# Patient Record
Sex: FEMALE | Employment: FULL TIME | URBAN - METROPOLITAN AREA
[De-identification: names, ages, dates, MRNs, and addresses within clinical notes are randomized per-mention and may not be internally consistent; named-entity substitution may affect disease eponyms.]

---

## 2017-12-04 ENCOUNTER — OFFICE VISIT (OUTPATIENT)
Dept: OBGYN CLINIC | Age: 27
End: 2017-12-04

## 2017-12-04 VITALS
DIASTOLIC BLOOD PRESSURE: 64 MMHG | BODY MASS INDEX: 31.42 KG/M2 | SYSTOLIC BLOOD PRESSURE: 108 MMHG | WEIGHT: 177.38 LBS

## 2017-12-04 DIAGNOSIS — Z34.80 PRENATAL CARE OF MULTIGRAVIDA, ANTEPARTUM: ICD-10-CM

## 2017-12-04 DIAGNOSIS — Z34.81 PRENATAL CARE, SUBSEQUENT PREGNANCY IN FIRST TRIMESTER: Primary | ICD-10-CM

## 2017-12-04 NOTE — PATIENT INSTRUCTIONS
Weeks 6 to 10 of Your Pregnancy: Care Instructions  Your Care Instructions    Congratulations on your pregnancy. This is an exciting and important time for you. During the first 6 to 10 weeks of your pregnancy, your body goes through many changes. Your baby grows very fast, even though you cannot feel it yet. You may start to notice that you feel different, both in your body and your emotions. Because each woman's pregnancy is unique, there is no right way to feel. You may feel the healthiest you have ever been, or you may feel tired or sick to your stomach (\"morning sickness\"). These early weeks are a time to make healthy choices and to eat the best foods for you and your baby. This care sheet will give you some ideas. This is also a good time to think about birth defects testing. These are tests done during pregnancy to look for possible problems with the baby. First trimester tests for birth defects can be done between 8 and 17 weeks of pregnancy, depending on the test. Talk with your doctor about what kinds of tests are available. Follow-up care is a key part of your treatment and safety. Be sure to make and go to all appointments, and call your doctor if you are having problems. It's also a good idea to know your test results and keep a list of the medicines you take. How can you care for yourself at home? Eat well  · Eat at least 3 meals and 2 healthy snacks every day. Eat fresh, whole foods, including:  ¨ 7 or more servings of bread, tortillas, cereal, rice, pasta, or oatmeal.  ¨ 3 or more servings of vegetables, especially leafy green vegetables. ¨ 2 or more servings of fruits. ¨ 3 or more servings of milk, yogurt, or cheese. ¨ 2 or more servings of meat, turkey, chicken, fish, eggs, or dried beans. · Drink plenty of fluids, especially water. Avoid sodas and other sweetened drinks. · Choose foods that have important vitamins for your baby, such as calcium, iron, and folate.   ¨ Dairy products, tofu, canned fish with bones, almonds, broccoli, dark leafy greens, corn tortillas, and fortified orange juice are good sources of calcium. ¨ Beef, poultry, liver, spinach, lentils, dried beans, fortified cereals, and dried fruits are rich in iron. ¨ Dark leafy greens, broccoli, asparagus, liver, fortified cereals, orange juice, peanuts, and almonds are good sources of folate. · Avoid foods that could harm your baby. ¨ Do not eat raw or undercooked meat, chicken, or fish (such as sushi or raw oysters). ¨ Do not eat raw eggs or foods that contain raw eggs, such as Caesar dressing. ¨ Do not eat soft cheeses and unpasteurized dairy foods, such as Brie, feta, or blue cheese. ¨ Do not eat fish that contains a lot of mercury, such as shark, swordfish, tilefish, or jerome mackerel. Do not eat more than 6 ounces of tuna each week. ¨ Do not eat raw sprouts, especially alfalfa sprouts. ¨ Cut down on caffeine, such as coffee, tea, and cola. Protect yourself and your baby  · Do not touch paula litter or cat feces. They can cause an infection that could harm your baby. · High body temperature can be harmful to your baby. So if you want to use a sauna or hot tub, be sure to talk to your doctor about how to use it safely. [employer] with morning sickness  · Sip small amounts of water, juices, or shakes. Try drinking between meals, not with meals. · Eat 5 or 6 small meals a day. Try dry toast or crackers when you first get up, and eat breakfast a little later. · Avoid spicy, greasy, and fatty foods. · When you feel sick, open your windows or go for a short walk to get fresh air. · Try nausea wristbands. These help some women. · Tell your doctor if you think your prenatal vitamins make you sick. Where can you learn more? Go to http://prasanth.info/. Enter G112 in the search box to learn more about \"Weeks 6 to 10 of Your Pregnancy: Care Instructions. \"  Current as of: March 16, 2017  Content Version: 11.4  © 5350-0063 Healthwise, Incorporated. Care instructions adapted under license by [employer] (which disclaims liability or warranty for this information). If you have questions about a medical condition or this instruction, always ask your healthcare professional. [address] any warranty or liability for your use of this information.

## 2017-12-04 NOTE — PROGRESS NOTES
Current pregnancy history:    Leopoldo Moose is a ,  28 y.o. female PATIENT REFUSED No LMP recorded. .  She presents for the evaluation of amenorrhea and a positive pregnancy test.    LMP history:  The date of her LMP is  certain. Her last menstrual period was normal.  A urine pregnancy test was positive      Based on her LMP, her EDC is 2018 and her EGA is 8 weeks,3 days. Her menstrual cycles are regular and occur approximately every 28 days  and range from 3 to 5 days. Ultrasound data:  She had an  ultrasound done by the ultrasound tech today which revealed a viable gonzalez pregnancy with a gestational age of 11 weeks and 2 days giving an [address] of 2018. Per US-   ULTRASOUND PERFORMED  A SINGLE VIABLE 8W2D IUP IS SEEN WITH NORMAL CARDIAC RHYTHM. GESTATIONAL AGE BASED ON TODAYS ULTRASOUND. A NORMAL YOLK SAC IS SEEN. RIGHT OVARY APPEARS WITHIN NORMAL LIMITS. LEFT OVARY APPEARS WITHIN NORMAL LIMITS. NO FREE FLUID IS SEEN IN THE CDS. Pregnancy symptoms:    Since her LMP she has experienced  urinary frequency, breast tenderness, and nausea. She has not been vomiting over the last few weeks. Associated signs and symptoms which she denies: dysuria, discharge, vaginal bleeding. Relevant past pregnancy history:   She has the following pregnancy history: Her last pregnancy was uncomplicated. She has no history of  delivery. Relevant past medical history:(relevant to this pregnancy): noncontributory. Pap/Occupational history:  Last pap smear: last year Results: Normal            Substance history: negative for alcohol, tobacco and street drugs. Positive for nothing. Exposure history: There is/are no indoor cat/s in the home. The patient was instructed to not change the cat litter. She admits close contact with children on a regular basis.    She has had chicken pox or the vaccine in the past.   Patient denies issues with domestic violence. Genetic Screening/Teratology Counseling: (Includes patient, baby's father, or anyone in either family with:)  3.  Patient's age >/= 28 at EDC?--no  2. Thalassemia ([facility] [place], [address], or  background): MCV<80?--no.     3.  Neural tube defect (meningomyelocele, spina bifida, anencephaly)?--no.   4.  Congenital heart defect?--no.  5.  Down syndrome?--no.   6.  Mateus-Sachs ([place], [facility])?--no.   7.  Canavan's Disease?--no.   8.  Familial Dysautonomia?--no.   9.  Sickle cell disease or trait ()? --no   The patient has not been tested for sickle trait  10. Hemophilia or other blood disorders?--no. 11.  Muscular dystrophy?--no. 12.  Cystic fibrosis?--no. 13.  Roland's Chorea?--no. 14.  Mental retardation/autism (if yes was person tested for Fragile X)?--no. 15.  Other inherited genetic or chromosomal disorder?--no. 12.  Maternal metabolic disorder (DM, PKU, etc)?--no. 17.  Patient or FOB with a child with a birth defect not listed above?--no.  17a. Patient or FOB with a birth defect themselves?--no. 18.  Recurrent pregnancy loss, or stillbirth?--no. 19.  Any medications since LMP other than prenatal vitamins (include vitamins, supplements, OTC meds, drugs, alcohol)?--no. 20.  Any other genetic/environmental exposure to discuss?--no. Infection History:  1. Lives with someone with TB or TB exposed?--no.   2.  Patient or partner has history of genital herpes?--no.  3.  Rash or viral illness since LMP?--no.    4.  History of STD (GC, CT, HPV, syphilis, HIV)? --no   5. Other: OTHER? No past medical history on file. No past surgical history on file. Social History     Occupational History    Not on file.      Social History Main Topics    Smoking status: Current Every Day Smoker     Packs/day: 0.50    Smokeless tobacco: Former User     Quit date: 1/4/2016    Alcohol use No    Drug use: No    Sexual activity: Yes     Partners: Male     Family History Problem Relation Age of Onset    Hypertension Father      OB History    Para Term  AB Living   1 1 1   1   SAB TAB Ectopic Molar Multiple Live Births       0 1      # Outcome Date GA Lbr John/2nd Weight Sex Delivery Anes PTL Lv   1 Term 16 40w1d 11:16 / 02:24 7 lb 13 oz (3.545 kg) F Vag-Vacuum EPIDURAL AN N DAPHNE        No Known Allergies  Prior to Admission medications    Medication Sig Start Date End Date Taking? Authorizing Provider   oxyCODONE-acetaminophen (PERCOCET) 5-325 mg per tablet Take 1 Tab by mouth every four (4) hours as needed for Pain. Max Daily Amount: 6 Tabs. 9/15/16   Richa Osman MD   ibuprofen (MOTRIN) 600 mg tablet Take 1 Tab by mouth every six (6) hours as needed for Pain. 9/15/16   Richa Osman MD   prenatal vit-calcium-iron-fa (PRENATAL PLUS, CALCIUM CARB,) 27 mg iron- 1 mg tab Take 1 Tab by mouth daily. Historical Provider   ferrous sulfate (IRON) 325 mg (65 mg iron) tablet Take  by mouth Daily (before breakfast). Historical Provider   ondansetron (ZOFRAN ODT) 4 mg disintegrating tablet Take 1 Tab by mouth every eight (8) hours as needed for Nausea.  13   Yong Sofia MD        Review of Systems: History obtained from the patient  Constitutional: negative for weight loss, fever, night sweats  HEENT: negative for hearing loss, earache, congestion, snoring, sore throat  CV: negative for chest pain, palpitations, edema  Resp: negative for cough, shortness of breath, wheezing  Breast: negative for breast lumps, nipple discharge, galactorrhea  GI: negative for change in bowel habits, abdominal pain, black or bloody stools  : negative for frequency, dysuria, hematuria, vaginal discharge  MSK: negative for back pain, joint pain, muscle pain  Skin: negative for itching, rash, hives  Neuro: negative for dizziness, headache, confusion, weakness  Psych: negative for anxiety, depression, change in mood  Heme/lymph: negative for bleeding, bruising, pallor    Objective: There were no vitals taken for this visit. Physical Exam:     Constitutional  · Appearance: well-nourished, well developed, alert, in no acute distress    HENT  · Head  · Face: appears normal  · Eyes: appear normal  · Ears: normal  · Mouth: normal  · Lips: no lesions      Chest  · Respiratory Effort: breathing unlabored     Cardiovascular  · Heart:  · Auscultation: regular rate and rhythm without murmur      Gastrointestinal  · Abdominal Examination: abdomen non-tender to palpation, normal bowel sounds, no masses present  · Liver and spleen: no hepatomegaly present, spleen not palpable  · Hernias: no hernias identified    Genitourinary  · deferred    Skin  · General Inspection: no rash, no lesions identified    Neurologic/Psychiatric  · Mental Status:  · Orientation: grossly oriented to person, place and time  · Mood and Affect: mood normal, affect appropriate    Assessment:   Intrauterine pregnancy with the following problems identified: see prob list.    Plan:     Offered CF testing, CVS, Nuchal Translucency, MSAFP, amnio, and discussed NIPT  Course of pregnancy discussed including visit schedule, routine U/S, glucola testing, etc.  Avoid alcoholic beverages and illicit/recreational drugs use  Take prenatal vitamins or folic acid daily. Hospital and practice style discussed with coverage system. Discussed nutrition, toxoplasmosis precautions, sexual activity, exercise, need for influenza vaccine, environmental and work hazards, travel advice, screen for domestic violence, need for seat belts. Discussed seafood, unpasteurized dairy products, deli meat, artificial sweeteners, and caffeine. Discussed current prescription drug use. Given medication list.  Discussed the use of over the counter medications and chemicals. Route of delivery discussed, including risks, benefits     Handouts given to pt.

## 2017-12-04 NOTE — MR AVS SNAPSHOT
Visit Information Date & Time Provider Department Dept. Phone Encounter #  
 21/9/3066 74:12 AM Leonardo Pastor MD [address] [facility] [phone] [id number] Upcoming Health Maintenance Date Due  
 PAP AKA CERVICAL CYTOLOGY 4/7/2010 Influenza Age 5 to Adult 8/1/2017 Allergies as of 12/4/2017  Review Complete On: 9/15/2016 By: Diane Evans RN No Known Allergies Current Immunizations  Never Reviewed No immunizations on file. Not reviewed this visit You Were Diagnosed With   
  
 Codes Comments Prenatal care, subsequent pregnancy in first trimester    -  Primary ICD-10-CM: Z34.81 ICD-9-CM: V22.1 Vitals BP Weight(growth percentile) Breastfeeding? BMI OB Status Smoking Status 108/64 (BP 1 Location: Right arm, BP Patient Position: Sitting) 177 lb 6 oz (80.5 kg) No 31.42 kg/m2 Pregnant Former Smoker BMI and BSA Data Body Mass Index Body Surface Area  
 31.42 kg/m 2 1.89 m 2 Your Updated Medication List  
  
   
This list is accurate as of: 12/4/17 11:28 AM.  Always use your most recent med list.  
  
  
  
  
 ibuprofen 600 mg tablet Commonly known as:  MOTRIN Take 1 Tab by mouth every six (6) hours as needed for Pain. Iron 325 mg (65 mg iron) tablet Generic drug:  ferrous sulfate Take  by mouth Daily (before breakfast). ondansetron 4 mg disintegrating tablet Commonly known as:  ZOFRAN ODT Take 1 Tab by mouth every eight (8) hours as needed for Nausea. oxyCODONE-acetaminophen 5-325 mg per tablet Commonly known as:  PERCOCET Take 1 Tab by mouth every four (4) hours as needed for Pain. Max Daily Amount: 6 Tabs. PRENATAL PLUS (CALCIUM CARB) 27 mg iron- 1 mg Tab Generic drug:  prenatal vit-calcium-iron-fa Take 1 Tab by mouth daily. Patient Instructions Weeks 6 to 10 of Your Pregnancy: Care Instructions Your Care Instructions Congratulations on your pregnancy. This is an exciting and important time for you. During the first 6 to 10 weeks of your pregnancy, your body goes through many changes. Your baby grows very fast, even though you cannot feel it yet. You may start to notice that you feel different, both in your body and your emotions. Because each woman's pregnancy is unique, there is no right way to feel. You may feel the healthiest you have ever been, or you may feel tired or sick to your stomach (\"morning sickness\"). These early weeks are a time to make healthy choices and to eat the best foods for you and your baby. This care sheet will give you some ideas. This is also a good time to think about birth defects testing. These are tests done during pregnancy to look for possible problems with the baby. First trimester tests for birth defects can be done between 8 and 17 weeks of pregnancy, depending on the test. Talk with your doctor about what kinds of tests are available. Follow-up care is a key part of your treatment and safety. Be sure to make and go to all appointments, and call your doctor if you are having problems. It's also a good idea to know your test results and keep a list of the medicines you take. How can you care for yourself at home? Eat well · Eat at least 3 meals and 2 healthy snacks every day. Eat fresh, whole foods, including: ¨ 7 or more servings of bread, tortillas, cereal, rice, pasta, or oatmeal. 
¨ 3 or more servings of vegetables, especially leafy green vegetables. ¨ 2 or more servings of fruits. ¨ 3 or more servings of milk, yogurt, or cheese. ¨ 2 or more servings of meat, turkey, chicken, fish, eggs, or dried beans. · Drink plenty of fluids, especially water. Avoid sodas and other sweetened drinks. · Choose foods that have important vitamins for your baby, such as calcium, iron, and folate.  
¨ Dairy products, tofu, canned fish with bones, almonds, broccoli, dark leafy greens, corn tortillas, and fortified orange juice are good sources of calcium. ¨ Beef, poultry, liver, spinach, lentils, dried beans, fortified cereals, and dried fruits are rich in iron. ¨ Dark leafy greens, broccoli, asparagus, liver, fortified cereals, orange juice, peanuts, and almonds are good sources of folate. · Avoid foods that could harm your baby. ¨ Do not eat raw or undercooked meat, chicken, or fish (such as sushi or raw oysters). ¨ Do not eat raw eggs or foods that contain raw eggs, such as Caesar dressing. ¨ Do not eat soft cheeses and unpasteurized dairy foods, such as Brie, feta, or blue cheese. ¨ Do not eat fish that contains a lot of mercury, such as shark, swordfish, tilefish, or jerome mackerel. Do not eat more than 6 ounces of tuna each week. ¨ Do not eat raw sprouts, especially alfalfa sprouts. ¨ Cut down on caffeine, such as coffee, tea, and cola. Protect yourself and your baby · Do not touch paula litter or cat feces. They can cause an infection that could harm your baby. · High body temperature can be harmful to your baby. So if you want to use a sauna or hot tub, be sure to talk to your doctor about how to use it safely. [place] with morning sickness · Sip small amounts of water, juices, or shakes. Try drinking between meals, not with meals. · Eat 5 or 6 small meals a day. Try dry toast or crackers when you first get up, and eat breakfast a little later. · Avoid spicy, greasy, and fatty foods. · When you feel sick, open your windows or go for a short walk to get fresh air. · Try nausea wristbands. These help some women. · Tell your doctor if you think your prenatal vitamins make you sick. Where can you learn more? Go to http://prasanth.info/. Enter G112 in the search box to learn more about \"Weeks 6 to 10 of Your Pregnancy: Care Instructions. \" Current as of: March 16, 2017 Content Version: 11.4 © 9520-9973 Healthwise, Incorporated. Care instructions adapted under license by [employer] (which disclaims liability or warranty for this information). If you have questions about a medical condition or this instruction, always ask your healthcare professional. [address] any warranty or liability for your use of this information. Introducing \[facility]\"" & HEALTH SERVICES! Hue Pollard introduces [facility] patient portal. Now you can access parts of your medical record, email your doctor's office, and request medication refills online. 1. In your internet browser, go to [email or web address] 2. Click on the First Time User? Click Here link in the Sign In box. You will see the New Member Sign Up page. 3. Enter your [facility] Access Code exactly as it appears below. You will not need to use this code after youve completed the sign-up process. If you do not sign up before the expiration date, you must request a new code. · [facility] Access Code: [email or web address] Expires: 3/4/2018 11:28 AM 
 
4. Enter the last four digits of your Social Security Number (xxxx) and Date of Birth (mm/dd/yyyy) as indicated and click Submit. You will be taken to the next sign-up page. 5. Create a [facility] ID. This will be your [facility] login ID and cannot be changed, so think of one that is secure and easy to remember. 6. Create a [facility] password. You can change your password at any time. 7. Enter your Password Reset Question and Answer. This can be used at a later time if you forget your password. 8. Enter your e-mail address. You will receive e-mail notification when new information is available in [address] 9. Click Sign Up. You can now view and download portions of your medical record. 10. Click the Download Summary menu link to download a portable copy of your medical information.  
 
If you have questions, please visit the Frequently Asked Questions section of the [employer]. Remember, [employer] is NOT to be used for urgent needs. For medical emergencies, dial 911. Now available from your iPhone and Android! Please provide this summary of care documentation to your next provider. Your primary care clinician is listed as Sharmaine Okeefe. If you have any questions after today's visit, please call [phone].

## 2017-12-26 ENCOUNTER — TELEPHONE (OUTPATIENT)
Dept: OBGYN CLINIC | Age: 27
End: 2017-12-26

## 2017-12-26 NOTE — TELEPHONE ENCOUNTER
I called patient and advised her not to touch sores and good handwashing until outbreak is gone. Appt next week if she wants titers done.

## 2017-12-26 NOTE — TELEPHONE ENCOUNTER
Patient calling stating that she and her family just moved in with her parents and her mother was dx with the shingles on her face. Patient states that she has had the chicken pox in the past.  Patient advised that she needs to practice good handwashing skills and she should be fine as long as she is immune to varicella. Patient requested to speak with Dr. Vasu castañeda to determine if she needs testing regarding her mother's dx of shingles. Patient transferred to Dr. Vasu castañeda.

## 2018-01-02 ENCOUNTER — ROUTINE PRENATAL (OUTPATIENT)
Dept: OBGYN CLINIC | Age: 28
End: 2018-01-02

## 2018-01-02 VITALS — WEIGHT: 178 LBS | DIASTOLIC BLOOD PRESSURE: 74 MMHG | BODY MASS INDEX: 31.53 KG/M2 | SYSTOLIC BLOOD PRESSURE: 110 MMHG

## 2018-01-02 DIAGNOSIS — Z34.80 PRENATAL CARE OF MULTIGRAVIDA, ANTEPARTUM: ICD-10-CM

## 2018-01-02 DIAGNOSIS — Z34.81 PRENATAL CARE, SUBSEQUENT PREGNANCY IN FIRST TRIMESTER: Primary | ICD-10-CM

## 2018-01-03 LAB
BACTERIA UR CULT: NORMAL
C TRACH RRNA CVX QL NAA+PROBE: NEGATIVE
CYTOLOGIST CVX/VAG CYTO: NORMAL
CYTOLOGY CVX/VAG DOC THIN PREP: NORMAL
DX ICD CODE: NORMAL
LABCORP, 190119: NORMAL
Lab: NORMAL
N GONORRHOEA RRNA CVX QL NAA+PROBE: NEGATIVE
OTHER STN SPEC: NORMAL
PATH REPORT.FINAL DX SPEC: NORMAL
STAT OF ADQ CVX/VAG CYTO-IMP: NORMAL

## 2018-01-04 LAB
ABO GROUP BLD: NORMAL
BLD GP AB SCN SERPL QL: NEGATIVE
ERYTHROCYTE [DISTWIDTH] IN BLOOD BY AUTOMATED COUNT: 13.6 % (ref 12.3–15.4)
HBV SURFACE AG SERPL QL IA: NEGATIVE
HCT VFR BLD AUTO: 36 % (ref 34–46.6)
HGB BLD-MCNC: 12.4 G/DL (ref 11.1–15.9)
HIV 1+2 AB+HIV1 P24 AG SERPL QL IA: NON REACTIVE
MCH RBC QN AUTO: 30.2 PG (ref 26.6–33)
MCHC RBC AUTO-ENTMCNC: 34.4 G/DL (ref 31.5–35.7)
MCV RBC AUTO: 88 FL (ref 79–97)
PLATELET # BLD AUTO: 247 X10E3/UL (ref 150–379)
RBC # BLD AUTO: 4.11 X10E6/UL (ref 3.77–5.28)
RH BLD: POSITIVE
RUBV IGG SERPL IA-ACNC: 10.2 INDEX
T PALLIDUM AB SER QL IA: NEGATIVE
WBC # BLD AUTO: 6.6 X10E3/UL (ref 3.4–10.8)

## 2018-01-22 ENCOUNTER — ROUTINE PRENATAL (OUTPATIENT)
Dept: OBGYN CLINIC | Age: 28
End: 2018-01-22

## 2018-01-22 VITALS
WEIGHT: 178.2 LBS | HEIGHT: 63 IN | BODY MASS INDEX: 31.57 KG/M2 | SYSTOLIC BLOOD PRESSURE: 102 MMHG | DIASTOLIC BLOOD PRESSURE: 68 MMHG

## 2018-01-22 DIAGNOSIS — Z3A.15 15 WEEKS GESTATION OF PREGNANCY: Primary | ICD-10-CM

## 2018-01-22 NOTE — PROGRESS NOTES
Here for routine prenatal visit  Reviewed midwifery services.   Declines Tetra today  FAS next visit

## 2018-01-22 NOTE — PROGRESS NOTES
Problem List  Date Reviewed: 1/2/2018          Codes Class Noted    Prenatal care of multigravida, antepartum ICD-10-CM: Z34.80  ICD-9-CM: V22.1  12/4/2017    Overview Addendum 8/9/0038  0:25 AM by Sherri Bains MD     Primary provider- Dr. Cristiane Hayward   VAVD-09/2016; 7lbs 13oz at 40/1wks; Dr Kingston Ramires;  [facility]; now 14m  EDC by LMP/ US  IOB labs:  done  Genetic Screening: declines panoroma/horizon; tetra__  Anatomy:  GTT:  Flu:__ TDAP:__  Rhogam:  GBS:  Circ:

## 2018-01-22 NOTE — PATIENT INSTRUCTIONS
Weeks 14 to 18 of Your Pregnancy: Care Instructions  Your Care Instructions    During this time, you may start to \"show,\" so that you look pregnant to people around you. You may also notice some changes in your skin, such as itchy spots on your palms or acne on your face. Your baby is now able to pass urine, and your baby's first stool (meconium) is starting to collect in his or her intestines. Hair is also beginning to grow on your baby's head. At your next visit, between weeks 18 and 20, your doctor may do an ultrasound test. The test allows your doctor to check for certain problems. Your doctor can also tell the sex of your baby. This is a good time to think about whether you want to know whether your baby is a boy or a girl. Talk to your doctor about getting a flu shot to help keep you healthy during your pregnancy. As your pregnancy moves along, it is common to worry or feel anxious. Your body is changing a lot. And you are thinking about giving birth, the health of your baby, and becoming a parent. You can learn to cope with any anxiety and stress you feel. Follow-up care is a key part of your treatment and safety. Be sure to make and go to all appointments, and call your doctor if you are having problems. It's also a good idea to know your test results and keep a list of the medicines you take. How can you care for yourself at home? ?Reduce stress  ? · Ask for help with cooking and housekeeping. ? · Figure out who or what causes your stress. Avoid these people or situations as much as possible. ? · Relax every day. Taking 10- to 15-minute breaks can make a big difference. Take a walk, listen to music, or take a warm bath. ? · Learn relaxation techniques at prenatal or yoga class. Or buy a relaxation tape. ? · List your fears about having a baby and becoming a parent. Share the list with someone you trust. Decide which worries are really small, and try to let them go. Exercise  ?  · If you did not exercise much before pregnancy, start slowly. Walking is best. Marzella  yourself, and do a little more every day. ? · Brisk walking, easy jogging, low-impact aerobics, water aerobics, and yoga are good choices. Some sports, such as scuba diving, horseback riding, downhill skiing, gymnastics, and water skiing, are not a good idea. ? · Try to do at least 2½ hours a week of moderate exercise, such as a fast walk. One way to do this is to be active 30 minutes a day, at least 5 days a week. It's fine to be active in blocks of 10 minutes or more throughout your day and week. ? · Wear loose clothing. And wear shoes and a bra that provide good support. ? · Warm up and cool down to start and finish your exercise. ? · If you want to use weights, be sure to use light weights. They reduce stress on your joints. ?Stay at the best weight for you  ? · Experts recommend that you gain about 1 pound a month during the first 3 months of your pregnancy. ? · Experts recommend that you gain about 1 pound a week during your last 6 months of pregnancy, for a total weight gain of 25 to 35 pounds. ? · If you are underweight, you will need to gain more weight (about 28 to 40 pounds). ? · If you are overweight, you may not need to gain as much weight (about 15 to 25 pounds). ? · If you are gaining weight too fast, use common sense. Exercise every day, and limit sweets, fast foods, and fats. Choose lean meats, fruits, and vegetables. ? · If you are having twins or more, your doctor may refer you to a dietitian. Where can you learn more? Go to http://jae-joni.info/. Enter N616 in the search box to learn more about \"Weeks 14 to 18 of Your Pregnancy: Care Instructions. \"  Current as of: March 16, 2017  Content Version: 11.4  © 4959-7863 [employer]. Care instructions adapted under license by easy2map (which disclaims liability or warranty for this information).  If you have questions about a medical condition or this instruction, always ask your healthcare professional. [place] [address] any warranty or liability for your use of this information.

## 2018-02-20 ENCOUNTER — ROUTINE PRENATAL (OUTPATIENT)
Dept: OBGYN CLINIC | Age: 28
End: 2018-02-20

## 2018-02-20 VITALS — WEIGHT: 179 LBS | BODY MASS INDEX: 31.71 KG/M2 | DIASTOLIC BLOOD PRESSURE: 70 MMHG | SYSTOLIC BLOOD PRESSURE: 112 MMHG

## 2018-02-20 DIAGNOSIS — Z34.80 PRENATAL CARE OF MULTIGRAVIDA, ANTEPARTUM: ICD-10-CM

## 2018-02-20 DIAGNOSIS — Z34.82 PRENATAL CARE, SUBSEQUENT PREGNANCY IN SECOND TRIMESTER: Primary | ICD-10-CM

## 2018-02-20 NOTE — PATIENT INSTRUCTIONS

## 2018-02-20 NOTE — MR AVS SNAPSHOT
[address] [place] [address] [facility] 
[phone] Patient: Chasity Chakraborty MRN: [id number] LWR:[birth year] Visit Information Date & Time Provider Department Dept. Phone Encounter #  
 6/59/5002  1:63 AM Sedrick Chiu MD [facility] [facility] [phone] [id number] Upcoming Health Maintenance Date Due Influenza Age 5 to Adult 8/1/2017 PAP AKA CERVICAL CYTOLOGY 1/2/2021 Allergies as of 2/20/2018  Review Complete On: 2/20/2018 By: Milla Elizabeth No Known Allergies Current Immunizations  Never Reviewed No immunizations on file. Not reviewed this visit You Were Diagnosed With   
  
 Codes Comments Prenatal care, subsequent pregnancy in second trimester    -  Primary ICD-10-CM: Z34.82 
ICD-9-CM: V22.1 Vitals BP Weight(growth percentile) BMI OB Status Smoking Status 112/70 179 lb (81.2 kg) 31.71 kg/m2 Pregnant Former Smoker BMI and BSA Data Body Mass Index Body Surface Area 31.71 kg/m 2 1.9 m 2 Your Updated Medication List  
  
   
This list is accurate as of: 2/20/18  8:40 AM.  Always use your most recent med list.  
  
  
  
  
 Iron 325 mg (65 mg iron) tablet Generic drug:  ferrous sulfate Take  by mouth Daily (before breakfast). PRENATAL PLUS (CALCIUM CARB) 27 mg iron- 1 mg Tab Generic drug:  prenatal vit-calcium-iron-fa Take 1 Tab by mouth daily. Patient Instructions Weeks 18 to 22 of Your Pregnancy: Care Instructions Your Care Instructions Your baby is continuing to develop quickly. At this stage, babies can now suck their thumbs,  firmly with their hands, and open and close their eyelids. Sometime between 18 and 22 weeks, you will start to feel your baby move. At first, these small fetal movements feel like fluttering or \"butterflies. \" Some women say that they feel like gas bubbles.  As the baby grows, these movements will become stronger. You may also notice that your baby kicks and hiccups. During this time, you may find that your nausea and fatigue are gone. Overall, you may feel better and have more energy than you did in your first trimester. But you may also have new discomforts now, such as sleep problems or leg cramps. This care sheet can help you ease these discomforts. Follow-up care is a key part of your treatment and safety. Be sure to make and go to all appointments, and call your doctor if you are having problems. It's also a good idea to know your test results and keep a list of the medicines you take. How can you care for yourself at home? Ease sleep problems · Avoid caffeine in drinks or chocolate late in the day. · Get some exercise every day. · Take a warm shower or bath before bed. · Have a light snack or glass of milk at bedtime. · Do relaxation exercises in bed to calm your mind and body. · Support your legs and back with extra pillows. Try a pillow between your legs if you sleep on your side. · Do not use sleeping pills or alcohol. They could harm your baby. Ease leg cramps · Do not massage your calf during the cramp. · Sit on a firm bed or chair. Straighten your leg, and bend your foot (flex your ankle) slowly upward, toward your knee. Bend your toes up and down. · Stand on a cool, flat surface. Stretch your toes upward, and take small steps walking on your heels. · Use a heating pad or hot water bottle to help with muscle ache. Prevent leg cramps · Be sure to get enough calcium. If you are worried that you are not getting enough, talk to your doctor. · Exercise every day, and stretch your legs before bed. · Take a warm bath before bed, and try leg warmers at night. Where can you learn more? Go to http://jae-joni.info/. Enter J119 in the search box to learn more about \"Weeks 18 to 22 of Your Pregnancy: Care Instructions. \" 
 Current as of: March 16, 2017 Content Version: 11.4 © 1600-1971 Healthwise, [employer]. Care instructions adapted under license by [employer] (which disclaims liability or warranty for this information). If you have questions about a medical condition or this instruction, always ask your healthcare professional. [address] any warranty or liability for your use of this information. Introducing [facility] & HEALTH SERVICES! [facility] introduces [employer] patient portal. Now you can access parts of your medical record, email your doctor's office, and request medication refills online. 1. In your internet browser, go to [email or web address]. [employer] 2. Click on the First Time User? Click Here link in the Sign In box. You will see the New Member Sign Up page. 3. Enter your [employer] Access Code exactly as it appears below. You will not need to use this code after youve completed the sign-up process. If you do not sign up before the expiration date, you must request a new code. · [employer] Access Code: [email or web address] Expires: 3/4/2018 11:28 AM 
 
4. Enter the last four digits of your Social Security Number (xxxx) and Date of Birth (mm/dd/yyyy) as indicated and click Submit. You will be taken to the next sign-up page. 5. Create a [employer] ID. This will be your [employer] login ID and cannot be changed, so think of one that is secure and easy to remember. 6. Create a [employer] password. You can change your password at any time. 7. Enter your Password Reset Question and Answer. This can be used at a later time if you forget your password. 8. Enter your e-mail address. You will receive e-mail notification when new information is available in [address] 9. Click Sign Up. You can now view and download portions of your medical record. 10. Click the Download Summary menu link to download a portable copy of your medical information. If you have questions, please visit the Frequently Asked Questions section of the [employer] website. Remember, [employer] is NOT to be used for urgent needs. For medical emergencies, dial 911. Now available from your iPhone and Android! Please provide this summary of care documentation to your next provider. Your primary care clinician is listed as Ras Esteban. If you have any questions after today's visit, please call [phone].

## 2018-03-19 ENCOUNTER — ROUTINE PRENATAL (OUTPATIENT)
Dept: OBGYN CLINIC | Age: 28
End: 2018-03-19

## 2018-03-19 VITALS — SYSTOLIC BLOOD PRESSURE: 122 MMHG | BODY MASS INDEX: 32.51 KG/M2 | DIASTOLIC BLOOD PRESSURE: 72 MMHG | WEIGHT: 183.5 LBS

## 2018-03-19 DIAGNOSIS — Z34.82 PRENATAL CARE, SUBSEQUENT PREGNANCY IN SECOND TRIMESTER: Primary | ICD-10-CM

## 2018-03-19 NOTE — MR AVS SNAPSHOT
[address] 57 
[phone] Patient: Francisco Gordon MRN: [id number] TYQ:4[birth year] Visit Information Date & Time Provider Department Dept. Phone Encounter #  
 6/03/4646 92:19 AM Jessy Haines MD [address] [facility] [phone] [id number] Your Appointments 4/17/2018  8:00 AM  
[facility] with [facility] AT [address] [place] [address] ([address] [place] [address]) Appt Note: u/s then ah w/ glucola(marginal cord insertion) [place] [address], [place] [address] [facility] [address]  
[address], [facility]5 [place] [id number]  
  
    
  
 4/17/2018  8:30 AM  
OB VISIT with Jessy Haines MD  
[address] [facility] ([address] [place] [address]) Appt Note: u/s then ah w/ glucola(marginal cord insertion) [place] [address], [place] 219 [facility] [address]  
[address], [address] [facility] 57 Upcoming Health Maintenance Date Due Influenza Age 5 to Adult 8/1/2017 PAP AKA CERVICAL CYTOLOGY 1/2/2021 Allergies as of 3/19/2018  Review Complete On: 3/19/2018 By: Taylor Linn No Known Allergies Current Immunizations  Never Reviewed No immunizations on file. Not reviewed this visit You Were Diagnosed With   
  
 Codes Comments Prenatal care, subsequent pregnancy in second trimester    -  Primary ICD-10-CM: Z34.82 
ICD-9-CM: V22.1 Vitals BP Weight(growth percentile) BMI OB Status Smoking Status 122/72 183 lb 8 oz (83.2 kg) 32.51 kg/m2 Pregnant Former Smoker BMI and BSA Data Body Mass Index Body Surface Area 32.51 kg/m 2 1.92 m 2 Your Updated Medication List  
  
   
This list is accurate as of 3/19/18 11:12 AM.  Always use your most recent med list.  
  
  
  
  
 PRENATAL PLUS (CALCIUM CARB) 27 mg iron- 1 mg Tab Generic drug:  prenatal vit-calcium-iron-fa Take 1 Tab by mouth daily. Patient Instructions Weeks 22 to 26 of Your Pregnancy: Care Instructions Your Care Instructions As you enter your 7th month of pregnancy at week 26, your baby's lungs are growing stronger and getting ready to breathe. You may notice that your baby responds to the sound of your or your partner's voice. You may also notice that your baby does less turning and twisting and more squirming or jerking. Jerking often means that your baby has the hiccups. Hiccups are perfectly normal and are only temporary. You may want to think about attending a childbirth preparation class. This is also a good time to start thinking about whether you want to have pain medicine during labor. Most pregnant women are tested for gestational diabetes between weeks 25 and 28. Gestational diabetes occurs when your blood sugar level gets too high when you're pregnant. The test is important, because you can have gestational diabetes and not know it. But the condition can cause problems for your baby. Follow-up care is a key part of your treatment and safety. Be sure to make and go to all appointments, and call your doctor if you are having problems. It's also a good idea to know your test results and keep a list of the medicines you take. How can you care for yourself at home? Ease discomfort from your baby's kicking · Change your position. Sometimes this will cause your baby to change position too. · Take a deep breath while you raise your arm over your head. Then breathe out while you drop your arm. Do Kegel exercises to prevent urine from leaking · You can do Kegel exercises while you stand or sit. ¨ Squeeze the same muscles you would use to stop your urine. Your belly and thighs should not move. ¨ Hold the squeeze for 3 seconds, and then relax for 3 seconds. ¨ Start with 3 seconds. Then add 1 second each week until you are able to squeeze for 10 seconds. ¨ Repeat the exercise 10 to 15 times for each session. Do three or more sessions each day. Ease or reduce swelling in your feet, ankles, hands, and fingers · If your fingers are puffy, take off your rings. · Do not eat high-salt foods, such as potato chips. · Prop up your feet on a stool or couch as much as possible. Sleep with pillows under your feet. · Do not stand for long periods of time or wear tight shoes. · Wear support stockings. Where can you learn more? Go to http://jae-joni.info/. Enter G264 in the search box to learn more about \"Weeks 22 to 26 of Your Pregnancy: Care Instructions. \" Current as of: March 16, 2017 Content Version: 11.4 © 8084-2663 [employer]. Care instructions adapted under license by Seeq (which disclaims liability or warranty for this information). If you have questions about a medical condition or this instruction, always ask your healthcare professional. [place] [address] any warranty or liability for your use of this information. Introducing \[facility]\"" & HEALTH SERVICES! [employer] introduces [employer] patient portal. Now you can access parts of your medical record, email your doctor's office, and request medication refills online. 1. In your internet browser, go to [email or web address] [email or web address]/IndaBox 2. Click on the First Time User? Click Here link in the Sign In box. You will see the New Member Sign Up page. 3. Enter your Add[employer] Access Code exactly as it appears below. You will not need to use this code after youve completed the sign-up process. If you do not sign up before the expiration date, you must request a new code. · Add[employer] Access Code: [email or web address] Expires: 6/17/2018 11:12 AM 
 
4. Enter the last four digits of your Social Security Number (xxxx) and Date of Birth (mm/dd/yyyy) as indicated and click Submit. You will be taken to the next sign-up page. 5. Create a BugHerd ID. This will be your Bug[employer] login ID and cannot be changed, so think of one that is secure and easy to remember. 6. Create a [employer] password. You can change your password at any time. 7. Enter your Password Reset Question and Answer. This can be used at a later time if you forget your password. 8. Enter your e-mail address. You will receive e-mail notification when new information is available in [address] 9. Click Sign Up. You can now view and download portions of your medical record. 10. Click the Download Summary menu link to download a portable copy of your medical information. If you have questions, please visit the Frequently Asked Questions section of the [employer] website. Remember, [employer] is NOT to be used for urgent needs. For medical emergencies, dial 911. Now available from your iPhone and Android! Please provide this summary of care documentation to your next provider. Your primary care clinician is listed as Esthela Garcia. If you have any questions after today's visit, please call [phone].

## 2018-03-19 NOTE — PATIENT INSTRUCTIONS
Weeks 22 to 26 of Your Pregnancy: Care Instructions  Your Care Instructions    As you enter your 7th month of pregnancy at week 26, your baby's lungs are growing stronger and getting ready to breathe. You may notice that your baby responds to the sound of your or your partner's voice. You may also notice that your baby does less turning and twisting and more squirming or jerking. Jerking often means that your baby has the hiccups. Hiccups are perfectly normal and are only temporary. You may want to think about attending a childbirth preparation class. This is also a good time to start thinking about whether you want to have pain medicine during labor. Most pregnant women are tested for gestational diabetes between weeks 25 and 28. Gestational diabetes occurs when your blood sugar level gets too high when you're pregnant. The test is important, because you can have gestational diabetes and not know it. But the condition can cause problems for your baby. Follow-up care is a key part of your treatment and safety. Be sure to make and go to all appointments, and call your doctor if you are having problems. It's also a good idea to know your test results and keep a list of the medicines you take. How can you care for yourself at home? Ease discomfort from your baby's kicking  · Change your position. Sometimes this will cause your baby to change position too. · Take a deep breath while you raise your arm over your head. Then breathe out while you drop your arm. Do Kegel exercises to prevent urine from leaking  · You can do Kegel exercises while you stand or sit. ¨ Squeeze the same muscles you would use to stop your urine. Your belly and thighs should not move. ¨ Hold the squeeze for 3 seconds, and then relax for 3 seconds. ¨ Start with 3 seconds. Then add 1 second each week until you are able to squeeze for 10 seconds. ¨ Repeat the exercise 10 to 15 times for each session.  Do three or more sessions each day.  Ease or reduce swelling in your feet, ankles, hands, and fingers  · If your fingers are puffy, take off your rings. · Do not eat high-salt foods, such as potato chips. · Prop up your feet on a stool or couch as much as possible. Sleep with pillows under your feet. · Do not stand for long periods of time or wear tight shoes. · Wear support stockings. Where can you learn more? Go to http://jae-joni.info/. Enter G264 in the search box to learn more about \"Weeks 22 to 26 of Your Pregnancy: Care Instructions. \"  Current as of: March 16, 2017  Content Version: 11.4  © 3702-3687 Healthwise, [employer]. Care instructions adapted under license by [employer] (which disclaims liability or warranty for this information). If you have questions about a medical condition or this instruction, always ask your healthcare professional. [place] [address] any warranty or liability for your use of this information.

## 2018-04-17 ENCOUNTER — ROUTINE PRENATAL (OUTPATIENT)
Dept: OBGYN CLINIC | Age: 28
End: 2018-04-17

## 2018-04-17 VITALS — WEIGHT: 187.5 LBS | SYSTOLIC BLOOD PRESSURE: 110 MMHG | BODY MASS INDEX: 33.21 KG/M2 | DIASTOLIC BLOOD PRESSURE: 78 MMHG

## 2018-04-17 DIAGNOSIS — Z34.80 PRENATAL CARE OF MULTIGRAVIDA, ANTEPARTUM: ICD-10-CM

## 2018-04-17 DIAGNOSIS — Z23 ENCOUNTER FOR IMMUNIZATION: ICD-10-CM

## 2018-04-17 DIAGNOSIS — Z34.82 PRENATAL CARE, SUBSEQUENT PREGNANCY IN SECOND TRIMESTER: Primary | ICD-10-CM

## 2018-04-17 LAB
GTT, 1 HR, GLUCOLA, EXTERNAL: 106
HCT, EXTERNAL: 34.9
HGB, EXTERNAL: 11.8
PLATELET CNT,   EXTERNAL: 252
T. PALLIDUM, EXTERNAL: NORMAL

## 2018-04-17 NOTE — PROGRESS NOTES
Tdap vaccine, Lot [id number], Exp 5/30/20, given IM in left deltoid without complication per MD order. Consent signed.

## 2018-04-17 NOTE — PROGRESS NOTES
Glucola and Tdap today. Per US-LIMITED OB SCAN  A SINGLE VERTEX 27W4D IUP IS SEEN. FETAL CARDIAC MOTION OBSERVED. LIMITED ANATOMY WAS VISUALIZED AND APPEARS WNL. APPROPRIATE GROWTH MEASURED. SIZE = DATES. BELLA AND PLACENTA APPEAR WITHIN NORMAL LIMITS. THE  UMBILICAL CORD HAS MARGINAL INSERTION.     Mom living with them; now in hospice; managing well

## 2018-04-17 NOTE — PATIENT INSTRUCTIONS
Weeks 26 to 30 of Your Pregnancy: Care Instructions  Your Care Instructions    You are now in your last trimester of pregnancy. Your baby is growing rapidly. And you'll probably feel your baby moving around more often. Your doctor may ask you to count your baby's kicks. Your back may ache as your body gets used to your baby's size and length. If you haven't already had the Tdap shot during this pregnancy, talk to your doctor about getting it. It will help protect your  against pertussis infection. During this time, it's important to take care of yourself and pay attention to what your body needs. If you feel sexual, explore ways to be close with your partner that match your comfort and desire. Use the tips provided in this care sheet to find ways to be sexual in your own way. Follow-up care is a key part of your treatment and safety. Be sure to make and go to all appointments, and call your doctor if you are having problems. It's also a good idea to know your test results and keep a list of the medicines you take. How can you care for yourself at home? Take it easy at work  · Take frequent breaks. If possible, stop working when you are tired, and rest during your lunch hour. · Take bathroom breaks every 2 hours. · Change positions often. If you sit for long periods, stand up and walk around. · When you stand for a long time, keep one foot on a low stool with your knee bent. After standing a lot, sit with your feet up. · Avoid fumes, chemicals, and tobacco smoke. Be sexual in your own way  · Having sex during pregnancy is okay, unless your doctor tells you not to. · You may be very interested in sex, or you may have no interest at all. · Your growing belly can make it hard to find a good position during intercourse. [place] and explore. · You may get cramps in your uterus when your partner touches your breasts.   · A back rub may relieve the backache or cramps that sometimes follow orgasm. Learn about  labor  · Watch for signs of  labor. You may be going into labor if:  ¨ You have menstrual-like cramps, with or without nausea. ¨ You have about 4 or more contractions in 20 minutes, or about 8 or more within 1 hour, even after you have had a glass of water and are resting. ¨ You have a low, dull backache that does not go away when you change your position. ¨ You have pain or pressure in your pelvis that comes and goes in a pattern. ¨ You have intestinal cramping or flu-like symptoms, with or without diarrhea. ¨ You notice an increase or change in your vaginal discharge. Discharge may be heavy, mucus-like, watery, or streaked with blood. ¨ Your water breaks. · If you think you have  labor:  ¨ Drink 2 or 3 glasses of water or juice. Not drinking enough fluids can cause contractions. ¨ Stop what you are doing, and empty your bladder. Then lie down on your left side for at least 1 hour. ¨ While lying on your side, find your breast bone. Put your fingers in the soft spot just below it. Move your fingers down toward your belly button to find the top of your uterus. Check to see if it is tight. ¨ Contractions can be weak or strong. Record your contractions for an hour. Time a contraction from the start of one contraction to the start of the next one. ¨ Single or several strong contractions without a pattern are called Miguel-Rodrigues contractions. They are practice contractions but not the start of labor. They often stop if you change what you are doing. ¨ Call your doctor if you have regular contractions. Where can you learn more? Go to http://jae-joni.info/. Enter M374 in the search box to learn more about \"Weeks 26 to 30 of Your Pregnancy: Care Instructions. \"  Current as of: 2017  Content Version: 11.4  © 5646-0069 [employer].  Care instructions adapted under license by [employer] (which disclaims liability or warranty for this information). If you have questions about a medical condition or this instruction, always ask your healthcare professional. [place] [address] any warranty or liability for your use of this information.

## 2018-04-18 LAB
BASOPHILS # BLD AUTO: 0 X10E3/UL (ref 0–0.2)
BASOPHILS NFR BLD AUTO: 0 %
EOSINOPHIL # BLD AUTO: 0.2 X10E3/UL (ref 0–0.4)
EOSINOPHIL NFR BLD AUTO: 2 %
ERYTHROCYTE [DISTWIDTH] IN BLOOD BY AUTOMATED COUNT: 14.2 % (ref 12.3–15.4)
GLUCOSE 1H P 50 G GLC PO SERPL-MCNC: 106 MG/DL (ref 65–139)
HCT VFR BLD AUTO: 34.9 % (ref 34–46.6)
HGB BLD-MCNC: 11.8 G/DL (ref 11.1–15.9)
IMM GRANULOCYTES # BLD: 0 X10E3/UL (ref 0–0.1)
IMM GRANULOCYTES NFR BLD: 0 %
LYMPHOCYTES # BLD AUTO: 1.3 X10E3/UL (ref 0.7–3.1)
LYMPHOCYTES NFR BLD AUTO: 15 %
MCH RBC QN AUTO: 30.2 PG (ref 26.6–33)
MCHC RBC AUTO-ENTMCNC: 33.8 G/DL (ref 31.5–35.7)
MCV RBC AUTO: 89 FL (ref 79–97)
MONOCYTES # BLD AUTO: 0.5 X10E3/UL (ref 0.1–0.9)
MONOCYTES NFR BLD AUTO: 5 %
NEUTROPHILS # BLD AUTO: 6.8 X10E3/UL (ref 1.4–7)
NEUTROPHILS NFR BLD AUTO: 78 %
PLATELET # BLD AUTO: 252 X10E3/UL (ref 150–379)
RBC # BLD AUTO: 3.91 X10E6/UL (ref 3.77–5.28)
RPR SER QL: NON REACTIVE
WBC # BLD AUTO: 8.9 X10E3/UL (ref 3.4–10.8)

## 2018-04-25 DIAGNOSIS — Z34.80 PRENATAL CARE OF MULTIGRAVIDA, ANTEPARTUM: ICD-10-CM

## 2018-05-14 ENCOUNTER — ROUTINE PRENATAL (OUTPATIENT)
Dept: OBGYN CLINIC | Age: 28
End: 2018-05-14

## 2018-05-14 VITALS — BODY MASS INDEX: 34.01 KG/M2 | SYSTOLIC BLOOD PRESSURE: 128 MMHG | DIASTOLIC BLOOD PRESSURE: 78 MMHG | WEIGHT: 192 LBS

## 2018-05-14 DIAGNOSIS — Z34.80 PRENATAL CARE OF MULTIGRAVIDA, ANTEPARTUM: ICD-10-CM

## 2018-05-14 NOTE — PROGRESS NOTES
Per US today-LIMITED OB SCAN  A SINGLE VERTEX 31W3D IUP IS SEEN. FETAL CARDIAC MOTION OBSERVED. LIMITED ANATOMY WAS VISUALIZED AND APPEARS WNL. APPROPRIATE GROWTH MEASURED. SIZE = DATES. BELLA AND PLACENTA APPEAR WITHIN NORMAL LIMITS. THE CORD INSERTION INTO PLACENTA APPEARS MARGINAL.     Mom was in hospice within their home; recently passed

## 2018-05-31 ENCOUNTER — ROUTINE PRENATAL (OUTPATIENT)
Dept: OBGYN CLINIC | Age: 28
End: 2018-05-31

## 2018-05-31 VITALS — BODY MASS INDEX: 34.22 KG/M2 | SYSTOLIC BLOOD PRESSURE: 116 MMHG | WEIGHT: 193.2 LBS | DIASTOLIC BLOOD PRESSURE: 68 MMHG

## 2018-05-31 DIAGNOSIS — Z34.83 PRENATAL CARE, SUBSEQUENT PREGNANCY IN THIRD TRIMESTER: Primary | ICD-10-CM

## 2018-05-31 DIAGNOSIS — N89.8 ITCHING IN THE VAGINAL AREA: ICD-10-CM

## 2018-05-31 NOTE — PROGRESS NOTES
Patient is having vaginal swelling, itching and white thick discharge; no recent antibiotics; candidiasis; recommend monistat

## 2018-06-04 ENCOUNTER — ROUTINE PRENATAL (OUTPATIENT)
Dept: OBGYN CLINIC | Age: 28
End: 2018-06-04

## 2018-06-04 VITALS — BODY MASS INDEX: 34.54 KG/M2 | DIASTOLIC BLOOD PRESSURE: 66 MMHG | SYSTOLIC BLOOD PRESSURE: 110 MMHG | WEIGHT: 195 LBS

## 2018-06-04 DIAGNOSIS — Z34.83 PRENATAL CARE, SUBSEQUENT PREGNANCY IN THIRD TRIMESTER: Primary | ICD-10-CM

## 2018-06-04 DIAGNOSIS — Z34.80 PRENATAL CARE OF MULTIGRAVIDA, ANTEPARTUM: ICD-10-CM

## 2018-06-04 NOTE — PATIENT INSTRUCTIONS

## 2018-06-04 NOTE — PROGRESS NOTES
Used Monistat-- feeling better! Doing well otherwise. Feeling lots of pressure. US next visit for marginal cord insert.  S>D

## 2018-06-15 ENCOUNTER — ROUTINE PRENATAL (OUTPATIENT)
Dept: OBGYN CLINIC | Age: 28
End: 2018-06-15

## 2018-06-15 VITALS — BODY MASS INDEX: 34.72 KG/M2 | DIASTOLIC BLOOD PRESSURE: 76 MMHG | WEIGHT: 196 LBS | SYSTOLIC BLOOD PRESSURE: 122 MMHG

## 2018-06-15 DIAGNOSIS — Z34.80 PRENATAL CARE OF MULTIGRAVIDA, ANTEPARTUM: ICD-10-CM

## 2018-06-15 DIAGNOSIS — Z34.83 PRENATAL CARE, SUBSEQUENT PREGNANCY IN THIRD TRIMESTER: Primary | ICD-10-CM

## 2018-06-15 LAB — GRBS, EXTERNAL: NEGATIVE

## 2018-06-15 NOTE — PATIENT INSTRUCTIONS

## 2018-06-15 NOTE — PROGRESS NOTES
Per US-LIMITED OB SCAN  A SINGLE VERTEX 36W0D IUP IS SEEN. FETAL CARDIAC MOTION OBSERVED. LIMITED ANATOMY WAS VISUALIZED AND APPEARS WNL. APPROPRIATE GROWTH MEASURED. SIZE = DATES. BELLA 24.9cm  PLACENTA APPEAR WITHIN NORMAL LIMITS. MARGINAL CORD INSERTION INTO PLACENTA. DOPPLERS APPEAR WNL. GBS today. Cramping and  pressure.  Fu US 2wk; will induce 7/6

## 2018-06-19 LAB — B-HEM STREP SPEC QL CULT: NEGATIVE

## 2018-06-20 DIAGNOSIS — Z34.80 PRENATAL CARE OF MULTIGRAVIDA, ANTEPARTUM: ICD-10-CM

## 2018-06-21 ENCOUNTER — ROUTINE PRENATAL (OUTPATIENT)
Dept: OBGYN CLINIC | Age: 28
End: 2018-06-21

## 2018-06-21 VITALS — SYSTOLIC BLOOD PRESSURE: 128 MMHG | DIASTOLIC BLOOD PRESSURE: 80 MMHG

## 2018-06-21 DIAGNOSIS — Z3A.36 36 WEEKS GESTATION OF PREGNANCY: Primary | ICD-10-CM

## 2018-06-21 NOTE — PROGRESS NOTES
Doing well. BELLA now 22! Will repeat BELLA next week.   Planning IOL with Dr. Ana Kemp in early July

## 2018-06-25 ENCOUNTER — ROUTINE PRENATAL (OUTPATIENT)
Dept: OBGYN CLINIC | Age: 28
End: 2018-06-25

## 2018-06-25 VITALS
DIASTOLIC BLOOD PRESSURE: 74 MMHG | SYSTOLIC BLOOD PRESSURE: 122 MMHG | HEIGHT: 63 IN | WEIGHT: 198.6 LBS | BODY MASS INDEX: 35.19 KG/M2

## 2018-06-25 DIAGNOSIS — Z34.83 PRENATAL CARE, SUBSEQUENT PREGNANCY IN THIRD TRIMESTER: Primary | ICD-10-CM

## 2018-06-25 NOTE — PATIENT INSTRUCTIONS
Week 37 of Your Pregnancy: Care Instructions  Your Care Instructions    You are near the end of your pregnancy-and you're probably pretty uncomfortable. It may be harder to walk around. Lying down probably isn't comfortable either. You may have trouble getting to sleep or staying asleep. Most women deliver their babies between 40 and 41 weeks. This is a good time to think about packing a bag for the hospital with items you'll need. Then you'll be ready when labor starts. Follow-up care is a key part of your treatment and safety. Be sure to make and go to all appointments, and call your doctor if you are having problems. It's also a good idea to know your test results and keep a list of the medicines you take. How can you care for yourself at home? Learn about breastfeeding  · Breastfeeding is best for your baby and good for you. · Breast milk has antibodies to help your baby fight infections. · Mothers who breastfeed often lose weight faster, because making milk burns calories. · Learning the best ways to hold your baby will make breastfeeding easier. · Let your partner bathe and diaper the baby to keep your partner from feeling left out. Snuggle together when you breastfeed. · You may want to learn how to use a breast pump and store your milk. · If you choose to bottle feed, make the feeding feel like breastfeeding so you can bond with your baby. Always hold your baby and the bottle. Do not prop bottles or let your baby fall asleep with a bottle. Learn about crying  · It is common for babies to cry for 1 to 3 hours a day. Some cry more, some cry less. · Babies don't cry to make you upset or because you are a bad parent. · Crying is how your baby communicates. Your baby may be hungry; have gas; need a diaper change; or feel cold, warm, tired, lonely, or tense. Sometimes babies cry for unknown reasons. · If you respond to your baby's needs, he or she will learn to trust you.   · Try to stay calm when your baby cries. Your baby may get more upset if he or she senses that you are upset. Know how to care for your   · Your baby's umbilical cord stump will drop off on its own, usually between 1 and 2 weeks. To care for your baby's umbilical cord area:  ¨ Clean the area at the bottom of the cord 2 or 3 times a day. ¨ Pay special attention to the area where the cord attaches to the skin. ¨ Keep the diaper folded below the cord. ¨ Use a damp washcloth or cotton ball to sponge bathe your baby until the stump has come off. · Your baby's first dark stool is called meconium. After the meconium is passed, your baby will develop his or her own bowel pattern. ¨ Some babies, especially  babies, have several bowel movements a day. Others have one or two a day, or one every 2 to 3 days. ¨  babies often have loose, yellow stools. Formula-fed babies have more formed stools. ¨ If your baby's stools look like little pellets, he or she is constipated. After 2 days of constipation, call your baby's doctor. · If your baby will be circumcised, you can care for him at home. ¨ Gently rinse his penis with warm water after every diaper change. Do not try to remove the film that forms on the penis. This film will go away on its own. Pat dry. ¨ Put petroleum ointment, such as Vaseline, on the area of the diaper that will touch your baby's penis. This will keep the diaper from sticking to your baby. ¨ Ask the doctor about giving your baby acetaminophen (Tylenol) for pain. Where can you learn more? Go to http://jae-joni.info/. Enter 68 21 97 in the search box to learn more about \"Week 37 of Your Pregnancy: Care Instructions. \"  Current as of: 2017  Content Version: 11.4  © 0968-5245 [employer]. Care instructions adapted under license by [employer] (which disclaims liability or warranty for this information).  If you have questions about a medical condition or this instruction, always ask your healthcare professional. [place] [address] any warranty or liability for your use of this information.

## 2018-07-02 ENCOUNTER — ROUTINE PRENATAL (OUTPATIENT)
Dept: OBGYN CLINIC | Age: 28
End: 2018-07-02

## 2018-07-02 VITALS
WEIGHT: 195.6 LBS | BODY MASS INDEX: 34.66 KG/M2 | SYSTOLIC BLOOD PRESSURE: 102 MMHG | DIASTOLIC BLOOD PRESSURE: 76 MMHG | HEIGHT: 63 IN

## 2018-07-02 DIAGNOSIS — Z34.83 PRENATAL CARE, SUBSEQUENT PREGNANCY IN THIRD TRIMESTER: Primary | ICD-10-CM

## 2018-07-05 ENCOUNTER — TELEPHONE (OUTPATIENT)
Dept: OBGYN CLINIC | Age: 28
End: 2018-07-05

## 2018-07-05 NOTE — TELEPHONE ENCOUNTER
Patient calling , 38w6d pregnant, C/O of having contractions that are 2-5 min apart for the past 20 min that vary in intensity. She is tracking the contractions but while on the phone, she states that she has not had one for almost 10 minutes now. She thinks these contractions are not consistent yet. She reports good fetal movement, denies bleeding, denies ROM. Patient states that she would like to track these contractions to the 5 min a part for a complete hour and contact our office back. Patient advised that she can do this since this is our labor protocol. Patient advised to contact me back if her contractions  and are consistent or if her water breaks. Patient is scheduled to be induced tomorrow 18. Patient verbalized understanding.

## 2018-07-06 ENCOUNTER — HOSPITAL ENCOUNTER (OUTPATIENT)
Age: 28
Setting detail: OBSERVATION
Discharge: HOME | End: 2018-07-06
Attending: OBSTETRICS & GYNECOLOGY | Admitting: OBSTETRICS & GYNECOLOGY
Payer: COMMERCIAL

## 2018-07-06 VITALS
SYSTOLIC BLOOD PRESSURE: 100 MMHG | TEMPERATURE: 97.7 F | BODY MASS INDEX: 34.55 KG/M2 | WEIGHT: 195 LBS | RESPIRATION RATE: 16 BRPM | HEIGHT: 63 IN | HEART RATE: 72 BPM | DIASTOLIC BLOOD PRESSURE: 57 MMHG

## 2018-07-06 PROBLEM — Z34.90 PREGNANCY: Status: ACTIVE | Noted: 2018-07-06

## 2018-07-06 LAB
ERYTHROCYTE [DISTWIDTH] IN BLOOD BY AUTOMATED COUNT: 13.7 % (ref 11.5–14.5)
HCT VFR BLD AUTO: 32.7 % (ref 35–47)
HGB BLD-MCNC: 10.7 G/DL (ref 11.5–16)
MCH RBC QN AUTO: 29.3 PG (ref 26–34)
MCHC RBC AUTO-ENTMCNC: 32.7 G/DL (ref 30–36.5)
MCV RBC AUTO: 89.6 FL (ref 80–99)
NRBC # BLD: 0 K/UL (ref 0–0.01)
NRBC BLD-RTO: 0 PER 100 WBC
PLATELET # BLD AUTO: 279 K/UL (ref 150–400)
PMV BLD AUTO: 10.9 FL (ref 8.9–12.9)
RBC # BLD AUTO: 3.65 M/UL (ref 3.8–5.2)
WBC # BLD AUTO: 6.9 K/UL (ref 3.6–11)

## 2018-07-06 PROCEDURE — 74011250636 HC RX REV CODE- 250/636: Performed by: OBSTETRICS & GYNECOLOGY

## 2018-07-06 PROCEDURE — 96366 THER/PROPH/DIAG IV INF ADDON: CPT

## 2018-07-06 PROCEDURE — 96360 HYDRATION IV INFUSION INIT: CPT

## 2018-07-06 PROCEDURE — 99285 EMERGENCY DEPT VISIT HI MDM: CPT

## 2018-07-06 PROCEDURE — 65270000029 HC RM PRIVATE

## 2018-07-06 PROCEDURE — 59020 FETAL CONTRACT STRESS TEST: CPT

## 2018-07-06 PROCEDURE — 85027 COMPLETE CBC AUTOMATED: CPT | Performed by: OBSTETRICS & GYNECOLOGY

## 2018-07-06 PROCEDURE — 96361 HYDRATE IV INFUSION ADD-ON: CPT

## 2018-07-06 PROCEDURE — 96365 THER/PROPH/DIAG IV INF INIT: CPT

## 2018-07-06 PROCEDURE — 36415 COLL VENOUS BLD VENIPUNCTURE: CPT | Performed by: OBSTETRICS & GYNECOLOGY

## 2018-07-06 PROCEDURE — 59025 FETAL NON-STRESS TEST: CPT

## 2018-07-06 PROCEDURE — 99218 HC RM OBSERVATION: CPT

## 2018-07-06 RX ORDER — NALOXONE HYDROCHLORIDE 0.4 MG/ML
0.4 INJECTION, SOLUTION INTRAMUSCULAR; INTRAVENOUS; SUBCUTANEOUS AS NEEDED
Status: DISCONTINUED | OUTPATIENT
Start: 2018-07-06 | End: 2018-07-06 | Stop reason: HOSPADM

## 2018-07-06 RX ORDER — LIDOCAINE HYDROCHLORIDE AND EPINEPHRINE 15; 5 MG/ML; UG/ML
4.5 INJECTION, SOLUTION EPIDURAL AS NEEDED
Status: DISCONTINUED | OUTPATIENT
Start: 2018-07-06 | End: 2018-07-06 | Stop reason: HOSPADM

## 2018-07-06 RX ORDER — TERBUTALINE SULFATE 1 MG/ML
0.25 INJECTION SUBCUTANEOUS AS NEEDED
Status: DISCONTINUED | OUTPATIENT
Start: 2018-07-06 | End: 2018-07-06 | Stop reason: HOSPADM

## 2018-07-06 RX ORDER — SODIUM CHLORIDE, SODIUM LACTATE, POTASSIUM CHLORIDE, CALCIUM CHLORIDE 600; 310; 30; 20 MG/100ML; MG/100ML; MG/100ML; MG/100ML
125 INJECTION, SOLUTION INTRAVENOUS CONTINUOUS
Status: DISCONTINUED | OUTPATIENT
Start: 2018-07-06 | End: 2018-07-06 | Stop reason: HOSPADM

## 2018-07-06 RX ORDER — FENTANYL/BUPIVACAINE/NS/PF 2-1250MCG
1-16 PREFILLED PUMP RESERVOIR EPIDURAL CONTINUOUS
Status: DISCONTINUED | OUTPATIENT
Start: 2018-07-06 | End: 2018-07-06 | Stop reason: HOSPADM

## 2018-07-06 RX ORDER — BUPIVACAINE HYDROCHLORIDE 5 MG/ML
30 INJECTION, SOLUTION EPIDURAL; INTRACAUDAL AS NEEDED
Status: DISCONTINUED | OUTPATIENT
Start: 2018-07-06 | End: 2018-07-06 | Stop reason: HOSPADM

## 2018-07-06 RX ORDER — FENTANYL CITRATE 50 UG/ML
100 INJECTION, SOLUTION INTRAMUSCULAR; INTRAVENOUS ONCE
Status: DISCONTINUED | OUTPATIENT
Start: 2018-07-06 | End: 2018-07-06 | Stop reason: HOSPADM

## 2018-07-06 RX ORDER — SODIUM CHLORIDE 0.9 % (FLUSH) 0.9 %
5-10 SYRINGE (ML) INJECTION AS NEEDED
Status: DISCONTINUED | OUTPATIENT
Start: 2018-07-06 | End: 2018-07-06 | Stop reason: HOSPADM

## 2018-07-06 RX ORDER — NALBUPHINE HYDROCHLORIDE 10 MG/ML
10 INJECTION, SOLUTION INTRAMUSCULAR; INTRAVENOUS; SUBCUTANEOUS
Status: DISCONTINUED | OUTPATIENT
Start: 2018-07-06 | End: 2018-07-06 | Stop reason: HOSPADM

## 2018-07-06 RX ORDER — OXYTOCIN IN 5 % DEXTROSE 30/500 ML
1-25 PLASTIC BAG, INJECTION (ML) INTRAVENOUS
Status: DISCONTINUED | OUTPATIENT
Start: 2018-07-06 | End: 2018-07-06 | Stop reason: HOSPADM

## 2018-07-06 RX ORDER — FENTANYL CITRATE 50 UG/ML
100 INJECTION, SOLUTION INTRAMUSCULAR; INTRAVENOUS
Status: DISCONTINUED | OUTPATIENT
Start: 2018-07-06 | End: 2018-07-06 | Stop reason: HOSPADM

## 2018-07-06 RX ORDER — EPHEDRINE SULFATE 50 MG/ML
10 INJECTION, SOLUTION INTRAVENOUS ONCE
Status: DISCONTINUED | OUTPATIENT
Start: 2018-07-06 | End: 2018-07-06 | Stop reason: HOSPADM

## 2018-07-06 RX ADMIN — SODIUM CHLORIDE, SODIUM LACTATE, POTASSIUM CHLORIDE, AND CALCIUM CHLORIDE 125 ML/HR: 600; 310; 30; 20 INJECTION, SOLUTION INTRAVENOUS at 06:45

## 2018-07-06 NOTE — IP AVS SNAPSHOT
[address] [place] [address] 
[phone] Patient: Natalia Larkin MRN: [id number] QQO:8[birth year] About your hospitalization You were admitted on:  July 6, 2018 You last received care in the:  [facility] 3 LABOR & DELIVERY You were discharged on:  July 6, 2018 Why you were hospitalized Your primary diagnosis was:  Not on File Your diagnoses also included:  Pregnancy Follow-up Information Follow up With Details Comments Contact Info Melquiades John MD On 7/11/2018 If symptoms worsen [address] [facility] [address] [facility] 
[phone] Discharge Orders None A check nikki indicates which time of day the medication should be taken. My Medications ASK your doctor about these medications Instructions Each Dose to Equal  
 Morning Noon Evening Bedtime PRENATAL PLUS (CALCIUM CARB) 27 mg iron- 1 mg Tab Generic drug:  prenatal vit-calcium-iron-fa Your last dose was: Your next dose is: Take 1 Tab by mouth daily. 1 Tab Discharge Instructions Labor Induction: Care Instructions Your Care Instructions If you pass your due date and your labor does not start on its own, your doctor may want to try to start (induce) labor. Your doctor may suggest doing this for other reasons. It may be a good idea to induce labor if you have another problem. For example, it may be done if you have high blood pressure. Or it may be a good idea if the placenta can no longer give enough support to the baby. There are several ways to induce labor. · Medicine may be used to make the cervix soft and help it thin. · Medicine may be used to cause the uterus to contract. · A balloon catheter may be used to help the cervix open.  
· Your doctor may sweep the membranes or break the amniotic sac to start or increase labor. This may be done if your cervix is soft and slightly open. You may need to have your baby delivered through a cut (incision) in your belly. This is called a  section, or a . It may be done if your doctor has used medicine but your labor is not progressing. After you have your baby, you should not have any side effects from the medicine used to start labor. Follow-up care is a key part of your treatment and safety. Be sure to make and go to all appointments, and call your doctor if you are having problems. It's also a good idea to know your test results and keep a list of the medicines you take. When should your labor be induced? · Your pregnancy has gone 1 to 2 weeks past your expected due date. · You have a problem that may harm your health or the health of your baby if you continue to be pregnant. This includes high blood pressure, preeclampsia, and diabetes. · Your water breaks, but labor does not start. When should you call for help? Watch closely for changes in your health, and be sure to contact your doctor if you have questions about inducing labor. Where can you learn more? Go to http://jae-joni.info/. Enter H493 in the search box to learn more about \"Labor Induction: Care Instructions. \" Current as of: 2017 Content Version: 11.4 © 6690-2205 Healthwise[employer]. Care instructions adapted under license by [employer] (which disclaims liability or warranty for this information). If you have questions about a medical condition or this instruction, always ask your healthcare professional. [place] [address] any warranty or liability for your use of this information. Introducing [facility]! [facility] introduces Biod[employer]ix patient portal. Now you can access parts of your medical record, email your doctor's office, and request medication refills online. 1. In your internet browser, go to [email or web address] FrontiÃ[employer]rest 2. Click on the First Time User? Click Here link in the Sign In box. You will see the New Member Sign Up page. 3. Enter your Aardvark Access Code exactly as it appears below. You will not need to use this code after youve completed the sign-up process. If you do not sign up before the expiration date, you must request a new code. · Aardvark Access Code: [email or web address] Expires: 9/23/2018  3:22 PM 
 
4. Enter the last four digits of your Social Security Number (xxxx) and Date of Birth (mm/dd/yyyy) as indicated and click Submit. You will be taken to the next sign-up page. 5. Create a YouEyet ID. This will be your Aardvark login ID and cannot be changed, so think of one that is secure and easy to remember. 6. Create a Aardvark password. You can change your password at any time. 7. Enter your Password Reset Question and Answer. This can be used at a later time if you forget your password. 8. Enter your e-mail address. You will receive e-mail notification when new information is available in [address] 9. Click Sign Up. You can now view and download portions of your medical record. 10. Click the Download Summary menu link to download a portable copy of your medical information. If you have questions, please visit the Frequently Asked Questions section of the Aardvark website. Remember, Aardvark is NOT to be used for urgent needs. For medical emergencies, dial 911. Now available from your iPhone and Android! Introducing Tim Mckeon As a [employer] patient, I wanted to make you aware of our electronic visit tool called Tim Mckeon. [employer] 24/7 allows you to connect within minutes with a medical provider 24 hours a day, seven days a week via a mobile device or tablet or logging into a secure website from your computer. You can access Tim Mckeon from anywhere in the [place]. A virtual visit might be right for you when you have a simple condition and feel like you just dont want to get out of bed, or cant get away from work for an appointment, when your regular [facility] provider is not available (evenings, weekends or holidays), or when youre out of town and need minor care. Electronic visits cost only $49 and if the [employer] provider determines a prescription is needed to treat your condition, one can be electronically transmitted to a nearby pharmacy*. Please take a moment to enroll today if you have not already done so. The enrollment process is free and takes just a few minutes. To enroll, please download the buuteeq polina to your tablet or phone, or visit [email or web address] to enroll on your computer. And, as an [address] [place] [address] patient with a Minggl account, the results of your visits will be scanned into your electronic medical record and your primary care provider will be able to view the scanned results. We urge you to continue to see your regular [facility] provider for your ongoing medical care. And while your primary care provider may not be the one available when you seek a Tim Smoothcindy virtual visit, the peace of mind you get from getting a real diagnosis real time can be priceless. For more information on Tim Mckeon, view our Frequently Asked Questions (FAQs) at [email or web address]. Sincerely, 
 
Kimberly Roger MD 
Chief Medical Officer Brandon Looney *:  certain medications cannot be prescribed via Tim Mckeon Providers Seen During Your Hospitalization Provider Specialty Primary office phone Lorraine Quijano MD Obstetrics & Gynecology [phone] Your Primary Care Physician (PCP) Primary Care Physician Office Phone Office Fax Chuck Marin [phone] You are allergic to the following No active allergies Recent Documentation Height Weight Breastfeeding? BMI OB Status Smoking Status 1.6 m 88.5 kg Yes 34.54 kg/m2 Pregnant Former Smoker Emergency Contacts Name Discharge Info Relation Home Work Mobile [address] CAREGIVER [3] Spouse [3] [phone] Patient Belongings The following personal items are in your possession at time of discharge: 
     Visual Aid: None             Clothing: At bedside, With patient Please provide this summary of care documentation to your next provider. Signatures-by signing, you are acknowledging that this After Visit Summary has been reviewed with you and you have received a copy. Patient Signature:  ____________________________________________________________ Date:  ____________________________________________________________  
  
Noelle Irvington Provider Signature:  ____________________________________________________________ Date:  ____________________________________________________________

## 2018-07-06 NOTE — PROGRESS NOTES
2018  6:10 AM Pt received for elec induction, 39 weeks, , Dr Elise Level, polyhydramnious and marginal cord insertion. 0630 Admission history and physical assessment completed. Pt states having \"leaking\" no fluid seen  0645 PIV and labs  0700 Dr Bartolo Gonzalez at bedside, SVE, nitrozene neg  0710 Pitocin int  1325 Dr. Bartolo Gonzalez in roomSVE, no change, ballotable head. Discussion with patient and  and decision made to stop induction and DC home and wait for labor. Pt verbalizes good understanding and is tearful but accepting of this plan. DC instructions with labor precautions and floow up appointment reviewed. PIV DCd. Pt dressed and walked out with family.

## 2018-07-06 NOTE — DISCHARGE INSTRUCTIONS
Labor Induction: Care Instructions  Your Care Instructions  If you pass your due date and your labor does not start on its own, your doctor may want to try to start (induce) labor. Your doctor may suggest doing this for other reasons. It may be a good idea to induce labor if you have another problem. For example, it may be done if you have high blood pressure. Or it may be a good idea if the placenta can no longer give enough support to the baby. There are several ways to induce labor. · Medicine may be used to make the cervix soft and help it thin. · Medicine may be used to cause the uterus to contract. · A balloon catheter may be used to help the cervix open. · Your doctor may sweep the membranes or break the amniotic sac to start or increase labor. This may be done if your cervix is soft and slightly open. You may need to have your baby delivered through a cut (incision) in your belly. This is called a  section, or a . It may be done if your doctor has used medicine but your labor is not progressing. After you have your baby, you should not have any side effects from the medicine used to start labor. Follow-up care is a key part of your treatment and safety. Be sure to make and go to all appointments, and call your doctor if you are having problems. It's also a good idea to know your test results and keep a list of the medicines you take. When should your labor be induced? · Your pregnancy has gone 1 to 2 weeks past your expected due date. · You have a problem that may harm your health or the health of your baby if you continue to be pregnant. This includes high blood pressure, preeclampsia, and diabetes. · Your water breaks, but labor does not start. When should you call for help? Watch closely for changes in your health, and be sure to contact your doctor if you have questions about inducing labor. Where can you learn more?   Go to http://jae-joni.info/. Enter I040 in the search box to learn more about \"Labor Induction: Care Instructions. \"  Current as of: March 16, 2017  Content Version: 11.4  © 7429-0748 Healthwise, [facility]. Care instructions adapted under license by [employer] (which disclaims liability or warranty for this information). If you have questions about a medical condition or this instruction, always ask your healthcare professional. [place] [address] any warranty or liability for your use of this information.

## 2018-07-06 NOTE — IP AVS SNAPSHOT
[address] 
[phone] Patient: Ana Lilia Lou MRN: [id number] JONELLE:[birth year] A check nikki indicates which time of day the medication should be taken. My Medications ASK your doctor about these medications Instructions Each Dose to Equal  
 Morning Noon Evening Bedtime PRENATAL PLUS (CALCIUM CARB) 27 mg iron- 1 mg Tab Generic drug:  prenatal vit-calcium-iron-fa Your last dose was: Your next dose is: Take 1 Tab by mouth daily. 1 Tab

## 2018-07-06 NOTE — H&P
History & Physical    Name: Tracy Pantoja MRN: [id number]  SSN: [id number]    YOB: 1990  Age: 33 y.o. Sex: female        Subjective:     Estimated Date of Delivery: 18  OB History      Para Term  AB Living    2 1 1 0 0 1    SAB TAB Ectopic Molar Multiple Live Births    0 0 0 0 0 1          Ms. Lorena Swanson is admitted with pregnancy at 39w0d for induction of labor. Favorable cervix Prenatal course was normal but her mom passed from metastatic cancer; her birthday is today. Please see prenatal records for details. Patient Active Problem List    Diagnosis    Pregnancy    Prenatal care of multigravida, antepartum     Primary provider- Dr. Ella Rivas     Consider indxn   Mom passed away 2018 metastatic - Sister and Sagrario Machado will be at birth for support  VAVD-2016; 7lbs 13oz at 40/1wks; Dr Ella Rivas; [place] Pearl; now 14m  EDC by LMP/ US  IOB labs:  done  Genetic Screening: declines panoroma/horizon; tetra declined  Anatomy:  BOY!!  Marginal cord insertion; fu US 28wk nl  GTT: 106 WNL  Flu: declined;  TDAP: done  GBS: Negative  Circ:          No specialty comments available. History reviewed. No pertinent past medical history. History reviewed. No pertinent surgical history. Social History     Occupational History    Not on file. Social History Main Topics    Smoking status: Former Smoker     Packs/day: 0.50     Quit date: 2016    Smokeless tobacco: Never Used    Alcohol use No    Drug use: No    Sexual activity: Yes     Partners: Male     Family History   Problem Relation Age of Onset    Cancer Mother     Hypertension Father        No Known Allergies  Prior to Admission medications    Medication Sig Start Date End Date Taking? Authorizing Provider   prenatal vit-calcium-iron-fa (PRENATAL PLUS, CALCIUM CARB,) 27 mg iron- 1 mg tab Take 1 Tab by mouth daily.    Yes Historical Provider        Review of Systems: A comprehensive review of systems was negative except for that written in the HPI. Objective:     Vitals:  Vitals:    07/06/18 0617 07/06/18 0626 07/06/18 1000 07/06/18 1200   BP:  117/61 100/58 100/57   Pulse:  75 72 72   Resp:  14  16   Temp:  98.1 °F (36.7 °C) 97.7 °F (36.5 °C)    Weight: 195 lb (88.5 kg)      Height: 5' 3\" (1.6 m)           Physical Exam:  Patient without distress. Membranes:  Intact  Fetal Heart Rate: Reactive  Accelerations: yes    Prenatal Labs:   Lab Results   Component Value Date/Time    Rubella, External Immune 06/21/2016    GrBStrep, External Negative 06/15/2018    HBsAg, External Negative 02/02/2016    Gonorrhea, External Negative 02/02/2016    Chlamydia, External Negative 02/02/2016        Assessment/Plan:     Plan: Admit for Reassuring fetal status. Group B Strep was negative.     Signed By:  Lorraine Quijano MD     July 6, 2018

## 2018-07-06 NOTE — PROGRESS NOTES
Patient now on pitocin for 5 hours; patient not feeling contractions despite position changes and titrating of pitocin    VSS  Tracing reactive  cx unchanged; 70/3/-3  BOWI    Discussed with couple current status. Due to elective induction status associated w unfortunate loss of her mom during pregnancy but now failure of body to go into labor, will discharge home w precautions and reconsider induction in 7-10 days if not delivered. Couple comfortable with plan.   appt in office next week

## 2018-07-07 ENCOUNTER — ANESTHESIA (OUTPATIENT)
Dept: LABOR AND DELIVERY | Age: 28
End: 2018-07-07
Payer: COMMERCIAL

## 2018-07-07 ENCOUNTER — HOSPITAL ENCOUNTER (INPATIENT)
Age: 28
LOS: 3 days | Discharge: HOME | End: 2018-07-10
Attending: OBSTETRICS & GYNECOLOGY | Admitting: OBSTETRICS & GYNECOLOGY
Payer: COMMERCIAL

## 2018-07-07 ENCOUNTER — ANESTHESIA EVENT (OUTPATIENT)
Dept: LABOR AND DELIVERY | Age: 28
End: 2018-07-07
Payer: COMMERCIAL

## 2018-07-07 LAB
ERYTHROCYTE [DISTWIDTH] IN BLOOD BY AUTOMATED COUNT: 13.6 % (ref 11.5–14.5)
HCT VFR BLD AUTO: 31.9 % (ref 35–47)
HGB BLD-MCNC: 10.6 G/DL (ref 11.5–16)
MCH RBC QN AUTO: 29.4 PG (ref 26–34)
MCHC RBC AUTO-ENTMCNC: 33.2 G/DL (ref 30–36.5)
MCV RBC AUTO: 88.6 FL (ref 80–99)
NRBC # BLD: 0 K/UL (ref 0–0.01)
NRBC BLD-RTO: 0 PER 100 WBC
PLATELET # BLD AUTO: 283 K/UL (ref 150–400)
PMV BLD AUTO: 11.3 FL (ref 8.9–12.9)
RBC # BLD AUTO: 3.6 M/UL (ref 3.8–5.2)
WBC # BLD AUTO: 7 K/UL (ref 3.6–11)

## 2018-07-07 PROCEDURE — 3E0E7GC INTRODUCTION OF OTHER THERAPEUTIC SUBSTANCE INTO PRODUCTS OF CONCEPTION, VIA NATURAL OR ARTIFICIAL OPENING: ICD-10-PCS | Performed by: ADVANCED PRACTICE MIDWIFE

## 2018-07-07 PROCEDURE — 74011000258 HC RX REV CODE- 258: Performed by: ADVANCED PRACTICE MIDWIFE

## 2018-07-07 PROCEDURE — 85027 COMPLETE CBC AUTOMATED: CPT | Performed by: ADVANCED PRACTICE MIDWIFE

## 2018-07-07 PROCEDURE — 74011000250 HC RX REV CODE- 250: Performed by: ANESTHESIOLOGY

## 2018-07-07 PROCEDURE — 75410000002 HC LABOR FEE PER 1 HR

## 2018-07-07 PROCEDURE — 74011250636 HC RX REV CODE- 250/636: Performed by: ANESTHESIOLOGY

## 2018-07-07 PROCEDURE — 76060000078 HC EPIDURAL ANESTHESIA

## 2018-07-07 PROCEDURE — 36415 COLL VENOUS BLD VENIPUNCTURE: CPT | Performed by: ADVANCED PRACTICE MIDWIFE

## 2018-07-07 PROCEDURE — 74011000250 HC RX REV CODE- 250

## 2018-07-07 PROCEDURE — 65270000029 HC RM PRIVATE

## 2018-07-07 PROCEDURE — 74011250636 HC RX REV CODE- 250/636: Performed by: ADVANCED PRACTICE MIDWIFE

## 2018-07-07 RX ORDER — NALOXONE HYDROCHLORIDE 0.4 MG/ML
0.4 INJECTION, SOLUTION INTRAMUSCULAR; INTRAVENOUS; SUBCUTANEOUS AS NEEDED
Status: DISCONTINUED | OUTPATIENT
Start: 2018-07-07 | End: 2018-07-08 | Stop reason: HOSPADM

## 2018-07-07 RX ORDER — TERBUTALINE SULFATE 1 MG/ML
0.25 INJECTION SUBCUTANEOUS AS NEEDED
Status: DISCONTINUED | OUTPATIENT
Start: 2018-07-07 | End: 2018-07-08 | Stop reason: HOSPADM

## 2018-07-07 RX ORDER — SODIUM CHLORIDE 0.9 % (FLUSH) 0.9 %
5-10 SYRINGE (ML) INJECTION EVERY 8 HOURS
Status: DISCONTINUED | OUTPATIENT
Start: 2018-07-07 | End: 2018-07-08 | Stop reason: HOSPADM

## 2018-07-07 RX ORDER — NALBUPHINE HYDROCHLORIDE 10 MG/ML
10 INJECTION, SOLUTION INTRAMUSCULAR; INTRAVENOUS; SUBCUTANEOUS
Status: DISCONTINUED | OUTPATIENT
Start: 2018-07-07 | End: 2018-07-08 | Stop reason: HOSPADM

## 2018-07-07 RX ORDER — BUPIVACAINE HYDROCHLORIDE 2.5 MG/ML
15 INJECTION, SOLUTION EPIDURAL; INFILTRATION; INTRACAUDAL ONCE
Status: DISPENSED | OUTPATIENT
Start: 2018-07-07 | End: 2018-07-08

## 2018-07-07 RX ORDER — NALBUPHINE HYDROCHLORIDE 10 MG/ML
2.5 INJECTION, SOLUTION INTRAMUSCULAR; INTRAVENOUS; SUBCUTANEOUS
Status: DISCONTINUED | OUTPATIENT
Start: 2018-07-07 | End: 2018-07-08 | Stop reason: HOSPADM

## 2018-07-07 RX ORDER — SODIUM CHLORIDE, SODIUM LACTATE, POTASSIUM CHLORIDE, CALCIUM CHLORIDE 600; 310; 30; 20 MG/100ML; MG/100ML; MG/100ML; MG/100ML
125 INJECTION, SOLUTION INTRAVENOUS CONTINUOUS
Status: DISCONTINUED | OUTPATIENT
Start: 2018-07-07 | End: 2018-07-08

## 2018-07-07 RX ORDER — BUPIVACAINE HYDROCHLORIDE 2.5 MG/ML
INJECTION, SOLUTION EPIDURAL; INFILTRATION; INTRACAUDAL AS NEEDED
Status: DISCONTINUED | OUTPATIENT
Start: 2018-07-07 | End: 2018-07-08 | Stop reason: HOSPADM

## 2018-07-07 RX ORDER — SODIUM CHLORIDE 0.9 % (FLUSH) 0.9 %
SYRINGE (ML) INJECTION
Status: DISPENSED
Start: 2018-07-07 | End: 2018-07-07

## 2018-07-07 RX ORDER — FENTANYL/BUPIVACAINE/NS/PF 2-1250MCG
1-16 PREFILLED PUMP RESERVOIR EPIDURAL CONTINUOUS
Status: DISCONTINUED | OUTPATIENT
Start: 2018-07-07 | End: 2018-07-08

## 2018-07-07 RX ORDER — EPHEDRINE SULFATE 50 MG/ML
10 INJECTION, SOLUTION INTRAVENOUS ONCE
Status: COMPLETED | OUTPATIENT
Start: 2018-07-07 | End: 2018-07-07

## 2018-07-07 RX ORDER — OXYTOCIN IN 5 % DEXTROSE 30/500 ML
1-25 PLASTIC BAG, INJECTION (ML) INTRAVENOUS
Status: DISCONTINUED | OUTPATIENT
Start: 2018-07-07 | End: 2018-07-08 | Stop reason: HOSPADM

## 2018-07-07 RX ORDER — NALBUPHINE HYDROCHLORIDE 10 MG/ML
2.5 INJECTION, SOLUTION INTRAMUSCULAR; INTRAVENOUS; SUBCUTANEOUS
Status: ACTIVE | OUTPATIENT
Start: 2018-07-07 | End: 2018-07-07

## 2018-07-07 RX ORDER — FENTANYL CITRATE 50 UG/ML
100 INJECTION, SOLUTION INTRAMUSCULAR; INTRAVENOUS ONCE
Status: DISPENSED | OUTPATIENT
Start: 2018-07-07 | End: 2018-07-08

## 2018-07-07 RX ORDER — FENTANYL CITRATE 50 UG/ML
100 INJECTION, SOLUTION INTRAMUSCULAR; INTRAVENOUS
Status: DISCONTINUED | OUTPATIENT
Start: 2018-07-07 | End: 2018-07-07 | Stop reason: SDUPTHER

## 2018-07-07 RX ORDER — FENTANYL CITRATE 50 UG/ML
100 INJECTION, SOLUTION INTRAMUSCULAR; INTRAVENOUS
Status: DISCONTINUED | OUTPATIENT
Start: 2018-07-07 | End: 2018-07-08 | Stop reason: HOSPADM

## 2018-07-07 RX ORDER — SODIUM CHLORIDE 0.9 % (FLUSH) 0.9 %
5-10 SYRINGE (ML) INJECTION AS NEEDED
Status: DISCONTINUED | OUTPATIENT
Start: 2018-07-07 | End: 2018-07-08 | Stop reason: HOSPADM

## 2018-07-07 RX ADMIN — BUPIVACAINE HYDROCHLORIDE 2 ML: 2.5 INJECTION, SOLUTION EPIDURAL; INFILTRATION; INTRACAUDAL at 18:03

## 2018-07-07 RX ADMIN — Medication 2 MILLI-UNITS/MIN: at 11:53

## 2018-07-07 RX ADMIN — SODIUM CHLORIDE, SODIUM LACTATE, POTASSIUM CHLORIDE, AND CALCIUM CHLORIDE 999 ML/HR: 600; 310; 30; 20 INJECTION, SOLUTION INTRAVENOUS at 17:41

## 2018-07-07 RX ADMIN — SODIUM CHLORIDE, SODIUM LACTATE, POTASSIUM CHLORIDE, AND CALCIUM CHLORIDE 999 ML/HR: 600; 310; 30; 20 INJECTION, SOLUTION INTRAVENOUS at 21:45

## 2018-07-07 RX ADMIN — BUPIVACAINE HYDROCHLORIDE 5 ML: 2.5 INJECTION, SOLUTION EPIDURAL; INFILTRATION; INTRACAUDAL at 18:14

## 2018-07-07 RX ADMIN — SODIUM CHLORIDE, SODIUM LACTATE, POTASSIUM CHLORIDE, AND CALCIUM CHLORIDE 125 ML/HR: 600; 310; 30; 20 INJECTION, SOLUTION INTRAVENOUS at 21:00

## 2018-07-07 RX ADMIN — AMPICILLIN SODIUM 2 G: 2 INJECTION, POWDER, FOR SOLUTION INTRAMUSCULAR; INTRAVENOUS at 23:48

## 2018-07-07 RX ADMIN — EPHEDRINE SULFATE 10 MG: 50 INJECTION INTRAVENOUS at 19:57

## 2018-07-07 RX ADMIN — Medication 10 ML/HR: at 18:09

## 2018-07-07 RX ADMIN — SODIUM CHLORIDE, SODIUM LACTATE, POTASSIUM CHLORIDE, AND CALCIUM CHLORIDE 125 ML/HR: 600; 310; 30; 20 INJECTION, SOLUTION INTRAVENOUS at 11:48

## 2018-07-07 RX ADMIN — BUPIVACAINE HYDROCHLORIDE 5 ML: 2.5 INJECTION, SOLUTION EPIDURAL; INFILTRATION; INTRACAUDAL at 18:05

## 2018-07-07 NOTE — IP AVS SNAPSHOT
[address] [facility] [address] 
[phone] Patient: Marianne Diaz MRN: [id number] YHC:8/0/5699 About your hospitalization You were admitted on:  July 7, 2018 You last received care in the:  [address] [facility] You were discharged on:  July 10, 2018 Why you were hospitalized Your primary diagnosis was:  Not on File Your diagnoses also included:  Pregnancy Follow-up Information Follow up With Details Comments Contact Info Zaid Pinedo MD   [address] [place] [address] [address] [facility] 
[phone] Your Scheduled Appointments Thursday July 12, 2018  1:00 PM EDT  
OB VISIT with RUTH Jones [facility] AT [address] [place] [address] ([address]) [address], [address] [facility]  
[phone] Discharge Orders None A check nikki indicates which time of day the medication should be taken. My Medications START taking these medications Instructions Each Dose to Equal  
 Morning Noon Evening Bedtime  
 ibuprofen 800 mg tablet Commonly known as:  MOTRIN Your last dose was: Your next dose is: Take 1 Tab by mouth every eight (8) hours. 800 mg  
    
   
   
   
  
 oxyCODONE-acetaminophen 5-325 mg per tablet Commonly known as:  PERCOCET Your last dose was: Your next dose is: Take 1 Tab by mouth every four (4) hours as needed. Max Daily Amount: 6 Tabs. 1 Tab ASK your doctor about these medications Instructions Each Dose to Equal  
 Morning Noon Evening Bedtime PRENATAL PLUS (CALCIUM CARB) 27 mg iron- 1 mg Tab Generic drug:  prenatal vit-calcium-iron-fa Your last dose was: Your next dose is: Take 1 Tab by mouth daily. 1 Tab Where to Get Your Medications Information on where to get these meds will be given to you by the nurse or doctor. ! Ask your nurse or doctor about these medications  
  ibuprofen 800 mg tablet  
 oxyCODONE-acetaminophen 5-325 mg per tablet Opioid Education Prescription Opioids: What You Need to Know: 
 
Prescription opioids can be used to help relieve moderate-to-severe pain and are often prescribed following a surgery or injury, or for certain health conditions. These medications can be an important part of treatment but also come with serious risks. Opioids are strong pain medicines. Examples include hydrocodone, oxycodone, fentanyl, and morphine. Heroin is an example of an illegal opioid. It is important to work with your health care provider to make sure you are getting the safest, most effective care. WHAT ARE THE RISKS AND SIDE EFFECTS OF OPIOID USE? Prescription opioids carry serious risks of addiction and overdose, especially with prolonged use. An opioid overdose, often marked by slow breathing, can cause sudden death. The use of prescription opioids can have a number of side effects as well, even when taken as directed. · Tolerance-meaning you might need to take more of a medication for the same pain relief · Physical dependence-meaning you have symptoms of withdrawal when the medication is stopped. Withdrawal symptoms can include nausea, sweating, chills, diarrhea, stomach cramps, and muscle aches. Withdrawal can last up to several weeks, depending on which drug you took and how long you took it. · Increased sensitivity to pain · Constipation · Nausea, vomiting, and dry mouth · Sleepiness and dizziness · Confusion · Depression · Low levels of testosterone that can result in lower sex drive, energy, and strength · Itching and sweating RISKS ARE GREATER WITH:      
· History of drug misuse, substance use disorder, or overdose · Mental health conditions (such as depression or anxiety) · Sleep apnea · Older age (72 years or older) · Pregnancy Avoid alcohol while taking prescription opioids. Also, unless specifically advised by your health care provider, medications to avoid include: · Benzodiazepines (such as Xanax or Valium) · Muscle relaxants (such as Soma or Flexeril) · Hypnotics (such as Ambien or Lunesta) · Other prescription opioids KNOW YOUR OPTIONS Talk to your health care provider about ways to manage your pain that don't involve prescription opioids. Some of these options may actually work better and have fewer risks and side effects. Options may include: 
· Pain relievers such as acetaminophen, ibuprofen, and naproxen · Some medications that are also used for depression or seizures · Physical therapy and exercise · Counseling to help patients learn how to cope better with triggers of pain and stress. · Application of heat or cold compress · Massage therapy · Relaxation techniques Be Informed Make sure you know the name of your medication, how much and how often to take it, and its potential risks & side effects. IF YOU ARE PRESCRIBED OPIOIDS FOR PAIN: 
· Never take opioids in greater amounts or more often than prescribed. Remember the goal is not to be pain-free but to manage your pain at a tolerable level. · Follow up with your primary care provider to: · Work together to create a plan on how to manage your pain. · Talk about ways to help manage your pain that don't involve prescription opioids. · Talk about any and all concerns and side effects. · Help prevent misuse and abuse. · Never sell or share prescription opioids · Help prevent misuse and abuse. · Store prescription opioids in a secure place and out of reach of others (this may include visitors, children, friends, and family).  
· Safely dispose of unused/unwanted prescription opioids: Find your community drug take-back program or your pharmacy mail-back program, or flush them down the toilet, following guidance from the Food and Drug Administration (www.fda.gov/Drugs/ResourcesForYou). · Visit www.cdc.gov/drugoverdose to learn about the risks of opioid abuse and overdose. · If you believe you may be struggling with addiction, tell your health care provider and ask for guidance or call [facility] at 3-850-984-YCTY. Discharge Instructions Vaginal Childbirth: Care Instructions Your Care Instructions Your body will slowly heal in the next few weeks. It is easy to get too tired and overwhelmed during the first weeks after your baby is born. Changes in your hormones can shift your mood without warning. You may find it hard to meet the extra demands on your energy and time. Take it easy on yourself. Follow-up care is a key part of your treatment and safety. Be sure to make and go to all appointments, and call your doctor if you are having problems. It's also a good idea to know your test results and keep a list of the medicines you take. How can you care for yourself at home? · Vaginal bleeding and cramps ¨ After delivery, you will have a bloody discharge from the vagina. This will turn pink within a week and then white or yellow after about 10 days. It may last for 2 to 4 weeks or longer, until the uterus has healed. Use pads instead of tampons until you stop bleeding. ¨ Do not worry if you pass some blood clots, as long as they are smaller than a golf ball. If you have a tear or stitches in your vaginal area, change the pad at least every 4 hours to prevent soreness and infection. ¨ You may have cramps for the first few days after childbirth. These are normal and occur as the uterus shrinks to normal size. Take an over-the-counter pain medicine, such as acetaminophen (Tylenol), ibuprofen (Advil, Motrin), or naproxen (Aleve), for cramps.  Read and follow all instructions on the label. Do not take aspirin, because it can cause more bleeding. ¨ Do not take two or more pain medicines at the same time unless the doctor told you to. Many pain medicines have acetaminophen, which is Tylenol. Too much acetaminophen (Tylenol) can be harmful. · Stitches ¨ If you have stitches, they will dissolve on their own and do not need to be removed. Follow your doctor's instructions for cleaning the stitched area. ¨ Put ice or a cold pack on your painful area for 10 to 20 minutes at a time, several times a day, for the first few days. Put a thin cloth between the ice and your skin. ¨ Sit in a few inches of warm water (sitz bath) 3 times a day and after bowel movements. The warm water helps with pain and itching. If you do not have a tub, a warm shower might help. · Breast fullness ¨ Your breasts may overfill (engorge) in the first few days after delivery. To help milk flow and to relieve pain, warm your breasts in the shower or by using warm, moist towels before nursing. ¨ If you are not nursing, do not put warmth on your breasts or touch your breasts. Wear a tight bra or sports bra and use ice until the fullness goes away. This usually takes 2 to 3 days. ¨ Put ice or a cold pack on your breast after nursing to reduce swelling and pain. Put a thin cloth between the ice and your skin. · Activity ¨ Eat a balanced diet. Do not try to lose weight by cutting calories. Keep taking your prenatal vitamins, or take a multivitamin. ¨ Get as much rest as you can. Try to take naps when your baby sleeps during the day. ¨ Get some exercise every day. But do not do any heavy exercise until your doctor says it is okay. ¨ Wait until you are healed (about 4 to 6 weeks) before you have sexual intercourse. Your doctor will tell you when it is okay to have sex. ¨ Talk to your doctor about birth control. You can get pregnant even before your period returns.  Also, you can get pregnant while you are breastfeeding. · Mental health ¨ It is normal to have some sadness, anxiety, sleeplessness, and mood swings after you go home. If you feel upset or hopeless for more than a few days or are having trouble doing the things you need to do, talk to your doctor. · Constipation and hemorrhoids ¨ Drink plenty of fluids, enough so that your urine is light yellow or clear like water. If you have kidney, heart, or liver disease and have to limit fluids, talk with your doctor before you increase the amount of fluids you drink. ¨ Eat plenty of fiber each day. Have a bran muffin or bran cereal for breakfast, and try eating a piece of fruit for a mid-afternoon snack. ¨ For painful, itchy hemorrhoids, put ice or a cold pack on the area several times a day for 10 minutes at a time. Follow this by putting a warm compress on the area for another 10 to 20 minutes or by sitting in a shallow, warm bath. When should you call for help? Call 911 anytime you think you may need emergency care. For example, call if: 
? · You passed out (lost consciousness). ?Call your doctor now or seek immediate medical care if: 
? · You have severe vaginal bleeding. ? · You are dizzy or lightheaded, or you feel like you may faint. ? · You have a fever. ? · You have new or more pain in your belly or pelvis. ? Watch closely for changes in your health, and be sure to contact your doctor if: 
? · Your vaginal bleeding seems to be getting heavier. ? · You have new or worse vaginal discharge. ? · You feel sad, anxious, or hopeless for more than a few days. ? · You do not get better as expected. Where can you learn more? Go to http://jae-joni.info/. Enter R351 in the search box to learn more about \"Vaginal Childbirth: Care Instructions. \" Current as of: March 16, 2017 Content Version: 11.4 © 7693-6449 Healthwise, Incorporated.  Care instructions adapted under license by [address] (which disclaims liability or warranty for this information). If you have questions about a medical condition or this instruction, always ask your healthcare professional. [address] any warranty or liability for your use of this information. Introducing [facility]! [employer] introduces Ailvxing net patient portal. Now you can access parts of your medical record, email your doctor's office, and request medication refills online. 1. In your internet browser, go to [email or web address] 2. Click on the First Time User? Click Here link in the Sign In box. You will see the New Member Sign Up page. 3. Enter your Ail[employer] net Access Code exactly as it appears below. You will not need to use this code after youve completed the sign-up process. If you do not sign up before the expiration date, you must request a new code. · Ail[employer] net Access Code: [email or web address] Expires: 9/23/2018  3:22 PM 
 
4. Enter the last four digits of your Social Security Number (xxxx) and Date of Birth (mm/dd/yyyy) as indicated and click Submit. You will be taken to the next sign-up page. 5. Create a Ailvxing net ID. This will be your Ailvxing net login ID and cannot be changed, so think of one that is secure and easy to remember. 6. Create a Ailvx[employer] net password. You can change your password at any time. 7. Enter your Password Reset Question and Answer. This can be used at a later time if you forget your password. 8. Enter your e-mail address. You will receive e-mail notification when new information is available in [address] 9. Click Sign Up. You can now view and download portions of your medical record. 10. Click the Download Summary menu link to download a portable copy of your medical information. If you have questions, please visit the Frequently Asked Questions section of the Ailvx[employer] net website.  Remember, Ail[employer] net is NOT to be used for urgent needs. For medical emergencies, dial 911. Now available from your iPhone and Android! Introducing Tim Mckeon As a [employer] patient, I wanted to make you aware of our electronic visit tool called Tim Mckeon. [employer] 24/7 allows you to connect within minutes with a medical provider 24 hours a day, seven days a week via a mobile device or tablet or logging into a secure website from your computer. You can access Tim Mckeon from anywhere in the [place]. A virtual visit might be right for you when you have a simple condition and feel like you just dont want to get out of bed, or cant get away from work for an appointment, when your regular [employer] provider is not available (evenings, weekends or holidays), or when youre out of town and need minor care. Electronic visits cost only $49 and if the [employer] 24/7 provider determines a prescription is needed to treat your condition, one can be electronically transmitted to a nearby pharmacy*. Please take a moment to enroll today if you have not already done so. The enrollment process is free and takes just a few minutes. To enroll, please download the [employer] 24/7 polina to your tablet or phone, or visit [email or web address] to enroll on your computer. And, as an [address] [place] [address] patient with a [email or web address] account, the results of your visits will be scanned into your electronic medical record and your primary care provider will be able to view the scanned results. We urge you to continue to see your regular [employer] provider for your ongoing medical care. And while your primary care provider may not be the one available when you seek a Tim Mckeon virtual visit, the peace of mind you get from getting a real diagnosis real time can be priceless. For more information on Tim Mckeon, view our Frequently Asked Questions (FAQs) at [email or web address].  
 
Sincerely, 
 
 Anders Melton MD 
Chief Medical Officer Brandon Looney *:  certain medications cannot be prescribed via Tim Mckeon Providers Seen During Your Hospitalization Provider Specialty Primary office phone Falguni Prieto MD Obstetrics & Gynecology [phone] Your Primary Care Physician (PCP) Primary Care Physician Office Phone Office Fax Alvaro Ortiz [phone] You are allergic to the following No active allergies Recent Documentation Height Weight Breastfeeding? BMI OB Status Smoking Status 1.6 m 88.5 kg Yes 34.54 kg/m2 Recent pregnancy Former Smoker Emergency Contacts Name Discharge Info Relation Home Work Mobile [address] [facility] CAREGIVER [3] Spouse [[phone] Patient Belongings The following personal items are in your possession at time of discharge: 
  Dental Appliances: None  Visual Aid: None      Home Medications: None   Jewelry: None  Clothing: At bedside, With patient    Other Valuables: None Please provide this summary of care documentation to your next provider. Signatures-by signing, you are acknowledging that this After Visit Summary has been reviewed with you and you have received a copy. Patient Signature:  ____________________________________________________________ Date:  ____________________________________________________________  
  
Escobar Reeves Provider Signature:  ____________________________________________________________ Date:  ____________________________________________________________

## 2018-07-07 NOTE — ANESTHESIA PREPROCEDURE EVALUATION
Anesthetic History No history of anesthetic complications Review of Systems / Medical History Patient summary reviewed, nursing notes reviewed and pertinent labs reviewed Pulmonary Within defined limits Neuro/Psych Within defined limits Cardiovascular Within defined limits GI/Hepatic/Renal 
Within defined limits Endo/Other Within defined limits Other Findings Physical Exam 
 
Airway Mallampati: II 
TM Distance: > 6 cm Neck ROM: normal range of motion Mouth opening: Normal 
 
 Cardiovascular Regular rate and rhythm,  S1 and S2 normal,  no murmur, click, rub, or gallop Dental 
No notable dental hx Pulmonary Breath sounds clear to auscultation Abdominal 
GI exam deferred Other Findings Anesthetic Plan ASA: 2 Anesthesia type: epidural 
 
 
 
 
Induction: Intravenous Anesthetic plan and risks discussed with: Patient

## 2018-07-07 NOTE — H&P
History & Physical 
 
Name: Marianne Diaz MRN: [id number]  SSN: [id number] YOB: 1990  Age: 33 y.o. Sex: female Subjective:  
 
Estimated Date of Delivery: 18 OB History  Para Term  AB Living 2 1 1 0 0 1 SAB TAB Ectopic Molar Multiple Live Births  
 0 0 0 0 0 1 Ms. Calixto Metz is admitted with pregnancy at 39w1d for active labor. Prenatal course was normal. Please see prenatal records for details. Patient Active Problem List  
 Diagnosis  Pregnancy  Prenatal care of multigravida, antepartum Primary provider- Dr. Elise Marsh  Consider indxn  Mom passed away 2018 metastatic - Sister and MIL will be at birth for support VAVD-2016; 7lbs 13oz at 40/1wks; Dr Elise Marsh; Tre Cho; now 14m EDC by LMP/ US 
IOB labs:  done Genetic Screening: declines panoroma/horizon; tetra declined Anatomy:  BOY!!  Marginal cord insertion; fu US 28wk nl GTT: 106 WNL Flu: declined;  TDAP: done GBS: Negative Circ: No specialty comments available. History reviewed. No pertinent past medical history. History reviewed. No pertinent surgical history. Social History Occupational History  Not on file. Social History Main Topics  Smoking status: Former Smoker Packs/day: 0.50 Quit date: 2016  Smokeless tobacco: Never Used  Alcohol use No  
 Drug use: No  
 Sexual activity: Yes  
  Partners: Male Family History Problem Relation Age of Onset  Cancer Mother  Hypertension Father No Known Allergies Prior to Admission medications Medication Sig Start Date End Date Taking? Authorizing Provider  
prenatal vit-calcium-iron-fa (PRENATAL PLUS, CALCIUM CARB,) 27 mg iron- 1 mg tab Take 1 Tab by mouth daily. Yes Historical Provider Review of Systems: A comprehensive review of systems was negative except for that written in the HPI. Objective:  
 
Vitals: 
Vitals:  
 18 2155 Weight: 195 lb (88.5 kg) Height: 5' 3\" (1.6 m) Physical Exam: 
SVE: deffered Membranes:  SROM clear Fetal Heart Rate: Cat 1 Prenatal Labs:  
Lab Results Component Value Date/Time  
 Rubella, External Immune 06/21/2016 GrBStrep, External Negative 06/15/2018 HBsAg, External Negative 02/02/2016 Gonorrhea, External Negative 02/02/2016 Chlamydia, External Negative 02/02/2016 Assessment/Plan:  
 
Plan: Admit for Reassuring fetal status. Group B Strep was negative. Admit labs, pt desires pitocin to facilitate labor, request epidural for pain management. Pt intermittent monitoring, may eat light breakfast,-  
 
Spoke to charge about pt history- pt to be moved to larger room to allow for additional supportive family to be present. Reviewed Pitocin R/B/A versus time for active labor to start- pt and  both in agreement for pitocin to start soon, verbalized understanding of R/B with PROM. Signed By:  Dulce Jiménez CNM July 7, 2018

## 2018-07-07 NOTE — PROGRESS NOTES
Pt back from walking, ate light meal, feels comfortable, random mild contractions FHR cat1 UC - occassional 
SVE- deffered Pit 2 A/P Continue current management pit per protocol Epidural when desires SVE after epidural  
 
[facility], CNM

## 2018-07-07 NOTE — PROGRESS NOTES
1130: Bedside and Verbal shift change report given to Panfilo Gomes RNC (oncoming nurse) by Ric Caceres RN (offgoing nurse). Report included the following information SBAR.  
0527: Ariana Nova CNM at bedside to see patient. 1749: Dr Kristen Pallas called for epidural. 
1800: Dr Kristen Pallas at bedside to place epidural. 
1935: Bedside and Verbal shift change report given to 2525 Johann Martinez (oncoming nurse) by Panfilo Gomes RNC (offgoing nurse). Report included the following information SBAR, Intake/Output, MAR and Accordion.

## 2018-07-07 NOTE — PROGRESS NOTES
2882 received pt of Dr. Maximiliano Johnston in with complaints of possible ROM, 0830 felt a fluid, clear, will place on monitor for evaluation 1015 amnisure positive 1021 updated Lila on pt status will be over to check patient [address] in room talking with pt viewing strip, going over plan of care, pt up to walk per orders will be back then to start pitocin

## 2018-07-07 NOTE — IP AVS SNAPSHOT
[address] 
[phone] Patient: Marianne Diaz MRN: [id number] IUK:[id number] A check nikki indicates which time of day the medication should be taken. My Medications START taking these medications Instructions Each Dose to Equal  
 Morning Noon Evening Bedtime  
 ibuprofen 800 mg tablet Commonly known as:  MOTRIN Your last dose was: Your next dose is: Take 1 Tab by mouth every eight (8) hours. 800 mg  
    
   
   
   
  
 oxyCODONE-acetaminophen 5-325 mg per tablet Commonly known as:  PERCOCET Your last dose was: Your next dose is: Take 1 Tab by mouth every four (4) hours as needed. Max Daily Amount: 6 Tabs. 1 Tab ASK your doctor about these medications Instructions Each Dose to Equal  
 Morning Noon Evening Bedtime PRENATAL PLUS (CALCIUM CARB) 27 mg iron- 1 mg Tab Generic drug:  prenatal vit-calcium-iron-fa Your last dose was: Your next dose is: Take 1 Tab by mouth daily. 1 Tab Where to Get Your Medications Information on where to get these meds will be given to you by the nurse or doctor. ! Ask your nurse or doctor about these medications  
  ibuprofen 800 mg tablet  
 oxyCODONE-acetaminophen 5-325 mg per tablet

## 2018-07-08 PROCEDURE — 74011250637 HC RX REV CODE- 250/637: Performed by: ADVANCED PRACTICE MIDWIFE

## 2018-07-08 PROCEDURE — 74011250636 HC RX REV CODE- 250/636: Performed by: ADVANCED PRACTICE MIDWIFE

## 2018-07-08 PROCEDURE — 75410000003 HC RECOV DEL/VAG/CSECN EA 0.5 HR

## 2018-07-08 PROCEDURE — 77030010848 HC CATH INTUTR PRSS KOLB -B

## 2018-07-08 PROCEDURE — 74011250636 HC RX REV CODE- 250/636: Performed by: ANESTHESIOLOGY

## 2018-07-08 PROCEDURE — 0KQM0ZZ REPAIR PERINEUM MUSCLE, OPEN APPROACH: ICD-10-PCS | Performed by: ADVANCED PRACTICE MIDWIFE

## 2018-07-08 PROCEDURE — 75410000000 HC DELIVERY VAGINAL/SINGLE

## 2018-07-08 PROCEDURE — 65410000002 HC RM PRIVATE OB

## 2018-07-08 PROCEDURE — 75410000002 HC LABOR FEE PER 1 HR

## 2018-07-08 PROCEDURE — 74011000258 HC RX REV CODE- 258: Performed by: ADVANCED PRACTICE MIDWIFE

## 2018-07-08 PROCEDURE — 77030005537 HC CATH URETH BARD -A

## 2018-07-08 PROCEDURE — 77030011943

## 2018-07-08 RX ORDER — IBUPROFEN 400 MG/1
800 TABLET ORAL EVERY 8 HOURS
Status: DISCONTINUED | OUTPATIENT
Start: 2018-07-08 | End: 2018-07-10 | Stop reason: HOSPADM

## 2018-07-08 RX ORDER — OXYTOCIN/RINGER'S LACTATE 20/1000 ML
125-1000 PLASTIC BAG, INJECTION (ML) INTRAVENOUS AS NEEDED
Status: DISCONTINUED | OUTPATIENT
Start: 2018-07-08 | End: 2018-07-10 | Stop reason: HOSPADM

## 2018-07-08 RX ORDER — MINERAL OIL
OIL (ML) ORAL
Status: DISCONTINUED
Start: 2018-07-08 | End: 2018-07-08

## 2018-07-08 RX ORDER — SIMETHICONE 80 MG
80 TABLET,CHEWABLE ORAL
Status: DISCONTINUED | OUTPATIENT
Start: 2018-07-08 | End: 2018-07-10 | Stop reason: HOSPADM

## 2018-07-08 RX ORDER — SODIUM CHLORIDE 0.9 % (FLUSH) 0.9 %
5-10 SYRINGE (ML) INJECTION EVERY 8 HOURS
Status: DISCONTINUED | OUTPATIENT
Start: 2018-07-08 | End: 2018-07-08

## 2018-07-08 RX ORDER — SODIUM CHLORIDE 0.9 % (FLUSH) 0.9 %
5-10 SYRINGE (ML) INJECTION AS NEEDED
Status: DISCONTINUED | OUTPATIENT
Start: 2018-07-08 | End: 2018-07-10 | Stop reason: HOSPADM

## 2018-07-08 RX ORDER — HYDROCORTISONE 1 %
CREAM (GRAM) TOPICAL AS NEEDED
Status: DISCONTINUED | OUTPATIENT
Start: 2018-07-08 | End: 2018-07-10 | Stop reason: HOSPADM

## 2018-07-08 RX ORDER — AMMONIA 15 % (W/V)
1 AMPUL (EA) INHALATION AS NEEDED
Status: DISCONTINUED | OUTPATIENT
Start: 2018-07-08 | End: 2018-07-10 | Stop reason: HOSPADM

## 2018-07-08 RX ORDER — OXYCODONE AND ACETAMINOPHEN 5; 325 MG/1; MG/1
1 TABLET ORAL
Status: DISCONTINUED | OUTPATIENT
Start: 2018-07-08 | End: 2018-07-10 | Stop reason: HOSPADM

## 2018-07-08 RX ORDER — HYDROCORTISONE ACETATE PRAMOXINE HCL 2.5; 1 G/100G; G/100G
CREAM TOPICAL AS NEEDED
Status: DISCONTINUED | OUTPATIENT
Start: 2018-07-08 | End: 2018-07-10 | Stop reason: HOSPADM

## 2018-07-08 RX ORDER — DOCUSATE SODIUM 100 MG/1
100 CAPSULE, LIQUID FILLED ORAL
Status: DISCONTINUED | OUTPATIENT
Start: 2018-07-08 | End: 2018-07-10 | Stop reason: HOSPADM

## 2018-07-08 RX ADMIN — IBUPROFEN 800 MG: 400 TABLET ORAL at 09:40

## 2018-07-08 RX ADMIN — OXYCODONE HYDROCHLORIDE AND ACETAMINOPHEN 1 TABLET: 5; 325 TABLET ORAL at 19:14

## 2018-07-08 RX ADMIN — SODIUM CHLORIDE, SODIUM LACTATE, POTASSIUM CHLORIDE, AND CALCIUM CHLORIDE 125 ML/HR: 600; 310; 30; 20 INJECTION, SOLUTION INTRAVENOUS at 01:15

## 2018-07-08 RX ADMIN — OXYCODONE HYDROCHLORIDE AND ACETAMINOPHEN 1 TABLET: 5; 325 TABLET ORAL at 23:50

## 2018-07-08 RX ADMIN — DOCUSATE SODIUM 100 MG: 100 CAPSULE, LIQUID FILLED ORAL at 14:10

## 2018-07-08 RX ADMIN — Medication 10 ML/HR: at 01:12

## 2018-07-08 RX ADMIN — AMPICILLIN SODIUM 1 G: 1 INJECTION, POWDER, FOR SOLUTION INTRAMUSCULAR; INTRAVENOUS at 03:53

## 2018-07-08 RX ADMIN — HYDROCORTISONE ACETATE PRAMOXINE HCL: 2.5; 1 CREAM TOPICAL at 14:10

## 2018-07-08 RX ADMIN — OXYCODONE HYDROCHLORIDE AND ACETAMINOPHEN 1 TABLET: 5; 325 TABLET ORAL at 14:10

## 2018-07-08 RX ADMIN — IBUPROFEN 800 MG: 400 TABLET ORAL at 17:36

## 2018-07-08 NOTE — ANESTHESIA PROCEDURE NOTES
Epidural Block Performed by: Regino Fry Authorized by: Regino Fry Pre-Procedure Indication: labor epidural

## 2018-07-08 NOTE — PROGRESS NOTES
Pt comfortable in bed, exam and FHR evaluated    FHR Cat 1  UC 3-5  Pit off  SVE 6-7    A/P:  Reactive tracing, fetal head remains -2 with MVU's 130-200 and spacing out  Restart pit low dose to achieve MVUs 180-220- titrate to FHR tolerance   Mk Brown, RUTH

## 2018-07-08 NOTE — PROGRESS NOTES
: Bedside and Verbal shift change report given to H. Midge Hatchet (oncoming nurse) by Lacy Varghese RN (offgoing nurse). Report included the following information SBAR, Procedure Summary, Intake/Output, MAR and Recent Results. : Pitocin halfed to 7mu/min due to [facility]. : Shantel Godoy called and made aware of [facility]. : Shantel Godoy at bedside assessing [facility]. : IUPC placed by CN for amnioinfusion. : Pitocin turned off.  
0: Pitocin restarted at 2 mu/min. 
0525: Pt called out complaining of pressure. 0530: SVE by Shantel Godoy - 10/100 and ready to push. 1:  of live male  by Hermes Cornelius, [address] Apgars 8/9.  
0747: TRANSFER - OUT REPORT: 
 
Verbal report given to YVETTE Faustin RN(name) on Ana Lilia Lou  being transferred to MIU(unit) for routine progression of care Report consisted of patients Situation, Background, Assessment and  
Recommendations(SBAR). Information from the following report(s) SBAR, Procedure Summary, Intake/Output, MAR and Recent Results was reviewed with the receiving nurse. Lines:  
Peripheral IV 18 Left Hand (Active) Site Assessment Clean, dry, & intact 2018  8:00 PM  
Phlebitis Assessment 0 2018  8:00 PM  
Infiltration Assessment 0 2018  8:00 PM  
Dressing Status Clean, dry, & intact 2018  8:00 PM  
Dressing Type Tape;Transparent 2018  8:00 PM  
Hub Color/Line Status Green; Infusing 2018  8:00 PM  
  
 
Opportunity for questions and clarification was provided. Patient transported with: 
 Registered Nurse

## 2018-07-08 NOTE — PROGRESS NOTES
Pt resting comfortably in bed S/P epidural, straight cath and exam    FHR - Cat 1  UC 2-4 moderate to palpation  Pit 14  SVE 5/90/-2    A/P- continue to increase pitocin for increased strength and fetal tolerance  Reviewed plan with pt and nurse both in agreement.   Recommend whelchers and spinning to nurse to assist with decent    Nilsa Mckeon CNM

## 2018-07-08 NOTE — ROUTINE PROCESS
Bedside shift change report given to TRAY Chu (oncoming nurse) by TRAY Flynn (offgoing nurse). Report included the following information SBAR.

## 2018-07-08 NOTE — ROUTINE PROCESS
0820 TRANSFER - IN REPORT:    Verbal report received from Angel Caballero [address] on Macario Whiting  being received from L&D for routine progression of care      Report consisted of patients Situation, Background, Assessment and   Recommendations(SBAR). Information from the following report(s) SBAR, Kardex, Intake/Output, MAR and Recent Results was reviewed with the receiving nurse. Opportunity for questions and clarification was provided. Assessment completed upon patients arrival to unit and care assumed. 0930 Patient up to void with nurse. Gait steady. Pericare completed. Patient returned to bed. Call bell placed within reach.

## 2018-07-08 NOTE — PROGRESS NOTES
Post-Partum Day Number 0 Progress Note    Leopoldo Moose     Assessment: Doing well, post partum day 0    Plan:  1. Continue routine postpartum and perineal care as well as maternal education. Information for the patient's :  Aime Moses [[id number]]   Information for the patient's :  Milena Rahman [[id number]]   Vaginal, Spontaneous Delivery   Patient doing well without significant complaint. Voiding without difficulty, normal lochia. Vitals:  Visit Vitals    BP 93/61 (BP 1 Location: Right arm, BP Patient Position: Sitting)    Pulse 86    Temp 97.8 °F (36.6 °C)    Resp 16    Ht 5' 3\" (1.6 m)    Wt 195 lb (88.5 kg)    SpO2 100%    Breastfeeding Yes    BMI 34.54 kg/m2     Temp (24hrs), Av.1 °F (36.7 °C), Min:97.4 °F (36.3 °C), Max:98.5 °F (36.9 °C)        Exam:   Patient without distress. Abdomen soft, fundus firm, nontender                Perineum with normal lochia noted. Lower extremities are negative for swelling, cords or tenderness.     Labs:     Lab Results   Component Value Date/Time    WBC 7.0 2018 11:33 AM    WBC 6.9 2018 06:47 AM    WBC 8.9 2018 09:13 AM    WBC 6.6 2018 08:33 AM    WBC 13.9 (H) 2016 04:20 AM    WBC 10.9 2013 11:15 AM    WBC 5.6 06/10/2009 11:48 AM    HGB 10.6 (L) 2018 11:33 AM    HGB 10.7 (L) 2018 06:47 AM    HGB 11.8 2018 09:13 AM    HGB 12.4 2018 08:33 AM    HGB 11.4 (L) 2016 04:20 AM    HGB 14.6 2013 11:15 AM    HGB 13.3 06/10/2009 11:48 AM    HCT 31.9 (L) 2018 11:33 AM    HCT 32.7 (L) 2018 06:47 AM    HCT 34.9 2018 09:13 AM    HCT 36.0 2018 08:33 AM    HCT 34.2 (L) 2016 04:20 AM    HCT 41.6 2013 11:15 AM    HCT 38.7 06/10/2009 11:48 AM    PLATELET 294 41/15/8441 11:33 AM    PLATELET 198  06:47 AM    PLATELET 212  09:13 AM    PLATELET 614  08:33 AM    PLATELET 268 09/13/2016 04:20 AM    PLATELET 081 84/75/2892 11:15 AM    PLATELET 117 02/35/6237 11:48 AM    Hgb, External 11.8 04/17/2018    Hct, External 34.9 04/17/2018    Platelet cnt., External 252 04/17/2018       No results found for this or any previous visit (from the past 24 hour(s)).

## 2018-07-08 NOTE — PROGRESS NOTES
Pt resting in room, contractions increasing in intensity however pt not very uncomfortable - will consider epidural in near future as contraction continue to intensify    FHR - Cat 1  UC 2-4 mild to moderate    A/P- continue current management  Conrad Lebron CNM

## 2018-07-08 NOTE — LACTATION NOTE
This note was copied from a baby's chart. Initial Lactation Consultation: Infant born this morning vaginally to a  mom at 44 2/7 weeks gestation. Mom has a 3year old that she nursed for 11 days. Per mom, infant latching deeply at feeds. She states that she started leaking colostrum 2 weeks ago. Infant not seen at breast at this time. She will call if she needs assistance. Mobile behaviors reviewed, Very sleepy in first 24 hours, mother must wake baby for feedings, offer hand expressed drops, baby usually will respond and feed vigorously 6-8 times in the first day, but is important to offer 8-12 times,  After baby wakes from deep sleep usually on the 2nd or 3rd day a new behavior pattern follows. Frequent feeding during this brief behavioral phase preceeding milk transition is called cluster feeding. Typical  behavior: baby becomes vigorous at the breast and wants to feed frequently- every 1-2 hours for several feedings. This is the normal process by which the baby demands his/her supply. This type of frequent feeding is the stimulation which causes lactogenesis II (milk coming in). Skin to skin and rooming in discussed with mom. The benefits include infants temperature regulation, decrease stress in mom and baby, increase in maternal milk production and allowing mom to see early feeding cues. Feeding Plan: Mother will keep baby skin to skin as often as possible, feed on demand, 8-12x/day , respond to feeding cues, obtain latch, listen for audible swallowing, be aware of signs of oxytocin release/ cramping,thirst,sleepiness while breastfeeding, offer both breasts,and will not limit feedings.

## 2018-07-08 NOTE — L&D DELIVERY NOTE
Delivery Note      Pt progressed to complete +2  and feeling pressure. Good maternal pushing efforts for  live male over second degree lac- repaired with 3-0 rapide. vtx delivered in VINH position. Infant placed on maternal abdomen with spontaneous respiratory effort. 3 vessel cord clamped x 2 by RUTH then cut by FOB after 2 mins. Spontaneous delivery of intact placenta.   ml     Provider:  Shaunna Eng CNM    Assistant: none    Pre-Delivery Diagnosis: Term pregnancy    Post-Delivery Diagnosis: Living  infant(s) or Male    Intrapartum Event: None    Procedure: Spontaneous vaginal delivery    Epidural: YES    Monitor:  Fetal Heart Tones - External and Uterine Contractions - External    Indications for instrumental delivery: none    Estimated Blood Loss:     Episiotomy: none    Laceration(s):  2nd degree    Laceration(s) repair: YES    Presentation: Cephalic    Fetal Description: gonzalez    Fetal Position: Left Occiput Anterior    Birth Weight: TBD    Birth Length: TBD    Apgar - One Minute: 8    Apgar - Five Minutes: 9    Umbilical Cord: 3 vessels present    Specimens: Cord Blood           Complications:  none           Cord Blood Results:   Information for the patient's :  Shalini Cook [[id number]]   No results found for: PCTABR, PCTDIG, BILI, [address]  Information for the patient's :  [facility] [[id number]]   No results found for: PCTABR, PCTDIG, BILI, ABORH    Prenatal Labs:     Lab Results   Component Value Date/Time    HBsAg, External Negative 2016    Rubella, External Immune 2016    Gonorrhea, External Negative 2016    Chlamydia, External Negative 2016    GrBStrep, External Negative 06/15/2018        Attending Attestation: I was present and scrubbed for the entire procedure    Signed By:  Shaunna Eng CNM     2018

## 2018-07-08 NOTE — PROGRESS NOTES
Called to room to assess fetal heart rate @ 2215- CNM to bedside immediately after    Visit Vitals    /59 (BP 1 Location: Left arm, BP Patient Position: Lying right side)    Pulse 68    Temp 98.5 °F (36.9 °C)    Resp 16    Ht 5' 3\" (1.6 m)    Wt 195 lb (88.5 kg)    SpO2 100%    Breastfeeding Yes    BMI 34.54 kg/m2     FHR Cat 2 with moderate variability- variables some late like others variables   2  Pit 7 - recently halved at 2144 by RN    A/P-   amnioinfusion 300ml bolus then 100 / hr  IUPC for strength  Reviewed strip, IUPC, with pt and  questions answered to pt satisfaction  Reviewed strip, plan with Seagl aware and in agreement with plan at this time  Pitocin off to observe tracing with amnioinfusion and position changes.   Conrad Lebron CNM

## 2018-07-08 NOTE — PROGRESS NOTES
Labor Progress Note    ASSESSMENT / PLAN:    Tomasa Xavierr 28 y.o.  at 39w1d     1. Alerted to status by Patti Loza CNM      Good variability. Repetitive variable decels, some late in timing. Pitocin off, amnioinfusion running    2. Introduced myself to patient and family. Current and past pregnancy history reviewed  2. Progressing   3.  Expectant Management with close observation      Active Problems:    Pregnancy (2018)          OBJECTIVE:    Visit Vitals    /59 (BP 1 Location: Left arm, BP Patient Position: Lying right side)    Pulse 68    Temp 98.5 °F (36.9 °C)    Resp 16    Ht 5' 3\" (1.6 m)    Wt 88.5 kg (195 lb)    SpO2 100%    Breastfeeding Yes    BMI 34.54 kg/m2        Fetal Monitoring: cat 2    SVE    5+ cm per CNM exam        Adenike Mccartney MD  2018  11:05 PM

## 2018-07-09 PROCEDURE — 74011250637 HC RX REV CODE- 250/637: Performed by: ADVANCED PRACTICE MIDWIFE

## 2018-07-09 PROCEDURE — 65410000002 HC RM PRIVATE OB

## 2018-07-09 RX ADMIN — OXYCODONE HYDROCHLORIDE AND ACETAMINOPHEN 1 TABLET: 5; 325 TABLET ORAL at 15:27

## 2018-07-09 RX ADMIN — OXYCODONE HYDROCHLORIDE AND ACETAMINOPHEN 1 TABLET: 5; 325 TABLET ORAL at 19:42

## 2018-07-09 RX ADMIN — OXYCODONE HYDROCHLORIDE AND ACETAMINOPHEN 1 TABLET: 5; 325 TABLET ORAL at 09:15

## 2018-07-09 RX ADMIN — IBUPROFEN 800 MG: 400 TABLET ORAL at 01:49

## 2018-07-09 RX ADMIN — OXYCODONE HYDROCHLORIDE AND ACETAMINOPHEN 1 TABLET: 5; 325 TABLET ORAL at 04:44

## 2018-07-09 RX ADMIN — IBUPROFEN 800 MG: 400 TABLET ORAL at 17:33

## 2018-07-09 RX ADMIN — IBUPROFEN 800 MG: 400 TABLET ORAL at 09:15

## 2018-07-09 NOTE — PROGRESS NOTES
Post-Partum Day Number 1 Progress Note    Aminah Malcolm     Assessment: Doing well, post partum day 1    Plan:  1. Continue routine postpartum and perineal care as well as maternal education. 2. The risks and benefits of the circumcision  procedure and anesthesia including: bleeding, infection, variability of cosmetic results were discussed at length with the mother. She is aware that future repeat procedures may be necessary. She gives informed consent to proceed as noted and her questions are answered. Information for the patient's :  Zoey Diop [[id number]]   Information for the patient's :  Mike Gutierrez [[id number]]   Vaginal, Spontaneous Delivery   Patient doing well without significant complaint. Voiding without difficulty, normal lochia. Vitals:  Visit Vitals    BP 99/64 (BP 1 Location: Right arm, BP Patient Position: At rest)    Pulse 72    Temp 97.7 °F (36.5 °C)    Resp 16    Ht 5' 3\" (1.6 m)    Wt 195 lb (88.5 kg)    SpO2 100%    Breastfeeding Yes    BMI 34.54 kg/m2     Temp (24hrs), Av.7 °F (36.5 °C), Min:97.3 °F (36.3 °C), Max:98 °F (36.7 °C)        Exam:   Patient without distress. Abdomen soft, fundus firm, nontender                Perineum with normal lochia noted. Lower extremities are negative for swelling, cords or tenderness.     Labs:     Lab Results   Component Value Date/Time    WBC 7.0 2018 11:33 AM    WBC 6.9 2018 06:47 AM    WBC 8.9 2018 09:13 AM    WBC 6.6 2018 08:33 AM    WBC 13.9 (H) 2016 04:20 AM    WBC 10.9 2013 11:15 AM    WBC 5.6 06/10/2009 11:48 AM    HGB 10.6 (L) 2018 11:33 AM    HGB 10.7 (L) 2018 06:47 AM    HGB 11.8 2018 09:13 AM    HGB 12.4 2018 08:33 AM    HGB 11.4 (L) 2016 04:20 AM    HGB 14.6 2013 11:15 AM    HGB 13.3 06/10/2009 11:48 AM    HCT 31.9 (L) 2018 11:33 AM    HCT 32.7 (L) 2018 06:47 AM HCT 34.9 04/17/2018 09:13 AM    HCT 36.0 01/02/2018 08:33 AM    HCT 34.2 (L) 09/13/2016 04:20 AM    HCT 41.6 12/21/2013 11:15 AM    HCT 38.7 06/10/2009 11:48 AM    PLATELET 944 68/37/0487 11:33 AM    PLATELET 424 28/03/2199 06:47 AM    PLATELET 306 62/13/8273 09:13 AM    PLATELET 829 21/82/7629 08:33 AM    PLATELET 686 86/59/4745 04:20 AM    PLATELET 959 94/78/3677 11:15 AM    PLATELET 016 54/09/7921 11:48 AM    Hgb, External 11.8 04/17/2018    Hct, External 34.9 04/17/2018    Platelet cnt., External 252 04/17/2018       No results found for this or any previous visit (from the past 24 hour(s)).

## 2018-07-09 NOTE — ROUTINE PROCESS
Bedside shift change report given to Patricia Bates RN (oncoming nurse) by DOROTHY Riggins RN (offgoing nurse). Report included the following information SBAR, Intake/Output, MAR and Recent Results. 7/10/18 @ 0100 - Rooming in interrupted due to Mother's request for 2 hours of sleep reason. Mother's concerns explored, solutions offered, education on the benefits of rooming in shared. Mother chooses to continue with plan of separation. Mother's request honored, baby taken to nursery.

## 2018-07-09 NOTE — LACTATION NOTE
This note was copied from a baby's chart. I was able to see one of infant's feeding. Mother was having a hard time getting him to latch when I walked in the room. She did not have baby supported well. I just helped her by showing her to bring infant up to the level of her breasts with a pillow for him to lay on and she sandwiched her areola and he latched right on. He was sucking rhythmically. Mother complains of initial soreness but only with initial latch. I assured her this was normal.  She is feeding infant on demand. She feels like breastfeeding is going well.

## 2018-07-09 NOTE — ROUTINE PROCESS
Bedside shift change report given to [address] [place] [address] (oncoming nurse) by SOHA Camacho RN (offgoing nurse). Report included the following information SBAR, Procedure Summary, Intake/Output, MAR and Recent Results.

## 2018-07-10 VITALS
DIASTOLIC BLOOD PRESSURE: 84 MMHG | HEIGHT: 63 IN | BODY MASS INDEX: 34.55 KG/M2 | SYSTOLIC BLOOD PRESSURE: 124 MMHG | TEMPERATURE: 97.6 F | RESPIRATION RATE: 16 BRPM | WEIGHT: 195 LBS | OXYGEN SATURATION: 100 % | HEART RATE: 81 BPM

## 2018-07-10 PROCEDURE — 74011250637 HC RX REV CODE- 250/637: Performed by: ADVANCED PRACTICE MIDWIFE

## 2018-07-10 RX ORDER — IBUPROFEN 800 MG/1
800 TABLET ORAL EVERY 8 HOURS
Qty: 90 TAB | Refills: 3 | Status: SHIPPED | OUTPATIENT
Start: 2018-07-10 | End: 2018-09-06

## 2018-07-10 RX ORDER — OXYCODONE AND ACETAMINOPHEN 5; 325 MG/1; MG/1
1 TABLET ORAL
Qty: 15 TAB | Refills: 0 | Status: SHIPPED | OUTPATIENT
Start: 2018-07-10 | End: 2018-09-06

## 2018-07-10 RX ADMIN — IBUPROFEN 800 MG: 400 TABLET ORAL at 08:42

## 2018-07-10 RX ADMIN — OXYCODONE HYDROCHLORIDE AND ACETAMINOPHEN 1 TABLET: 5; 325 TABLET ORAL at 01:22

## 2018-07-10 RX ADMIN — OXYCODONE HYDROCHLORIDE AND ACETAMINOPHEN 1 TABLET: 5; 325 TABLET ORAL at 04:52

## 2018-07-10 NOTE — DISCHARGE INSTRUCTIONS
Vaginal Childbirth: Care Instructions  Your Care Instructions  Your body will slowly heal in the next few weeks. It is easy to get too tired and overwhelmed during the first weeks after your baby is born. Changes in your hormones can shift your mood without warning. You may find it hard to meet the extra demands on your energy and time. Take it easy on yourself. Follow-up care is a key part of your treatment and safety. Be sure to make and go to all appointments, and call your doctor if you are having problems. It's also a good idea to know your test results and keep a list of the medicines you take. How can you care for yourself at home? · Vaginal bleeding and cramps  ¨ After delivery, you will have a bloody discharge from the vagina. This will turn pink within a week and then white or yellow after about 10 days. It may last for 2 to 4 weeks or longer, until the uterus has healed. Use pads instead of tampons until you stop bleeding. ¨ Do not worry if you pass some blood clots, as long as they are smaller than a golf ball. If you have a tear or stitches in your vaginal area, change the pad at least every 4 hours to prevent soreness and infection. ¨ You may have cramps for the first few days after childbirth. These are normal and occur as the uterus shrinks to normal size. Take an over-the-counter pain medicine, such as acetaminophen (Tylenol), ibuprofen (Advil, Motrin), or naproxen (Aleve), for cramps. Read and follow all instructions on the label. Do not take aspirin, because it can cause more bleeding. ¨ Do not take two or more pain medicines at the same time unless the doctor told you to. Many pain medicines have acetaminophen, which is Tylenol. Too much acetaminophen (Tylenol) can be harmful. · Stitches  ¨ If you have stitches, they will dissolve on their own and do not need to be removed. Follow your doctor's instructions for cleaning the stitched area.   ¨ Put ice or a cold pack on your painful area for 10 to 20 minutes at a time, several times a day, for the first few days. Put a thin cloth between the ice and your skin. ¨ Sit in a few inches of warm water (sitz bath) 3 times a day and after bowel movements. The warm water helps with pain and itching. If you do not have a tub, a warm shower might help. · Breast fullness  ¨ Your breasts may overfill (engorge) in the first few days after delivery. To help milk flow and to relieve pain, warm your breasts in the shower or by using warm, moist towels before nursing. ¨ If you are not nursing, do not put warmth on your breasts or touch your breasts. Wear a tight bra or sports bra and use ice until the fullness goes away. This usually takes 2 to 3 days. ¨ Put ice or a cold pack on your breast after nursing to reduce swelling and pain. Put a thin cloth between the ice and your skin. · Activity  ¨ Eat a balanced diet. Do not try to lose weight by cutting calories. Keep taking your prenatal vitamins, or take a multivitamin. ¨ Get as much rest as you can. Try to take naps when your baby sleeps during the day. ¨ Get some exercise every day. But do not do any heavy exercise until your doctor says it is okay. ¨ Wait until you are healed (about 4 to 6 weeks) before you have sexual intercourse. Your doctor will tell you when it is okay to have sex. ¨ Talk to your doctor about birth control. You can get pregnant even before your period returns. Also, you can get pregnant while you are breastfeeding. · Mental health  ¨ It is normal to have some sadness, anxiety, sleeplessness, and mood swings after you go home. If you feel upset or hopeless for more than a few days or are having trouble doing the things you need to do, talk to your doctor. · Constipation and hemorrhoids  ¨ Drink plenty of fluids, enough so that your urine is light yellow or clear like water.  If you have kidney, heart, or liver disease and have to limit fluids, talk with your doctor before you increase the amount of fluids you drink. ¨ Eat plenty of fiber each day. Have a bran muffin or bran cereal for breakfast, and try eating a piece of fruit for a mid-afternoon snack. ¨ For painful, itchy hemorrhoids, put ice or a cold pack on the area several times a day for 10 minutes at a time. Follow this by putting a warm compress on the area for another 10 to 20 minutes or by sitting in a shallow, warm bath. When should you call for help? Call 911 anytime you think you may need emergency care. For example, call if:  ? · You passed out (lost consciousness). ?Call your doctor now or seek immediate medical care if:  ? · You have severe vaginal bleeding. ? · You are dizzy or lightheaded, or you feel like you may faint. ? · You have a fever. ? · You have new or more pain in your belly or pelvis. ? Watch closely for changes in your health, and be sure to contact your doctor if:  ? · Your vaginal bleeding seems to be getting heavier. ? · You have new or worse vaginal discharge. ? · You feel sad, anxious, or hopeless for more than a few days. ? · You do not get better as expected. Where can you learn more? Go to http://jae-joni.info/. Enter P507 in the search box to learn more about \"Vaginal Childbirth: Care Instructions. \"  Current as of: March 16, 2017  Content Version: 11.4  © 1403-8277 [employer]. Care instructions adapted under license by [employer] (which disclaims liability or warranty for this information). If you have questions about a medical condition or this instruction, always ask your healthcare professional. [place] [address] any warranty or liability for your use of this information.

## 2018-07-10 NOTE — LACTATION NOTE
This note was copied from a baby's chart. Mom and baby scheduled for discharge today. I did not see the baby at the breast. Mom states the baby has been latching and nursing well. She said he has been nursing on both breasts and the baby nurses for up to 50 minutes at a time. Mom states baby was sleepy earlier this morning and she was concerned so she gave him some formula. We talked about breast stimulation and milk production and how giving formula could potentially affect her milk supply. We talked about ways to wake the baby to get him to nurse. We reviewed cluster feeding. Frequent feeding during this brief behavioral phase preceeding milk transition is called cluster feeding. Typical  behavior: baby becomes vigorous at the breast and wants to feed frequently- every 1-2 hours for several feedings. Emptying of the breast twice produces double in subsequent feedings. This is the normal process by which the baby demands his/her supply. This type of frequent feeding is the stimulation which causes lactogenesis II (milk coming in). We discussed engorgement. Breasts may become engorged when milk \"comes in\". How milk is made / normal phases of milk production, supply and demand discussed. Taught care of engorged breasts - frequent breastfeeding encouraged. Mom should put the baby to the breast and allow him to completely finish one breast before offering the second breast. She may pump a couple minutes after nursing for comfort. She can apply ice to the breasts for 10-15 minutes after nursing as needed. Pumping and returning to work/school discussed:  Start pumping for storage after first 2-3 weeks- about one hour after first AM feeding when supply is most abundant, once a day to start, timing of pumping at work/school, storage options and guidelines, and clean private pumping location (never in the bathroom). Breast feeding teaching completed and all questions answered.  Baby voiding and stooling adequately.

## 2018-07-10 NOTE — DISCHARGE SUMMARY
Obstetrical Discharge Summary     Name: Adam Elizalde MRN: [id number]  SSN: [id number]    YOB: 1990  Age: 33 y.o. Sex: female      Allergies: Review of patient's allergies indicates no known allergies. Admit Date: 2018    Discharge Date: 7/10/2018     Admitting Physician: Germain Romberg, MD     AttendingProvider:  Edgar Pate CNM      * Admission Diagnoses: Pregnancy    * Discharge Diagnoses:   Information for the patient's :  Blair Isidro [[id number]]   Delivery of a   male infant via  on 2018 at   by . Apgars were  and . Information for the patient's :  Rosalva Mcfadden [[id number]]   Delivery of a 8 lb 1.5 oz (3.67 kg) male infant via Vaginal, Spontaneous Delivery on 2018 at 5:49 AM  by . Apgars were 8 and 9.        Additional Diagnoses:   Hospital Problems as of 7/10/2018  Date Reviewed: 2018          Codes Class Noted - Resolved POA    Pregnancy ICD-10-CM: Z34.90  ICD-9-CM: V22.2  2018 - Present Unknown             Lab Results   Component Value Date/Time    Rubella, External Immune 2016    GrBStrep, External Negative 06/15/2018    ABO,Rh O Positive 2016      Immunization History   Administered Date(s) Administered    Tdap 2018       * Procedures:   * No surgery found *      [place]  Depression Scale  I have been able to laugh and see the funny side of things: As much as I always could  I have looked forward with enjoyment to things: As much as I ever did  I have blamed myself unnecessarily when things went wrong: Not very often  I have been anxious or worried for no good reason: No, not at all  I have felt scared or panicky for no very good reason: No, not at all  Things have been getting on top of me: No, I have been coping as well as ever  I have been so unhappy that I have had difficulty sleeping: No, not at all  I have felt sad or miserable: No, not at all  I have been so unhappy that I have been crying: No, never  The thought of harming myself has occurred to me: Never  Total Score: 1    * Discharge Condition: good    [facility] Hospital Course: Normal hospital course following the delivery. * Disposition: Home    Discharge Medications:   Current Discharge Medication List      START taking these medications    Details   ibuprofen (MOTRIN) 800 mg tablet Take 1 Tab by mouth every eight (8) hours. Qty: 90 Tab, Refills: 3      oxyCODONE-acetaminophen (PERCOCET) 5-325 mg per tablet Take 1 Tab by mouth every four (4) hours as needed. Max Daily Amount: 6 Tabs. Qty: 15 Tab, Refills: 0    Associated Diagnoses: Vaginal delivery             * Follow-up Care/Patient Instructions:   Activity: Activity as tolerated  Diet: Regular Diet  Wound Care: None needed    Follow-up Information     Follow up With Details Comments [address]  [facility] [address]  [phone]             Signed By:  Elda Pyle CNM     July 10, 2018

## 2018-07-10 NOTE — PROGRESS NOTES
Post-Partum Day Number 2 Progress Note    [facility]     Assessment: Doing well, post partum day 2    Plan:   1. Discharge home today  2. Follow up in office in 6 weeks with Vipin Barney CNM  3. Post partum activity advised, diet as tolerated  4. Discharge Medications: ibuprofen, percocet and medications prior to admission    Information for the patient's :  Darell Canavan [[id number]]   Information for the patient's :  Magali Alvarado [[id number]]   Vaginal, Spontaneous Delivery   Patient doing well without significant complaint. Voiding without difficulty, normal lochia. Vitals:  Visit Vitals    /68 (BP 1 Location: Right arm, BP Patient Position: Sitting)    Pulse 67    Temp 97.7 °F (36.5 °C)    Resp 16    Ht 5' 3\" (1.6 m)    Wt 195 lb (88.5 kg)    SpO2 100%    Breastfeeding Yes    BMI 34.54 kg/m2     Temp (24hrs), Av.7 °F (36.5 °C), Min:97.6 °F (36.4 °C), Max:97.8 °F (36.6 °C)      Exam:         Patient without distress. Abdomen soft, fundus firm, nontender                 Lower extremities are negative for swelling, cords or tenderness.     Labs:     Lab Results   Component Value Date/Time    WBC 7.0 2018 11:33 AM    WBC 6.9 2018 06:47 AM    WBC 8.9 2018 09:13 AM    WBC 6.6 2018 08:33 AM    WBC 13.9 (H) 2016 04:20 AM    WBC 10.9 2013 11:15 AM    WBC 5.6 06/10/2009 11:48 AM    HGB 10.6 (L) 2018 11:33 AM    HGB 10.7 (L) 2018 06:47 AM    HGB 11.8 2018 09:13 AM    HGB 12.4 2018 08:33 AM    HGB 11.4 (L) 2016 04:20 AM    HGB 14.6 2013 11:15 AM    HGB 13.3 06/10/2009 11:48 AM    HCT 31.9 (L) 2018 11:33 AM    HCT 32.7 (L) 2018 06:47 AM    HCT 34.9 2018 09:13 AM    HCT 36.0 2018 08:33 AM    HCT 34.2 (L) 2016 04:20 AM    HCT 41.6 2013 11:15 AM    HCT 38.7 06/10/2009 11:48 AM    PLATELET 983  11:33 AM    PLATELET 996 07/06/2018 06:47 AM    PLATELET 546 34/80/0484 09:13 AM    PLATELET 192 29/98/0332 08:33 AM    PLATELET 650 85/53/4078 04:20 AM    PLATELET 978 32/21/7120 11:15 AM    PLATELET 536 29/66/4505 11:48 AM    Hgb, External 11.8 04/17/2018    Hct, External 34.9 04/17/2018    Platelet cnt., External 252 04/17/2018       No results found for this or any previous visit (from the past 24 hour(s)).

## 2018-07-10 NOTE — ROUTINE PROCESS
OB SBAR received from [address] [place] [address]    8005: I have reviewed discharge instructions with the patient. The patient verbalized understanding. Prescriptions given for Motrin and Percocet.

## 2018-09-06 ENCOUNTER — OFFICE VISIT (OUTPATIENT)
Dept: OBGYN CLINIC | Age: 28
End: 2018-09-06

## 2018-09-06 VITALS
WEIGHT: 183.6 LBS | HEIGHT: 63 IN | BODY MASS INDEX: 32.53 KG/M2 | RESPIRATION RATE: 18 BRPM | DIASTOLIC BLOOD PRESSURE: 72 MMHG | SYSTOLIC BLOOD PRESSURE: 120 MMHG

## 2018-09-06 RX ORDER — DESOGESTREL AND ETHINYL ESTRADIOL 21-5 (28)
1 KIT ORAL DAILY
Qty: 3 PACKAGE | Refills: 4 | Status: SHIPPED | OUTPATIENT
Start: 2018-09-06

## 2018-09-06 NOTE — PROGRESS NOTES
Postpartum evaluation    Josiah Lindsay is a 33 y.o. female who presents for a postpartum exam.     She is now 8 weeks post normal spontaneous vaginal delivery. Her baby is doing well. She has had no menses since delivery. She has had the following significant problems since her delivery: none    The patient is bottle feeding without difficulty. The patient would like to use oral contraceptive for birth control. She is currently taking: no medications. She is due for her next AE in 01/2019 months.      Visit Vitals    /72    Resp 18    Ht 5' 3\" (1.6 m)    Wt 183 lb 9.6 oz (83.3 kg)    LMP 08/29/2018    BMI 32.52 kg/m2       PHYSICAL EXAMINATION    Constitutional  · Appearance: well-nourished, well developed, alert, in no acute distress    HENT  · Head and Face: appears normal    Gastrointestinal  · Abdominal Examination: abdomen non-tender to palpation, normal bowel sounds, no masses present  · Liver and spleen: no hepatomegaly present, spleen not palpable  · Hernias: no hernias identified    Genitourinary  · External Genitalia: normal appearance for age, no discharge present, no tenderness present, no inflammatory lesions present, no masses present, no atrophy present  · Vagina: normal vaginal vault without central or paravaginal defects, no discharge present, no inflammatory lesions present, no masses present  · Bladder: non-tender to palpation  · Urethra: appears normal  · Cervix: normal   · Uterus: normal size, shape and consistency  · Adnexa: no adnexal tenderness present, no adnexal masses present  · Perineum: perineum within normal limits, no evidence of trauma, no rashes or skin lesions present  · Anus: anus within normal limits, no hemorrhoids present  · Inguinal Lymph Nodes: no lymphadenopathy present    Skin  · General Inspection: no rash, no lesions identified    Neurologic/Psychiatric  · Mental Status:  · Orientation: grossly oriented to person, place and time  · Mood and Affect: mood normal, affect appropriate    Assessment:  Normal postpartum check    Plan:  RTO for AE.   Rx for contraception: Elsa Patel

## 2022-03-19 PROBLEM — Z34.80 PRENATAL CARE OF MULTIGRAVIDA, ANTEPARTUM: Status: ACTIVE | Noted: 2017-12-04

## 2022-03-20 PROBLEM — Z34.90 PREGNANCY: Status: ACTIVE | Noted: 2018-07-06

## 2024-09-16 ENCOUNTER — TELEPHONE (OUTPATIENT)
Facility: HOSPITAL | Age: 34
End: 2024-09-16

## 2024-09-16 DIAGNOSIS — C50.912 MALIGNANT NEOPLASM OF LEFT FEMALE BREAST, UNSPECIFIED ESTROGEN RECEPTOR STATUS, UNSPECIFIED SITE OF BREAST (HCC): Primary | ICD-10-CM

## 2024-09-19 ENCOUNTER — TELEPHONE (OUTPATIENT)
Facility: HOSPITAL | Age: 34
End: 2024-09-19

## 2024-09-20 ENCOUNTER — HOSPITAL ENCOUNTER (OUTPATIENT)
Facility: HOSPITAL | Age: 34
Discharge: HOME | End: 2024-09-23
Attending: SURGERY
Payer: COMMERCIAL

## 2024-09-20 ENCOUNTER — CLINICAL DOCUMENTATION (OUTPATIENT)
Facility: HOSPITAL | Age: 34
End: 2024-09-20

## 2024-09-20 DIAGNOSIS — C50.912 MALIGNANT NEOPLASM OF LEFT FEMALE BREAST, UNSPECIFIED ESTROGEN RECEPTOR STATUS, UNSPECIFIED SITE OF BREAST (HCC): ICD-10-CM

## 2024-09-20 PROCEDURE — A9585 GADOBUTROL INJECTION: HCPCS | Performed by: SURGERY

## 2024-09-20 PROCEDURE — 6360000004 HC RX CONTRAST MEDICATION: Performed by: SURGERY

## 2024-09-20 PROCEDURE — C8908 MRI W/O FOL W/CONT, BREAST,: HCPCS

## 2024-09-20 RX ORDER — GADOBUTROL 604.72 MG/ML
8 INJECTION INTRAVENOUS
Status: COMPLETED | OUTPATIENT
Start: 2024-09-20 | End: 2024-09-20

## 2024-09-20 RX ADMIN — GADOBUTROL 8 ML: 604.72 INJECTION INTRAVENOUS at 10:22

## 2024-09-20 NOTE — H&P (VIEW-ONLY)
and lateral left breast; there are 3  biopsy-proven malignancies and several additional malignant breast masses  present.  2. BI-RADS Assessment Category 6: Known biopsy proven malignancy- Appropriate  action should be taken. Newly diagnosed metastatic left axillary  lymphadenopathy. There are numerous malignant level 1 lymph nodes, in addition  to one malignant level 2 Mamta lymph node.  3. There are 2 abnormal left-sided internal mammary chain lymph nodes,  representing a suspicious finding.   4. Diffuse left breast skin thickening.     Other: Abnormal appearance of the bones throughout the visualized ribs and  sternum, concerning for bony metastatic disease.     RECOMMENDATIONS:  Appropriate action is recommended.  Staging studies are recommended, given the  findings above, including the diffusely abnormal appearance of the bones.       Review of Systems   All other systems reviewed and are negative.         Physical Exam  Vitals and nursing note reviewed.   Chest:   Breasts:     Right: No swelling, bleeding, inverted nipple, mass, nipple discharge, skin change or tenderness.      Left: Mass and skin change (peau d orange) present. No swelling, bleeding, inverted nipple, nipple discharge or tenderness.       Lymphadenopathy:      Upper Body:      Right upper body: No axillary adenopathy.      Left upper body: Axillary adenopathy present. No supraclavicular or pectoral adenopathy.            ASSESSMENT and PLAN   Diagnosis Orders   1. Breast cancer metastasized to axillary lymph node, left (HCC)  NM BONE SCAN WHOLE BODY    CT CHEST ABDOMEN PELVIS W CONTRAST Additional Contrast? None        35 yo female with left breast T2 N2 Mx IDC grade 3 ki 67 9% triple negative  Also with suspicious IM nodes   Mri shows concern for rib and sternal mets   - genetic testing kit given  - ct c/a/p, bone scan will have nurse navigator set up   - has appt with med onc Dr. Gillespie   - needs neoadjuvant chemo will need port

## 2024-09-23 ENCOUNTER — OFFICE VISIT (OUTPATIENT)
Age: 34
End: 2024-09-23
Payer: COMMERCIAL

## 2024-09-23 ENCOUNTER — TELEPHONE (OUTPATIENT)
Age: 34
End: 2024-09-23

## 2024-09-23 ENCOUNTER — TELEPHONE (OUTPATIENT)
Facility: HOSPITAL | Age: 34
End: 2024-09-23

## 2024-09-23 ENCOUNTER — CLINICAL DOCUMENTATION (OUTPATIENT)
Age: 34
End: 2024-09-23

## 2024-09-23 VITALS — WEIGHT: 188 LBS | BODY MASS INDEX: 32.1 KG/M2 | HEIGHT: 64 IN

## 2024-09-23 DIAGNOSIS — C77.3 BREAST CANCER METASTASIZED TO AXILLARY LYMPH NODE, LEFT (HCC): Primary | ICD-10-CM

## 2024-09-23 DIAGNOSIS — C50.912 BREAST CANCER METASTASIZED TO AXILLARY LYMPH NODE, LEFT (HCC): Primary | ICD-10-CM

## 2024-09-23 PROCEDURE — 99245 OFF/OP CONSLTJ NEW/EST HI 55: CPT | Performed by: SURGERY

## 2024-09-25 ENCOUNTER — TELEPHONE (OUTPATIENT)
Age: 34
End: 2024-09-25

## 2024-09-25 ENCOUNTER — HOSPITAL ENCOUNTER (OUTPATIENT)
Facility: HOSPITAL | Age: 34
Discharge: HOME | End: 2024-09-28
Payer: COMMERCIAL

## 2024-09-25 ENCOUNTER — HOSPITAL ENCOUNTER (OUTPATIENT)
Facility: HOSPITAL | Age: 34
Discharge: HOME | End: 2024-09-28
Attending: SURGERY
Payer: COMMERCIAL

## 2024-09-25 DIAGNOSIS — C77.3 BREAST CANCER METASTASIZED TO AXILLARY LYMPH NODE, LEFT (HCC): ICD-10-CM

## 2024-09-25 DIAGNOSIS — C50.912 BREAST CANCER METASTASIZED TO AXILLARY LYMPH NODE, LEFT (HCC): ICD-10-CM

## 2024-09-25 PROCEDURE — 71260 CT THORAX DX C+: CPT

## 2024-09-25 PROCEDURE — 78306 BONE IMAGING WHOLE BODY: CPT | Performed by: SURGERY

## 2024-09-25 PROCEDURE — 3430000000 HC RX DIAGNOSTIC RADIOPHARMACEUTICAL: Performed by: SURGERY

## 2024-09-25 PROCEDURE — A9503 TC99M MEDRONATE: HCPCS | Performed by: SURGERY

## 2024-09-25 PROCEDURE — 6360000004 HC RX CONTRAST MEDICATION: Performed by: RADIOLOGY

## 2024-09-25 RX ORDER — TC 99M MEDRONATE 20 MG/10ML
22 INJECTION, POWDER, LYOPHILIZED, FOR SOLUTION INTRAVENOUS
Status: COMPLETED | OUTPATIENT
Start: 2024-09-25 | End: 2024-09-25

## 2024-09-25 RX ORDER — IOPAMIDOL 755 MG/ML
100 INJECTION, SOLUTION INTRAVASCULAR
Status: COMPLETED | OUTPATIENT
Start: 2024-09-25 | End: 2024-09-25

## 2024-09-25 RX ADMIN — TC 99M MEDRONATE 22 MILLICURIE: 20 INJECTION, POWDER, LYOPHILIZED, FOR SOLUTION INTRAVENOUS at 10:00

## 2024-09-25 RX ADMIN — IOPAMIDOL 100 ML: 755 INJECTION, SOLUTION INTRAVENOUS at 10:35

## 2024-09-26 ENCOUNTER — TELEMEDICINE (OUTPATIENT)
Age: 34
End: 2024-09-26
Payer: COMMERCIAL

## 2024-09-26 ENCOUNTER — TELEPHONE (OUTPATIENT)
Age: 34
End: 2024-09-26

## 2024-09-26 DIAGNOSIS — C50.919 CARCINOMA OF BREAST METASTATIC TO BONE, UNSPECIFIED LATERALITY (HCC): ICD-10-CM

## 2024-09-26 DIAGNOSIS — N95.9 PREMENOPAUSAL PATIENT: ICD-10-CM

## 2024-09-26 DIAGNOSIS — E55.9 VITAMIN D DEFICIENCY: ICD-10-CM

## 2024-09-26 DIAGNOSIS — C50.912 BREAST CANCER METASTASIZED TO AXILLARY LYMPH NODE, LEFT (HCC): Primary | ICD-10-CM

## 2024-09-26 DIAGNOSIS — C79.51 CARCINOMA OF BREAST METASTATIC TO BONE, UNSPECIFIED LATERALITY (HCC): ICD-10-CM

## 2024-09-26 DIAGNOSIS — C77.3 BREAST CANCER METASTASIZED TO AXILLARY LYMPH NODE, LEFT (HCC): Primary | ICD-10-CM

## 2024-09-26 DIAGNOSIS — R45.89 ANXIETY ABOUT HEALTH: ICD-10-CM

## 2024-09-26 PROCEDURE — 99205 OFFICE O/P NEW HI 60 MIN: CPT | Performed by: INTERNAL MEDICINE

## 2024-09-26 RX ORDER — LORAZEPAM 0.5 MG/1
0.5 TABLET ORAL EVERY 8 HOURS PRN
Qty: 30 TABLET | Refills: 0 | Status: SHIPPED | OUTPATIENT
Start: 2024-09-26 | End: 2024-10-26

## 2024-09-26 NOTE — TELEPHONE ENCOUNTER
Port placement  Mac  [facility] or [facility]  Add on 10/8 [facility]

## 2024-09-27 ENCOUNTER — TELEPHONE (OUTPATIENT)
Age: 34
End: 2024-09-27

## 2024-09-27 ENCOUNTER — TELEPHONE (OUTPATIENT)
Facility: HOSPITAL | Age: 34
End: 2024-09-27

## 2024-09-27 ENCOUNTER — PREP FOR PROCEDURE (OUTPATIENT)
Age: 34
End: 2024-09-27

## 2024-09-27 ENCOUNTER — CLINICAL DOCUMENTATION (OUTPATIENT)
Age: 34
End: 2024-09-27

## 2024-09-27 DIAGNOSIS — C77.3 BREAST CANCER METASTASIZED TO AXILLARY LYMPH NODE, LEFT (HCC): Primary | ICD-10-CM

## 2024-09-27 DIAGNOSIS — C50.912 BREAST CANCER METASTASIZED TO AXILLARY LYMPH NODE, LEFT (HCC): Primary | ICD-10-CM

## 2024-09-30 ENCOUNTER — TELEPHONE (OUTPATIENT)
Age: 34
End: 2024-09-30

## 2024-09-30 NOTE — TELEPHONE ENCOUNTER
confirmed appt schedule with pt and pt also stated she sent msg to team regarding cold cap read msg of what kiesha sent pt stated would respond

## 2024-10-01 ENCOUNTER — NURSE ONLY (OUTPATIENT)
Age: 34
End: 2024-10-01

## 2024-10-01 DIAGNOSIS — C50.912 BREAST CANCER METASTASIZED TO AXILLARY LYMPH NODE, LEFT (HCC): Primary | ICD-10-CM

## 2024-10-01 DIAGNOSIS — C77.3 BREAST CANCER METASTASIZED TO AXILLARY LYMPH NODE, LEFT (HCC): Primary | ICD-10-CM

## 2024-10-01 RX ORDER — SODIUM CHLORIDE 9 MG/ML
5-250 INJECTION, SOLUTION INTRAVENOUS PRN
OUTPATIENT
Start: 2024-10-23

## 2024-10-01 RX ORDER — DIPHENHYDRAMINE HYDROCHLORIDE 50 MG/ML
50 INJECTION INTRAMUSCULAR; INTRAVENOUS
OUTPATIENT
Start: 2024-10-16

## 2024-10-01 RX ORDER — SODIUM CHLORIDE 0.9 % (FLUSH) 0.9 %
5-40 SYRINGE (ML) INJECTION PRN
OUTPATIENT
Start: 2024-10-23

## 2024-10-01 RX ORDER — ONDANSETRON 2 MG/ML
8 INJECTION INTRAMUSCULAR; INTRAVENOUS
OUTPATIENT
Start: 2024-10-09

## 2024-10-01 RX ORDER — DIPHENHYDRAMINE HYDROCHLORIDE 50 MG/ML
50 INJECTION INTRAMUSCULAR; INTRAVENOUS ONCE
OUTPATIENT
Start: 2024-10-09 | End: 2024-10-09

## 2024-10-01 RX ORDER — PALONOSETRON 0.05 MG/ML
0.25 INJECTION, SOLUTION INTRAVENOUS ONCE
OUTPATIENT
Start: 2024-10-09 | End: 2024-10-09

## 2024-10-01 RX ORDER — ACETAMINOPHEN 325 MG/1
650 TABLET ORAL
OUTPATIENT
Start: 2024-10-09

## 2024-10-01 RX ORDER — HEPARIN 100 UNIT/ML
500 SYRINGE INTRAVENOUS PRN
OUTPATIENT
Start: 2024-10-09

## 2024-10-01 RX ORDER — ALBUTEROL SULFATE 90 UG/1
4 INHALANT RESPIRATORY (INHALATION) PRN
OUTPATIENT
Start: 2024-10-23

## 2024-10-01 RX ORDER — DEXAMETHASONE SODIUM PHOSPHATE 10 MG/ML
10 INJECTION, SOLUTION INTRAMUSCULAR; INTRAVENOUS ONCE
OUTPATIENT
Start: 2024-10-23 | End: 2024-10-23

## 2024-10-01 RX ORDER — HEPARIN 100 UNIT/ML
500 SYRINGE INTRAVENOUS PRN
OUTPATIENT
Start: 2024-10-16

## 2024-10-01 RX ORDER — SODIUM CHLORIDE 0.9 % (FLUSH) 0.9 %
5-40 SYRINGE (ML) INJECTION PRN
OUTPATIENT
Start: 2024-10-16

## 2024-10-01 RX ORDER — EPINEPHRINE 1 MG/ML
0.3 INJECTION, SOLUTION, CONCENTRATE INTRAVENOUS PRN
OUTPATIENT
Start: 2024-10-23

## 2024-10-01 RX ORDER — SODIUM CHLORIDE 9 MG/ML
INJECTION, SOLUTION INTRAVENOUS CONTINUOUS
OUTPATIENT
Start: 2024-10-23

## 2024-10-01 RX ORDER — ACETAMINOPHEN 325 MG/1
650 TABLET ORAL
OUTPATIENT
Start: 2024-10-16

## 2024-10-01 RX ORDER — SODIUM CHLORIDE 9 MG/ML
5-250 INJECTION, SOLUTION INTRAVENOUS PRN
OUTPATIENT
Start: 2024-10-16

## 2024-10-01 RX ORDER — EPINEPHRINE 1 MG/ML
0.3 INJECTION, SOLUTION, CONCENTRATE INTRAVENOUS PRN
OUTPATIENT
Start: 2024-10-09

## 2024-10-01 RX ORDER — SODIUM CHLORIDE 9 MG/ML
5-250 INJECTION, SOLUTION INTRAVENOUS PRN
OUTPATIENT
Start: 2024-10-09

## 2024-10-01 RX ORDER — PROCHLORPERAZINE EDISYLATE 5 MG/ML
5 INJECTION INTRAMUSCULAR; INTRAVENOUS
OUTPATIENT
Start: 2024-10-09

## 2024-10-01 RX ORDER — MEPERIDINE HYDROCHLORIDE 25 MG/ML
12.5 INJECTION INTRAMUSCULAR; INTRAVENOUS; SUBCUTANEOUS PRN
OUTPATIENT
Start: 2024-10-09

## 2024-10-01 RX ORDER — DIPHENHYDRAMINE HYDROCHLORIDE 50 MG/ML
50 INJECTION INTRAMUSCULAR; INTRAVENOUS
OUTPATIENT
Start: 2024-10-09

## 2024-10-01 RX ORDER — DIPHENHYDRAMINE HYDROCHLORIDE 50 MG/ML
50 INJECTION INTRAMUSCULAR; INTRAVENOUS ONCE
OUTPATIENT
Start: 2024-10-16 | End: 2024-10-16

## 2024-10-01 RX ORDER — MEPERIDINE HYDROCHLORIDE 25 MG/ML
12.5 INJECTION INTRAMUSCULAR; INTRAVENOUS; SUBCUTANEOUS PRN
OUTPATIENT
Start: 2024-10-23

## 2024-10-01 RX ORDER — ALBUTEROL SULFATE 90 UG/1
4 INHALANT RESPIRATORY (INHALATION) PRN
OUTPATIENT
Start: 2024-10-16

## 2024-10-01 RX ORDER — SODIUM CHLORIDE 9 MG/ML
INJECTION, SOLUTION INTRAVENOUS CONTINUOUS
OUTPATIENT
Start: 2024-10-09

## 2024-10-01 RX ORDER — ALBUTEROL SULFATE 90 UG/1
4 INHALANT RESPIRATORY (INHALATION) PRN
OUTPATIENT
Start: 2024-10-09

## 2024-10-01 RX ORDER — ONDANSETRON 2 MG/ML
8 INJECTION INTRAMUSCULAR; INTRAVENOUS
OUTPATIENT
Start: 2024-10-23

## 2024-10-01 RX ORDER — ONDANSETRON 2 MG/ML
8 INJECTION INTRAMUSCULAR; INTRAVENOUS
OUTPATIENT
Start: 2024-10-16

## 2024-10-01 RX ORDER — HEPARIN 100 UNIT/ML
500 SYRINGE INTRAVENOUS PRN
OUTPATIENT
Start: 2024-10-23

## 2024-10-01 RX ORDER — DIPHENHYDRAMINE HYDROCHLORIDE 50 MG/ML
50 INJECTION INTRAMUSCULAR; INTRAVENOUS
OUTPATIENT
Start: 2024-10-23

## 2024-10-01 RX ORDER — DEXAMETHASONE SODIUM PHOSPHATE 10 MG/ML
10 INJECTION, SOLUTION INTRAMUSCULAR; INTRAVENOUS ONCE
OUTPATIENT
Start: 2024-10-16 | End: 2024-10-16

## 2024-10-01 RX ORDER — ACETAMINOPHEN 325 MG/1
650 TABLET ORAL
OUTPATIENT
Start: 2024-10-23

## 2024-10-01 RX ORDER — DIPHENHYDRAMINE HYDROCHLORIDE 50 MG/ML
50 INJECTION INTRAMUSCULAR; INTRAVENOUS ONCE
OUTPATIENT
Start: 2024-10-23 | End: 2024-10-23

## 2024-10-01 RX ORDER — MEPERIDINE HYDROCHLORIDE 25 MG/ML
12.5 INJECTION INTRAMUSCULAR; INTRAVENOUS; SUBCUTANEOUS PRN
OUTPATIENT
Start: 2024-10-16

## 2024-10-01 RX ORDER — SODIUM CHLORIDE 9 MG/ML
INJECTION, SOLUTION INTRAVENOUS CONTINUOUS
OUTPATIENT
Start: 2024-10-16

## 2024-10-01 RX ORDER — EPINEPHRINE 1 MG/ML
0.3 INJECTION, SOLUTION, CONCENTRATE INTRAVENOUS PRN
OUTPATIENT
Start: 2024-10-16

## 2024-10-01 RX ORDER — SODIUM CHLORIDE 0.9 % (FLUSH) 0.9 %
5-40 SYRINGE (ML) INJECTION PRN
OUTPATIENT
Start: 2024-10-09

## 2024-10-01 NOTE — PROGRESS NOTES
Opportunity to meet with patient at in office appt today, accompanied by supportive friend.  Patient has been prescribed treatment starting 10/9/24 and would like to use cold cap therapy for hair loss prevention.  Provided information regarding [employer] system and relayed that there is a local [employer] rep who was a cold cap user during her own treatment.      Reviewed with patient kit, how to use [employer] during treatment to prevent hair loss.  Understands that company cannot guarantee full hair loss prevention but that should be 50% or less and that when hair regrows it is expected to return sooner than fully unprotected hair follicles. Patient agreed to proceed with cap fitting and was determined to wear a treatment  medium cap with medium cover. Understands to watch training videos at www.[email or web address] as well as practicing with cap/kit upon arrival at home address.  Forms to Provider for signature and then faxed successfully to [employer].

## 2024-10-02 ENCOUNTER — TELEPHONE (OUTPATIENT)
Age: 34
End: 2024-10-02

## 2024-10-02 ENCOUNTER — CLINICAL DOCUMENTATION (OUTPATIENT)
Age: 34
End: 2024-10-02

## 2024-10-02 ENCOUNTER — HOSPITAL ENCOUNTER (OUTPATIENT)
Facility: HOSPITAL | Age: 34
Discharge: HOME | End: 2024-10-05
Payer: COMMERCIAL

## 2024-10-02 VITALS
RESPIRATION RATE: 12 BRPM | WEIGHT: 188 LBS | HEART RATE: 84 BPM | HEIGHT: 64 IN | DIASTOLIC BLOOD PRESSURE: 66 MMHG | BODY MASS INDEX: 32.1 KG/M2 | OXYGEN SATURATION: 96 % | SYSTOLIC BLOOD PRESSURE: 95 MMHG

## 2024-10-02 DIAGNOSIS — C79.51 CARCINOMA OF BREAST METASTATIC TO BONE, UNSPECIFIED LATERALITY (HCC): ICD-10-CM

## 2024-10-02 DIAGNOSIS — C77.3 BREAST CANCER METASTASIZED TO AXILLARY LYMPH NODE, LEFT (HCC): ICD-10-CM

## 2024-10-02 DIAGNOSIS — E55.9 VITAMIN D DEFICIENCY: ICD-10-CM

## 2024-10-02 DIAGNOSIS — C50.912 BREAST CANCER METASTASIZED TO AXILLARY LYMPH NODE, LEFT (HCC): ICD-10-CM

## 2024-10-02 DIAGNOSIS — N95.9 PREMENOPAUSAL PATIENT: ICD-10-CM

## 2024-10-02 DIAGNOSIS — C50.919 CARCINOMA OF BREAST METASTATIC TO BONE, UNSPECIFIED LATERALITY (HCC): ICD-10-CM

## 2024-10-02 LAB
BASOPHILS # BLD: 0 K/UL (ref 0–0.1)
BASOPHILS NFR BLD: 1 % (ref 0–1)
DIFFERENTIAL METHOD BLD: ABNORMAL
EOSINOPHIL # BLD: 0.1 K/UL (ref 0–0.4)
EOSINOPHIL NFR BLD: 3 % (ref 0–7)
ERYTHROCYTE [DISTWIDTH] IN BLOOD BY AUTOMATED COUNT: 12.6 % (ref 11.5–14.5)
HCG UR QL: NEGATIVE
HCT VFR BLD AUTO: 34.6 % (ref 35–47)
HGB BLD-MCNC: 11.7 G/DL (ref 11.5–16)
IMM GRANULOCYTES # BLD AUTO: 0.3 K/UL (ref 0–0.04)
IMM GRANULOCYTES NFR BLD AUTO: 6 % (ref 0–0.5)
LYMPHOCYTES # BLD: 1.3 K/UL (ref 0.8–3.5)
LYMPHOCYTES NFR BLD: 28 % (ref 12–49)
MCH RBC QN AUTO: 29.5 PG (ref 26–34)
MCHC RBC AUTO-ENTMCNC: 33.8 G/DL (ref 30–36.5)
MCV RBC AUTO: 87.2 FL (ref 80–99)
MONOCYTES # BLD: 0.3 K/UL (ref 0–1)
MONOCYTES NFR BLD: 7 % (ref 5–13)
NEUTS SEG # BLD: 2.6 K/UL (ref 1.8–8)
NEUTS SEG NFR BLD: 55 % (ref 32–75)
NRBC # BLD: 0 K/UL (ref 0–0.01)
NRBC BLD-RTO: 0 PER 100 WBC
PLATELET # BLD AUTO: 199 K/UL (ref 150–400)
PMV BLD AUTO: 9.8 FL (ref 8.9–12.9)
RBC # BLD AUTO: 3.97 M/UL (ref 3.8–5.2)
RBC MORPH BLD: ABNORMAL
WBC # BLD AUTO: 4.6 K/UL (ref 3.6–11)

## 2024-10-02 PROCEDURE — 88341 IMHCHEM/IMCYTCHM EA ADD ANTB: CPT

## 2024-10-02 PROCEDURE — 88305 TISSUE EXAM BY PATHOLOGIST: CPT

## 2024-10-02 PROCEDURE — 36415 COLL VENOUS BLD VENIPUNCTURE: CPT

## 2024-10-02 PROCEDURE — 88360 TUMOR IMMUNOHISTOCHEM/MANUAL: CPT

## 2024-10-02 PROCEDURE — 81025 URINE PREGNANCY TEST: CPT

## 2024-10-02 PROCEDURE — 88313 SPECIAL STAINS GROUP 2: CPT

## 2024-10-02 PROCEDURE — 2500000003 HC RX 250 WO HCPCS: Performed by: NURSE PRACTITIONER

## 2024-10-02 PROCEDURE — 85025 COMPLETE CBC W/AUTO DIFF WBC: CPT

## 2024-10-02 PROCEDURE — 88342 IMHCHEM/IMCYTCHM 1ST ANTB: CPT

## 2024-10-02 PROCEDURE — 88311 DECALCIFY TISSUE: CPT

## 2024-10-02 PROCEDURE — 99153 MOD SED SAME PHYS/QHP EA: CPT

## 2024-10-02 PROCEDURE — 6360000002 HC RX W HCPCS: Performed by: NURSE PRACTITIONER

## 2024-10-02 PROCEDURE — 2580000003 HC RX 258: Performed by: NURSE PRACTITIONER

## 2024-10-02 RX ORDER — FENTANYL CITRATE 50 UG/ML
INJECTION, SOLUTION INTRAMUSCULAR; INTRAVENOUS PRN
Status: COMPLETED | OUTPATIENT
Start: 2024-10-02 | End: 2024-10-02

## 2024-10-02 RX ORDER — SODIUM CHLORIDE 9 MG/ML
INJECTION, SOLUTION INTRAVENOUS CONTINUOUS PRN
Status: COMPLETED | OUTPATIENT
Start: 2024-10-02 | End: 2024-10-02

## 2024-10-02 RX ORDER — MIDAZOLAM HYDROCHLORIDE 5 MG/5ML
INJECTION, SOLUTION INTRAMUSCULAR; INTRAVENOUS PRN
Status: COMPLETED | OUTPATIENT
Start: 2024-10-02 | End: 2024-10-02

## 2024-10-02 RX ORDER — LIDOCAINE HYDROCHLORIDE 10 MG/ML
INJECTION, SOLUTION EPIDURAL; INFILTRATION; INTRACAUDAL; PERINEURAL PRN
Status: COMPLETED | OUTPATIENT
Start: 2024-10-02 | End: 2024-10-02

## 2024-10-02 RX ADMIN — MIDAZOLAM HYDROCHLORIDE 1 MG: 1 INJECTION, SOLUTION INTRAMUSCULAR; INTRAVENOUS at 13:05

## 2024-10-02 RX ADMIN — FENTANYL CITRATE 50 MCG: 50 INJECTION, SOLUTION INTRAMUSCULAR; INTRAVENOUS at 13:12

## 2024-10-02 RX ADMIN — LIDOCAINE HYDROCHLORIDE 10 ML: 10 INJECTION, SOLUTION EPIDURAL; INFILTRATION; INTRACAUDAL; PERINEURAL at 13:12

## 2024-10-02 RX ADMIN — FENTANYL CITRATE 50 MCG: 50 INJECTION, SOLUTION INTRAMUSCULAR; INTRAVENOUS at 13:08

## 2024-10-02 RX ADMIN — FENTANYL CITRATE 50 MCG: 50 INJECTION, SOLUTION INTRAMUSCULAR; INTRAVENOUS at 13:16

## 2024-10-02 RX ADMIN — MIDAZOLAM HYDROCHLORIDE 1 MG: 1 INJECTION, SOLUTION INTRAMUSCULAR; INTRAVENOUS at 13:19

## 2024-10-02 RX ADMIN — SODIUM CHLORIDE 100 ML/HR: 9 INJECTION, SOLUTION INTRAVENOUS at 12:59

## 2024-10-02 RX ADMIN — MIDAZOLAM HYDROCHLORIDE 1 MG: 1 INJECTION, SOLUTION INTRAMUSCULAR; INTRAVENOUS at 13:16

## 2024-10-02 RX ADMIN — MIDAZOLAM HYDROCHLORIDE 1 MG: 1 INJECTION, SOLUTION INTRAMUSCULAR; INTRAVENOUS at 13:12

## 2024-10-02 RX ADMIN — MIDAZOLAM HYDROCHLORIDE 1 MG: 1 INJECTION, SOLUTION INTRAMUSCULAR; INTRAVENOUS at 13:08

## 2024-10-02 RX ADMIN — FENTANYL CITRATE 50 MCG: 50 INJECTION, SOLUTION INTRAMUSCULAR; INTRAVENOUS at 13:05

## 2024-10-02 ASSESSMENT — PAIN - FUNCTIONAL ASSESSMENT: PAIN_FUNCTIONAL_ASSESSMENT: NONE - DENIES PAIN

## 2024-10-02 NOTE — H&P
INTERVENTIONAL RADIOLOGY  Preoperative History and Physical      Patient:  Alea Lindsay  :  1990  Age:  34 y.o.  MRN:  [id number]  Today's Date:  10/2/2024      CC / HPI   Alea Lindsay is a 34 y.o. female with a history of breast cancer who presents for iliac bone biopsy.    PAST MEDICAL HISTORY  No past medical history on file.    PAST SURGICAL HISTORY  Past Surgical History:   Procedure Laterality Date    US BREAST BIOPSY NEEDLE ADDITIONAL LEFT Left 2024    US BREAST BIOPSY NEEDLE ADDITIONAL LEFT    US LYMPH NODE BIOPSY  2024    RODDY US BIOPSY LYMPH NODE       SOCIAL HISTORY  Social History     Socioeconomic History    Marital status:      Spouse name: Not on file    Number of children: Not on file    Years of education: Not on file    Highest education level: Not on file   Occupational History    Not on file   Tobacco Use    Smoking status: Not on file    Smokeless tobacco: Not on file   Substance and Sexual Activity    Alcohol use: Not on file    Drug use: Not on file    Sexual activity: Not on file   Other Topics Concern    Not on file   Social History Narrative    Not on file     Social Determinants of Health     Financial Resource Strain: Not on file   Food Insecurity: Not on file   Transportation Needs: Not on file   Physical Activity: Not on file   Stress: Not on file   Social Connections: Not on file   Intimate Partner Violence: Not on file   Housing Stability: Not on file       FAMILY HISTORY  No family history on file.    CURRENT MEDICATIONS  Current Outpatient Medications   Medication Sig Dispense Refill    LORazepam (ATIVAN) 0.5 MG tablet Take 1 tablet by mouth every 8 hours as needed for Anxiety for up to 30 days. Max Daily Amount: 1.5 mg 30 tablet 0     No current facility-administered medications for this encounter.       ALLERGIES  No Known Allergies    DIAGNOSTIC STUDIES   IMAGING STUDIES  Relevant Imaging studies reviewed    LABS  Lab Results   Component Value

## 2024-10-02 NOTE — DISCHARGE INSTRUCTIONS
Bone Marrow Aspiration and Biopsy: What to Expect at Home  Your Recovery  The biopsy site may feel sore for several days. You may have a bruise on the site. It may help to take pain medicine and put ice packs on the site. You will probably be able to return to work and your usual activities the day after the procedure. Your doctor or nurse will call you with the results of your test.  This care sheet gives you a general idea about how long it will take for you to recover. But each person recovers at a different pace. Follow the steps below to get better as quickly as possible.  How can you care for yourself at home?  Activity    Rest when you feel tired. Getting enough sleep will help you recover.     Most people are able to return to their usual activities the day after the procedure.  Wait 24 hours to shower. Do not take a tub bath or go swimming for 10 days.   Medicines    Your doctor will tell you if and when you can restart your medicines. You will be given instructions about taking any new medicines.     If you stopped taking aspirin or some other blood thinner, your doctor will tell you when to start taking it again.     Be safe with medicines. Read and follow all instructions on the label.  If you are not taking a prescription pain medicine, ask your doctor if you can take an over-the-counter medicine such as acetaminophen (Tylenol). Read and follow all instructions on the label.  If the doctor gave you a prescription medicine for pain, take it as prescribed.  Store your prescription pain medicines where no one else can get to them. When you are done using them, dispose of them quickly and safely. Your local pharmacy or hospital may have a drop-off site.  Do not take two or more pain medicines at the same time unless the doctor told you to. Many pain medicines have acetaminophen, which is Tylenol. Too much acetaminophen (Tylenol) can be harmful.   Ice    Put ice or a cold pack on the biopsy site for 10

## 2024-10-02 NOTE — TELEPHONE ENCOUNTER
[facility]   [address] [place] [address]   W: [phone]  F: [phone]    Medical Nutrition Therapy  Nutrition Encounter:    Referral received from Dr. Gillespie. Patient with triple negative left breast cancer w/ bone mets.  Plan for carbo/taxol/pembro and AC/pembro pending bone biopsy. Called and spoke with patient, explained that RD is available to address nutrition throughout the spectrum of care.  We discussed general healthy eating, potential side effects that impact eating.  She wonders about sugar and cancer; discussed.  Will send information via [employer] and continue to be a resource for any nutrition related questions or concerns.         Signed By: CORNELIUS BRUNO RD

## 2024-10-02 NOTE — PROGRESS NOTES
1230: Pt arrives ambulatory to angio department accompanied by  for bone biopsy procedure. All assessments completed and consent was reviewed.  Education given was regarding procedure, moderate sedation, post-procedure care and  management/follow-up. Opportunity for questions was provided and all questions and concerns were addressed.     1415: Patient given copy of discharge instructions and verbalized understanding.  Patient wheeled to exit via wheelchair. Patient in NAD. No bleeding noted at puncture site.

## 2024-10-02 NOTE — PROGRESS NOTES
Patient Surgery Information Sheet      Patient Name:  Alea Lindsay  Surgery Date:  October 8, 2024    Type of Surgery:  PORT PLACEMENT     Estimated arrival time 9:00 AM    Arrival time will be confirmed the afternoon before your surgery.    Pre-procedure: Not Applicable    Pre-Operative Testing Department will call to schedule pre-op testing appointment if needed before surgery    Hospital:  [facility]  Address:  [address] [place], [place] [id number]  Check in location:  Through the main entrance of [facility] directly to the left at Patient Access    Pre-Operative Instructions:  Will be given at the pre-op appointment.    Special Instructions if needed:     NPO (nothing by mouth) or drinking after midnight the night before Surgery  Patient may shower the morning of, do not use any lotion, deodorant, powders, perfumes or makeup  Patient will need  the morning of surgery     Surgery Scheduler:  Sung Hoffman

## 2024-10-04 ENCOUNTER — TELEPHONE (OUTPATIENT)
Age: 34
End: 2024-10-04

## 2024-10-04 ENCOUNTER — HOSPITAL ENCOUNTER (OUTPATIENT)
Facility: HOSPITAL | Age: 34
Discharge: HOME | End: 2024-10-07

## 2024-10-04 VITALS
WEIGHT: 186.6 LBS | BODY MASS INDEX: 31.86 KG/M2 | SYSTOLIC BLOOD PRESSURE: 118 MMHG | OXYGEN SATURATION: 100 % | HEART RATE: 85 BPM | TEMPERATURE: 97.8 F | HEIGHT: 64 IN | DIASTOLIC BLOOD PRESSURE: 77 MMHG

## 2024-10-04 NOTE — TELEPHONE ENCOUNTER
case number:[id number]   phone number:[phone]     Called [employer] - no peer to peer needed as CT scans and bone scans were approved.

## 2024-10-04 NOTE — TELEPHONE ENCOUNTER
FU call to [facility] Pathology Dept, spoke with Chayo, [phone].  Patient and case number ID verified.  Per Chayo she supplied incorrect fax number to [facility] Path Dept and PDL-1 is not yet in process.  Understands that this was a STAT add on request.  Chayo supplied updated fax number direct to Pathologist of [phone] and stated request would be expedited.  Update to Provider/Research.      10/7/24 1338  FU call to [facility] Pathology Dept, [phone].  Spoke with Chayo, Patient fully ID verified.   Chayo is unable to confirm if fax requesting add on pathology was received or in process.  Escalated concern to supervisor.  Spoke with , Melody, at [phone].    Melody was able to confirm receipt of add on pathology request for PDL -1 testing.  States patient has already had PDL-1 testing completed, RN supplied office fax number, Melody states will fax to RN attn.  Update to Provider.

## 2024-10-04 NOTE — PERIOP NOTE
[address] [address]   MAIN OR                                  [phone]    MAIN PRE OP             [phone]                                                                                AMBULATORY PRE OP          [phone]  PRE-ADMISSION TESTING    [phone]     Surgery Date:  10/8/24       Is surgery arrival time given by surgeon?  NO    If “NO”, [facility] staff will call you between 4 and 7pm the day before your surgery with your arrival time. (If your surgery is on a Monday, we will call you the Friday before.)    Call [phone] after 7pm Monday-Friday if you did not receive this call.    INSTRUCTIONS BEFORE YOUR SURGERY   When You  Arrive Arrive at [facility] Patient Access on 1st floor the day of your surgery.   Medications to   TAKE   Morning of Surgery MEDICATIONS TO TAKE THE MORNING OF SURGERY WITH A SIP OF WATER: NONE    You may take these medications, IF NEEDED, the morning of surgery: NONE  Ask your surgeon/prescribing doctor for instructions on taking or stopping these medications prior to surgery: NONE   Medications to STOP  before surgery Non-Steroidal anti-inflammatory Drugs (NSAID's): for example, Diclofenac (Voltaren), Ibuprofen (Advil, Motrin), Naproxen (Aleve) 3 days  STOP all herbal supplements and vitamins(unless prescribed by your doctor), and fish oil for 7 days  Other: NONE  (Pain medications not listed above, including Tylenol may be taken up until 4 hours prior to arrival time)   Blood  Thinners If you take Aspirin, Plavix, Coumadin, or any blood-thinning or anti-blood clot medicine, talk to the doctor who prescribed the medications for pre-operative instructions.  If you take aspirin or aspirin containing products for pain, stop 7 days prior to surgery   Going Home - or Spending the Night OUTPATIENT SURGERY: You must have a responsible adult drive you home and stay with you 24 hours after surgery.

## 2024-10-07 ENCOUNTER — ANESTHESIA EVENT (OUTPATIENT)
Facility: HOSPITAL | Age: 34
End: 2024-10-07
Payer: COMMERCIAL

## 2024-10-07 DIAGNOSIS — C77.3 BREAST CANCER METASTASIZED TO AXILLARY LYMPH NODE, LEFT (HCC): Primary | ICD-10-CM

## 2024-10-07 DIAGNOSIS — C50.912 BREAST CANCER METASTASIZED TO AXILLARY LYMPH NODE, LEFT (HCC): Primary | ICD-10-CM

## 2024-10-07 RX ORDER — OXYCODONE HYDROCHLORIDE 5 MG/1
5 TABLET ORAL
Status: CANCELLED | OUTPATIENT
Start: 2024-10-07 | End: 2024-10-08

## 2024-10-07 RX ORDER — HYDROMORPHONE HYDROCHLORIDE 1 MG/ML
0.5 INJECTION, SOLUTION INTRAMUSCULAR; INTRAVENOUS; SUBCUTANEOUS EVERY 5 MIN PRN
Status: CANCELLED | OUTPATIENT
Start: 2024-10-07

## 2024-10-07 RX ORDER — HYDRALAZINE HYDROCHLORIDE 20 MG/ML
10 INJECTION INTRAMUSCULAR; INTRAVENOUS
Status: CANCELLED | OUTPATIENT
Start: 2024-10-07

## 2024-10-07 RX ORDER — ACETAMINOPHEN 500 MG
1000 TABLET ORAL ONCE
Status: CANCELLED | OUTPATIENT
Start: 2024-10-07 | End: 2024-10-07

## 2024-10-07 RX ORDER — SODIUM CHLORIDE, SODIUM LACTATE, POTASSIUM CHLORIDE, CALCIUM CHLORIDE 600; 310; 30; 20 MG/100ML; MG/100ML; MG/100ML; MG/100ML
INJECTION, SOLUTION INTRAVENOUS CONTINUOUS
Status: CANCELLED | OUTPATIENT
Start: 2024-10-07

## 2024-10-07 RX ORDER — LIDOCAINE HYDROCHLORIDE 10 MG/ML
1 INJECTION, SOLUTION INFILTRATION; PERINEURAL
Status: CANCELLED | OUTPATIENT
Start: 2024-10-07 | End: 2024-10-08

## 2024-10-07 RX ORDER — SODIUM CHLORIDE 0.9 % (FLUSH) 0.9 %
5-40 SYRINGE (ML) INJECTION EVERY 12 HOURS SCHEDULED
Status: CANCELLED | OUTPATIENT
Start: 2024-10-07

## 2024-10-07 RX ORDER — SODIUM CHLORIDE 9 MG/ML
INJECTION, SOLUTION INTRAVENOUS PRN
Status: CANCELLED | OUTPATIENT
Start: 2024-10-07

## 2024-10-07 RX ORDER — NALOXONE HYDROCHLORIDE 0.4 MG/ML
INJECTION, SOLUTION INTRAMUSCULAR; INTRAVENOUS; SUBCUTANEOUS PRN
Status: CANCELLED | OUTPATIENT
Start: 2024-10-07

## 2024-10-07 RX ORDER — MIDAZOLAM HYDROCHLORIDE 2 MG/2ML
2 INJECTION, SOLUTION INTRAMUSCULAR; INTRAVENOUS PRN
Status: CANCELLED | OUTPATIENT
Start: 2024-10-07

## 2024-10-07 RX ORDER — SODIUM CHLORIDE 0.9 % (FLUSH) 0.9 %
5-40 SYRINGE (ML) INJECTION PRN
Status: CANCELLED | OUTPATIENT
Start: 2024-10-07

## 2024-10-07 RX ORDER — LIDOCAINE/PRILOCAINE 2.5 %-2.5%
CREAM (GRAM) TOPICAL
Qty: 30 G | Refills: 1 | Status: SHIPPED | OUTPATIENT
Start: 2024-10-07

## 2024-10-07 RX ORDER — PROCHLORPERAZINE EDISYLATE 5 MG/ML
5 INJECTION INTRAMUSCULAR; INTRAVENOUS
Status: CANCELLED | OUTPATIENT
Start: 2024-10-07 | End: 2024-10-08

## 2024-10-07 RX ORDER — PROCHLORPERAZINE MALEATE 5 MG
5-10 TABLET ORAL EVERY 6 HOURS PRN
Qty: 30 TABLET | Refills: 1 | Status: SHIPPED | OUTPATIENT
Start: 2024-10-07

## 2024-10-07 RX ORDER — ONDANSETRON 4 MG/1
4-8 TABLET, ORALLY DISINTEGRATING ORAL EVERY 8 HOURS PRN
Qty: 60 TABLET | Refills: 1 | Status: SHIPPED | OUTPATIENT
Start: 2024-10-07

## 2024-10-07 RX ORDER — FENTANYL CITRATE 50 UG/ML
100 INJECTION, SOLUTION INTRAMUSCULAR; INTRAVENOUS
Status: CANCELLED | OUTPATIENT
Start: 2024-10-07 | End: 2024-10-08

## 2024-10-07 RX ORDER — FENTANYL CITRATE 50 UG/ML
25 INJECTION, SOLUTION INTRAMUSCULAR; INTRAVENOUS EVERY 5 MIN PRN
Status: CANCELLED | OUTPATIENT
Start: 2024-10-07

## 2024-10-07 RX ORDER — DEXAMETHASONE 4 MG/1
TABLET ORAL
Qty: 32 TABLET | Refills: 0 | Status: SHIPPED | OUTPATIENT
Start: 2024-10-07

## 2024-10-07 RX ORDER — ONDANSETRON 2 MG/ML
4 INJECTION INTRAMUSCULAR; INTRAVENOUS
Status: CANCELLED | OUTPATIENT
Start: 2024-10-07 | End: 2024-10-08

## 2024-10-08 ENCOUNTER — ANESTHESIA (OUTPATIENT)
Facility: HOSPITAL | Age: 34
End: 2024-10-08
Payer: COMMERCIAL

## 2024-10-08 ENCOUNTER — APPOINTMENT (OUTPATIENT)
Facility: HOSPITAL | Age: 34
End: 2024-10-08
Attending: SURGERY
Payer: COMMERCIAL

## 2024-10-08 ENCOUNTER — HOSPITAL ENCOUNTER (OUTPATIENT)
Facility: HOSPITAL | Age: 34
Setting detail: OUTPATIENT SURGERY
Discharge: HOME | End: 2024-10-08
Attending: SURGERY | Admitting: SURGERY
Payer: COMMERCIAL

## 2024-10-08 VITALS
TEMPERATURE: 98.4 F | SYSTOLIC BLOOD PRESSURE: 95 MMHG | OXYGEN SATURATION: 97 % | BODY MASS INDEX: 31.86 KG/M2 | RESPIRATION RATE: 16 BRPM | WEIGHT: 186.6 LBS | DIASTOLIC BLOOD PRESSURE: 67 MMHG | HEART RATE: 82 BPM | HEIGHT: 64 IN

## 2024-10-08 DIAGNOSIS — G89.18 PAIN FOLLOWING SURGERY OR PROCEDURE: Primary | ICD-10-CM

## 2024-10-08 PROCEDURE — 3700000000 HC ANESTHESIA ATTENDED CARE: Performed by: SURGERY

## 2024-10-08 PROCEDURE — 3600000002 HC SURGERY LEVEL 2 BASE: Performed by: SURGERY

## 2024-10-08 PROCEDURE — 7100000000 HC PACU RECOVERY - FIRST 15 MIN: Performed by: SURGERY

## 2024-10-08 PROCEDURE — C1788 PORT, INDWELLING, IMP: HCPCS | Performed by: SURGERY

## 2024-10-08 PROCEDURE — 7100000001 HC PACU RECOVERY - ADDTL 15 MIN: Performed by: SURGERY

## 2024-10-08 PROCEDURE — 3600000012 HC SURGERY LEVEL 2 ADDTL 15MIN: Performed by: SURGERY

## 2024-10-08 PROCEDURE — 2500000003 HC RX 250 WO HCPCS: Performed by: SURGERY

## 2024-10-08 PROCEDURE — 3700000001 HC ADD 15 MINUTES (ANESTHESIA): Performed by: SURGERY

## 2024-10-08 PROCEDURE — 6360000002 HC RX W HCPCS: Performed by: SURGERY

## 2024-10-08 PROCEDURE — 6360000002 HC RX W HCPCS: Performed by: NURSE ANESTHETIST, CERTIFIED REGISTERED

## 2024-10-08 PROCEDURE — 71045 X-RAY EXAM CHEST 1 VIEW: CPT

## 2024-10-08 PROCEDURE — 2580000003 HC RX 258: Performed by: NURSE ANESTHETIST, CERTIFIED REGISTERED

## 2024-10-08 PROCEDURE — 36561 INSERT TUNNELED CV CATH: CPT | Performed by: SURGERY

## 2024-10-08 PROCEDURE — 2709999900 HC NON-CHARGEABLE SUPPLY: Performed by: SURGERY

## 2024-10-08 DEVICE — POWERPORT ISP IMPLANTABLE PORT WITH ATTACHABLE 8F CHRONOFLEX OPEN-ENDED SINGLE-LUMEN VENOUS CATHETER INTERMEDIATE KIT (WITH SUTURE PLUGS)
Type: IMPLANTABLE DEVICE | Site: CHEST | Status: FUNCTIONAL
Brand: POWERPORT, CHRONOFLEX

## 2024-10-08 RX ORDER — MIDAZOLAM HYDROCHLORIDE 1 MG/ML
INJECTION INTRAMUSCULAR; INTRAVENOUS
Status: DISCONTINUED | OUTPATIENT
Start: 2024-10-08 | End: 2024-10-08 | Stop reason: SDUPTHER

## 2024-10-08 RX ORDER — ONDANSETRON 2 MG/ML
INJECTION INTRAMUSCULAR; INTRAVENOUS
Status: DISCONTINUED | OUTPATIENT
Start: 2024-10-08 | End: 2024-10-08 | Stop reason: SDUPTHER

## 2024-10-08 RX ORDER — OXYCODONE HYDROCHLORIDE 5 MG/1
5 TABLET ORAL EVERY 6 HOURS PRN
Qty: 28 TABLET | Refills: 0 | Status: SHIPPED | OUTPATIENT
Start: 2024-10-08 | End: 2024-10-15

## 2024-10-08 RX ORDER — ONDANSETRON 4 MG/1
4 TABLET, FILM COATED ORAL 3 TIMES DAILY PRN
Qty: 15 TABLET | Refills: 0 | Status: SHIPPED | OUTPATIENT
Start: 2024-10-08

## 2024-10-08 RX ORDER — CEFAZOLIN SODIUM 1 G/3ML
INJECTION, POWDER, FOR SOLUTION INTRAMUSCULAR; INTRAVENOUS
Status: DISCONTINUED | OUTPATIENT
Start: 2024-10-08 | End: 2024-10-08 | Stop reason: SDUPTHER

## 2024-10-08 RX ORDER — SODIUM CHLORIDE, SODIUM LACTATE, POTASSIUM CHLORIDE, CALCIUM CHLORIDE 600; 310; 30; 20 MG/100ML; MG/100ML; MG/100ML; MG/100ML
INJECTION, SOLUTION INTRAVENOUS
Status: DISCONTINUED | OUTPATIENT
Start: 2024-10-08 | End: 2024-10-08 | Stop reason: SDUPTHER

## 2024-10-08 RX ORDER — PHENYLEPHRINE HCL IN 0.9% NACL 0.4MG/10ML
SYRINGE (ML) INTRAVENOUS
Status: DISCONTINUED | OUTPATIENT
Start: 2024-10-08 | End: 2024-10-08 | Stop reason: SDUPTHER

## 2024-10-08 RX ORDER — DEXAMETHASONE SODIUM PHOSPHATE 4 MG/ML
INJECTION, SOLUTION INTRA-ARTICULAR; INTRALESIONAL; INTRAMUSCULAR; INTRAVENOUS; SOFT TISSUE
Status: DISCONTINUED | OUTPATIENT
Start: 2024-10-08 | End: 2024-10-08 | Stop reason: SDUPTHER

## 2024-10-08 RX ORDER — FENTANYL CITRATE 50 UG/ML
INJECTION, SOLUTION INTRAMUSCULAR; INTRAVENOUS
Status: DISCONTINUED | OUTPATIENT
Start: 2024-10-08 | End: 2024-10-08 | Stop reason: SDUPTHER

## 2024-10-08 RX ORDER — HEPARIN 100 UNIT/ML
SYRINGE INTRAVENOUS PRN
Status: DISCONTINUED | OUTPATIENT
Start: 2024-10-08 | End: 2024-10-08 | Stop reason: HOSPADM

## 2024-10-08 RX ADMIN — Medication 120 MCG: at 09:52

## 2024-10-08 RX ADMIN — MIDAZOLAM HYDROCHLORIDE 2 MG: 1 INJECTION, SOLUTION INTRAMUSCULAR; INTRAVENOUS at 09:35

## 2024-10-08 RX ADMIN — ONDANSETRON 4 MG: 2 INJECTION INTRAMUSCULAR; INTRAVENOUS at 09:58

## 2024-10-08 RX ADMIN — Medication 120 MCG: at 09:49

## 2024-10-08 RX ADMIN — DEXAMETHASONE SODIUM PHOSPHATE 4 MG: 4 INJECTION INTRA-ARTICULAR; INTRALESIONAL; INTRAMUSCULAR; INTRAVENOUS; SOFT TISSUE at 09:55

## 2024-10-08 RX ADMIN — Medication 100 MCG: at 09:55

## 2024-10-08 RX ADMIN — MIDAZOLAM HYDROCHLORIDE 3 MG: 1 INJECTION, SOLUTION INTRAMUSCULAR; INTRAVENOUS at 09:32

## 2024-10-08 RX ADMIN — Medication 100 MCG: at 09:58

## 2024-10-08 RX ADMIN — PROPOFOL 30 MG: 10 INJECTION, EMULSION INTRAVENOUS at 09:50

## 2024-10-08 RX ADMIN — PROPOFOL 30 MG: 10 INJECTION, EMULSION INTRAVENOUS at 09:46

## 2024-10-08 RX ADMIN — SODIUM CHLORIDE, SODIUM LACTATE, POTASSIUM CHLORIDE, AND CALCIUM CHLORIDE: 600; 310; 30; 20 INJECTION, SOLUTION INTRAVENOUS at 09:25

## 2024-10-08 RX ADMIN — FENTANYL CITRATE 50 MCG: 50 INJECTION INTRAMUSCULAR; INTRAVENOUS at 09:46

## 2024-10-08 RX ADMIN — PROPOFOL 100 MCG/KG/MIN: 10 INJECTION, EMULSION INTRAVENOUS at 09:42

## 2024-10-08 RX ADMIN — CEFAZOLIN 2 G: 1 INJECTION, POWDER, FOR SOLUTION INTRAMUSCULAR; INTRAVENOUS at 09:55

## 2024-10-08 RX ADMIN — Medication 100 MCG: at 10:00

## 2024-10-08 RX ADMIN — FENTANYL CITRATE 50 MCG: 50 INJECTION INTRAMUSCULAR; INTRAVENOUS at 09:32

## 2024-10-08 ASSESSMENT — PAIN - FUNCTIONAL ASSESSMENT: PAIN_FUNCTIONAL_ASSESSMENT: 0-10

## 2024-10-08 ASSESSMENT — PAIN SCALES - GENERAL
PAINLEVEL_OUTOF10: 0

## 2024-10-08 NOTE — ANESTHESIA PRE PROCEDURE
ASA 1       Induction: intravenous.      Anesthetic plan and risks discussed with patient.      Plan discussed with CRNA.    Attending anesthesiologist reviewed and agrees with Preprocedure content            Abdirahman Haider MD   10/8/2024

## 2024-10-08 NOTE — INTERVAL H&P NOTE
Update History & Physical    The patient's History and Physical of September 23, 2024 was reviewed with the patient and I examined the patient. The plan is port placement.  The surgical site was confirmed by the patient and me.     Plan: The risks, benefits, expected outcome, and alternative to the recommended procedure have been discussed with the patient. Patient understands and wants to proceed with the procedure.     Electronically signed by Juliana Navarro MD on 10/8/2024 at 9:02 AM

## 2024-10-08 NOTE — ANESTHESIA POSTPROCEDURE EVALUATION
Department of Anesthesiology  Postprocedure Note    Patient: Alea Lindsay  MRN: [id number]  YOB: 1990  Date of evaluation: 10/8/2024    Procedure Summary       Date: 10/08/24 Room / Location: [facility] ASU A2 / [facility] AMBULATORY OR    Anesthesia Start: 0932 Anesthesia Stop: 1030    Procedure: PORT PLACEMENT (Right: Chest) Diagnosis:       Malignant neoplasm of left breast (HCC)      (Malignant neoplasm of left breast (HCC) [C50.912])    Surgeons: Juliana Navarro MD Responsible Provider: Abdirahman Haider Jr., MD    Anesthesia Type: MAC ASA Status: 1            Anesthesia Type: No value filed.    Amee Phase I: Amee Score: 10    Amee Phase II:      Anesthesia Post Evaluation    Patient location during evaluation: PACU  Patient participation: complete - patient participated  Level of consciousness: awake  Pain score: 2  Airway patency: patent  Nausea & Vomiting: no nausea  Cardiovascular status: blood pressure returned to baseline and hemodynamically stable  Respiratory status: acceptable  Hydration status: stable  Multimodal analgesia pain management approach    No notable events documented.

## 2024-10-08 NOTE — OP NOTE
[address]  [address]                            OPERATIVE REPORT      PATIENT NAME: ITZ SANTOS         : 1990  MED REC NO: [id number]                       ROOM: [facility]  ACCOUNT NO: [id number]                       ADMIT DATE: 10/08/2024  PROVIDER: Juliana Navarro MD    DATE OF SERVICE:  10/08/2024    PREOPERATIVE DIAGNOSES:  Left breast cancer, needs IV access for chemo.    POSTOPERATIVE DIAGNOSES:  Left breast cancer, needs IV access for chemo.    PROCEDURES PERFORMED:  Right subclavian port placement.    SURGEON:  Juliana Navarro MD    ASSISTANT:  Hakan Fuentes.    ANESTHESIA:  MAC.    ESTIMATED BLOOD LOSS:  Minimal.    SPECIMENS REMOVED:  None.    INTRAOPERATIVE FINDINGS:  Port in good position.     COMPLICATIONS:  None.    IMPLANTS:  8-[employer] PowerPort.    INDICATIONS:  A 34-year-old female, who was recently diagnosed with stage IV breast cancer and needed a port for neoadjuvant chemo.    DESCRIPTION OF PROCEDURE:  The patient was seen in the preop holding area where surgical site was marked by the surgeon and informed consent was obtained.  She was taken to the operating room, laid in supine position where MAC anesthesia was induced.  Bilateral chest and neck prepped and draped in the usual fashion.  A time-out was performed.  Attention was turned to the right chest wall.  The patient was placed in Trendelenburg position.  10 mL of local anesthetic was injected just inferior to the right clavicle and an 18-gauge introducer needle was used to access the right subclavian vein.  The syringe was removed.  The wire was threaded through the needle.  The needle was removed and the wire was going towards the SVC and right atrium on fluoro.  Next, a #15 blade was used to make an incision at the wire.  Bovie cautery was used to create a port pocket.  The dilator sheath was threaded over the wire.  The inner cannula and wire were

## 2024-10-08 NOTE — BRIEF OP NOTE
Brief Postoperative Note      Patient: Alea Lindsay  YOB: 1990  MRN: [id number]    Date of Procedure: 10/8/2024    Pre-Op Diagnosis Codes:      * Malignant neoplasm of left breast (HCC) [C50.912]    Post-Op Diagnosis: Same       Procedure(s):  PORT PLACEMENT    Surgeon(s):  Juliana Navarro MD    Assistant:  Surgical Assistant: Hakan Fuentes    Anesthesia: Monitor Anesthesia Care    Estimated Blood Loss (mL): Minimal    Complications: None    Specimens:   * No specimens in log *    Implants:  Implant Name Type Inv. Item Serial No.  Lot No. LRB No. Used Action   PORT INFUS 8FR POLYUR ATTCH CHRONOFLEX CATH TI SIMBA FILL SUT - [id number]  PORT INFUS 8FR POLYUR ATTCH CHRONOFLEX CATH TI SIMBA FILL SUT  [employer] [id number] 1 Implanted         Drains: * No LDAs found *    Findings:  Infection Present At Time Of Surgery (PATOS) (choose all levels that have infection present):  No infection present  Other Findings: port in position    Electronically signed by Juliana Navarro MD on 10/8/2024 at 10:14 AM

## 2024-10-08 NOTE — DISCHARGE INSTRUCTIONS
Discharge Instructions from Dr. Navarro      You may shower, but no hot tubs, swimming pools, or baths until your incision is healed.  No heavy lifting with the affected extremity (nothing greater than 5 pounds), and limit its use for the next 4-5 days.  You may use an ice pack for comfort for the next couple of days, but do not place ice directly on the skin.  Rather, use a towel or clothing to serve as a barrier between skin and ice to prevent injury.  If I placed a drain, follow the drain instructions provided, especially as you keep a record of the drain output.  Follow medication instructions carefully.    You will have bruising and swelling  Watch for signs of infection as listed below.  Redness  Swelling  Drainage from the incision or from your nipple that appears infected  Fever over 101.5 degrees for consecutive readings, or over 99.5 if you are currently undergoing chemotherapy.  Call our office (number is below) for a follow-up appointment.  If you have any problems, our phone number is [phone]

## 2024-10-09 ENCOUNTER — HOSPITAL ENCOUNTER (OUTPATIENT)
Facility: HOSPITAL | Age: 34
Setting detail: INFUSION SERIES
Discharge: HOME | End: 2024-10-09
Payer: COMMERCIAL

## 2024-10-09 ENCOUNTER — RESEARCH ENCOUNTER (OUTPATIENT)
Facility: HOSPITAL | Age: 34
End: 2024-10-09

## 2024-10-09 ENCOUNTER — OFFICE VISIT (OUTPATIENT)
Age: 34
End: 2024-10-09

## 2024-10-09 VITALS
OXYGEN SATURATION: 98 % | DIASTOLIC BLOOD PRESSURE: 78 MMHG | BODY MASS INDEX: 31.76 KG/M2 | TEMPERATURE: 97.7 F | WEIGHT: 186 LBS | SYSTOLIC BLOOD PRESSURE: 111 MMHG | HEIGHT: 64 IN | RESPIRATION RATE: 16 BRPM | HEART RATE: 80 BPM

## 2024-10-09 VITALS
WEIGHT: 186 LBS | DIASTOLIC BLOOD PRESSURE: 78 MMHG | OXYGEN SATURATION: 98 % | HEART RATE: 80 BPM | BODY MASS INDEX: 31.93 KG/M2 | RESPIRATION RATE: 16 BRPM | SYSTOLIC BLOOD PRESSURE: 111 MMHG | TEMPERATURE: 97.7 F

## 2024-10-09 DIAGNOSIS — C50.919 CARCINOMA OF BREAST METASTATIC TO BONE, UNSPECIFIED LATERALITY (HCC): Primary | ICD-10-CM

## 2024-10-09 DIAGNOSIS — C50.912 BREAST CANCER METASTASIZED TO AXILLARY LYMPH NODE, LEFT (HCC): ICD-10-CM

## 2024-10-09 DIAGNOSIS — N95.9 PREMENOPAUSAL PATIENT: ICD-10-CM

## 2024-10-09 DIAGNOSIS — C77.3 BREAST CANCER METASTASIZED TO AXILLARY LYMPH NODE, LEFT (HCC): ICD-10-CM

## 2024-10-09 DIAGNOSIS — C77.3 BREAST CANCER METASTASIZED TO AXILLARY LYMPH NODE, LEFT (HCC): Primary | ICD-10-CM

## 2024-10-09 DIAGNOSIS — C79.51 CARCINOMA OF BREAST METASTATIC TO BONE, UNSPECIFIED LATERALITY (HCC): Primary | ICD-10-CM

## 2024-10-09 DIAGNOSIS — C50.912 BREAST CANCER METASTASIZED TO AXILLARY LYMPH NODE, LEFT (HCC): Primary | ICD-10-CM

## 2024-10-09 LAB
ALBUMIN SERPL-MCNC: 3.9 G/DL (ref 3.5–5)
ALBUMIN/GLOB SERPL: 1.1 (ref 1.1–2.2)
ALP SERPL-CCNC: 392 U/L (ref 45–117)
ALT SERPL-CCNC: 187 U/L (ref 12–78)
ANION GAP SERPL CALC-SCNC: 5 MMOL/L (ref 2–12)
AST SERPL-CCNC: 184 U/L (ref 15–37)
BASOPHILS # BLD: 0 K/UL (ref 0–0.1)
BASOPHILS NFR BLD: 0 % (ref 0–1)
BILIRUB SERPL-MCNC: 0.6 MG/DL (ref 0.2–1)
BUN SERPL-MCNC: 13 MG/DL (ref 6–20)
BUN/CREAT SERPL: 14 (ref 12–20)
CALCIUM SERPL-MCNC: 9.1 MG/DL (ref 8.5–10.1)
CHLORIDE SERPL-SCNC: 107 MMOL/L (ref 97–108)
CO2 SERPL-SCNC: 24 MMOL/L (ref 21–32)
CORTIS SERPL-MCNC: 1.6 UG/DL
CREAT SERPL-MCNC: 0.95 MG/DL (ref 0.55–1.02)
DIFFERENTIAL METHOD BLD: ABNORMAL
EOSINOPHIL # BLD: 0 K/UL (ref 0–0.4)
EOSINOPHIL NFR BLD: 0 % (ref 0–7)
ERYTHROCYTE [DISTWIDTH] IN BLOOD BY AUTOMATED COUNT: 12.8 % (ref 11.5–14.5)
GLOBULIN SER CALC-MCNC: 3.5 G/DL (ref 2–4)
GLUCOSE SERPL-MCNC: 115 MG/DL (ref 65–100)
HCT VFR BLD AUTO: 34.9 % (ref 35–47)
HGB BLD-MCNC: 11.6 G/DL (ref 11.5–16)
IMM GRANULOCYTES # BLD AUTO: 0 K/UL
IMM GRANULOCYTES NFR BLD AUTO: 0 %
LYMPHOCYTES # BLD: 1.1 K/UL (ref 0.8–3.5)
LYMPHOCYTES NFR BLD: 14 % (ref 12–49)
MCH RBC QN AUTO: 29.1 PG (ref 26–34)
MCHC RBC AUTO-ENTMCNC: 33.2 G/DL (ref 30–36.5)
MCV RBC AUTO: 87.7 FL (ref 80–99)
MONOCYTES # BLD: 0.3 K/UL (ref 0–1)
MONOCYTES NFR BLD: 4 % (ref 5–13)
NEUTS BAND NFR BLD MANUAL: 3 % (ref 0–6)
NEUTS SEG # BLD: 6.2 K/UL (ref 1.8–8)
NEUTS SEG NFR BLD: 79 % (ref 32–75)
NRBC # BLD: 0 K/UL (ref 0–0.01)
NRBC BLD-RTO: 0 PER 100 WBC
PLATELET # BLD AUTO: 270 K/UL (ref 150–400)
PMV BLD AUTO: 9.8 FL (ref 8.9–12.9)
POTASSIUM SERPL-SCNC: 4.2 MMOL/L (ref 3.5–5.1)
PROT SERPL-MCNC: 7.4 G/DL (ref 6.4–8.2)
RBC # BLD AUTO: 3.98 M/UL (ref 3.8–5.2)
RBC MORPH BLD: ABNORMAL
SODIUM SERPL-SCNC: 136 MMOL/L (ref 136–145)
TSH SERPL DL<=0.05 MIU/L-ACNC: 0.77 UIU/ML (ref 0.36–3.74)
WBC # BLD AUTO: 7.6 K/UL (ref 3.6–11)
WBC MORPH BLD: ABNORMAL

## 2024-10-09 PROCEDURE — 82533 TOTAL CORTISOL: CPT

## 2024-10-09 PROCEDURE — 85025 COMPLETE CBC W/AUTO DIFF WBC: CPT

## 2024-10-09 PROCEDURE — 84443 ASSAY THYROID STIM HORMONE: CPT

## 2024-10-09 PROCEDURE — 36415 COLL VENOUS BLD VENIPUNCTURE: CPT

## 2024-10-09 PROCEDURE — 80053 COMPREHEN METABOLIC PANEL: CPT

## 2024-10-09 ASSESSMENT — PAIN SCALES - GENERAL: PAINLEVEL_OUTOF10: 0

## 2024-10-09 NOTE — PROGRESS NOTES
Alea Lindsay is a 34 y.o. female    Chief Complaint   Patient presents with    Follow-up     new MBC       1. Have you been to the ER, urgent care clinic since your last visit?  Hospitalized since your last visit?No    2. Have you seen or consulted any other health care providers outside of the [facility] since your last visit?  Include any pap smears or colon screening. No    
testing/ pending    4) anxiety about new dx.   Ativan prn      5) Psychosocial. Mood good. Coping well.    on visit today. Pt has great support.   Works in a . Navigator/ SW support prn.   Interested in complementary services.  Discussed        Fu here at chemo  Call if questions    I appreciate the opportunity to participate in Ms. Alea Lindsay's care.    Signed By: Sepdieh Gillespie,

## 2024-10-09 NOTE — PROGRESS NOTES
Pt has been screened for all eligibility/ineligibility criteria and has been determined eligible for this protocol. Informed consent was reviewed by RN with patient prior to signing.  Patient was informed that participation is voluntary and she has the right to withdraw at anytime without prejudice.  The patient has been deemed of adequate mental and emotional capacity to give an informed consent per Dr. Gillespie. Possible side effects were discussed in detail, with patient being advised to inform RN or Dr. Gillespie immediately in the event of any side effects once drug is started or at any time should she have any questions.  All questions were answered adequately by RN or Dr. Gillespie.   Patient signed consent today for study [id number] : A Phase III, Open-label, Randomised Study of Datopotamab Deruxtecan (Dato-DXd) With or Without Durvalumab Compared with Investigator's Choice of Chemotherapy (Paclitaxel, Nab-paclitaxel or Gemcitabine + Carboplatin) in Combination With Pembrolizumab in Patients with PD-L1 Positive Locally Recurrent Inoperable or Metastatic Triple-negative Breast Cancer ([id number]).  The patient also signed the Genetic Research ICF.  The patient was accompanied by her .  A copy of ICF given to patient.  No additional questions at this time.      The participant is a WOCBP.  She agrees to be sexually active only with her vasectomized partner.  She assures the research staff that her partner received post-vasectomy confirmation of azoospermia.

## 2024-10-15 ENCOUNTER — HOSPITAL ENCOUNTER (OUTPATIENT)
Facility: HOSPITAL | Age: 34
Discharge: HOME | End: 2024-10-17
Payer: COMMERCIAL

## 2024-10-15 ENCOUNTER — HOSPITAL ENCOUNTER (OUTPATIENT)
Facility: HOSPITAL | Age: 34
Discharge: HOME | End: 2024-10-18
Payer: COMMERCIAL

## 2024-10-15 DIAGNOSIS — C50.912 BREAST CANCER METASTASIZED TO AXILLARY LYMPH NODE, LEFT (HCC): ICD-10-CM

## 2024-10-15 DIAGNOSIS — C77.3 BREAST CANCER METASTASIZED TO AXILLARY LYMPH NODE, LEFT (HCC): ICD-10-CM

## 2024-10-15 PROCEDURE — 93306 TTE W/DOPPLER COMPLETE: CPT

## 2024-10-15 PROCEDURE — 70553 MRI BRAIN STEM W/O & W/DYE: CPT

## 2024-10-15 PROCEDURE — 6360000004 HC RX CONTRAST MEDICATION: Performed by: NURSE PRACTITIONER

## 2024-10-15 PROCEDURE — A9579 GAD-BASE MR CONTRAST NOS,1ML: HCPCS | Performed by: NURSE PRACTITIONER

## 2024-10-15 RX ADMIN — GADOTERIDOL 18 ML: 279.3 INJECTION, SOLUTION INTRAVENOUS at 14:55

## 2024-10-16 ENCOUNTER — HOSPITAL ENCOUNTER (OUTPATIENT)
Facility: HOSPITAL | Age: 34
Setting detail: INFUSION SERIES
End: 2024-10-16

## 2024-10-16 LAB
ECHO AO ROOT DIAM: 1.8 CM
ECHO AV AREA PLAN: 1.4 CM2
ECHO EST RA PRESSURE: 5 MMHG
ECHO LA DIAMETER: 3.1 CM
ECHO LA TO AORTIC ROOT RATIO: 1.72
ECHO LA VOL A-L A4C: 39 ML (ref 22–52)
ECHO LA VOL MOD A4C: 38 ML (ref 22–52)
ECHO LV EDV A4C: 86 ML
ECHO LV EF PHYSICIAN: 55 %
ECHO LV EJECTION FRACTION A4C: 57 %
ECHO LV ESV A4C: 36 ML
ECHO LV FRACTIONAL SHORTENING: 29 % (ref 28–44)
ECHO LV INTERNAL DIMENSION DIASTOLIC: 4.5 CM (ref 3.9–5.3)
ECHO LV INTERNAL DIMENSION SYSTOLIC: 3.2 CM
ECHO LV IVSD: 0.9 CM (ref 0.6–0.9)
ECHO LV MASS 2D: 113.6 G (ref 67–162)
ECHO LV POSTERIOR WALL DIASTOLIC: 0.7 CM (ref 0.6–0.9)
ECHO LV RELATIVE WALL THICKNESS RATIO: 0.31
ECHO LVOT AREA: 2 CM2
ECHO LVOT DIAM: 1.6 CM
ECHO LVOT PEAK GRADIENT: 6 MMHG
ECHO LVOT PEAK VELOCITY: 1.3 M/S
ECHO MV A VELOCITY: 0.73 M/S
ECHO MV AREA PHT: 4.4 CM2
ECHO MV E DECELERATION TIME (DT): 160.1 MS
ECHO MV E VELOCITY: 0.49 M/S
ECHO MV E/A RATIO: 0.67
ECHO MV MAX VELOCITY: 1 M/S
ECHO MV MEAN GRADIENT: 1 MMHG
ECHO MV MEAN VELOCITY: 0.5 M/S
ECHO MV PEAK GRADIENT: 4 MMHG
ECHO MV PRESSURE HALF TIME (PHT): 50.2 MS
ECHO MV REGURGITANT PEAK GRADIENT: 58 MMHG
ECHO MV REGURGITANT PEAK VELOCITY: 3.8 M/S
ECHO MV VTI: 22.5 CM
ECHO PULMONARY ARTERY END DIASTOLIC PRESSURE: 6 MMHG
ECHO PULMONARY ARTERY END DIASTOLIC PRESSURE: 9 MMHG
ECHO PV MAX VELOCITY: 0.9 M/S
ECHO PV PEAK GRADIENT: 4 MMHG
ECHO PV REGURGITANT MAX VELOCITY: 1.2 M/S
ECHO RIGHT VENTRICULAR SYSTOLIC PRESSURE (RVSP): 35 MMHG
ECHO TV REGURGITANT MAX VELOCITY: 2.74 M/S
ECHO TV REGURGITANT PEAK GRADIENT: 31 MMHG

## 2024-10-17 ENCOUNTER — HOSPITAL ENCOUNTER (OUTPATIENT)
Facility: HOSPITAL | Age: 34
Discharge: HOME | End: 2024-10-20
Payer: COMMERCIAL

## 2024-10-17 ENCOUNTER — HOSPITAL ENCOUNTER (OUTPATIENT)
Facility: HOSPITAL | Age: 34
Setting detail: INFUSION SERIES
Discharge: HOME | End: 2024-10-17
Payer: COMMERCIAL

## 2024-10-17 DIAGNOSIS — C50.912 BREAST CANCER METASTASIZED TO AXILLARY LYMPH NODE, LEFT (HCC): ICD-10-CM

## 2024-10-17 DIAGNOSIS — C77.3 BREAST CANCER METASTASIZED TO AXILLARY LYMPH NODE, LEFT (HCC): ICD-10-CM

## 2024-10-17 LAB
ALBUMIN SERPL-MCNC: 3.7 G/DL (ref 3.5–5)
ALBUMIN/GLOB SERPL: 1.1 (ref 1.1–2.2)
ALP SERPL-CCNC: 452 U/L (ref 45–117)
ALT SERPL-CCNC: 175 U/L (ref 12–78)
AMYLASE SERPL-CCNC: 38 U/L (ref 25–115)
ANION GAP SERPL CALC-SCNC: 5 MMOL/L (ref 2–12)
AST SERPL-CCNC: 162 U/L (ref 15–37)
BASOPHILS # BLD: 0 K/UL (ref 0–0.1)
BASOPHILS NFR BLD: 0 % (ref 0–1)
BILIRUB SERPL-MCNC: 0.6 MG/DL (ref 0.2–1)
BUN SERPL-MCNC: 13 MG/DL (ref 6–20)
BUN/CREAT SERPL: 15 (ref 12–20)
CALCIUM SERPL-MCNC: 9.4 MG/DL (ref 8.5–10.1)
CHLORIDE SERPL-SCNC: 110 MMOL/L (ref 97–108)
CO2 SERPL-SCNC: 24 MMOL/L (ref 21–32)
CREAT SERPL-MCNC: 0.87 MG/DL (ref 0.55–1.02)
DIFFERENTIAL METHOD BLD: ABNORMAL
EKG ATRIAL RATE: 71 BPM
EKG DIAGNOSIS: NORMAL
EKG P AXIS: 49 DEGREES
EKG P-R INTERVAL: 140 MS
EKG Q-T INTERVAL: 368 MS
EKG QRS DURATION: 76 MS
EKG QTC CALCULATION (BAZETT): 399 MS
EKG R AXIS: 18 DEGREES
EKG T AXIS: 9 DEGREES
EKG VENTRICULAR RATE: 71 BPM
EOSINOPHIL # BLD: 0.1 K/UL (ref 0–0.4)
EOSINOPHIL NFR BLD: 3 % (ref 0–7)
ERYTHROCYTE [DISTWIDTH] IN BLOOD BY AUTOMATED COUNT: 13 % (ref 11.5–14.5)
GGT SERPL-CCNC: 235 U/L (ref 5–55)
GLOBULIN SER CALC-MCNC: 3.4 G/DL (ref 2–4)
GLUCOSE SERPL-MCNC: 108 MG/DL (ref 65–100)
HBV SURFACE AG SER QL: <0.1 INDEX
HBV SURFACE AG SER QL: <0.1 INDEX
HBV SURFACE AG SER QL: NEGATIVE
HBV SURFACE AG SER QL: NEGATIVE
HCG SERPL-ACNC: <1 MIU/ML (ref 0–6)
HCT VFR BLD AUTO: 35.2 % (ref 35–47)
HCV AB SER IA-ACNC: <0.02 INDEX
HCV AB SERPL QL IA: NONREACTIVE
HGB BLD-MCNC: 11.8 G/DL (ref 11.5–16)
HIV 1+2 AB+HIV1 P24 AG SERPL QL IA: NONREACTIVE
HIV 1/2 RESULT COMMENT: NORMAL
IMM GRANULOCYTES # BLD AUTO: 0 K/UL
IMM GRANULOCYTES NFR BLD AUTO: 0 %
LDH SERPL L TO P-CCNC: 299 U/L (ref 81–246)
LIPASE SERPL-CCNC: 32 U/L (ref 13–75)
LYMPHOCYTES # BLD: 1.2 K/UL (ref 0.8–3.5)
LYMPHOCYTES NFR BLD: 31 % (ref 12–49)
MAGNESIUM SERPL-MCNC: 2.3 MG/DL (ref 1.6–2.4)
MCH RBC QN AUTO: 29.3 PG (ref 26–34)
MCHC RBC AUTO-ENTMCNC: 33.5 G/DL (ref 30–36.5)
MCV RBC AUTO: 87.3 FL (ref 80–99)
METAMYELOCYTES NFR BLD MANUAL: 4 %
MONOCYTES # BLD: 0.3 K/UL (ref 0–1)
MONOCYTES NFR BLD: 8 % (ref 5–13)
NEUTS SEG # BLD: 2.1 K/UL (ref 1.8–8)
NEUTS SEG NFR BLD: 54 % (ref 32–75)
NRBC # BLD: 0.03 K/UL (ref 0–0.01)
NRBC BLD-RTO: 0.8 PER 100 WBC
PLATELET # BLD AUTO: 226 K/UL (ref 150–400)
PMV BLD AUTO: 9.2 FL (ref 8.9–12.9)
POTASSIUM SERPL-SCNC: 4.5 MMOL/L (ref 3.5–5.1)
PROT SERPL-MCNC: 7.1 G/DL (ref 6.4–8.2)
RBC # BLD AUTO: 4.03 M/UL (ref 3.8–5.2)
RBC MORPH BLD: ABNORMAL
SODIUM SERPL-SCNC: 139 MMOL/L (ref 136–145)
WBC # BLD AUTO: 3.8 K/UL (ref 3.6–11)

## 2024-10-17 PROCEDURE — 86803 HEPATITIS C AB TEST: CPT

## 2024-10-17 PROCEDURE — 83690 ASSAY OF LIPASE: CPT

## 2024-10-17 PROCEDURE — 80053 COMPREHEN METABOLIC PANEL: CPT

## 2024-10-17 PROCEDURE — 87389 HIV-1 AG W/HIV-1&-2 AB AG IA: CPT

## 2024-10-17 PROCEDURE — 83735 ASSAY OF MAGNESIUM: CPT

## 2024-10-17 PROCEDURE — 84443 ASSAY THYROID STIM HORMONE: CPT

## 2024-10-17 PROCEDURE — 85025 COMPLETE CBC W/AUTO DIFF WBC: CPT

## 2024-10-17 PROCEDURE — 36415 COLL VENOUS BLD VENIPUNCTURE: CPT

## 2024-10-17 PROCEDURE — 84702 CHORIONIC GONADOTROPIN TEST: CPT

## 2024-10-17 PROCEDURE — 82977 ASSAY OF GGT: CPT

## 2024-10-17 PROCEDURE — 93005 ELECTROCARDIOGRAM TRACING: CPT

## 2024-10-17 PROCEDURE — 82150 ASSAY OF AMYLASE: CPT

## 2024-10-17 PROCEDURE — 83615 LACTATE (LD) (LDH) ENZYME: CPT

## 2024-10-17 PROCEDURE — 87340 HEPATITIS B SURFACE AG IA: CPT

## 2024-10-17 NOTE — PROGRESS NOTES
Labs drawn peripherally from left AC and sent for processing. Pt tolerated procedure. Arm wrapped with 2x2 and coban. D/C ambulatory home in no distress.    Future Appointments   Date Time Provider Department Center   10/18/2024 10:00 AM PULMONARY LAB [facility]   10/22/2024 11:15 AM Indira Chau, APRN - NP [facility] AMB   3/5/2025 10:30 AM [facility] CHEMO CHAIR 2 [facility]

## 2024-10-18 ENCOUNTER — RESEARCH ENCOUNTER (OUTPATIENT)
Facility: HOSPITAL | Age: 34
End: 2024-10-18

## 2024-10-18 ENCOUNTER — HOSPITAL ENCOUNTER (OUTPATIENT)
Facility: HOSPITAL | Age: 34
Discharge: HOME | End: 2024-10-21
Payer: COMMERCIAL

## 2024-10-18 DIAGNOSIS — C50.912 BREAST CANCER METASTASIZED TO AXILLARY LYMPH NODE, LEFT (HCC): ICD-10-CM

## 2024-10-18 DIAGNOSIS — C77.3 BREAST CANCER METASTASIZED TO AXILLARY LYMPH NODE, LEFT (HCC): ICD-10-CM

## 2024-10-18 DIAGNOSIS — R79.89 ELEVATED LFTS: Primary | ICD-10-CM

## 2024-10-18 LAB — TSH SERPL DL<=0.05 MIU/L-ACNC: 1.21 UIU/ML (ref 0.45–4.5)

## 2024-10-18 PROCEDURE — 94726 PLETHYSMOGRAPHY LUNG VOLUMES: CPT

## 2024-10-18 PROCEDURE — 94010 BREATHING CAPACITY TEST: CPT

## 2024-10-18 PROCEDURE — 94729 DIFFUSING CAPACITY: CPT

## 2024-10-21 ENCOUNTER — TELEPHONE (OUTPATIENT)
Age: 34
End: 2024-10-21

## 2024-10-21 ENCOUNTER — RESEARCH ENCOUNTER (OUTPATIENT)
Facility: HOSPITAL | Age: 34
End: 2024-10-21

## 2024-10-21 ENCOUNTER — HOSPITAL ENCOUNTER (OUTPATIENT)
Facility: HOSPITAL | Age: 34
Setting detail: INFUSION SERIES
Discharge: HOME | End: 2024-10-21

## 2024-10-21 DIAGNOSIS — R79.89 ELEVATED LFTS: Primary | ICD-10-CM

## 2024-10-21 DIAGNOSIS — C79.51 CARCINOMA OF LEFT BREAST METASTATIC TO BONE (HCC): Primary | ICD-10-CM

## 2024-10-21 DIAGNOSIS — C50.912 CARCINOMA OF LEFT BREAST METASTATIC TO BONE (HCC): Primary | ICD-10-CM

## 2024-10-21 DIAGNOSIS — R79.89 ELEVATED LFTS: ICD-10-CM

## 2024-10-21 DIAGNOSIS — K76.9 LIVER LESION: Primary | ICD-10-CM

## 2024-10-21 DIAGNOSIS — C50.912 BREAST CANCER METASTASIZED TO AXILLARY LYMPH NODE, LEFT (HCC): ICD-10-CM

## 2024-10-21 DIAGNOSIS — C77.3 BREAST CANCER METASTASIZED TO AXILLARY LYMPH NODE, LEFT (HCC): ICD-10-CM

## 2024-10-21 LAB
ALBUMIN SERPL-MCNC: 3.9 G/DL (ref 3.5–5)
ALBUMIN SERPL-MCNC: 4 G/DL (ref 3.5–5)
ALBUMIN/GLOB SERPL: 1.1 (ref 1.1–2.2)
ALBUMIN/GLOB SERPL: 1.2 (ref 1.1–2.2)
ALP SERPL-CCNC: 450 U/L (ref 45–117)
ALP SERPL-CCNC: 461 U/L (ref 45–117)
ALT SERPL-CCNC: 186 U/L (ref 12–78)
ALT SERPL-CCNC: 187 U/L (ref 12–78)
AMYLASE SERPL-CCNC: 39 U/L (ref 25–115)
ANION GAP SERPL CALC-SCNC: 6 MMOL/L (ref 2–12)
APPEARANCE UR: CLEAR
APTT PPP: 26.3 SEC (ref 22.1–31)
AST SERPL-CCNC: 190 U/L (ref 15–37)
AST SERPL-CCNC: 192 U/L (ref 15–37)
BACTERIA URNS QL MICRO: NEGATIVE /HPF
BASOPHILS # BLD: 0 K/UL (ref 0–0.1)
BASOPHILS NFR BLD: 0 % (ref 0–1)
BILIRUB DIRECT SERPL-MCNC: 0.3 MG/DL (ref 0–0.2)
BILIRUB SERPL-MCNC: 0.7 MG/DL (ref 0.2–1)
BILIRUB SERPL-MCNC: 0.7 MG/DL (ref 0.2–1)
BILIRUB UR QL: NEGATIVE
BUN SERPL-MCNC: 9 MG/DL (ref 6–20)
BUN/CREAT SERPL: 11 (ref 12–20)
CALCIUM SERPL-MCNC: 9.1 MG/DL (ref 8.5–10.1)
CHLORIDE SERPL-SCNC: 106 MMOL/L (ref 97–108)
CO2 SERPL-SCNC: 25 MMOL/L (ref 21–32)
COLOR UR: NORMAL
CREAT SERPL-MCNC: 0.82 MG/DL (ref 0.55–1.02)
DIFFERENTIAL METHOD BLD: ABNORMAL
EOSINOPHIL # BLD: 0.1 K/UL (ref 0–0.4)
EOSINOPHIL NFR BLD: 3 % (ref 0–7)
EPITH CASTS URNS QL MICRO: NORMAL /LPF
ERYTHROCYTE [DISTWIDTH] IN BLOOD BY AUTOMATED COUNT: 13.1 % (ref 11.5–14.5)
GGT SERPL-CCNC: 226 U/L (ref 5–55)
GLOBULIN SER CALC-MCNC: 3.3 G/DL (ref 2–4)
GLOBULIN SER CALC-MCNC: 3.4 G/DL (ref 2–4)
GLUCOSE SERPL-MCNC: 112 MG/DL (ref 65–100)
GLUCOSE UR STRIP.AUTO-MCNC: NEGATIVE MG/DL
HCG SERPL QL: NEGATIVE
HCT VFR BLD AUTO: 35.3 % (ref 35–47)
HGB BLD-MCNC: 11.9 G/DL (ref 11.5–16)
HGB UR QL STRIP: NEGATIVE
IMM GRANULOCYTES # BLD AUTO: 0 K/UL
IMM GRANULOCYTES NFR BLD AUTO: 0 %
INR PPP: 0.9 (ref 0.9–1.1)
KETONES UR QL STRIP.AUTO: NEGATIVE MG/DL
LDH SERPL L TO P-CCNC: 329 U/L (ref 81–246)
LEUKOCYTE ESTERASE UR QL STRIP.AUTO: NEGATIVE
LIPASE SERPL-CCNC: 33 U/L (ref 13–75)
LYMPHOCYTES # BLD: 1.5 K/UL (ref 0.8–3.5)
LYMPHOCYTES NFR BLD: 31 % (ref 12–49)
MAGNESIUM SERPL-MCNC: 2.2 MG/DL (ref 1.6–2.4)
MCH RBC QN AUTO: 29.2 PG (ref 26–34)
MCHC RBC AUTO-ENTMCNC: 33.7 G/DL (ref 30–36.5)
MCV RBC AUTO: 86.5 FL (ref 80–99)
METAMYELOCYTES NFR BLD MANUAL: 3 %
MONOCYTES # BLD: 0.2 K/UL (ref 0–1)
MONOCYTES NFR BLD: 5 % (ref 5–13)
MYELOCYTES NFR BLD MANUAL: 2 %
NEUTS SEG # BLD: 2.7 K/UL (ref 1.8–8)
NEUTS SEG NFR BLD: 56 % (ref 32–75)
NITRITE UR QL STRIP.AUTO: NEGATIVE
NRBC # BLD: 0.03 K/UL (ref 0–0.01)
NRBC BLD-RTO: 0.6 PER 100 WBC
PH UR STRIP: 6 (ref 5–8)
PLATELET # BLD AUTO: 195 K/UL (ref 150–400)
PMV BLD AUTO: 9.6 FL (ref 8.9–12.9)
POTASSIUM SERPL-SCNC: 4.2 MMOL/L (ref 3.5–5.1)
PROT SERPL-MCNC: 7.3 G/DL (ref 6.4–8.2)
PROT SERPL-MCNC: 7.3 G/DL (ref 6.4–8.2)
PROT UR STRIP-MCNC: NEGATIVE MG/DL
PROTHROMBIN TIME: 9.9 SEC (ref 9–11.1)
RBC # BLD AUTO: 4.08 M/UL (ref 3.8–5.2)
RBC #/AREA URNS HPF: NORMAL /HPF (ref 0–5)
RBC MORPH BLD: ABNORMAL
SODIUM SERPL-SCNC: 137 MMOL/L (ref 136–145)
SP GR UR REFRACTOMETRY: 1.01 (ref 1–1.03)
THERAPEUTIC RANGE: NORMAL SECS (ref 58–77)
UROBILINOGEN UR QL STRIP.AUTO: 0.2 EU/DL (ref 0.2–1)
WBC # BLD AUTO: 4.9 K/UL (ref 3.6–11)
WBC MORPH BLD: ABNORMAL
WBC URNS QL MICRO: NORMAL /HPF (ref 0–4)

## 2024-10-21 PROCEDURE — 85610 PROTHROMBIN TIME: CPT

## 2024-10-21 PROCEDURE — 80076 HEPATIC FUNCTION PANEL: CPT

## 2024-10-21 PROCEDURE — 80053 COMPREHEN METABOLIC PANEL: CPT

## 2024-10-21 PROCEDURE — 83690 ASSAY OF LIPASE: CPT

## 2024-10-21 PROCEDURE — 84703 CHORIONIC GONADOTROPIN ASSAY: CPT

## 2024-10-21 PROCEDURE — 36415 COLL VENOUS BLD VENIPUNCTURE: CPT

## 2024-10-21 PROCEDURE — 82150 ASSAY OF AMYLASE: CPT

## 2024-10-21 PROCEDURE — 83615 LACTATE (LD) (LDH) ENZYME: CPT

## 2024-10-21 PROCEDURE — 85730 THROMBOPLASTIN TIME PARTIAL: CPT

## 2024-10-21 PROCEDURE — 83735 ASSAY OF MAGNESIUM: CPT

## 2024-10-21 PROCEDURE — 81002 URINALYSIS NONAUTO W/O SCOPE: CPT

## 2024-10-21 PROCEDURE — 85025 COMPLETE CBC W/AUTO DIFF WBC: CPT

## 2024-10-21 PROCEDURE — 82977 ASSAY OF GGT: CPT

## 2024-10-21 RX ORDER — ACETAMINOPHEN 325 MG/1
650 TABLET ORAL
OUTPATIENT
Start: 2024-10-31

## 2024-10-21 RX ORDER — MEPERIDINE HYDROCHLORIDE 25 MG/ML
12.5 INJECTION INTRAMUSCULAR; INTRAVENOUS; SUBCUTANEOUS PRN
Status: CANCELLED | OUTPATIENT
Start: 2024-10-24

## 2024-10-21 RX ORDER — DIPHENHYDRAMINE HYDROCHLORIDE 50 MG/ML
50 INJECTION INTRAMUSCULAR; INTRAVENOUS
Status: CANCELLED | OUTPATIENT
Start: 2024-10-24

## 2024-10-21 RX ORDER — SODIUM CHLORIDE 0.9 % (FLUSH) 0.9 %
5-40 SYRINGE (ML) INJECTION PRN
Status: CANCELLED | OUTPATIENT
Start: 2024-10-24

## 2024-10-21 RX ORDER — SODIUM CHLORIDE 9 MG/ML
INJECTION, SOLUTION INTRAVENOUS CONTINUOUS
OUTPATIENT
Start: 2024-10-24

## 2024-10-21 RX ORDER — PALONOSETRON 0.05 MG/ML
0.25 INJECTION, SOLUTION INTRAVENOUS ONCE
Status: CANCELLED | OUTPATIENT
Start: 2024-10-24 | End: 2024-10-24

## 2024-10-21 RX ORDER — ONDANSETRON 2 MG/ML
8 INJECTION INTRAMUSCULAR; INTRAVENOUS
OUTPATIENT
Start: 2024-10-24

## 2024-10-21 RX ORDER — DIPHENHYDRAMINE HYDROCHLORIDE 50 MG/ML
50 INJECTION INTRAMUSCULAR; INTRAVENOUS
OUTPATIENT
Start: 2024-10-31

## 2024-10-21 RX ORDER — ALBUTEROL SULFATE 90 UG/1
4 INHALANT RESPIRATORY (INHALATION) PRN
Status: CANCELLED | OUTPATIENT
Start: 2024-10-24

## 2024-10-21 RX ORDER — SODIUM CHLORIDE 9 MG/ML
5-250 INJECTION, SOLUTION INTRAVENOUS PRN
Status: CANCELLED | OUTPATIENT
Start: 2024-10-24

## 2024-10-21 RX ORDER — SODIUM CHLORIDE 9 MG/ML
5-250 INJECTION, SOLUTION INTRAVENOUS PRN
OUTPATIENT
Start: 2024-10-31

## 2024-10-21 RX ORDER — HEPARIN 100 UNIT/ML
500 SYRINGE INTRAVENOUS PRN
Status: CANCELLED | OUTPATIENT
Start: 2024-10-24

## 2024-10-21 RX ORDER — ACETAMINOPHEN 325 MG/1
650 TABLET ORAL
Status: CANCELLED | OUTPATIENT
Start: 2024-10-24

## 2024-10-21 RX ORDER — ACETAMINOPHEN 325 MG/1
650 TABLET ORAL
OUTPATIENT
Start: 2024-10-24

## 2024-10-21 RX ORDER — SODIUM CHLORIDE 0.9 % (FLUSH) 0.9 %
5-40 SYRINGE (ML) INJECTION PRN
OUTPATIENT
Start: 2024-10-31

## 2024-10-21 RX ORDER — ALBUTEROL SULFATE 90 UG/1
4 INHALANT RESPIRATORY (INHALATION) PRN
OUTPATIENT
Start: 2024-10-24

## 2024-10-21 RX ORDER — SODIUM CHLORIDE 9 MG/ML
INJECTION, SOLUTION INTRAVENOUS CONTINUOUS
Status: CANCELLED | OUTPATIENT
Start: 2024-10-24

## 2024-10-21 RX ORDER — ONDANSETRON 2 MG/ML
8 INJECTION INTRAMUSCULAR; INTRAVENOUS
OUTPATIENT
Start: 2024-10-31

## 2024-10-21 RX ORDER — EPINEPHRINE 1 MG/ML
0.3 INJECTION, SOLUTION, CONCENTRATE INTRAVENOUS PRN
OUTPATIENT
Start: 2024-10-31

## 2024-10-21 RX ORDER — DEXAMETHASONE SODIUM PHOSPHATE 10 MG/ML
10 INJECTION, SOLUTION INTRAMUSCULAR; INTRAVENOUS ONCE
Status: CANCELLED | OUTPATIENT
Start: 2024-10-24 | End: 2024-10-24

## 2024-10-21 RX ORDER — DIPHENHYDRAMINE HYDROCHLORIDE 50 MG/ML
50 INJECTION INTRAMUSCULAR; INTRAVENOUS
OUTPATIENT
Start: 2024-10-24

## 2024-10-21 RX ORDER — SODIUM CHLORIDE 9 MG/ML
INJECTION, SOLUTION INTRAVENOUS CONTINUOUS
OUTPATIENT
Start: 2024-10-31

## 2024-10-21 RX ORDER — MEPERIDINE HYDROCHLORIDE 25 MG/ML
12.5 INJECTION INTRAMUSCULAR; INTRAVENOUS; SUBCUTANEOUS PRN
OUTPATIENT
Start: 2024-10-31

## 2024-10-21 RX ORDER — ALBUTEROL SULFATE 90 UG/1
4 INHALANT RESPIRATORY (INHALATION) PRN
OUTPATIENT
Start: 2024-10-31

## 2024-10-21 RX ORDER — ONDANSETRON 2 MG/ML
8 INJECTION INTRAMUSCULAR; INTRAVENOUS
Status: CANCELLED | OUTPATIENT
Start: 2024-10-24

## 2024-10-21 RX ORDER — EPINEPHRINE 1 MG/ML
0.3 INJECTION, SOLUTION, CONCENTRATE INTRAVENOUS PRN
OUTPATIENT
Start: 2024-10-24

## 2024-10-21 RX ORDER — PALONOSETRON 0.05 MG/ML
0.25 INJECTION, SOLUTION INTRAVENOUS ONCE
OUTPATIENT
Start: 2024-10-31 | End: 2024-10-31

## 2024-10-21 RX ORDER — HEPARIN 100 UNIT/ML
500 SYRINGE INTRAVENOUS PRN
OUTPATIENT
Start: 2024-10-31

## 2024-10-21 RX ORDER — EPINEPHRINE 1 MG/ML
0.3 INJECTION, SOLUTION, CONCENTRATE INTRAVENOUS PRN
Status: CANCELLED | OUTPATIENT
Start: 2024-10-24

## 2024-10-21 NOTE — PROGRESS NOTES
Labs drawn peripherally from right forearm without difficulty. Labs sent for processing. Arm wrapped with 2x2 and coban. D/C ambulatory home in no distress. Pt is aware of appointment tomorrow.    Future Appointments   Date Time Provider Department Center   10/22/2024  8:00 AM [facility] CHEMO CHAIR 13 [facility]   10/22/2024  8:15 AM Sepideh Gillespie,  [facility]   10/24/2024 11:15 AM Indira Chau, APRN - NP [facility]   10/30/2024 10:00 AM [facility] CHEMO CHAIR 11 [facility]   11/6/2024 10:00 AM [facility] CHEMO CHAIR 11 [facility]   11/13/2024  8:00 AM [facility] CHEMO CHAIR 7 [facility]   11/20/2024 10:00 AM [facility] CHEMO CHAIR 11 [facility]   11/27/2024 10:00 AM [facility] CHEMO CHAIR 11 [facility]   12/4/2024  8:00 AM RAD[facility] CHEMO CHAIR 7 [facility]   12/11/2024 10:00 AM RAD[facility] CHEMO CHAIR 11 [facility]   12/18/2024 10:00 AM RAD[facility] CHEMO CHAIR 11 [facility]   12/26/2024  8:30 AM RAD[facility] CHEMO CHAIR 8 [facility]   1/2/2025 10:00 AM RAD[facility] CHEMO CHAIR 11 [facility]   1/8/2025 10:30 AM RAHDA CHEMO CHAIR 12 [facility]

## 2024-10-21 NOTE — TELEPHONE ENCOUNTER
Pts spouse called and lvm requesting cb as he has a couple of questions    Called pts spouse back and lvm requesting cb

## 2024-10-22 ENCOUNTER — TELEPHONE (OUTPATIENT)
Age: 34
End: 2024-10-22

## 2024-10-22 ENCOUNTER — HOSPITAL ENCOUNTER (OUTPATIENT)
Facility: HOSPITAL | Age: 34
Setting detail: INFUSION SERIES
End: 2024-10-22

## 2024-10-22 NOTE — TELEPHONE ENCOUNTER
pt spouse called wanting to follow up about a previous conversation you and his wife had. He is requesting you give him a call back [phone]      On 10/21/24

## 2024-10-22 NOTE — TELEPHONE ENCOUNTER
Pts spouse called and wanted to follow up on pts game plan for treatment. Pt was trying to get approved for clinical trial but that fell through, so where does pt stand with everything & what is the best plan to move ahead with?    Spouse was also inquiring about insurance approval process on treatment?      # [phone]

## 2024-10-22 NOTE — TELEPHONE ENCOUNTER
FU call to patient's spouse, Jose Ramon.  Patient ID verified X 2. [phone].  Spouse is concerned as insurance auth for treatment is still pending.  RN answered spouse's questions to best of ability regarding medication, approval process.  Encouraged to keep open mind and hope for approval/anticipate delay if not approved by AM of treatment.  Understands will be notified if not approved.    Spouse expressed concerns over the emotions of cancer, unenrollment from clinical trial, feels does not know what to expect. Reassured spouse that goal is to start treatment as soon as possible, with approval and would continue to strive for that goal.  Understanding verbalized.   Update to Provider/NN.

## 2024-10-23 ENCOUNTER — APPOINTMENT (OUTPATIENT)
Facility: HOSPITAL | Age: 34
End: 2024-10-23
Payer: COMMERCIAL

## 2024-10-23 NOTE — PROGRESS NOTES
I called the patient to notify her that she would not qualify for the [id number] clinical trial due to elevated liver function tests from screening labs performed on 10/17.  She reported that she has been taking tylenol for pain after port insertion, and denied recent ETOH use.  I presented the option to hold tylenol and hydrate over the weekend and retry on Monday to recheck liver function, with the understanding that her liver function tests might still not normalize.  The patient wishes to pursue this option.  [facility] lab appointment scheduled for Monday 10/21 @ 10:30 am - pt is aware.

## 2024-10-23 NOTE — PROGRESS NOTES
I called the patient to inform her that liver function tests were still elevated and that she will not qualify for the [id number] clinical trial.  She verbalized understanding.      After discussing with standard of care team, I called the patient to notify her that due to PD-L1 results and elevated liver function tests that resulted since her initial chemotherapy orders were placed, the standard of care team will need to adjust her chemotherapy regimen and plan.  I informed the patient that due to this change insurance pre-authorization will not be ready by tomorrow 10/22, and she will not be able to start treatment tomorrow.  I notified her that we have rescheduled her treatment start date to 10/24, with hope, but not guarantee that the STAT insurance authorization will be completed by that time.  The patient verbalized understanding.      Later in the afternoon I returned a call from the patient's  Katelin.  I answered his questions about the change in chemotherapy regimen, informing him that the initial regimen was entered as a placeholder while molecular testing and bone biopsy results were pending.  He verbalized understanding.  I informed him that Rosalba Guevara will be his point of contact moving forward and that he and Radames could contact her via MyChart messaging or the main clinic phone number.  I let him know that I would provide handoff of Radames's case to Rosalba tomorrow, and that the standard of care team would be in touch regarding the plan moving forward.

## 2024-10-24 ENCOUNTER — OFFICE VISIT (OUTPATIENT)
Age: 34
End: 2024-10-24
Payer: COMMERCIAL

## 2024-10-24 ENCOUNTER — CLINICAL DOCUMENTATION (OUTPATIENT)
Age: 34
End: 2024-10-24

## 2024-10-24 ENCOUNTER — HOSPITAL ENCOUNTER (OUTPATIENT)
Facility: HOSPITAL | Age: 34
Setting detail: INFUSION SERIES
Discharge: HOME | End: 2024-10-24
Payer: COMMERCIAL

## 2024-10-24 VITALS
BODY MASS INDEX: 31.76 KG/M2 | DIASTOLIC BLOOD PRESSURE: 80 MMHG | HEART RATE: 82 BPM | HEIGHT: 64 IN | SYSTOLIC BLOOD PRESSURE: 125 MMHG | RESPIRATION RATE: 18 BRPM | TEMPERATURE: 97.7 F | OXYGEN SATURATION: 100 % | WEIGHT: 186 LBS

## 2024-10-24 VITALS
DIASTOLIC BLOOD PRESSURE: 85 MMHG | OXYGEN SATURATION: 100 % | SYSTOLIC BLOOD PRESSURE: 115 MMHG | WEIGHT: 186 LBS | BODY MASS INDEX: 31.93 KG/M2 | TEMPERATURE: 97.7 F | HEART RATE: 92 BPM | RESPIRATION RATE: 18 BRPM

## 2024-10-24 DIAGNOSIS — C77.3 BREAST CANCER METASTASIZED TO AXILLARY LYMPH NODE, LEFT (HCC): ICD-10-CM

## 2024-10-24 DIAGNOSIS — C50.912 CARCINOMA OF LEFT BREAST METASTATIC TO BONE (HCC): Primary | ICD-10-CM

## 2024-10-24 DIAGNOSIS — C50.919 CARCINOMA OF BREAST METASTATIC TO BONE, UNSPECIFIED LATERALITY (HCC): Primary | ICD-10-CM

## 2024-10-24 DIAGNOSIS — N95.9 PREMENOPAUSAL PATIENT: ICD-10-CM

## 2024-10-24 DIAGNOSIS — C50.912 BREAST CANCER METASTASIZED TO AXILLARY LYMPH NODE, LEFT (HCC): ICD-10-CM

## 2024-10-24 DIAGNOSIS — C79.51 CARCINOMA OF BREAST METASTATIC TO BONE, UNSPECIFIED LATERALITY (HCC): Primary | ICD-10-CM

## 2024-10-24 DIAGNOSIS — C79.51 CARCINOMA OF LEFT BREAST METASTATIC TO BONE (HCC): Primary | ICD-10-CM

## 2024-10-24 LAB
ALBUMIN SERPL-MCNC: 3.6 G/DL (ref 3.5–5)
ALBUMIN/GLOB SERPL: 1 (ref 1.1–2.2)
ALP SERPL-CCNC: 431 U/L (ref 45–117)
ALT SERPL-CCNC: 141 U/L (ref 12–78)
ANION GAP SERPL CALC-SCNC: 7 MMOL/L (ref 2–12)
AST SERPL-CCNC: 122 U/L (ref 15–37)
BILIRUB SERPL-MCNC: 0.8 MG/DL (ref 0.2–1)
BUN SERPL-MCNC: 10 MG/DL (ref 6–20)
BUN/CREAT SERPL: 14 (ref 12–20)
CALCIUM SERPL-MCNC: 8.9 MG/DL (ref 8.5–10.1)
CHLORIDE SERPL-SCNC: 108 MMOL/L (ref 97–108)
CO2 SERPL-SCNC: 24 MMOL/L (ref 21–32)
CREAT SERPL-MCNC: 0.74 MG/DL (ref 0.55–1.02)
GLOBULIN SER CALC-MCNC: 3.5 G/DL (ref 2–4)
GLUCOSE SERPL-MCNC: 105 MG/DL (ref 65–100)
POTASSIUM SERPL-SCNC: 4.2 MMOL/L (ref 3.5–5.1)
PROT SERPL-MCNC: 7.1 G/DL (ref 6.4–8.2)
SODIUM SERPL-SCNC: 139 MMOL/L (ref 136–145)

## 2024-10-24 PROCEDURE — 36415 COLL VENOUS BLD VENIPUNCTURE: CPT

## 2024-10-24 PROCEDURE — 2580000003 HC RX 258: Performed by: INTERNAL MEDICINE

## 2024-10-24 PROCEDURE — 6360000002 HC RX W HCPCS: Performed by: INTERNAL MEDICINE

## 2024-10-24 PROCEDURE — 96372 THER/PROPH/DIAG INJ SC/IM: CPT

## 2024-10-24 PROCEDURE — 80053 COMPREHEN METABOLIC PANEL: CPT

## 2024-10-24 PROCEDURE — 96417 CHEMO IV INFUS EACH ADDL SEQ: CPT

## 2024-10-24 PROCEDURE — 99215 OFFICE O/P EST HI 40 MIN: CPT | Performed by: INTERNAL MEDICINE

## 2024-10-24 PROCEDURE — 96375 TX/PRO/DX INJ NEW DRUG ADDON: CPT

## 2024-10-24 PROCEDURE — 6360000002 HC RX W HCPCS: Performed by: NURSE PRACTITIONER

## 2024-10-24 PROCEDURE — 96413 CHEMO IV INFUSION 1 HR: CPT

## 2024-10-24 RX ORDER — SODIUM CHLORIDE 0.9 % (FLUSH) 0.9 %
5-40 SYRINGE (ML) INJECTION PRN
Status: DISCONTINUED | OUTPATIENT
Start: 2024-10-24 | End: 2024-10-25 | Stop reason: HOSPADM

## 2024-10-24 RX ORDER — PALONOSETRON 0.05 MG/ML
0.25 INJECTION, SOLUTION INTRAVENOUS ONCE
Status: COMPLETED | OUTPATIENT
Start: 2024-10-24 | End: 2024-10-24

## 2024-10-24 RX ORDER — DIPHENHYDRAMINE HYDROCHLORIDE 50 MG/ML
50 INJECTION INTRAMUSCULAR; INTRAVENOUS
OUTPATIENT
Start: 2024-12-01

## 2024-10-24 RX ORDER — EPINEPHRINE 1 MG/ML
0.3 INJECTION, SOLUTION INTRAMUSCULAR; SUBCUTANEOUS PRN
Status: DISCONTINUED | OUTPATIENT
Start: 2024-10-24 | End: 2024-10-25 | Stop reason: HOSPADM

## 2024-10-24 RX ORDER — MEPERIDINE HYDROCHLORIDE 25 MG/ML
12.5 INJECTION INTRAMUSCULAR; INTRAVENOUS; SUBCUTANEOUS PRN
Status: DISCONTINUED | OUTPATIENT
Start: 2024-10-24 | End: 2024-10-25 | Stop reason: HOSPADM

## 2024-10-24 RX ORDER — ALBUTEROL SULFATE 90 UG/1
4 INHALANT RESPIRATORY (INHALATION) PRN
OUTPATIENT
Start: 2024-12-01

## 2024-10-24 RX ORDER — DIPHENHYDRAMINE HYDROCHLORIDE 50 MG/ML
50 INJECTION INTRAMUSCULAR; INTRAVENOUS
Status: DISCONTINUED | OUTPATIENT
Start: 2024-10-24 | End: 2024-10-25 | Stop reason: HOSPADM

## 2024-10-24 RX ORDER — ACETAMINOPHEN 325 MG/1
650 TABLET ORAL
Status: DISCONTINUED | OUTPATIENT
Start: 2024-10-24 | End: 2024-10-25 | Stop reason: HOSPADM

## 2024-10-24 RX ORDER — ALBUTEROL SULFATE 90 UG/1
4 INHALANT RESPIRATORY (INHALATION) PRN
Status: DISCONTINUED | OUTPATIENT
Start: 2024-10-24 | End: 2024-10-25 | Stop reason: HOSPADM

## 2024-10-24 RX ORDER — EPINEPHRINE 1 MG/ML
0.3 INJECTION, SOLUTION INTRAMUSCULAR; SUBCUTANEOUS PRN
OUTPATIENT
Start: 2024-12-01

## 2024-10-24 RX ORDER — ONDANSETRON 2 MG/ML
8 INJECTION INTRAMUSCULAR; INTRAVENOUS
OUTPATIENT
Start: 2024-12-01

## 2024-10-24 RX ORDER — SODIUM CHLORIDE 9 MG/ML
INJECTION, SOLUTION INTRAVENOUS CONTINUOUS
Status: DISCONTINUED | OUTPATIENT
Start: 2024-10-24 | End: 2024-10-25 | Stop reason: HOSPADM

## 2024-10-24 RX ORDER — DEXAMETHASONE SODIUM PHOSPHATE 10 MG/ML
10 INJECTION, SOLUTION INTRAMUSCULAR; INTRAVENOUS ONCE
OUTPATIENT
Start: 2024-10-31 | End: 2024-10-31

## 2024-10-24 RX ORDER — ACETAMINOPHEN 325 MG/1
650 TABLET ORAL
OUTPATIENT
Start: 2024-12-01

## 2024-10-24 RX ORDER — ONDANSETRON 2 MG/ML
8 INJECTION INTRAMUSCULAR; INTRAVENOUS
Status: DISCONTINUED | OUTPATIENT
Start: 2024-10-24 | End: 2024-10-25 | Stop reason: HOSPADM

## 2024-10-24 RX ORDER — DEXAMETHASONE SODIUM PHOSPHATE 10 MG/ML
10 INJECTION, SOLUTION INTRAMUSCULAR; INTRAVENOUS ONCE
Status: COMPLETED | OUTPATIENT
Start: 2024-10-24 | End: 2024-10-24

## 2024-10-24 RX ORDER — SODIUM CHLORIDE 9 MG/ML
INJECTION, SOLUTION INTRAVENOUS CONTINUOUS
OUTPATIENT
Start: 2024-12-01

## 2024-10-24 RX ADMIN — DENOSUMAB 120 MG: 120 INJECTION SUBCUTANEOUS at 10:53

## 2024-10-24 RX ADMIN — GEMCITABINE HYDROCHLORIDE 1950 MG: 2 INJECTION, SOLUTION INTRAVENOUS at 12:00

## 2024-10-24 RX ADMIN — CARBOPLATIN 250 MG: 10 INJECTION, SOLUTION INTRAVENOUS at 11:23

## 2024-10-24 RX ADMIN — PALONOSETRON 0.25 MG: 0.05 INJECTION, SOLUTION INTRAVENOUS at 10:32

## 2024-10-24 RX ADMIN — DEXAMETHASONE SODIUM PHOSPHATE 10 MG: 10 INJECTION, SOLUTION INTRAMUSCULAR; INTRAVENOUS at 10:36

## 2024-10-24 RX ADMIN — SODIUM CHLORIDE, PRESERVATIVE FREE 20 ML: 5 INJECTION INTRAVENOUS at 12:30

## 2024-10-24 ASSESSMENT — PAIN SCALES - GENERAL: PAINLEVEL_OUTOF10: 0

## 2024-10-24 NOTE — PROGRESS NOTES
Pharmacy Note- Oncology Treatment Education    Alea Lindsay is a  34 y.o.female  diagnosed with breast cancer here today for Cycle 1, Day 1 oncology treatment counseling and consent. Ms. Lindsay is being treated with CARBOplatin, gemcitabine, pembrolizumab.   Provided education on CARBOplatin, gemcitabine, pembrolizumab and premedications - palonosetron and dexamethasone.    Provided Ms. Lindsay with handouts and reviewed home supportive care regimen.     Side effects of oncology treatment reviewed included s/s infection, anemia, appetite changes, thrombocytopenia, fatigue, hair loss/alopecia, bone pain, skin and nail changes, diarrhea/constipation and kidney/liver changes.    Ms. Lindsay was provided information regarding the risks of immune-mediated adverse reactions secondary to pembrolizumab that may require interruption/delay of treatment and possible use of corticosteroids.  These reactions include, but are not limited to: colitis (diarrhea or severe abdominal pain); hepatitis (jaundice, severe nausea, or vomiting, easy bruising, and/or bleeding); hypophysitis (persistent or unusual headache, extreme weakness, dizziness/fainting, and/or vision changes); dermatitis (skin rash, mouth sores, skin blisters, and/or skin peeling); ocular toxicity (uveitis, iritis, and/or episcleritis); neuropathy (motor or sensory); hyper/hypothyroidism.        Patient given ways to manage these side effects and when to contact office.     [facility] Handout of medications provided to patient. Ms. Lindsay verbalized understanding of the information presented and all of the patient's questions were answered.    Radha Gasca, PharmD, BCPS, BCOP    For Pharmacy Admin Tracking Only    Program: Medical Group  CPA in place:  Yes  Recommendation Provided To: Patient/Caregiver: 1 via In person    Intervention Accepted By: Patient/Caregiver: 1    Time Spent (min): 30

## 2024-10-24 NOTE — PROGRESS NOTES
[facility]   [address] [place] [address]   W: [phone]  F: [phone]    Medical Nutrition Therapy  Nutrition Encounter:    Brief visit with patient at start of treatment.  She wonders if there is anything she can do about LFTs, says she has been told they have been high in the past.  We discussed general healthy eating, choosing complex/whole grain/whole wheat carbohydrates, and protein at each meal.  Will send information via [employer] and continue to be a resource for any nutrition related questions or concerns.         Signed By: CORNELIUS BRUNO RD

## 2024-10-24 NOTE — PROCEDURES
[address]  [address]                         PULMONARY FUNCTION TEST      PATIENT NAME: ITZ SANTOS         : 1990  MED REC NO: [id number]                       ROOM:   ACCOUNT NO: [id number]                       ADMIT DATE: 10/18/2024  PROVIDER: Matthew Escobedo MD    DATE OF SERVICE:  10/18/2024    REQUESTING PHYSICIAN:  Not available.    DIAGNOSIS:  Shortness of breath.    ATS criteria for reproducibility and acceptability was met.    Spirometry: FEV1/FVC ratio is 81, which is normal.  FEV1 is 3.12 L which is 100% predicted, which is normal.  FVC is 3.84 L which is 102% predicted, which is also normal.    Lung volume:  Total lung capacity 4.74 L which is 96% predicted which is normal.  RV/TLC ratio is 19.    Diffusion capacity:  DLCO uncorrected for hemoglobin is 20.25 mL/mmHg per minute, which is 91% predicted, which is normal.    Flow volume loop is normal.    INTERPRETATION:  Normal spirometry.  Normal lung volumes and normal diffusion capacity.  Clinical correlation advised.        MD DIONI MARQUEZA/AQS  D:  10/23/2024 17:11:49  T:  10/24/2024 04:20:41  JOB #:  [id number]    CC:   Requesting Physician

## 2024-10-24 NOTE — PROGRESS NOTES
[facility] Chemotherapy Progress Note    Date: 2024    Name: Alea Lindsay    MRN: [id number]         : 1990       Pt admit to [facility] for C1 D1 Keytruda,Carbo,Gemzar ambulatory in stable condition. Assessment completed. No new concerns voiced. Port with positive blood return. Labs sent for processing on 10/21/24.                          No data to display                  Ms. Lindsay's vitals were reviewed.  Patient Vitals for the past 12 hrs:   Temp Pulse Resp BP SpO2   10/24/24 1230 -- 82 -- 125/80 --   10/24/24 0741 97.7 °F (36.5 °C) 92 18 115/85 100 %         Lab results were obtained and reviewed.  Recent Results (from the past 12 hour(s))   Comprehensive metabolic panel    Collection Time: 10/24/24  8:12 AM   Result Value Ref Range    Sodium 139 136 - 145 mmol/L    Potassium 4.2 3.5 - 5.1 mmol/L    Chloride 108 97 - 108 mmol/L    CO2 24 21 - 32 mmol/L    Anion Gap 7 2 - 12 mmol/L    Glucose 105 (H) 65 - 100 mg/dL    BUN 10 6 - 20 MG/DL    Creatinine 0.74 0.55 - 1.02 MG/DL    BUN/Creatinine Ratio 14 12 - 20      Est, Glom Filt Rate >90 >60 ml/min/1.73m2    Calcium 8.9 8.5 - 10.1 MG/DL    Total Bilirubin 0.8 0.2 - 1.0 MG/DL     (H) 12 - 78 U/L     (H) 15 - 37 U/L    Alk Phosphatase 431 (H) 45 - 117 U/L    Total Protein 7.1 6.4 - 8.2 g/dL    Albumin 3.6 3.5 - 5.0 g/dL    Globulin 3.5 2.0 - 4.0 g/dL    Albumin/Globulin Ratio 1.0 (L) 1.1 - 2.2         Pre-medications  were administered as ordered and chemotherapy was initiated.  Medications Administered         CARBOplatin (PARAPLATIN) 250 mg in sodium chloride 0.9 % 100 mL chemo IVPB Admin Date  10/24/2024 Action  New Bag Dose  250 mg Rate  270 mL/hr Route  IntraVENous Documented By  Teresa Pappas, RN        denosumab (XGEVA) SC injection 120 mg Admin Date  10/24/2024 Action  Given Dose  120 mg Rate   Route  SubCUTAneous Documented By  Teresa Pappas, RN        dexAMETHasone (PF) (DECADRON) injection 10 mg Admin

## 2024-10-24 NOTE — PROGRESS NOTES
Alea Lindsay is a 34 y.o. female    Chief Complaint   Patient presents with    Follow-up     new MBC       1. Have you been to the ER, urgent care clinic since your last visit?  Hospitalized since your last visit?No    2. Have you seen or consulted any other health care providers outside of the [facility] since your last visit?  Include any pap smears or colon screening. No    
performed by Juliana Navarro MD at [facility] AMBULATORY OR    US BREAST BIOPSY NEEDLE ADDITIONAL LEFT Left 2024    US BREAST BIOPSY NEEDLE ADDITIONAL LEFT    US LYMPH NODE BIOPSY  2024    RODDY US BIOPSY LYMPH NODE      Social History     Tobacco Use    Smoking status: Former     Current packs/day: 0.00     Average packs/day: 0.3 packs/day for 10.0 years (2.5 ttl pk-yrs)     Types: Cigarettes     Quit date: 2024     Years since quittin.1    Smokeless tobacco: Never    Tobacco comments:     QUIT SMOKING CIGARETTES    Substance Use Topics    Alcohol use: Yes     Alcohol/week: 2.0 standard drinks of alcohol     Types: 2 Glasses of wine per week      Family History   Problem Relation Age of Onset    Lung Cancer Mother     Cancer Mother         Lung Cancer -      Cancer Father         Blood Cancer -      Colon Cancer Paternal Grandfather     Anesth Problems Neg Hx      Current Outpatient Medications   Medication Sig    ondansetron (ZOFRAN) 4 MG tablet Take 1 tablet by mouth 3 times daily as needed for Nausea or Vomiting    lidocaine-prilocaine (EMLA) 2.5-2.5 % cream Apply thin layer to port a cath site 30-60 minutes prior to port access    ondansetron (ZOFRAN-ODT) 4 MG disintegrating tablet Take 1-2 tablets by mouth every 8 hours as needed for Nausea or Vomiting    prochlorperazine (COMPAZINE) 5 MG tablet Take 1-2 tablets by mouth every 6 hours as needed for Nausea    dexAMETHasone (DECADRON) 4 MG tablet Take 2 tabs (8 mg) by mouth on days 2 and 3 after chemotherapy    LORazepam (ATIVAN) 0.5 MG tablet Take 1 tablet by mouth every 8 hours as needed for Anxiety for up to 30 days. Max Daily Amount: 1.5 mg     No current facility-administered medications for this visit.      No Known Allergies     A complete review of systems was obtained, negative except as described above and on ROS pink sheet    Physical Exam:   /85 (Site: Left Upper Arm, Position: Sitting)   Pulse 92   Temp

## 2024-10-30 ENCOUNTER — APPOINTMENT (OUTPATIENT)
Facility: HOSPITAL | Age: 34
End: 2024-10-30
Payer: COMMERCIAL

## 2024-10-30 ENCOUNTER — HOSPITAL ENCOUNTER (OUTPATIENT)
Facility: HOSPITAL | Age: 34
Setting detail: INFUSION SERIES
Discharge: HOME | End: 2024-10-30
Payer: COMMERCIAL

## 2024-10-30 DIAGNOSIS — E83.51 HYPOCALCEMIA: Primary | ICD-10-CM

## 2024-10-30 DIAGNOSIS — C79.51 CARCINOMA OF LEFT BREAST METASTATIC TO BONE (HCC): ICD-10-CM

## 2024-10-30 DIAGNOSIS — C50.912 CARCINOMA OF LEFT BREAST METASTATIC TO BONE (HCC): ICD-10-CM

## 2024-10-30 LAB
ALBUMIN SERPL-MCNC: 3.4 G/DL (ref 3.5–5)
ALBUMIN/GLOB SERPL: 1 (ref 1.1–2.2)
ALP SERPL-CCNC: 336 U/L (ref 45–117)
ALT SERPL-CCNC: 181 U/L (ref 12–78)
ANION GAP SERPL CALC-SCNC: 6 MMOL/L (ref 2–12)
AST SERPL-CCNC: 166 U/L (ref 15–37)
BASOPHILS # BLD: 0 K/UL (ref 0–0.1)
BASOPHILS NFR BLD: 0 % (ref 0–1)
BILIRUB SERPL-MCNC: 0.7 MG/DL (ref 0.2–1)
BUN SERPL-MCNC: 9 MG/DL (ref 6–20)
BUN/CREAT SERPL: 12 (ref 12–20)
CALCIUM SERPL-MCNC: 7.4 MG/DL (ref 8.5–10.1)
CHLORIDE SERPL-SCNC: 105 MMOL/L (ref 97–108)
CO2 SERPL-SCNC: 24 MMOL/L (ref 21–32)
CREAT SERPL-MCNC: 0.77 MG/DL (ref 0.55–1.02)
DIFFERENTIAL METHOD BLD: ABNORMAL
EOSINOPHIL # BLD: 0 K/UL (ref 0–0.4)
EOSINOPHIL NFR BLD: 2 % (ref 0–7)
ERYTHROCYTE [DISTWIDTH] IN BLOOD BY AUTOMATED COUNT: 13.1 % (ref 11.5–14.5)
GLOBULIN SER CALC-MCNC: 3.3 G/DL (ref 2–4)
GLUCOSE SERPL-MCNC: 123 MG/DL (ref 65–100)
HCT VFR BLD AUTO: 30.7 % (ref 35–47)
HGB BLD-MCNC: 10.5 G/DL (ref 11.5–16)
IMM GRANULOCYTES # BLD AUTO: 0 K/UL
IMM GRANULOCYTES NFR BLD AUTO: 0 %
LYMPHOCYTES # BLD: 0.7 K/UL (ref 0.8–3.5)
LYMPHOCYTES NFR BLD: 49 % (ref 12–49)
MCH RBC QN AUTO: 29.3 PG (ref 26–34)
MCHC RBC AUTO-ENTMCNC: 34.2 G/DL (ref 30–36.5)
MCV RBC AUTO: 85.8 FL (ref 80–99)
MONOCYTES # BLD: 0 K/UL (ref 0–1)
MONOCYTES NFR BLD: 1 % (ref 5–13)
NEUTS BAND NFR BLD MANUAL: 2 % (ref 0–6)
NEUTS SEG # BLD: 0.7 K/UL (ref 1.8–8)
NEUTS SEG NFR BLD: 46 % (ref 32–75)
NRBC # BLD: 0 K/UL (ref 0–0.01)
NRBC BLD-RTO: 0 PER 100 WBC
PLATELET # BLD AUTO: 108 K/UL (ref 150–400)
PMV BLD AUTO: 9 FL (ref 8.9–12.9)
POTASSIUM SERPL-SCNC: 4.1 MMOL/L (ref 3.5–5.1)
PROT SERPL-MCNC: 6.7 G/DL (ref 6.4–8.2)
RBC # BLD AUTO: 3.58 M/UL (ref 3.8–5.2)
RBC MORPH BLD: ABNORMAL
SODIUM SERPL-SCNC: 135 MMOL/L (ref 136–145)
WBC # BLD AUTO: 1.4 K/UL (ref 3.6–11)
WBC MORPH BLD: ABNORMAL

## 2024-10-30 PROCEDURE — 80053 COMPREHEN METABOLIC PANEL: CPT

## 2024-10-30 PROCEDURE — 85025 COMPLETE CBC W/AUTO DIFF WBC: CPT

## 2024-10-30 PROCEDURE — 36415 COLL VENOUS BLD VENIPUNCTURE: CPT

## 2024-10-30 RX ORDER — CALCIUM GLUCONATE 20 MG/ML
2000 INJECTION, SOLUTION INTRAVENOUS ONCE
Status: CANCELLED
Start: 2024-10-31

## 2024-10-30 NOTE — PROGRESS NOTES
[facility] Progress Note    Date: 2024    Name: Alea Lindsay    MRN: [id number]         : 1990      Pt arrived ambulatory and in no distress, for lab visit.  Labs drawn via R hand, patient tolerated well.        Please check Epic for Results.     Departed [facility] ambulatory and in no distress.    Future Appointments   Date Time Provider Department Center   10/31/2024  8:30 AM [facility] CHEMO CHAIR 8 [facility]   2024 11:00 AM [facility] LAB CHAIR 1 [facility]   2024 11:30 AM Sepideh Gillespie DO [facility] AMB   2024  9:30 AM [facility] CHEMO CHAIR 10 [facility]   2024  9:45 AM Sepideh Gillespie DO [facility] AMB   2024  2:00 PM [facility] LAB CHAIR 1 [facility]   2024  8:00 AM [facility] CHEMO CHAIR 7 [facility]   2024 10:30 AM [facility] LAB CHAIR 1 [facility]   2024  8:00 AM [facility] CHEMO CHAIR 7 [facility]   2024  8:15 AM Sepideh Gillespie DO [facility] AMB   2024 10:00 AM [facility] LAB CHAIR 1 [facility]   2024  8:00 AM [facility] CHEMO CHAIR 7 [facility]   2024  1:30 PM [facility] LAB CHAIR 1 [facility]   2024  8:30 AM [facility] CHEMO CHAIR 8 [facility]   2024  9:00 AM Radha Christensen APRN - NP [facility] AM[facility]   2024 10:00 AM [facility] LAB CHAIR 1 [facility]   2025  8:00 AM [facility] CHEMO CHAIR 7 [facility]   1/15/2025 10:30 AM [facility] LAB CHAIR 1 [facility]   2025  9:30 AM [facility] CHEMO CHAIR 10 [facility]   2025  9:45 AM Radha Christensen APRN - NP [facility] AMB   2025 10:00 AM [facility] LAB CHAIR 1 [facility]   2025  8:30 AM [facility] CHEMO CHAIR 8 [facility]   2025 10:00 AM [facility]   2025  8:30 AM [facility] CHEMO CHAIR 8 [facility]   2025  8:45 AM Sepideh Gillespie, DO [facility]   2025  9:30 AM [facility]   2025  8:00 AM [facility] CHEMO CHAIR 7 [facility]       Cee Calles  2024

## 2024-10-31 ENCOUNTER — HOSPITAL ENCOUNTER (OUTPATIENT)
Facility: HOSPITAL | Age: 34
Setting detail: INFUSION SERIES
Discharge: HOME | End: 2024-10-31
Payer: COMMERCIAL

## 2024-10-31 ENCOUNTER — CLINICAL DOCUMENTATION (OUTPATIENT)
Age: 34
End: 2024-10-31

## 2024-10-31 VITALS
TEMPERATURE: 97.6 F | DIASTOLIC BLOOD PRESSURE: 76 MMHG | HEIGHT: 64 IN | HEART RATE: 74 BPM | WEIGHT: 185.6 LBS | SYSTOLIC BLOOD PRESSURE: 113 MMHG | RESPIRATION RATE: 18 BRPM | BODY MASS INDEX: 31.69 KG/M2

## 2024-10-31 DIAGNOSIS — C77.3 BREAST CANCER METASTASIZED TO AXILLARY LYMPH NODE, LEFT (HCC): ICD-10-CM

## 2024-10-31 DIAGNOSIS — E55.9 VITAMIN D DEFICIENCY: ICD-10-CM

## 2024-10-31 DIAGNOSIS — N95.9 PREMENOPAUSAL PATIENT: ICD-10-CM

## 2024-10-31 DIAGNOSIS — E83.51 HYPOCALCEMIA: ICD-10-CM

## 2024-10-31 DIAGNOSIS — C50.912 BREAST CANCER METASTASIZED TO AXILLARY LYMPH NODE, LEFT (HCC): ICD-10-CM

## 2024-10-31 DIAGNOSIS — C50.912 CARCINOMA OF LEFT BREAST METASTATIC TO BONE (HCC): Primary | ICD-10-CM

## 2024-10-31 DIAGNOSIS — C79.51 CARCINOMA OF LEFT BREAST METASTATIC TO BONE (HCC): Primary | ICD-10-CM

## 2024-10-31 LAB
BASOPHILS # BLD: 0 K/UL (ref 0–0.1)
BASOPHILS NFR BLD: 0 % (ref 0–1)
DIFFERENTIAL METHOD BLD: ABNORMAL
EOSINOPHIL # BLD: 0.1 K/UL (ref 0–0.4)
EOSINOPHIL NFR BLD: 3 % (ref 0–7)
ERYTHROCYTE [DISTWIDTH] IN BLOOD BY AUTOMATED COUNT: 13.1 % (ref 11.5–14.5)
HCT VFR BLD AUTO: 29.9 % (ref 35–47)
HGB BLD-MCNC: 10.2 G/DL (ref 11.5–16)
IMM GRANULOCYTES # BLD AUTO: 0 K/UL
IMM GRANULOCYTES NFR BLD AUTO: 0 %
LYMPHOCYTES # BLD: 1.1 K/UL (ref 0.8–3.5)
LYMPHOCYTES NFR BLD: 63 % (ref 12–49)
MCH RBC QN AUTO: 29.4 PG (ref 26–34)
MCHC RBC AUTO-ENTMCNC: 34.1 G/DL (ref 30–36.5)
MCV RBC AUTO: 86.2 FL (ref 80–99)
MONOCYTES # BLD: 0.1 K/UL (ref 0–1)
MONOCYTES NFR BLD: 8 % (ref 5–13)
NEUTS SEG # BLD: 0.5 K/UL (ref 1.8–8)
NEUTS SEG NFR BLD: 26 % (ref 32–75)
NRBC # BLD: 0.02 K/UL (ref 0–0.01)
NRBC BLD-RTO: 1.1 PER 100 WBC
PLATELET # BLD AUTO: 92 K/UL (ref 150–400)
PMV BLD AUTO: 9 FL (ref 8.9–12.9)
RBC # BLD AUTO: 3.47 M/UL (ref 3.8–5.2)
RBC MORPH BLD: ABNORMAL
WBC # BLD AUTO: 1.8 K/UL (ref 3.6–11)
WBC MORPH BLD: ABNORMAL

## 2024-10-31 PROCEDURE — 36415 COLL VENOUS BLD VENIPUNCTURE: CPT

## 2024-10-31 PROCEDURE — 96365 THER/PROPH/DIAG IV INF INIT: CPT

## 2024-10-31 PROCEDURE — 6360000002 HC RX W HCPCS: Performed by: INTERNAL MEDICINE

## 2024-10-31 PROCEDURE — 2580000003 HC RX 258: Performed by: INTERNAL MEDICINE

## 2024-10-31 PROCEDURE — 85025 COMPLETE CBC W/AUTO DIFF WBC: CPT

## 2024-10-31 RX ORDER — CALCIUM GLUCONATE 20 MG/ML
2000 INJECTION, SOLUTION INTRAVENOUS ONCE
Status: DISCONTINUED | OUTPATIENT
Start: 2024-10-31 | End: 2024-10-31

## 2024-10-31 RX ADMIN — CALCIUM GLUCONATE 1000 MG: 98 INJECTION, SOLUTION INTRAVENOUS at 11:14

## 2024-10-31 ASSESSMENT — PAIN SCALES - GENERAL: PAINLEVEL_OUTOF10: 0

## 2024-10-31 NOTE — PROGRESS NOTES
[facility]   [address] [place] [address]   W: [phone]  F: [phone]    Medical Nutrition Therapy  Nutrition Encounter:    Met with patient. She tolerated cycle 1 without any nausea/vomiting. Felt more tired on Monday.  She wonders about beet juice as her  read it could help w/ liver enzymes. Explained she could try, that more is not always better (suggested about 4oz if she tried) but encouraged trying whole beets as an option; suggested trying on salad w/ goat cheese and dressing to help mask the beets. She is not taking any supplements.  Calcium was low and was suggested to try oral calcium.  Provided examples.    Will continue to a resource for any nutrition related questions or concerns.        Signed By: CORNELIUS BRUNO RD

## 2024-10-31 NOTE — PROGRESS NOTES
[facility]                       Date: 2024    Name: Alea Lindsay    MRN: [id number]         : 1990      08:30  Pt admit to [facility] for Carbo/Gemzar (HELD) Calcium  ambulatory in stable condition. Assessment completed. No new concerns voiced.  Please review pending lab results in CC.      Ms. Lindsay's vitals were reviewed prior to and after treatment.   Patient Vitals for the past 12 hrs:   Temp Pulse Resp BP   10/31/24 1207 -- 74 -- 113/76   10/31/24 0913 97.6 °F (36.4 °C) 95 18 (!) 138/91         Lab results were obtained and reviewed.  Recent Results (from the past 12 hour(s))   CBC with Auto Differential    Collection Time: 10/31/24  9:27 AM   Result Value Ref Range    WBC 1.8 (L) 3.6 - 11.0 K/uL    RBC 3.47 (L) 3.80 - 5.20 M/uL    Hemoglobin 10.2 (L) 11.5 - 16.0 g/dL    Hematocrit 29.9 (L) 35.0 - 47.0 %    MCV 86.2 80.0 - 99.0 FL    MCH 29.4 26.0 - 34.0 PG    MCHC 34.1 30.0 - 36.5 g/dL    RDW 13.1 11.5 - 14.5 %    Platelets 92 (L) 150 - 400 K/uL    MPV 9.0 8.9 - 12.9 FL    Nucleated RBCs 1.1 (H) 0  WBC    nRBC 0.02 (H) 0.00 - 0.01 K/uL    Neutrophils % 26 (L) 32 - 75 %    Lymphocytes % 63 (H) 12 - 49 %    Monocytes % 8 5 - 13 %    Eosinophils % 3 0 - 7 %    Basophils % 0 0 - 1 %    Immature Granulocytes % 0 %    Neutrophils Absolute 0.5 (L) 1.8 - 8.0 K/UL    Lymphocytes Absolute 1.1 0.8 - 3.5 K/UL    Monocytes Absolute 0.1 0.0 - 1.0 K/UL    Eosinophils Absolute 0.1 0.0 - 0.4 K/UL    Basophils Absolute 0.0 0.0 - 0.1 K/UL    Immature Granulocytes Absolute 0.0 K/UL    Differential Type MANUAL      RBC Comment NORMOCYTIC, NORMOCHROMIC      WBC Comment ATYPICAL LYMPHOCYTES PRESENT         Medications Administered         calcium gluconate 1,000 mg in sodium chloride 0.9 % 100 mL IVPB Admin Date  10/31/2024 Action  New Bag Dose  1,000 mg Rate  120 mL/hr Route  IntraVENous Documented By  Yolie Subramanian, RN          CHEMO held today due to ANC. Calcium gluc still given.    Port

## 2024-11-06 ENCOUNTER — APPOINTMENT (OUTPATIENT)
Facility: HOSPITAL | Age: 34
End: 2024-11-06
Payer: COMMERCIAL

## 2024-11-06 ENCOUNTER — CLINICAL DOCUMENTATION (OUTPATIENT)
Age: 34
End: 2024-11-06

## 2024-11-06 NOTE — PROGRESS NOTES
Medical Acupuncture     Referral sent for acupuncture for bone pain from [facility] - pt with recently dx metastatic breast cancer. Started on chemotherapy.     Recent labs show that pt is neutropenic, and I cannot perform acupuncture at this time. Will be in communication w/ her Oncology office to alert me when can safely do acupuncture treatment.

## 2024-11-11 PROBLEM — R45.89 ANXIETY ABOUT HEALTH: Status: ACTIVE | Noted: 2024-11-11

## 2024-11-11 PROBLEM — Z80.9 FAMILY HISTORY OF CANCER: Status: ACTIVE | Noted: 2024-11-11

## 2024-11-11 PROBLEM — C79.51 MALIGNANT NEOPLASM OF BREAST METASTATIC TO BONE (HCC): Status: ACTIVE | Noted: 2024-11-11

## 2024-11-11 PROBLEM — C50.919 MALIGNANT NEOPLASM OF BREAST METASTATIC TO BONE (HCC): Status: ACTIVE | Noted: 2024-11-11

## 2024-11-11 RX ORDER — ALBUTEROL SULFATE 90 UG/1
4 INHALANT RESPIRATORY (INHALATION) PRN
Status: CANCELLED | OUTPATIENT
Start: 2024-11-21

## 2024-11-11 RX ORDER — MEPERIDINE HYDROCHLORIDE 25 MG/ML
12.5 INJECTION INTRAMUSCULAR; INTRAVENOUS; SUBCUTANEOUS PRN
Status: CANCELLED | OUTPATIENT
Start: 2024-11-21

## 2024-11-11 RX ORDER — DEXAMETHASONE SODIUM PHOSPHATE 10 MG/ML
10 INJECTION, SOLUTION INTRAMUSCULAR; INTRAVENOUS ONCE
Status: CANCELLED | OUTPATIENT
Start: 2024-11-21 | End: 2024-11-21

## 2024-11-11 RX ORDER — SODIUM CHLORIDE 9 MG/ML
5-250 INJECTION, SOLUTION INTRAVENOUS PRN
Status: CANCELLED | OUTPATIENT
Start: 2024-11-21

## 2024-11-11 RX ORDER — ACETAMINOPHEN 325 MG/1
650 TABLET ORAL
Status: CANCELLED | OUTPATIENT
Start: 2024-11-21

## 2024-11-11 RX ORDER — HYDROCORTISONE SODIUM SUCCINATE 100 MG/2ML
100 INJECTION INTRAMUSCULAR; INTRAVENOUS
Status: CANCELLED | OUTPATIENT
Start: 2024-11-21

## 2024-11-11 RX ORDER — ONDANSETRON 2 MG/ML
8 INJECTION INTRAMUSCULAR; INTRAVENOUS
Status: CANCELLED | OUTPATIENT
Start: 2024-11-21

## 2024-11-11 RX ORDER — PALONOSETRON 0.05 MG/ML
0.25 INJECTION, SOLUTION INTRAVENOUS ONCE
Status: CANCELLED | OUTPATIENT
Start: 2024-11-21 | End: 2024-11-21

## 2024-11-11 RX ORDER — SODIUM CHLORIDE 9 MG/ML
INJECTION, SOLUTION INTRAVENOUS CONTINUOUS
Status: CANCELLED | OUTPATIENT
Start: 2024-11-21

## 2024-11-11 RX ORDER — EPINEPHRINE 1 MG/ML
0.3 INJECTION, SOLUTION INTRAMUSCULAR; SUBCUTANEOUS PRN
Status: CANCELLED | OUTPATIENT
Start: 2024-11-21

## 2024-11-11 RX ORDER — HEPARIN 100 UNIT/ML
500 SYRINGE INTRAVENOUS PRN
Status: CANCELLED | OUTPATIENT
Start: 2024-11-21

## 2024-11-11 RX ORDER — DIPHENHYDRAMINE HYDROCHLORIDE 50 MG/ML
50 INJECTION INTRAMUSCULAR; INTRAVENOUS
Status: CANCELLED | OUTPATIENT
Start: 2024-11-21

## 2024-11-11 RX ORDER — SODIUM CHLORIDE 0.9 % (FLUSH) 0.9 %
5-40 SYRINGE (ML) INJECTION PRN
Status: CANCELLED | OUTPATIENT
Start: 2024-11-21

## 2024-11-11 NOTE — PROGRESS NOTES
[facility] [place] at [facility]  [address], [address] [place] [address]  W: [phone]  F: [phone]    Reason for Visit:   Alea Lindsay is a 34 y.o. female seen today in office for follow up of MBC.    Treatment History:   Initially presented to [facility] with LEFT breast pain  Mammo 9/6/24 with 2.8 cm spiculated mass with two other suspicious masses on LEFT  09/09/24 -  (A) LEFT Breast 4:00 biopsy: PATH - IDC, grade 3, ER negative, OH negative, HER2 negative, Ki 67 >95%, with tumor necrosis.  Lymphovascular invasion identified  (B) LEFT Breast 3:00 biopsy: PATH - IDC, grade 3, ER negative, OH negative, HER2 negative, Ki 67 >95%,  with tumor necrosis  (C) LEFT Breast 1:00 biopsy: PATH - IDC grade 3, ER negative, OH negative, HER2 negative, Ki 67 >95%, with tumor necrosis, Lymphovascular invasion identified  (D) Lymph Node LEFT Axilla - Metastatic carcinoma  Breast MRI 9/20/24: Right Breast: There is no suspicious mass or non-mass enhancement within the right breast. There is no skin thickening or nipple retraction. There is no suspicious axillary or internal mammary chain lymphadenopathy. Left Breast: There are multiple malignant masses in the central and lateral left breast. The largest of these is located posteriorly at 4:00, is solid and cystic with areas of central necrosis, and measures up to 3.1 x 2.1 x 2 cm. Anterior and superior to this, are 5 additional smaller masses, all within close proximity, and the largest of these has a biopsy clip associated with it. The largest mass measures 1.3 cm. There is very little intervening normal breast tissue  the dominant mass at 4:00 from these multiple additional masses within the central left breast. Within the anterior third laterally at 3:00, there is an additional malignant mass containing a biopsy clip, and this mass measures up to 1 cm. Posterior to this mass by approximately 2 cm is an additional malignant mass

## 2024-11-12 ENCOUNTER — APPOINTMENT (OUTPATIENT)
Facility: HOSPITAL | Age: 34
End: 2024-11-12
Payer: COMMERCIAL

## 2024-11-13 ENCOUNTER — OFFICE VISIT (OUTPATIENT)
Age: 34
End: 2024-11-13

## 2024-11-13 ENCOUNTER — HOSPITAL ENCOUNTER (OUTPATIENT)
Facility: HOSPITAL | Age: 34
Setting detail: INFUSION SERIES
Discharge: HOME | End: 2024-11-13
Payer: COMMERCIAL

## 2024-11-13 ENCOUNTER — APPOINTMENT (OUTPATIENT)
Facility: HOSPITAL | Age: 34
End: 2024-11-13
Payer: COMMERCIAL

## 2024-11-13 VITALS
TEMPERATURE: 98 F | BODY MASS INDEX: 32.27 KG/M2 | OXYGEN SATURATION: 99 % | DIASTOLIC BLOOD PRESSURE: 76 MMHG | SYSTOLIC BLOOD PRESSURE: 109 MMHG | HEART RATE: 104 BPM | WEIGHT: 188 LBS | RESPIRATION RATE: 16 BRPM

## 2024-11-13 DIAGNOSIS — C50.912 CARCINOMA OF LEFT BREAST METASTATIC TO BONE (HCC): ICD-10-CM

## 2024-11-13 DIAGNOSIS — Z51.12 ENCOUNTER FOR ANTINEOPLASTIC IMMUNOTHERAPY: ICD-10-CM

## 2024-11-13 DIAGNOSIS — Z09 CHEMOTHERAPY FOLLOW-UP EXAMINATION: ICD-10-CM

## 2024-11-13 DIAGNOSIS — T45.1X5A CHEMOTHERAPY-INDUCED FATIGUE: ICD-10-CM

## 2024-11-13 DIAGNOSIS — Z80.9 FAMILY HISTORY OF CANCER: ICD-10-CM

## 2024-11-13 DIAGNOSIS — N95.9 PREMENOPAUSAL PATIENT: ICD-10-CM

## 2024-11-13 DIAGNOSIS — R53.83 CHEMOTHERAPY-INDUCED FATIGUE: ICD-10-CM

## 2024-11-13 DIAGNOSIS — R45.89 ANXIETY ABOUT HEALTH: ICD-10-CM

## 2024-11-13 DIAGNOSIS — Z95.828 PORT-A-CATH IN PLACE: ICD-10-CM

## 2024-11-13 DIAGNOSIS — D70.1 CHEMOTHERAPY INDUCED NEUTROPENIA (HCC): Primary | ICD-10-CM

## 2024-11-13 DIAGNOSIS — T45.1X5A CHEMOTHERAPY INDUCED NEUTROPENIA (HCC): Primary | ICD-10-CM

## 2024-11-13 DIAGNOSIS — C50.919 MALIGNANT NEOPLASM OF BREAST METASTATIC TO BONE (HCC): Primary | ICD-10-CM

## 2024-11-13 DIAGNOSIS — C79.51 MALIGNANT NEOPLASM OF BREAST METASTATIC TO BONE (HCC): Primary | ICD-10-CM

## 2024-11-13 DIAGNOSIS — E83.51 HYPOCALCEMIA: ICD-10-CM

## 2024-11-13 DIAGNOSIS — C79.51 CARCINOMA OF LEFT BREAST METASTATIC TO BONE (HCC): ICD-10-CM

## 2024-11-13 LAB
ALBUMIN SERPL-MCNC: 4.1 G/DL (ref 3.5–5)
ALBUMIN/GLOB SERPL: 1.2 (ref 1.1–2.2)
ALP SERPL-CCNC: 549 U/L (ref 45–117)
ALT SERPL-CCNC: 80 U/L (ref 12–78)
ANION GAP SERPL CALC-SCNC: 7 MMOL/L (ref 2–12)
AST SERPL-CCNC: 66 U/L (ref 15–37)
BASOPHILS # BLD: 0 K/UL (ref 0–0.1)
BASOPHILS NFR BLD: 1 % (ref 0–1)
BILIRUB SERPL-MCNC: 0.9 MG/DL (ref 0.2–1)
BUN SERPL-MCNC: 8 MG/DL (ref 6–20)
BUN/CREAT SERPL: 12 (ref 12–20)
CALCIUM SERPL-MCNC: 8.3 MG/DL (ref 8.5–10.1)
CHLORIDE SERPL-SCNC: 109 MMOL/L (ref 97–108)
CO2 SERPL-SCNC: 22 MMOL/L (ref 21–32)
CREAT SERPL-MCNC: 0.66 MG/DL (ref 0.55–1.02)
DIFFERENTIAL METHOD BLD: ABNORMAL
EOSINOPHIL # BLD: 0.1 K/UL (ref 0–0.4)
EOSINOPHIL NFR BLD: 2 % (ref 0–7)
ERYTHROCYTE [DISTWIDTH] IN BLOOD BY AUTOMATED COUNT: 15.9 % (ref 11.5–14.5)
GLOBULIN SER CALC-MCNC: 3.4 G/DL (ref 2–4)
GLUCOSE SERPL-MCNC: 105 MG/DL (ref 65–100)
HCT VFR BLD AUTO: 30.5 % (ref 35–47)
HGB BLD-MCNC: 10.2 G/DL (ref 11.5–16)
IMM GRANULOCYTES # BLD AUTO: 0 K/UL
IMM GRANULOCYTES NFR BLD AUTO: 0 %
LYMPHOCYTES # BLD: 1.1 K/UL (ref 0.8–3.5)
LYMPHOCYTES NFR BLD: 44 % (ref 12–49)
MCH RBC QN AUTO: 29.9 PG (ref 26–34)
MCHC RBC AUTO-ENTMCNC: 33.4 G/DL (ref 30–36.5)
MCV RBC AUTO: 89.4 FL (ref 80–99)
METAMYELOCYTES NFR BLD MANUAL: 1 %
MONOCYTES # BLD: 0.4 K/UL (ref 0–1)
MONOCYTES NFR BLD: 14 % (ref 5–13)
MYELOCYTES NFR BLD MANUAL: 1 %
NEUTS SEG # BLD: 1 K/UL (ref 1.8–8)
NEUTS SEG NFR BLD: 37 % (ref 32–75)
NRBC # BLD: 0.17 K/UL (ref 0–0.01)
NRBC BLD-RTO: 6.5 PER 100 WBC
PLATELET # BLD AUTO: 241 K/UL (ref 150–400)
PMV BLD AUTO: 9.3 FL (ref 8.9–12.9)
POTASSIUM SERPL-SCNC: 4.1 MMOL/L (ref 3.5–5.1)
PROT SERPL-MCNC: 7.5 G/DL (ref 6.4–8.2)
RBC # BLD AUTO: 3.41 M/UL (ref 3.8–5.2)
RBC MORPH BLD: ABNORMAL
SODIUM SERPL-SCNC: 138 MMOL/L (ref 136–145)
WBC # BLD AUTO: 2.6 K/UL (ref 3.6–11)
WBC MORPH BLD: ABNORMAL

## 2024-11-13 PROCEDURE — 80053 COMPREHEN METABOLIC PANEL: CPT

## 2024-11-13 PROCEDURE — 85025 COMPLETE CBC W/AUTO DIFF WBC: CPT

## 2024-11-13 PROCEDURE — 36415 COLL VENOUS BLD VENIPUNCTURE: CPT

## 2024-11-13 RX ORDER — EPINEPHRINE 1 MG/ML
0.3 INJECTION, SOLUTION, CONCENTRATE INTRAVENOUS PRN
OUTPATIENT
Start: 2024-11-18

## 2024-11-13 RX ORDER — ACETAMINOPHEN 325 MG/1
650 TABLET ORAL
OUTPATIENT
Start: 2024-11-18

## 2024-11-13 RX ORDER — LIDOCAINE/PRILOCAINE 2.5 %-2.5%
CREAM (GRAM) TOPICAL
Qty: 30 G | Refills: 1 | Status: SHIPPED | OUTPATIENT
Start: 2024-11-13

## 2024-11-13 RX ORDER — ONDANSETRON 2 MG/ML
8 INJECTION INTRAMUSCULAR; INTRAVENOUS
OUTPATIENT
Start: 2024-11-18

## 2024-11-13 RX ORDER — ALBUTEROL SULFATE 90 UG/1
4 INHALANT RESPIRATORY (INHALATION) PRN
OUTPATIENT
Start: 2024-11-18

## 2024-11-13 RX ORDER — DIPHENHYDRAMINE HYDROCHLORIDE 50 MG/ML
50 INJECTION INTRAMUSCULAR; INTRAVENOUS
OUTPATIENT
Start: 2024-11-18

## 2024-11-13 RX ORDER — SODIUM CHLORIDE 9 MG/ML
INJECTION, SOLUTION INTRAVENOUS CONTINUOUS
OUTPATIENT
Start: 2024-11-18

## 2024-11-13 NOTE — PROGRESS NOTES
Alea Lindsay is a 34 y.o. female    Chief Complaint   Patient presents with    Follow-up     MBC       1. Have you been to the ER, urgent care clinic since your last visit?  Hospitalized since your last visit?No    2. Have you seen or consulted any other health care providers outside of the [facility] since your last visit?  Include any pap smears or colon screening. No

## 2024-11-13 NOTE — PROGRESS NOTES
[facility] Progress Note    Date: 2024    Name: Alea Lindsay    MRN: [id number]         : 1990      Pt arrived ambulatory and in no distress, for lab visit.  Labs drawn via L forearm, patient tolerated well.        Please check Epic for Results.     Departed [facility] ambulatory and in no distress.    Future Appointments   Date Time Provider Department Center   2024  9:30 AM [facility] CHEMO CHAIR 10 [facility]   2024  2:00 PM [facility] LAB CHAIR 1 [facility]   2024  8:00 AM [facility] CHEMO CHAIR 7 [facility]   2024 10:30 AM [facility] LAB CHAIR 1 [facility]   2024  8:00 AM [facility] CHEMO CHAIR 7 [facility]   2024  8:15 AM Sepideh Gillespie DO [facility] AM[facility]   2024 10:00 AM [facility] LAB CHAIR 1 [facility]   2024  8:00 AM [facility] CHEMO CHAIR 7 [facility]   2024  1:30 PM [facility] LAB CHAIR 1 [facility]   2024  8:30 AM [facility] CHEMO CHAIR 8 [facility]   2024  9:00 AM Radha Christensen APRN - NP [facility] AM[facility]   2024 10:00 AM [facility] LAB CHAIR 1 [facility]   2025  8:00 AM [facility] CHEMO CHAIR 7 [facility]   1/15/2025 10:30 AM [facility] LAB CHAIR 1 [facility]   2025  9:30 AM [facility] CHEMO CHAIR 10 [facility]   2025  9:45 AM Radha Christensen APRN - NP [facility] AM[facility]   2025 10:00 AM [facility] LAB CHAIR 1 [facility]   2025  8:30 AM [facility] CHEMO CHAIR 8 [facility]   2025 10:00 AM [facility] LAB CHAIR 1 [facility]   2025  8:30 AM [facility] CHEMO CHAIR 8 [facility]   2025  8:45 AM Sepideh Gillespie DO [facility] BS AMB   2025  9:30 AM RAD[facility] LAB CHAIR 1 [facility]   2025  8:00 AM RAD[facility] CHEMO CHAIR 7 [facility]       Cee Calles  2024

## 2024-11-14 ENCOUNTER — TELEPHONE (OUTPATIENT)
Facility: HOSPITAL | Age: 34
End: 2024-11-14

## 2024-11-14 ENCOUNTER — APPOINTMENT (OUTPATIENT)
Facility: HOSPITAL | Age: 34
End: 2024-11-14
Payer: COMMERCIAL

## 2024-11-14 ENCOUNTER — HOSPITAL ENCOUNTER (OUTPATIENT)
Facility: HOSPITAL | Age: 34
Setting detail: INFUSION SERIES
Discharge: HOME | End: 2024-11-14
Payer: COMMERCIAL

## 2024-11-14 VITALS
HEIGHT: 64 IN | HEART RATE: 82 BPM | SYSTOLIC BLOOD PRESSURE: 113 MMHG | OXYGEN SATURATION: 99 % | DIASTOLIC BLOOD PRESSURE: 70 MMHG | WEIGHT: 188 LBS | RESPIRATION RATE: 17 BRPM | TEMPERATURE: 98 F | BODY MASS INDEX: 32.1 KG/M2

## 2024-11-14 DIAGNOSIS — C50.912 CARCINOMA OF LEFT BREAST METASTATIC TO BONE (HCC): ICD-10-CM

## 2024-11-14 DIAGNOSIS — E83.51 HYPOCALCEMIA: Primary | ICD-10-CM

## 2024-11-14 DIAGNOSIS — C79.51 CARCINOMA OF LEFT BREAST METASTATIC TO BONE (HCC): ICD-10-CM

## 2024-11-14 PROCEDURE — 96375 TX/PRO/DX INJ NEW DRUG ADDON: CPT

## 2024-11-14 PROCEDURE — 96413 CHEMO IV INFUSION 1 HR: CPT

## 2024-11-14 PROCEDURE — 6360000002 HC RX W HCPCS: Performed by: INTERNAL MEDICINE

## 2024-11-14 PROCEDURE — 96417 CHEMO IV INFUS EACH ADDL SEQ: CPT

## 2024-11-14 PROCEDURE — 2580000003 HC RX 258: Performed by: INTERNAL MEDICINE

## 2024-11-14 RX ORDER — ACETAMINOPHEN 325 MG/1
650 TABLET ORAL
Status: DISCONTINUED | OUTPATIENT
Start: 2024-11-14 | End: 2024-11-15 | Stop reason: HOSPADM

## 2024-11-14 RX ORDER — DEXAMETHASONE SODIUM PHOSPHATE 10 MG/ML
10 INJECTION, SOLUTION INTRAMUSCULAR; INTRAVENOUS ONCE
Status: COMPLETED | OUTPATIENT
Start: 2024-11-14 | End: 2024-11-14

## 2024-11-14 RX ORDER — ALBUTEROL SULFATE 90 UG/1
4 INHALANT RESPIRATORY (INHALATION) PRN
Status: DISCONTINUED | OUTPATIENT
Start: 2024-11-14 | End: 2024-11-15 | Stop reason: HOSPADM

## 2024-11-14 RX ORDER — MEPERIDINE HYDROCHLORIDE 25 MG/ML
12.5 INJECTION INTRAMUSCULAR; INTRAVENOUS; SUBCUTANEOUS PRN
Status: DISCONTINUED | OUTPATIENT
Start: 2024-11-14 | End: 2024-11-15 | Stop reason: HOSPADM

## 2024-11-14 RX ORDER — SODIUM CHLORIDE 9 MG/ML
5-250 INJECTION, SOLUTION INTRAVENOUS PRN
Status: DISCONTINUED | OUTPATIENT
Start: 2024-11-14 | End: 2024-11-15 | Stop reason: HOSPADM

## 2024-11-14 RX ORDER — EPINEPHRINE 1 MG/ML
0.3 INJECTION, SOLUTION INTRAMUSCULAR; SUBCUTANEOUS PRN
Status: DISCONTINUED | OUTPATIENT
Start: 2024-11-14 | End: 2024-11-15 | Stop reason: HOSPADM

## 2024-11-14 RX ORDER — PALONOSETRON 0.05 MG/ML
0.25 INJECTION, SOLUTION INTRAVENOUS ONCE
Status: COMPLETED | OUTPATIENT
Start: 2024-11-14 | End: 2024-11-14

## 2024-11-14 RX ORDER — SODIUM CHLORIDE 9 MG/ML
INJECTION, SOLUTION INTRAVENOUS CONTINUOUS
Status: DISCONTINUED | OUTPATIENT
Start: 2024-11-14 | End: 2024-11-15 | Stop reason: HOSPADM

## 2024-11-14 RX ORDER — SODIUM CHLORIDE 0.9 % (FLUSH) 0.9 %
5-40 SYRINGE (ML) INJECTION PRN
Status: DISCONTINUED | OUTPATIENT
Start: 2024-11-14 | End: 2024-11-15 | Stop reason: HOSPADM

## 2024-11-14 RX ORDER — HEPARIN 100 UNIT/ML
500 SYRINGE INTRAVENOUS PRN
Status: DISCONTINUED | OUTPATIENT
Start: 2024-11-14 | End: 2024-11-15 | Stop reason: HOSPADM

## 2024-11-14 RX ORDER — ONDANSETRON 2 MG/ML
8 INJECTION INTRAMUSCULAR; INTRAVENOUS
Status: DISCONTINUED | OUTPATIENT
Start: 2024-11-14 | End: 2024-11-15 | Stop reason: HOSPADM

## 2024-11-14 RX ORDER — DIPHENHYDRAMINE HYDROCHLORIDE 50 MG/ML
50 INJECTION INTRAMUSCULAR; INTRAVENOUS
Status: DISCONTINUED | OUTPATIENT
Start: 2024-11-14 | End: 2024-11-15 | Stop reason: HOSPADM

## 2024-11-14 RX ORDER — HYDROCORTISONE SODIUM SUCCINATE 100 MG/2ML
100 INJECTION INTRAMUSCULAR; INTRAVENOUS
Status: DISCONTINUED | OUTPATIENT
Start: 2024-11-14 | End: 2024-11-15 | Stop reason: HOSPADM

## 2024-11-14 RX ADMIN — SODIUM CHLORIDE 200 MG: 9 INJECTION, SOLUTION INTRAVENOUS at 11:26

## 2024-11-14 RX ADMIN — GEMCITABINE HYDROCHLORIDE 1560 MG: 1 INJECTION, SOLUTION INTRAVENOUS at 13:29

## 2024-11-14 RX ADMIN — CARBOPLATIN 215 MG: 10 INJECTION, SOLUTION INTRAVENOUS at 12:56

## 2024-11-14 RX ADMIN — SODIUM CHLORIDE, PRESERVATIVE FREE 20 ML: 5 INJECTION INTRAVENOUS at 13:57

## 2024-11-14 RX ADMIN — PALONOSETRON HYDROCHLORIDE 0.25 MG: 0.25 INJECTION INTRAVENOUS at 11:53

## 2024-11-14 RX ADMIN — DEXAMETHASONE SODIUM PHOSPHATE 10 MG: 10 INJECTION, SOLUTION INTRAMUSCULAR; INTRAVENOUS at 11:56

## 2024-11-14 NOTE — TELEPHONE ENCOUNTER
[facility]  Breast Navigator Encounter    Name:    Alea Lindsay  Age:    34 y.o.  Diagnosis: TNBC     Interdisciplinary Team:  Med-Onc:  Jose Miguel    Surg-Onc:      Rad-Onc:      Plastics:      :      Nurse Navigator:  Chata BIGGS-RN      Encounter type:  []Patient Initiated  []Navigator Follow-up []Pre-op  []Post-op  []Check-in Prior to First Treatment []Treatment Modality Change  [x]Initial Navigator Encounter []Other:       Narrative:     Spoke with Radames and introduced self and role. Followed up with their most recent doctors appointments to see if they had any questions about that and if they felt comfortable with all the information given. I emailed the patient the same information. I let them know I am here to support them and to help in any way possible and I will follow them for the duration of their care. Thank you for allowing me to be a part of Radames care. We discussed adding palliative medicine on at some point for symptom management. She would like to get through the holidays first and some of the other things she is doing before she sets an appointment up but is very open to that. I told her I would be reaching out here and there and checking in but if she needed anything from me to please let me know.                  Referrals/Handouts:            Chata BIGGS, RN.  Breast Cancer Navigator   [facility]  [address] [place] [address]  [address]  Office: [phone]  Cell: [phone]  F: [phone]  [email or web address]  Good Help to Those in Need®

## 2024-11-14 NOTE — PROGRESS NOTES
\[facility]\"" Chemotherapy Progress Note    Date: 2024    Name: Alea Lindsay    MRN: [id number]         : 1990      0930 Pt admit to \[facility]\"" for C2 Keytruda, Carbo, Gemzar ambulatory in stable condition. Assessment completed. No new concerns voiced. Port with positive blood return. Labs sent for processing. ( Labs done 24)                         No data to display                  Ms. Lindsay's vitals were reviewed.  Patient Vitals for the past 12 hrs:   Temp Pulse Resp BP SpO2   24 1400 -- 82 -- 113/70 --   24 0930 98 °F (36.7 °C) 93 17 125/81 99 %         Lab results were obtained and reviewed.  No results found for this or any previous visit (from the past 12 hour(s)).    Pre-medications  were administered as ordered and chemotherapy was initiated.  Medications Administered         CARBOplatin (PARAPLATIN) 215 mg in sodium chloride 0.9 % 100 mL chemo IVPB Admin Date  2024 Action  New Bag Dose  215 mg Rate  263 mL/hr Route  IntraVENous Documented By  Teresa Pappas RN        dexAMETHasone (PF) (DECADRON) injection 10 mg Admin Date  2024 Action  Given Dose  10 mg Rate   Route  IntraVENous Documented By  Teresa Pappas RN        gemcitabine HCl (GEMZAR) 1,560 mg in sodium chloride 0.9 % 100 mL chemo IVPB Admin Date  2024 Action  New Bag Dose  1,560 mg Rate  302.1 mL/hr Route  IntraVENous Documented By  Teresa Pappas RN        palonosetron (ALOXI) injection 0.25 mg Admin Date  2024 Action  Given Dose  0.25 mg Rate   Route  IntraVENous Documented By  Teresa Pappas RN        pembrolizumab (KEYTRUDA) 200 mg in sodium chloride 0.9 % 100 mL IVPB Admin Date  2024 Action  New Bag Dose  200 mg Rate  236 mL/hr Route  IntraVENous Documented By  Teresa Pappas RN        sodium chloride flush 0.9 % injection 5-40 mL Admin Date  2024 Action  Given Dose  20 mL Rate   Route  IntraVENous Documented By  Teresa Pappas RN            Two nurses

## 2024-11-18 ENCOUNTER — HOSPITAL ENCOUNTER (OUTPATIENT)
Facility: HOSPITAL | Age: 34
Setting detail: INFUSION SERIES
Discharge: HOME | End: 2024-11-18
Payer: COMMERCIAL

## 2024-11-18 VITALS
DIASTOLIC BLOOD PRESSURE: 72 MMHG | HEART RATE: 106 BPM | TEMPERATURE: 97.8 F | RESPIRATION RATE: 18 BRPM | SYSTOLIC BLOOD PRESSURE: 113 MMHG

## 2024-11-18 DIAGNOSIS — D70.1 CHEMOTHERAPY INDUCED NEUTROPENIA (HCC): Primary | ICD-10-CM

## 2024-11-18 DIAGNOSIS — T45.1X5A CHEMOTHERAPY INDUCED NEUTROPENIA (HCC): Primary | ICD-10-CM

## 2024-11-18 DIAGNOSIS — C79.51 MALIGNANT NEOPLASM OF BREAST METASTATIC TO BONE (HCC): ICD-10-CM

## 2024-11-18 DIAGNOSIS — C50.919 MALIGNANT NEOPLASM OF BREAST METASTATIC TO BONE (HCC): ICD-10-CM

## 2024-11-18 PROCEDURE — 96372 THER/PROPH/DIAG INJ SC/IM: CPT

## 2024-11-18 PROCEDURE — 6360000002 HC RX W HCPCS: Performed by: NURSE PRACTITIONER

## 2024-11-18 RX ORDER — ALBUTEROL SULFATE 90 UG/1
4 INHALANT RESPIRATORY (INHALATION) PRN
Status: CANCELLED | OUTPATIENT
Start: 2024-11-19

## 2024-11-18 RX ORDER — ACETAMINOPHEN 325 MG/1
650 TABLET ORAL
Status: CANCELLED | OUTPATIENT
Start: 2024-11-19

## 2024-11-18 RX ORDER — HYDROCORTISONE SODIUM SUCCINATE 100 MG/2ML
100 INJECTION INTRAMUSCULAR; INTRAVENOUS
Status: CANCELLED | OUTPATIENT
Start: 2024-11-19

## 2024-11-18 RX ORDER — SODIUM CHLORIDE 9 MG/ML
INJECTION, SOLUTION INTRAVENOUS CONTINUOUS
Status: CANCELLED | OUTPATIENT
Start: 2024-11-19

## 2024-11-18 RX ORDER — DIPHENHYDRAMINE HYDROCHLORIDE 50 MG/ML
50 INJECTION INTRAMUSCULAR; INTRAVENOUS
Status: CANCELLED | OUTPATIENT
Start: 2024-11-19

## 2024-11-18 RX ORDER — ONDANSETRON 2 MG/ML
8 INJECTION INTRAMUSCULAR; INTRAVENOUS
Status: CANCELLED | OUTPATIENT
Start: 2024-11-19

## 2024-11-18 RX ORDER — EPINEPHRINE 1 MG/ML
0.3 INJECTION, SOLUTION INTRAMUSCULAR; SUBCUTANEOUS PRN
Status: CANCELLED | OUTPATIENT
Start: 2024-11-19

## 2024-11-18 RX ADMIN — FILGRASTIM-SNDZ 480 MCG: 480 INJECTION, SOLUTION INTRAVENOUS; SUBCUTANEOUS at 14:30

## 2024-11-18 ASSESSMENT — PAIN SCALES - GENERAL: PAINLEVEL_OUTOF10: 0

## 2024-11-18 NOTE — PROGRESS NOTES
[facility] Short [facility]                       Date: 2024    Name: Alea Lindsay    MRN: [id number]         : 1990      Pt admit to [facility] for Zarxio ambulatory in stable condition. Assessment completed and documented in flowsheets.     Ms. Lindsay's vitals were reviewed:  Patient Vitals for the past 12 hrs:   Temp Pulse Resp BP   24 1426 97.8 °F (36.6 °C) (!) 106 18 113/72         Medications given: given SC in LEFT arm  Medications Administered         filgrastim-sndz (ZARXIO) injection 480 mcg Admin Date  2024 Action  Given Dose  480 mcg Route  SubCUTAneous Documented By  Sofia Rodriguez RN            Medication education reinforced with patient and they verbalize understanding.    Ms. Lindsay tolerated the injection and [facility]. Patient is aware if future appointments.    Future Appointments   Date Time Provider Department Center   2024  3:30 PM [facility] FASTTRACK 1 [facility]   2024  2:00 PM [facility] LAB CHAIR 1 [facility]   2024  8:00 AM [facility] CHEMO CHAIR 7 [facility]   2024 10:30 AM [facility] LAB CHAIR 1 [facility]   2024 10:45 AM Sepideh Gillespie DO [facility]   2024  8:00 AM [facility] CHEMO CHAIR 7 [facility]   2024 10:00 AM [facility] LAB CHAIR 1 [facility]   2024  8:00 AM [facility] CHEMO CHAIR 7 [facility]   2024  8:15 AM Sepideh Gillespie DO [facility] AMB   2024  1:30 PM [facility] LAB CHAIR 1 [facility]   2024  8:30 AM [facility] CHEMO CHAIR 8 [facility]   2024 10:00 AM [facility] LAB CHAIR 1 [facility]   2025  8:00 AM [facility] CHEMO CHAIR 7 [facility]   1/15/2025 10:30 AM [facility] LAB CHAIR 1 [facility]   2025  9:30 AM [facility] CHEMO CHAIR 10 [facility]   2025  9:45 AM Radha Christensen APRN - NP [facility] AM[facility]   2025 10:00 AM [facility]   2025  8:30 AM [facility]   2025 10:00 AM [facility]   2025  8:30 AM [facility]

## 2024-11-19 ENCOUNTER — HOSPITAL ENCOUNTER (OUTPATIENT)
Facility: HOSPITAL | Age: 34
Setting detail: INFUSION SERIES
Discharge: HOME | End: 2024-11-19
Payer: COMMERCIAL

## 2024-11-19 VITALS
OXYGEN SATURATION: 99 % | DIASTOLIC BLOOD PRESSURE: 72 MMHG | RESPIRATION RATE: 18 BRPM | TEMPERATURE: 98.4 F | SYSTOLIC BLOOD PRESSURE: 117 MMHG | HEART RATE: 104 BPM

## 2024-11-19 DIAGNOSIS — D70.1 CHEMOTHERAPY INDUCED NEUTROPENIA (HCC): Primary | ICD-10-CM

## 2024-11-19 DIAGNOSIS — C50.919 MALIGNANT NEOPLASM OF BREAST METASTATIC TO BONE (HCC): ICD-10-CM

## 2024-11-19 DIAGNOSIS — T45.1X5A CHEMOTHERAPY INDUCED NEUTROPENIA (HCC): Primary | ICD-10-CM

## 2024-11-19 DIAGNOSIS — C79.51 MALIGNANT NEOPLASM OF BREAST METASTATIC TO BONE (HCC): ICD-10-CM

## 2024-11-19 PROCEDURE — 6360000002 HC RX W HCPCS: Performed by: NURSE PRACTITIONER

## 2024-11-19 PROCEDURE — 96372 THER/PROPH/DIAG INJ SC/IM: CPT

## 2024-11-19 RX ORDER — ACETAMINOPHEN 325 MG/1
650 TABLET ORAL
OUTPATIENT
Start: 2024-11-20

## 2024-11-19 RX ORDER — DIPHENHYDRAMINE HYDROCHLORIDE 50 MG/ML
50 INJECTION INTRAMUSCULAR; INTRAVENOUS
OUTPATIENT
Start: 2024-11-20

## 2024-11-19 RX ORDER — ONDANSETRON 2 MG/ML
8 INJECTION INTRAMUSCULAR; INTRAVENOUS
OUTPATIENT
Start: 2024-11-20

## 2024-11-19 RX ORDER — SODIUM CHLORIDE 9 MG/ML
INJECTION, SOLUTION INTRAVENOUS CONTINUOUS
OUTPATIENT
Start: 2024-11-20

## 2024-11-19 RX ORDER — HYDROCORTISONE SODIUM SUCCINATE 100 MG/2ML
100 INJECTION INTRAMUSCULAR; INTRAVENOUS
OUTPATIENT
Start: 2024-11-20

## 2024-11-19 RX ORDER — EPINEPHRINE 1 MG/ML
0.3 INJECTION, SOLUTION INTRAMUSCULAR; SUBCUTANEOUS PRN
OUTPATIENT
Start: 2024-11-20

## 2024-11-19 RX ORDER — ALBUTEROL SULFATE 90 UG/1
4 INHALANT RESPIRATORY (INHALATION) PRN
OUTPATIENT
Start: 2024-11-20

## 2024-11-19 RX ADMIN — FILGRASTIM-SNDZ 480 MCG: 480 INJECTION, SOLUTION INTRAVENOUS; SUBCUTANEOUS at 15:38

## 2024-11-19 NOTE — PROGRESS NOTES
[facility] Progress Note    Date: 2024    Name: Alea Lindsay    MRN: [id number]         : 1990    Ms. Lindsay arrived ambulatory and in no distress for Zarxio dose 2.      Assessment was completed and documented in flowsheets. No acute concerns at this time.    Ms. Lindsay's vital signs for this visit.  Vitals:    24 1536   BP: 117/72   Pulse: (!) 104   Resp: 18   Temp: 98.4 °F (36.9 °C)   SpO2: 99%        Medications given:   Medications Administered         filgrastim-sndz (ZARXIO) injection 480 mcg Admin Date  2024 Action  Given Dose  480 mcg Route  SubCUTAneous Documented By  Silvia Redding RN            Given in the R arm.     Ms. Lindsay tolerated the injection, and had no complaints.    [facility]. [facility] condition and is aware of future appointments.     Future Appointments   Date Time Provider Department Center   2024  2:00 PM [facility] LAB CHAIR 1 [facility]   2024  8:00 AM [facility] CHEMO CHAIR 7 [facility]   2024 10:30 AM [facility] LAB CHAIR 1 [facility]   2024 10:45 AM Sepideh Gillespie DO [facility]   2024  8:00 AM [facility] CHEMO CHAIR 7 [facility]   2024 10:00 AM [facility] LAB CHAIR 1 [facility]   2024  8:00 AM [facility] CHEMO CHAIR 7 [facility]   2024  8:15 AM Sepideh Gillespie DO [facility] AM[facility]   2024  1:30 PM [facility] LAB CHAIR 1 [facility]   2024  8:30 AM [facility] CHEMO CHAIR 8 [facility]   2024 10:00 AM [facility] LAB CHAIR 1 [facility]   2025  8:00 AM [facility] CHEMO CHAIR 7 [facility]   1/15/2025 10:30 AM [facility] LAB CHAIR 1 [facility]   2025  9:30 AM [facility] CHEMO CHAIR 10 [facility]   2025  9:45 AM Radha Christensen BRETT - NP [facility] BS AMB   2025 10:00 AM [facility]   2025  8:30 AM [facility]   2025 10:00 AM [facility]   2025  8:30 AM [facility]   2025  8:45 AM Sepideh Gillespie DO [facility] BS AMB

## 2024-11-20 ENCOUNTER — APPOINTMENT (OUTPATIENT)
Facility: HOSPITAL | Age: 34
End: 2024-11-20
Payer: COMMERCIAL

## 2024-11-20 ENCOUNTER — HOSPITAL ENCOUNTER (OUTPATIENT)
Facility: HOSPITAL | Age: 34
Setting detail: INFUSION SERIES
Discharge: HOME | End: 2024-11-20
Payer: COMMERCIAL

## 2024-11-20 VITALS
OXYGEN SATURATION: 99 % | RESPIRATION RATE: 16 BRPM | SYSTOLIC BLOOD PRESSURE: 119 MMHG | DIASTOLIC BLOOD PRESSURE: 80 MMHG | TEMPERATURE: 98 F | HEART RATE: 92 BPM

## 2024-11-20 DIAGNOSIS — C79.51 MALIGNANT NEOPLASM OF BREAST METASTATIC TO BONE (HCC): ICD-10-CM

## 2024-11-20 DIAGNOSIS — D70.1 CHEMOTHERAPY INDUCED NEUTROPENIA (HCC): Primary | ICD-10-CM

## 2024-11-20 DIAGNOSIS — T45.1X5A CHEMOTHERAPY INDUCED NEUTROPENIA (HCC): Primary | ICD-10-CM

## 2024-11-20 DIAGNOSIS — C50.919 MALIGNANT NEOPLASM OF BREAST METASTATIC TO BONE (HCC): ICD-10-CM

## 2024-11-20 PROCEDURE — 6360000002 HC RX W HCPCS: Performed by: NURSE PRACTITIONER

## 2024-11-20 PROCEDURE — 96372 THER/PROPH/DIAG INJ SC/IM: CPT

## 2024-11-20 RX ORDER — HYDROCORTISONE SODIUM SUCCINATE 100 MG/2ML
100 INJECTION INTRAMUSCULAR; INTRAVENOUS
OUTPATIENT
Start: 2024-11-20

## 2024-11-20 RX ORDER — ALBUTEROL SULFATE 90 UG/1
4 INHALANT RESPIRATORY (INHALATION) PRN
OUTPATIENT
Start: 2024-11-20

## 2024-11-20 RX ORDER — DIPHENHYDRAMINE HYDROCHLORIDE 50 MG/ML
50 INJECTION INTRAMUSCULAR; INTRAVENOUS
OUTPATIENT
Start: 2024-11-20

## 2024-11-20 RX ORDER — ACETAMINOPHEN 325 MG/1
650 TABLET ORAL
OUTPATIENT
Start: 2024-11-20

## 2024-11-20 RX ORDER — SODIUM CHLORIDE 9 MG/ML
INJECTION, SOLUTION INTRAVENOUS CONTINUOUS
OUTPATIENT
Start: 2024-11-20

## 2024-11-20 RX ORDER — ONDANSETRON 2 MG/ML
8 INJECTION INTRAMUSCULAR; INTRAVENOUS
OUTPATIENT
Start: 2024-11-20

## 2024-11-20 RX ORDER — EPINEPHRINE 1 MG/ML
0.3 INJECTION, SOLUTION INTRAMUSCULAR; SUBCUTANEOUS PRN
OUTPATIENT
Start: 2024-11-20

## 2024-11-20 RX ADMIN — FILGRASTIM-SNDZ 480 MCG: 480 INJECTION, SOLUTION INTRAVENOUS; SUBCUTANEOUS at 14:22

## 2024-11-20 ASSESSMENT — PAIN SCALES - GENERAL: PAINLEVEL_OUTOF10: 0

## 2024-11-20 NOTE — PROGRESS NOTES
[facility] Progress Note    Date: November 20, 2024       Pt [facility] Fil[facility]im [facility] in stable condition.  Assessment completed.     Patient Vitals for the past 12 hrs:   Temp Pulse Resp BP SpO2   11/20/24 1415 98 °F (36.7 °C) 92 16 119/80 99 %         Medications Administered         filgrastim-sndz (ZARXIO) injection 480 mcg Admin Date  11/20/2024 Action  Given Dose  480 mcg Route  SubCUTAneous Documented By  Chula Jackson RN           Injection given in left arm.    Ms. Lindsay tolerated the injection, and had no complaints.      [facility]. [facility]. Patient is aware of next scheduled [facility] appointment.         Future Appointments   Date Time Provider Department Center   11/21/2024  8:00 AM [facility] CHEMO CHAIR 7 [facility]   12/4/2024 10:30 AM [facility] LAB CHAIR 1 [facility]   12/4/2024 10:45 AM Sepideh Gillespie DO [facility] AM[facility]   12/5/2024  8:00 AM [facility] CHEMO CHAIR 7 [facility]   12/11/2024 10:00 AM [facility] LAB CHAIR 1 [facility]   12/12/2024  8:00 AM [facility] CHEMO CHAIR 7 [facility]   12/12/2024  8:15 AM Sepideh Gillespie DO [facility] BS AMB   12/23/2024  1:30 PM [facility] LAB CHAIR 1 [facility]   12/26/2024  8:30 AM [facility] CHEMO CHAIR 8 [facility]   12/30/2024 10:00 AM [facility] LAB CHAIR 1 [facility]   1/2/2025  8:00 AM [facility] CHEMO CHAIR 7 [facility]   1/15/2025 10:30 AM [facility] LAB CHAIR 1 [facility]   1/16/2025  9:30 AM [facility] CHEMO CHAIR 10 [facility]   1/16/2025  9:45 AM Radha Christensen APRN - NP [facility] AMB   1/22/2025 10:00 AM [facility] LAB CHAIR 1 [facility]   1/23/2025  8:30 AM [facility] CHEMO CHAIR 8 [facility]   2/5/2025 10:00 AM [facility] LAB CHAIR 1 [facility]   2/6/2025  8:30 AM [facility]   2/6/2025  8:45 AM Sepideh Gillespie DO [facility] BS [facility]   2/12/2025  9:30 AM [facility]   2/13/2025  8:00 AM RAD[facility] CHEMO CHAIR 7 [facility]         Chula Jackson, RN, RN  November 20, 2024

## 2024-11-21 ENCOUNTER — HOSPITAL ENCOUNTER (OUTPATIENT)
Facility: HOSPITAL | Age: 34
Setting detail: INFUSION SERIES
Discharge: HOME | End: 2024-11-21
Payer: COMMERCIAL

## 2024-11-21 VITALS
BODY MASS INDEX: 32.61 KG/M2 | SYSTOLIC BLOOD PRESSURE: 111 MMHG | HEART RATE: 89 BPM | DIASTOLIC BLOOD PRESSURE: 73 MMHG | TEMPERATURE: 98.4 F | RESPIRATION RATE: 16 BRPM | OXYGEN SATURATION: 100 % | HEIGHT: 64 IN | WEIGHT: 191 LBS

## 2024-11-21 DIAGNOSIS — E83.51 HYPOCALCEMIA: Primary | ICD-10-CM

## 2024-11-21 DIAGNOSIS — C50.912 CARCINOMA OF LEFT BREAST METASTATIC TO BONE (HCC): ICD-10-CM

## 2024-11-21 DIAGNOSIS — C79.51 CARCINOMA OF LEFT BREAST METASTATIC TO BONE (HCC): ICD-10-CM

## 2024-11-21 LAB
ALBUMIN SERPL-MCNC: 3.4 G/DL (ref 3.5–5)
ALBUMIN/GLOB SERPL: 1.1 (ref 1.1–2.2)
ALP SERPL-CCNC: 477 U/L (ref 45–117)
ALT SERPL-CCNC: 102 U/L (ref 12–78)
ANION GAP SERPL CALC-SCNC: 9 MMOL/L (ref 2–12)
AST SERPL-CCNC: 65 U/L (ref 15–37)
BASOPHILS # BLD: 0 K/UL (ref 0–0.1)
BASOPHILS NFR BLD: 0 % (ref 0–1)
BILIRUB SERPL-MCNC: 0.5 MG/DL (ref 0.2–1)
BUN SERPL-MCNC: 8 MG/DL (ref 6–20)
BUN/CREAT SERPL: 13 (ref 12–20)
CALCIUM SERPL-MCNC: 7.9 MG/DL (ref 8.5–10.1)
CHLORIDE SERPL-SCNC: 107 MMOL/L (ref 97–108)
CO2 SERPL-SCNC: 23 MMOL/L (ref 21–32)
CREAT SERPL-MCNC: 0.62 MG/DL (ref 0.55–1.02)
DIFFERENTIAL METHOD BLD: ABNORMAL
EOSINOPHIL # BLD: 0 K/UL (ref 0–0.4)
EOSINOPHIL NFR BLD: 0 % (ref 0–7)
ERYTHROCYTE [DISTWIDTH] IN BLOOD BY AUTOMATED COUNT: 15.1 % (ref 11.5–14.5)
GLOBULIN SER CALC-MCNC: 3.1 G/DL (ref 2–4)
GLUCOSE SERPL-MCNC: 131 MG/DL (ref 65–100)
HCT VFR BLD AUTO: 24.4 % (ref 35–47)
HGB BLD-MCNC: 8.3 G/DL (ref 11.5–16)
IMM GRANULOCYTES # BLD AUTO: 0 K/UL
IMM GRANULOCYTES NFR BLD AUTO: 0 %
LYMPHOCYTES # BLD: 1.7 K/UL (ref 0.8–3.5)
LYMPHOCYTES NFR BLD: 18 % (ref 12–49)
MCH RBC QN AUTO: 30.2 PG (ref 26–34)
MCHC RBC AUTO-ENTMCNC: 34 G/DL (ref 30–36.5)
MCV RBC AUTO: 88.7 FL (ref 80–99)
METAMYELOCYTES NFR BLD MANUAL: 2 %
MONOCYTES # BLD: 0.6 K/UL (ref 0–1)
MONOCYTES NFR BLD: 6 % (ref 5–13)
MYELOCYTES NFR BLD MANUAL: 1 %
NEUTS SEG # BLD: 7.1 K/UL (ref 1.8–8)
NEUTS SEG NFR BLD: 73 % (ref 32–75)
NRBC # BLD: 0.08 K/UL (ref 0–0.01)
NRBC BLD-RTO: 0.8 PER 100 WBC
PLATELET # BLD AUTO: 104 K/UL (ref 150–400)
PMV BLD AUTO: 9.4 FL (ref 8.9–12.9)
POTASSIUM SERPL-SCNC: 4.2 MMOL/L (ref 3.5–5.1)
PROT SERPL-MCNC: 6.5 G/DL (ref 6.4–8.2)
RBC # BLD AUTO: 2.75 M/UL (ref 3.8–5.2)
RBC MORPH BLD: ABNORMAL
RBC MORPH BLD: ABNORMAL
SODIUM SERPL-SCNC: 139 MMOL/L (ref 136–145)
WBC # BLD AUTO: 9.7 K/UL (ref 3.6–11)

## 2024-11-21 PROCEDURE — 85025 COMPLETE CBC W/AUTO DIFF WBC: CPT

## 2024-11-21 PROCEDURE — 96417 CHEMO IV INFUS EACH ADDL SEQ: CPT

## 2024-11-21 PROCEDURE — 96413 CHEMO IV INFUSION 1 HR: CPT

## 2024-11-21 PROCEDURE — 36415 COLL VENOUS BLD VENIPUNCTURE: CPT

## 2024-11-21 PROCEDURE — 6360000002 HC RX W HCPCS: Performed by: INTERNAL MEDICINE

## 2024-11-21 PROCEDURE — 80053 COMPREHEN METABOLIC PANEL: CPT

## 2024-11-21 PROCEDURE — 2580000003 HC RX 258: Performed by: INTERNAL MEDICINE

## 2024-11-21 PROCEDURE — 96375 TX/PRO/DX INJ NEW DRUG ADDON: CPT

## 2024-11-21 RX ORDER — DEXAMETHASONE SODIUM PHOSPHATE 10 MG/ML
10 INJECTION, SOLUTION INTRAMUSCULAR; INTRAVENOUS ONCE
Status: COMPLETED | OUTPATIENT
Start: 2024-11-21 | End: 2024-11-21

## 2024-11-21 RX ORDER — SODIUM CHLORIDE 9 MG/ML
5-250 INJECTION, SOLUTION INTRAVENOUS PRN
Status: DISCONTINUED | OUTPATIENT
Start: 2024-11-21 | End: 2024-11-22 | Stop reason: HOSPADM

## 2024-11-21 RX ORDER — SODIUM CHLORIDE 0.9 % (FLUSH) 0.9 %
5-40 SYRINGE (ML) INJECTION PRN
Status: DISCONTINUED | OUTPATIENT
Start: 2024-11-21 | End: 2024-11-22 | Stop reason: HOSPADM

## 2024-11-21 RX ORDER — HEPARIN 100 UNIT/ML
500 SYRINGE INTRAVENOUS PRN
Status: DISCONTINUED | OUTPATIENT
Start: 2024-11-21 | End: 2024-11-22 | Stop reason: HOSPADM

## 2024-11-21 RX ORDER — PALONOSETRON 0.05 MG/ML
0.25 INJECTION, SOLUTION INTRAVENOUS ONCE
Status: COMPLETED | OUTPATIENT
Start: 2024-11-21 | End: 2024-11-21

## 2024-11-21 RX ADMIN — DEXAMETHASONE SODIUM PHOSPHATE 10 MG: 10 INJECTION INTRAMUSCULAR; INTRAVENOUS at 10:49

## 2024-11-21 RX ADMIN — PALONOSETRON HYDROCHLORIDE 0.25 MG: 0.25 INJECTION INTRAVENOUS at 10:46

## 2024-11-21 RX ADMIN — GEMCITABINE HYDROCHLORIDE 1560 MG: 1 INJECTION, SOLUTION INTRAVENOUS at 12:16

## 2024-11-21 RX ADMIN — SODIUM CHLORIDE, PRESERVATIVE FREE 20 ML: 5 INJECTION INTRAVENOUS at 12:48

## 2024-11-21 RX ADMIN — CARBOPLATIN 215 MG: 10 INJECTION, SOLUTION INTRAVENOUS at 11:38

## 2024-11-21 NOTE — PROGRESS NOTES
A Spiritual Care Partner Volunteer visited Alea MARIJA Lindsay at [facility] in [facility] on 11/21/2024     Documented by: Chaplain Maddi Watkins

## 2024-11-21 NOTE — PROGRESS NOTES
[facility] Chemotherapy Progress Note    Date: 2024    Name: Alea Lindsay    MRN: [id number]         : 1990      0800 Pt admit to [facility] for C2 D8 Carbo/Gemzar ambulatory in stable condition. Assessment completed. No new concerns voiced. Port with positive blood return. Labs sent for processing.                          No data to display                  Ms. Lindsay's vitals were reviewed.  Patient Vitals for the past 12 hrs:   Temp Pulse Resp BP SpO2   24 1245 -- 89 -- 111/73 --   24 0800 98.4 °F (36.9 °C) 98 16 (!) 148/75 100 %         Lab results were obtained and reviewed.  Recent Results (from the past 12 hour(s))   Comprehensive metabolic panel    Collection Time: 24  8:21 AM   Result Value Ref Range    Sodium 139 136 - 145 mmol/L    Potassium 4.2 3.5 - 5.1 mmol/L    Chloride 107 97 - 108 mmol/L    CO2 23 21 - 32 mmol/L    Anion Gap 9 2 - 12 mmol/L    Glucose 131 (H) 65 - 100 mg/dL    BUN 8 6 - 20 MG/DL    Creatinine 0.62 0.55 - 1.02 MG/DL    BUN/Creatinine Ratio 13 12 - 20      Est, Glom Filt Rate >90 >60 ml/min/1.73m2    Calcium 7.9 (L) 8.5 - 10.1 MG/DL    Total Bilirubin 0.5 0.2 - 1.0 MG/DL     (H) 12 - 78 U/L    AST 65 (H) 15 - 37 U/L    Alk Phosphatase 477 (H) 45 - 117 U/L    Total Protein 6.5 6.4 - 8.2 g/dL    Albumin 3.4 (L) 3.5 - 5.0 g/dL    Globulin 3.1 2.0 - 4.0 g/dL    Albumin/Globulin Ratio 1.1 1.1 - 2.2     CBC With Auto Differential    Collection Time: 24  8:21 AM   Result Value Ref Range    WBC 9.7 3.6 - 11.0 K/uL    RBC 2.75 (L) 3.80 - 5.20 M/uL    Hemoglobin 8.3 (L) 11.5 - 16.0 g/dL    Hematocrit 24.4 (L) 35.0 - 47.0 %    MCV 88.7 80.0 - 99.0 FL    MCH 30.2 26.0 - 34.0 PG    MCHC 34.0 30.0 - 36.5 g/dL    RDW 15.1 (H) 11.5 - 14.5 %    Platelets 104 (L) 150 - 400 K/uL    MPV 9.4 8.9 - 12.9 FL    Nucleated RBCs 0.8 (H) 0  WBC    nRBC 0.08 (H) 0.00 - 0.01 K/uL    Neutrophils % 73 32 - 75 %    Lymphocytes % 18 12 - 49 %    Monocytes % 6 5 -  Flushed, and de-accessed per protocol. D/c home ambulatory in no distress. Pt aware of next appointment scheduled for 12/4/24.    Future Appointments   Date Time Provider Department Center   12/4/2024 10:30 AM [facility] LAB CHAIR 1 [facility]   12/4/2024 10:45 AM Sepideh Gillespie DO [facility]   12/5/2024  8:00 AM [facility] CHEMO CHAIR 7 [facility]   12/11/2024 10:00 AM [facility] LAB CHAIR 1 [facility]   12/12/2024  8:00 AM [facility] CHEMO CHAIR 7 [facility]   12/12/2024  8:15 AM Sepideh Gillespie DO [facility] BS AMB   12/23/2024  1:30 PM [facility] LAB CHAIR 1 [facility]   12/26/2024  8:30 AM [facility] CHEMO CHAIR 8 [facility]   12/30/2024 10:00 AM [facility] LAB CHAIR 1 [facility]   1/2/2025  8:00 AM [facility] CHEMO CHAIR 7 [facility]   1/15/2025 10:30 AM [facility] LAB CHAIR 1 [facility]   1/16/2025  9:30 AM [facility] CHEMO CHAIR 10 [facility]   1/16/2025  9:45 AM Radha Christensen APRN - NP [facility] BS AMB   1/22/2025 10:00 AM [facility] LAB CHAIR 1 [facility]   1/23/2025  8:30 AM [facility] CHEMO CHAIR 8 [facility]   2/5/2025 10:00 AM [facility] LAB CHAIR 1 [facility]   2/6/2025  8:30 AM [facility] CHEMO CHAIR 8 [facility]   2/6/2025  8:45 AM Sepideh Gillespie DO [facility] BS AMB   2/12/2025  9:30 AM [facility] LAB CHAIR 1 [facility]   2/13/2025  8:00 AM [facility] CHEMO CHAIR 7 [facility]         SEVERINO REYES RN  November 21, 2024

## 2024-11-26 RX ORDER — HEPARIN 100 UNIT/ML
500 SYRINGE INTRAVENOUS PRN
Status: CANCELLED | OUTPATIENT
Start: 2024-12-05

## 2024-11-26 RX ORDER — ONDANSETRON 2 MG/ML
8 INJECTION INTRAMUSCULAR; INTRAVENOUS
Status: CANCELLED | OUTPATIENT
Start: 2024-12-05

## 2024-11-26 RX ORDER — SODIUM CHLORIDE 9 MG/ML
5-250 INJECTION, SOLUTION INTRAVENOUS PRN
Status: CANCELLED | OUTPATIENT
Start: 2024-12-05

## 2024-11-26 RX ORDER — EPINEPHRINE 1 MG/ML
0.3 INJECTION, SOLUTION INTRAMUSCULAR; SUBCUTANEOUS PRN
Status: CANCELLED | OUTPATIENT
Start: 2024-12-12

## 2024-11-26 RX ORDER — HYDROCORTISONE SODIUM SUCCINATE 100 MG/2ML
100 INJECTION INTRAMUSCULAR; INTRAVENOUS
Status: CANCELLED | OUTPATIENT
Start: 2024-12-05

## 2024-11-26 RX ORDER — DEXAMETHASONE SODIUM PHOSPHATE 10 MG/ML
10 INJECTION, SOLUTION INTRAMUSCULAR; INTRAVENOUS ONCE
Status: CANCELLED | OUTPATIENT
Start: 2024-12-05 | End: 2024-12-05

## 2024-11-26 RX ORDER — ONDANSETRON 2 MG/ML
8 INJECTION INTRAMUSCULAR; INTRAVENOUS
Status: CANCELLED | OUTPATIENT
Start: 2024-12-12

## 2024-11-26 RX ORDER — SODIUM CHLORIDE 0.9 % (FLUSH) 0.9 %
5-40 SYRINGE (ML) INJECTION PRN
Status: CANCELLED | OUTPATIENT
Start: 2024-12-12

## 2024-11-26 RX ORDER — HEPARIN 100 UNIT/ML
500 SYRINGE INTRAVENOUS PRN
Status: CANCELLED | OUTPATIENT
Start: 2024-12-12

## 2024-11-26 RX ORDER — DEXAMETHASONE SODIUM PHOSPHATE 10 MG/ML
10 INJECTION, SOLUTION INTRAMUSCULAR; INTRAVENOUS ONCE
Status: CANCELLED | OUTPATIENT
Start: 2024-12-12 | End: 2024-12-12

## 2024-11-26 RX ORDER — SODIUM CHLORIDE 9 MG/ML
5-250 INJECTION, SOLUTION INTRAVENOUS PRN
Status: CANCELLED | OUTPATIENT
Start: 2024-12-12

## 2024-11-26 RX ORDER — ALBUTEROL SULFATE 90 UG/1
4 INHALANT RESPIRATORY (INHALATION) PRN
Status: CANCELLED | OUTPATIENT
Start: 2024-12-05

## 2024-11-26 RX ORDER — EPINEPHRINE 1 MG/ML
0.3 INJECTION, SOLUTION INTRAMUSCULAR; SUBCUTANEOUS PRN
Status: CANCELLED | OUTPATIENT
Start: 2024-12-05

## 2024-11-26 RX ORDER — SODIUM CHLORIDE 9 MG/ML
INJECTION, SOLUTION INTRAVENOUS CONTINUOUS
Status: CANCELLED | OUTPATIENT
Start: 2024-12-12

## 2024-11-26 RX ORDER — MEPERIDINE HYDROCHLORIDE 25 MG/ML
12.5 INJECTION INTRAMUSCULAR; INTRAVENOUS; SUBCUTANEOUS PRN
Status: CANCELLED | OUTPATIENT
Start: 2024-12-12

## 2024-11-26 RX ORDER — ACETAMINOPHEN 325 MG/1
650 TABLET ORAL
Status: CANCELLED | OUTPATIENT
Start: 2024-12-12

## 2024-11-26 RX ORDER — DIPHENHYDRAMINE HYDROCHLORIDE 50 MG/ML
50 INJECTION INTRAMUSCULAR; INTRAVENOUS
Status: CANCELLED | OUTPATIENT
Start: 2024-12-05

## 2024-11-26 RX ORDER — HYDROCORTISONE SODIUM SUCCINATE 100 MG/2ML
100 INJECTION INTRAMUSCULAR; INTRAVENOUS
Status: CANCELLED | OUTPATIENT
Start: 2024-12-12

## 2024-11-26 RX ORDER — PALONOSETRON 0.05 MG/ML
0.25 INJECTION, SOLUTION INTRAVENOUS ONCE
Status: CANCELLED | OUTPATIENT
Start: 2024-12-05 | End: 2024-12-05

## 2024-11-26 RX ORDER — SODIUM CHLORIDE 0.9 % (FLUSH) 0.9 %
5-40 SYRINGE (ML) INJECTION PRN
Status: CANCELLED | OUTPATIENT
Start: 2024-12-05

## 2024-11-26 RX ORDER — SODIUM CHLORIDE 9 MG/ML
INJECTION, SOLUTION INTRAVENOUS CONTINUOUS
Status: CANCELLED | OUTPATIENT
Start: 2024-12-05

## 2024-11-26 RX ORDER — PALONOSETRON 0.05 MG/ML
0.25 INJECTION, SOLUTION INTRAVENOUS ONCE
Status: CANCELLED | OUTPATIENT
Start: 2024-12-12 | End: 2024-12-12

## 2024-11-26 RX ORDER — DIPHENHYDRAMINE HYDROCHLORIDE 50 MG/ML
50 INJECTION INTRAMUSCULAR; INTRAVENOUS
Status: CANCELLED | OUTPATIENT
Start: 2024-12-12

## 2024-11-26 RX ORDER — ALBUTEROL SULFATE 90 UG/1
4 INHALANT RESPIRATORY (INHALATION) PRN
Status: CANCELLED | OUTPATIENT
Start: 2024-12-12

## 2024-11-26 RX ORDER — ACETAMINOPHEN 325 MG/1
650 TABLET ORAL
Status: CANCELLED | OUTPATIENT
Start: 2024-12-05

## 2024-11-26 RX ORDER — MEPERIDINE HYDROCHLORIDE 25 MG/ML
12.5 INJECTION INTRAMUSCULAR; INTRAVENOUS; SUBCUTANEOUS PRN
Status: CANCELLED | OUTPATIENT
Start: 2024-12-05

## 2024-11-27 ENCOUNTER — APPOINTMENT (OUTPATIENT)
Facility: HOSPITAL | Age: 34
End: 2024-11-27
Payer: COMMERCIAL

## 2024-12-03 ENCOUNTER — APPOINTMENT (OUTPATIENT)
Facility: HOSPITAL | Age: 34
End: 2024-12-03
Payer: COMMERCIAL

## 2024-12-03 NOTE — PROGRESS NOTES
[facility] [place] at [facility]  [address], [address] [place] [address]  W: [phone]  F: [phone]    Reason for Visit:   Alea Lindsay is a 34 y.o. female seen today in office for follow up of MBC.    Treatment History:   Initially presented to [facility] with LEFT breast pain  Mammo 9/6/24 with 2.8 cm spiculated mass with two other suspicious masses on LEFT  09/09/24 -  (A) LEFT Breast 4:00 biopsy: PATH - IDC, grade 3, ER negative, WA negative, HER2 negative, Ki 67 >95%, with tumor necrosis.  Lymphovascular invasion identified  (B) LEFT Breast 3:00 biopsy: PATH - IDC, grade 3, ER negative, WA negative, HER2 negative, Ki 67 >95%,  with tumor necrosis  (C) LEFT Breast 1:00 biopsy: PATH - IDC grade 3, ER negative, WA negative, HER2 negative, Ki 67 >95%, with tumor necrosis, Lymphovascular invasion identified  (D) Lymph Node LEFT Axilla - Metastatic carcinoma  Breast MRI 9/20/24: Right Breast: There is no suspicious mass or non-mass enhancement within the right breast. There is no skin thickening or nipple retraction. There is no suspicious axillary or internal mammary chain lymphadenopathy. Left Breast: There are multiple malignant masses in the central and lateral left breast. The largest of these is located posteriorly at 4:00, is solid and cystic with areas of central necrosis, and measures up to 3.1 x 2.1 x 2 cm. Anterior and superior to this, are 5 additional smaller masses, all within close proximity, and the largest of these has a biopsy clip associated with it. The largest mass measures 1.3 cm. There is very little intervening normal breast tissue  the dominant mass at 4:00 from these multiple additional masses within the central left breast. Within the anterior third laterally at 3:00, there is an additional malignant mass containing a biopsy clip, and this mass measures up to 1 cm. Posterior to this mass by approximately 2 cm is an additional malignant mass

## 2024-12-04 ENCOUNTER — APPOINTMENT (OUTPATIENT)
Facility: HOSPITAL | Age: 34
End: 2024-12-04
Payer: COMMERCIAL

## 2024-12-04 ENCOUNTER — HOSPITAL ENCOUNTER (OUTPATIENT)
Facility: HOSPITAL | Age: 34
Setting detail: INFUSION SERIES
Discharge: HOME | End: 2024-12-04
Payer: COMMERCIAL

## 2024-12-04 ENCOUNTER — OFFICE VISIT (OUTPATIENT)
Age: 34
End: 2024-12-04
Payer: COMMERCIAL

## 2024-12-04 VITALS
DIASTOLIC BLOOD PRESSURE: 70 MMHG | BODY MASS INDEX: 33.13 KG/M2 | OXYGEN SATURATION: 99 % | SYSTOLIC BLOOD PRESSURE: 104 MMHG | WEIGHT: 193 LBS | HEART RATE: 109 BPM | TEMPERATURE: 98.4 F | RESPIRATION RATE: 18 BRPM

## 2024-12-04 DIAGNOSIS — C79.51 MALIGNANT NEOPLASM OF BREAST METASTATIC TO BONE (HCC): Primary | ICD-10-CM

## 2024-12-04 DIAGNOSIS — R53.83 CHEMOTHERAPY-INDUCED FATIGUE: ICD-10-CM

## 2024-12-04 DIAGNOSIS — C50.919 MALIGNANT NEOPLASM OF BREAST METASTATIC TO BONE (HCC): Primary | ICD-10-CM

## 2024-12-04 DIAGNOSIS — E83.51 HYPOCALCEMIA: Primary | ICD-10-CM

## 2024-12-04 DIAGNOSIS — T45.1X5A CHEMOTHERAPY INDUCED NEUTROPENIA (HCC): ICD-10-CM

## 2024-12-04 DIAGNOSIS — D70.1 CHEMOTHERAPY INDUCED NEUTROPENIA (HCC): ICD-10-CM

## 2024-12-04 DIAGNOSIS — C50.912 CARCINOMA OF LEFT BREAST METASTATIC TO BONE (HCC): ICD-10-CM

## 2024-12-04 DIAGNOSIS — C79.51 CARCINOMA OF LEFT BREAST METASTATIC TO BONE (HCC): ICD-10-CM

## 2024-12-04 DIAGNOSIS — R79.89 ELEVATED LFTS: ICD-10-CM

## 2024-12-04 DIAGNOSIS — T45.1X5A CHEMOTHERAPY-INDUCED FATIGUE: ICD-10-CM

## 2024-12-04 LAB
ALBUMIN SERPL-MCNC: 4 G/DL (ref 3.5–5)
ALBUMIN/GLOB SERPL: 1.3 (ref 1.1–2.2)
ALP SERPL-CCNC: 376 U/L (ref 45–117)
ALT SERPL-CCNC: 82 U/L (ref 12–78)
ANION GAP SERPL CALC-SCNC: 6 MMOL/L (ref 2–12)
AST SERPL-CCNC: 62 U/L (ref 15–37)
BASOPHILS # BLD: 0 K/UL (ref 0–0.1)
BASOPHILS NFR BLD: 0 % (ref 0–1)
BILIRUB SERPL-MCNC: 0.8 MG/DL (ref 0.2–1)
BUN SERPL-MCNC: 5 MG/DL (ref 6–20)
BUN/CREAT SERPL: 8 (ref 12–20)
CALCIUM SERPL-MCNC: 7.9 MG/DL (ref 8.5–10.1)
CHLORIDE SERPL-SCNC: 109 MMOL/L (ref 97–108)
CO2 SERPL-SCNC: 22 MMOL/L (ref 21–32)
CREAT SERPL-MCNC: 0.61 MG/DL (ref 0.55–1.02)
DIFFERENTIAL METHOD BLD: ABNORMAL
EOSINOPHIL # BLD: 0.1 K/UL (ref 0–0.4)
EOSINOPHIL NFR BLD: 3 % (ref 0–7)
ERYTHROCYTE [DISTWIDTH] IN BLOOD BY AUTOMATED COUNT: 18.8 % (ref 11.5–14.5)
GLOBULIN SER CALC-MCNC: 3.1 G/DL (ref 2–4)
GLUCOSE SERPL-MCNC: 104 MG/DL (ref 65–100)
HCT VFR BLD AUTO: 23.7 % (ref 35–47)
HGB BLD-MCNC: 7.5 G/DL (ref 11.5–16)
IMM GRANULOCYTES # BLD AUTO: 0 K/UL
IMM GRANULOCYTES NFR BLD AUTO: 0 %
LYMPHOCYTES # BLD: 1.4 K/UL (ref 0.8–3.5)
LYMPHOCYTES NFR BLD: 44 % (ref 12–49)
MAGNESIUM SERPL-MCNC: 2.3 MG/DL (ref 1.6–2.4)
MCH RBC QN AUTO: 30.4 PG (ref 26–34)
MCHC RBC AUTO-ENTMCNC: 31.6 G/DL (ref 30–36.5)
MCV RBC AUTO: 96 FL (ref 80–99)
MONOCYTES # BLD: 0.2 K/UL (ref 0–1)
MONOCYTES NFR BLD: 7 % (ref 5–13)
NEUTS SEG # BLD: 1.4 K/UL (ref 1.8–8)
NEUTS SEG NFR BLD: 46 % (ref 32–75)
NRBC # BLD: 0.24 K/UL (ref 0–0.01)
NRBC BLD-RTO: 7.8 PER 100 WBC
PHOSPHATE SERPL-MCNC: 1.7 MG/DL (ref 2.6–4.7)
PLATELET # BLD AUTO: 159 K/UL (ref 150–400)
PMV BLD AUTO: 10.6 FL (ref 8.9–12.9)
POTASSIUM SERPL-SCNC: 4.3 MMOL/L (ref 3.5–5.1)
PROT SERPL-MCNC: 7.1 G/DL (ref 6.4–8.2)
RBC # BLD AUTO: 2.47 M/UL (ref 3.8–5.2)
RBC MORPH BLD: ABNORMAL
SODIUM SERPL-SCNC: 137 MMOL/L (ref 136–145)
TSH SERPL DL<=0.05 MIU/L-ACNC: 1.99 UIU/ML (ref 0.36–3.74)
WBC # BLD AUTO: 3.1 K/UL (ref 3.6–11)
WBC MORPH BLD: ABNORMAL

## 2024-12-04 PROCEDURE — 85025 COMPLETE CBC W/AUTO DIFF WBC: CPT

## 2024-12-04 PROCEDURE — 99215 OFFICE O/P EST HI 40 MIN: CPT | Performed by: INTERNAL MEDICINE

## 2024-12-04 PROCEDURE — 83735 ASSAY OF MAGNESIUM: CPT

## 2024-12-04 PROCEDURE — 84443 ASSAY THYROID STIM HORMONE: CPT

## 2024-12-04 PROCEDURE — 84100 ASSAY OF PHOSPHORUS: CPT

## 2024-12-04 PROCEDURE — 80053 COMPREHEN METABOLIC PANEL: CPT

## 2024-12-04 PROCEDURE — 36415 COLL VENOUS BLD VENIPUNCTURE: CPT

## 2024-12-04 NOTE — PROGRESS NOTES
Alea Lindsay is a 34 y.o. female    Chief Complaint   Patient presents with    Follow-up     MBC       1. Have you been to the ER, urgent care clinic since your last visit?  Hospitalized since your last visit?No    2. Have you seen or consulted any other health care providers outside of the [facility] since your last visit?  Include any pap smears or colon screening. No

## 2024-12-04 NOTE — PROGRESS NOTES
[facility] Progress Note    Date: 2024    Name: Alea Lindsay    MRN: [id number]         : 1990      Pt arrived ambulatory and in no distress, for lab visit.  Labs drawn via L forearm, patient tolerated well.        Please check Epic for Results.     Departed [facility] ambulatory and in no distress.    Future Appointments   Date Time Provider Department Center   2024  8:00 AM [facility] CHEMO CHAIR 7 [facility]   2024 10:00 AM [facility] LAB CHAIR 1 [facility]   2024  8:00 AM [facility] CHEMO CHAIR 7 [facility]   2024  8:15 AM Sepideh Gillespie,  [facility] AMB   2024  1:30 PM [facility] LAB CHAIR 1 [facility]   2024  8:30 AM [facility] CHEMO CHAIR 8 [facility]   2024 10:00 AM [facility] LAB CHAIR 1 [facility]   2025  8:00 AM [facility] CHEMO CHAIR 7 [facility]   1/15/2025 10:30 AM [facility] LAB CHAIR 1 [facility]   2025  9:30 AM [facility] CHEMO CHAIR 10 [facility]   2025  9:45 AM Radha Christensen APRN - NP [facility] AMB   2025 10:00 AM [facility] LAB CHAIR 1 [facility]   2025  8:30 AM [facility] CHEMO CHAIR 8 [facility]   2025 10:00 AM [facility] LAB CHAIR 1 [facility]   2025  8:30 AM [facility] CHEMO CHAIR 8 [facility]   2025  8:45 AM Sepideh Gillespie DO [facility] BS AMB   2025  9:30 AM [facility] LAB CHAIR 1 [facility]   2025  8:00 AM [facility] CHEMO CHAIR 7 [facility]       VeraEmma Marli  2024

## 2024-12-05 ENCOUNTER — HOSPITAL ENCOUNTER (OUTPATIENT)
Facility: HOSPITAL | Age: 34
Setting detail: INFUSION SERIES
Discharge: HOME | End: 2024-12-05
Payer: COMMERCIAL

## 2024-12-05 VITALS
HEIGHT: 64 IN | TEMPERATURE: 97.9 F | OXYGEN SATURATION: 100 % | HEART RATE: 98 BPM | RESPIRATION RATE: 18 BRPM | BODY MASS INDEX: 33.12 KG/M2 | WEIGHT: 194 LBS | SYSTOLIC BLOOD PRESSURE: 118 MMHG | DIASTOLIC BLOOD PRESSURE: 74 MMHG

## 2024-12-05 DIAGNOSIS — C79.51 CARCINOMA OF LEFT BREAST METASTATIC TO BONE (HCC): Primary | ICD-10-CM

## 2024-12-05 DIAGNOSIS — C50.912 CARCINOMA OF LEFT BREAST METASTATIC TO BONE (HCC): Primary | ICD-10-CM

## 2024-12-05 DIAGNOSIS — E83.51 HYPOCALCEMIA: ICD-10-CM

## 2024-12-05 PROCEDURE — 96375 TX/PRO/DX INJ NEW DRUG ADDON: CPT

## 2024-12-05 PROCEDURE — 6360000002 HC RX W HCPCS: Performed by: INTERNAL MEDICINE

## 2024-12-05 PROCEDURE — 96413 CHEMO IV INFUSION 1 HR: CPT

## 2024-12-05 PROCEDURE — 6370000000 HC RX 637 (ALT 250 FOR IP): Performed by: NURSE PRACTITIONER

## 2024-12-05 PROCEDURE — 96417 CHEMO IV INFUS EACH ADDL SEQ: CPT

## 2024-12-05 PROCEDURE — 2580000003 HC RX 258: Performed by: INTERNAL MEDICINE

## 2024-12-05 RX ORDER — SODIUM CHLORIDE 0.9 % (FLUSH) 0.9 %
5-40 SYRINGE (ML) INJECTION PRN
Status: DISCONTINUED | OUTPATIENT
Start: 2024-12-05 | End: 2024-12-06 | Stop reason: HOSPADM

## 2024-12-05 RX ORDER — DEXAMETHASONE SODIUM PHOSPHATE 10 MG/ML
10 INJECTION, SOLUTION INTRAMUSCULAR; INTRAVENOUS ONCE
Status: COMPLETED | OUTPATIENT
Start: 2024-12-05 | End: 2024-12-05

## 2024-12-05 RX ORDER — ONDANSETRON 2 MG/ML
8 INJECTION INTRAMUSCULAR; INTRAVENOUS
OUTPATIENT
Start: 2025-01-15

## 2024-12-05 RX ORDER — HEPARIN 100 UNIT/ML
500 SYRINGE INTRAVENOUS PRN
Status: DISCONTINUED | OUTPATIENT
Start: 2024-12-05 | End: 2024-12-06 | Stop reason: HOSPADM

## 2024-12-05 RX ORDER — ALBUTEROL SULFATE 90 UG/1
4 INHALANT RESPIRATORY (INHALATION) PRN
OUTPATIENT
Start: 2025-01-15

## 2024-12-05 RX ORDER — HYDROCORTISONE SODIUM SUCCINATE 100 MG/2ML
100 INJECTION INTRAMUSCULAR; INTRAVENOUS
OUTPATIENT
Start: 2025-01-15

## 2024-12-05 RX ORDER — ACETAMINOPHEN 325 MG/1
650 TABLET ORAL
OUTPATIENT
Start: 2025-01-15

## 2024-12-05 RX ORDER — DIPHENHYDRAMINE HYDROCHLORIDE 50 MG/ML
50 INJECTION INTRAMUSCULAR; INTRAVENOUS
OUTPATIENT
Start: 2025-01-15

## 2024-12-05 RX ORDER — PALONOSETRON 0.05 MG/ML
0.25 INJECTION, SOLUTION INTRAVENOUS ONCE
Status: COMPLETED | OUTPATIENT
Start: 2024-12-05 | End: 2024-12-05

## 2024-12-05 RX ORDER — EPINEPHRINE 1 MG/ML
0.3 INJECTION, SOLUTION INTRAMUSCULAR; SUBCUTANEOUS PRN
OUTPATIENT
Start: 2025-01-15

## 2024-12-05 RX ORDER — SODIUM CHLORIDE 9 MG/ML
INJECTION, SOLUTION INTRAVENOUS CONTINUOUS
OUTPATIENT
Start: 2025-01-15

## 2024-12-05 RX ORDER — SODIUM CHLORIDE 9 MG/ML
5-250 INJECTION, SOLUTION INTRAVENOUS PRN
Status: DISCONTINUED | OUTPATIENT
Start: 2024-12-05 | End: 2024-12-06 | Stop reason: HOSPADM

## 2024-12-05 RX ADMIN — PALONOSETRON 0.25 MG: 0.05 INJECTION, SOLUTION INTRAVENOUS at 09:58

## 2024-12-05 RX ADMIN — CARBOPLATIN 220 MG: 10 INJECTION, SOLUTION INTRAVENOUS at 10:30

## 2024-12-05 RX ADMIN — SODIUM CHLORIDE 200 MG: 9 INJECTION, SOLUTION INTRAVENOUS at 09:11

## 2024-12-05 RX ADMIN — GEMCITABINE HYDROCHLORIDE 1560 MG: 1 INJECTION, SOLUTION INTRAVENOUS at 11:05

## 2024-12-05 RX ADMIN — DEXAMETHASONE SODIUM PHOSPHATE 10 MG: 10 INJECTION, SOLUTION INTRAMUSCULAR; INTRAVENOUS at 10:00

## 2024-12-05 RX ADMIN — POTASSIUM & SODIUM PHOSPHATES POWDER PACK 280-160-250 MG 500 MG: 280-160-250 PACK at 08:27

## 2024-12-05 ASSESSMENT — PAIN SCALES - GENERAL: PAINLEVEL_OUTOF10: 0

## 2024-12-05 NOTE — PROGRESS NOTES
\[facility] [place] [facility]\"" Progress Note    08:00 Ms. Lindsay Arrived ambulatory and in no distress for KEYTRUDA/CARBO/GEMZAR C3D1 regimen.  Assessment was completed, no acute issues at this time, no new complaints voiced.  Port accessed without difficulty, labs drawn and processed.    Ms. Lindsay's vitals were reviewed.  Patient Vitals for the past 12 hrs:   Temp Pulse Resp BP SpO2   12/05/24 1134 -- 98 -- 118/74 --   12/05/24 0804 97.9 °F (36.6 °C) 96 18 (!) 140/87 100 %         Lab results were obtained and reviewed.  No results found for this or any previous visit (from the past 12 hour(s)).    Pre-medications  were administered as ordered and chemotherapy was initiated.  Medications Administered         CARBOplatin (PARAPLATIN) 220 mg in sodium chloride 0.9 % 100 mL chemo IVPB Admin Date  12/05/2024 Action  New Bag Dose  220 mg Rate  264 mL/hr Route  IntraVENous Documented By  Yolie Subramanian RN        dexAMETHasone (PF) (DECADRON) injection 10 mg Admin Date  12/05/2024 Action  Given Dose  10 mg Rate   Route  IntraVENous Documented By  Yolie Subramanian RN        gemcitabine HCl (GEMZAR) 1,560 mg in sodium chloride 0.9 % 100 mL chemo IVPB Admin Date  12/05/2024 Action  New Bag Dose  1,560 mg Rate  302.1 mL/hr Route  IntraVENous Documented By  Yolie Subramanian RN        palonosetron (ALOXI) injection 0.25 mg Admin Date  12/05/2024 Action  Given Dose  0.25 mg Rate   Route  IntraVENous Documented By  Yolie Subramanian RN        pembrolizumab (KEYTRUDA) 200 mg in sodium chloride 0.9 % 100 mL IVPB Admin Date  12/05/2024 Action  New Bag Dose  200 mg Rate  236 mL/hr Route  IntraVENous Documented By  Yolie Subramanian RN        potassium & sodium phosphates (PHOS-NAK) 280-160-250 MG packet 500 mg Admin Date  12/05/2024 Action  Given Dose  500 mg Rate   Route  Oral Documented By  Yolie Subramanian RN            Two nurses verified prior to administering: Drug name, Drug dose, Infusion volume or drug volume when prepared in a syringe, Rate of

## 2024-12-11 ENCOUNTER — APPOINTMENT (OUTPATIENT)
Facility: HOSPITAL | Age: 34
End: 2024-12-11
Payer: COMMERCIAL

## 2024-12-11 ENCOUNTER — HOSPITAL ENCOUNTER (OUTPATIENT)
Facility: HOSPITAL | Age: 34
Setting detail: INFUSION SERIES
Discharge: HOME | End: 2024-12-11
Payer: COMMERCIAL

## 2024-12-11 VITALS — BODY MASS INDEX: 33.06 KG/M2 | WEIGHT: 192.6 LBS

## 2024-12-11 DIAGNOSIS — C50.912 CARCINOMA OF LEFT BREAST METASTATIC TO BONE (HCC): ICD-10-CM

## 2024-12-11 DIAGNOSIS — C79.51 CARCINOMA OF LEFT BREAST METASTATIC TO BONE (HCC): ICD-10-CM

## 2024-12-11 DIAGNOSIS — E83.51 HYPOCALCEMIA: ICD-10-CM

## 2024-12-11 LAB
ALBUMIN SERPL-MCNC: 3.9 G/DL (ref 3.5–5)
ALBUMIN/GLOB SERPL: 1.1 (ref 1.1–2.2)
ALP SERPL-CCNC: 374 U/L (ref 45–117)
ALT SERPL-CCNC: 161 U/L (ref 12–78)
ANION GAP SERPL CALC-SCNC: 8 MMOL/L (ref 2–12)
AST SERPL-CCNC: 83 U/L (ref 15–37)
BASOPHILS # BLD: 0 K/UL (ref 0–0.1)
BASOPHILS NFR BLD: 1 % (ref 0–1)
BILIRUB SERPL-MCNC: 0.8 MG/DL (ref 0.2–1)
BUN SERPL-MCNC: 10 MG/DL (ref 6–20)
BUN/CREAT SERPL: 14 (ref 12–20)
CALCIUM SERPL-MCNC: 8.9 MG/DL (ref 8.5–10.1)
CHLORIDE SERPL-SCNC: 105 MMOL/L (ref 97–108)
CO2 SERPL-SCNC: 26 MMOL/L (ref 21–32)
CREAT SERPL-MCNC: 0.71 MG/DL (ref 0.55–1.02)
DIFFERENTIAL METHOD BLD: ABNORMAL
EOSINOPHIL # BLD: 0 K/UL (ref 0–0.4)
EOSINOPHIL NFR BLD: 0 % (ref 0–7)
ERYTHROCYTE [DISTWIDTH] IN BLOOD BY AUTOMATED COUNT: 17 % (ref 11.5–14.5)
GLOBULIN SER CALC-MCNC: 3.4 G/DL (ref 2–4)
GLUCOSE SERPL-MCNC: 110 MG/DL (ref 65–100)
HCT VFR BLD AUTO: 24 % (ref 35–47)
HGB BLD-MCNC: 7.7 G/DL (ref 11.5–16)
IMM GRANULOCYTES # BLD AUTO: 0 K/UL
IMM GRANULOCYTES NFR BLD AUTO: 0 %
LYMPHOCYTES # BLD: 1.2 K/UL (ref 0.8–3.5)
LYMPHOCYTES NFR BLD: 43 % (ref 12–49)
MCH RBC QN AUTO: 30.6 PG (ref 26–34)
MCHC RBC AUTO-ENTMCNC: 32.1 G/DL (ref 30–36.5)
MCV RBC AUTO: 95.2 FL (ref 80–99)
METAMYELOCYTES NFR BLD MANUAL: 1 %
MONOCYTES # BLD: 0.1 K/UL (ref 0–1)
MONOCYTES NFR BLD: 3 % (ref 5–13)
MYELOCYTES NFR BLD MANUAL: 3 %
NEUTS BAND NFR BLD MANUAL: 1 % (ref 0–6)
NEUTS SEG # BLD: 1.3 K/UL (ref 1.8–8)
NEUTS SEG NFR BLD: 48 % (ref 32–75)
NRBC # BLD: 0.21 K/UL (ref 0–0.01)
NRBC BLD-RTO: 7.8 PER 100 WBC
PLATELET # BLD AUTO: 241 K/UL (ref 150–400)
PMV BLD AUTO: 9.8 FL (ref 8.9–12.9)
POTASSIUM SERPL-SCNC: 4.2 MMOL/L (ref 3.5–5.1)
PROT SERPL-MCNC: 7.3 G/DL (ref 6.4–8.2)
RBC # BLD AUTO: 2.52 M/UL (ref 3.8–5.2)
RBC MORPH BLD: ABNORMAL
SODIUM SERPL-SCNC: 139 MMOL/L (ref 136–145)
WBC # BLD AUTO: 2.7 K/UL (ref 3.6–11)

## 2024-12-11 PROCEDURE — 80053 COMPREHEN METABOLIC PANEL: CPT

## 2024-12-11 PROCEDURE — 36415 COLL VENOUS BLD VENIPUNCTURE: CPT

## 2024-12-11 PROCEDURE — 85025 COMPLETE CBC W/AUTO DIFF WBC: CPT

## 2024-12-11 NOTE — PROGRESS NOTES
[facility] [place] Progress [facility]    Date: 2024    Name: Alea Lindsay    MRN: [id number]         : 1990      1000:  Pt arrived ambulatory and in no distress, for lab visit.  Labs drawn via Left forearm, patient tolerated well.            Departed [facility] [place] ambulatory and in no distress.    Future Appointments   Date Time Provider Department Center   2024  8:00 AM [facility] CHEMO CHAIR [facility]   2024  1:30 PM [facility] LAB CHAIR 1 [facility]   2024  8:30 AM [facility] CHEMO CHAIR 8 [facility]   2024 10:00 AM [facility] LAB CHAIR [facility]   2025  8:00 AM [facility] CHEMO CHAIR [facility]   1/15/2025 10:30 AM [facility] LAB CHAIR [facility]   1/15/2025 10:45 AM [facility] Christensen APRN - NP [facility]   2025  9:30 AM [facility] CHEMO CHAIR 10 [facility]   2025 10:00 AM [facility] LAB CHAIR [facility]   2025  8:30 AM [facility] CHEMO CHAIR [facility]   2025 10:00 AM [facility] LAB CHAIR [facility]   2025  8:30 AM [facility]MO CHA[facility]   2025  9:30 AM [facility] CHA[facility]   2025  8:00 AM [facility] CHEMO CHAIR [facility]   2025  9:00 AM [facility] LAB CHAIR 1 [facility]   2025  8:00 AM [facility] CHEMO CHAIR [facility]   3/5/2025  9:00 AM [facility] LAB CHAIR 1 [facility]   3/6/2025  8:00 AM [facility] CHEMO CHAIR 7 [facility]   3/19/2025  9:30 AM [facility] LAB CHAIR 1 [facility]   3/20/2025  8:00 AM [facility] CHEMO CHAIR 7 [facility]   3/26/2025  9:30 AM [facility] LAB CHAIR 1 [facility]   3/27/2025  8:00 AM [facility] CHEMO CHAIR 7 [facility]       Chayo Monotya RN  2024

## 2024-12-12 ENCOUNTER — HOSPITAL ENCOUNTER (OUTPATIENT)
Facility: HOSPITAL | Age: 34
Setting detail: INFUSION SERIES
Discharge: HOME | End: 2024-12-12
Payer: COMMERCIAL

## 2024-12-12 VITALS
SYSTOLIC BLOOD PRESSURE: 129 MMHG | WEIGHT: 192.6 LBS | RESPIRATION RATE: 18 BRPM | TEMPERATURE: 97.9 F | DIASTOLIC BLOOD PRESSURE: 66 MMHG | HEIGHT: 64 IN | BODY MASS INDEX: 32.88 KG/M2 | HEART RATE: 89 BPM

## 2024-12-12 DIAGNOSIS — E83.51 HYPOCALCEMIA: ICD-10-CM

## 2024-12-12 DIAGNOSIS — C79.51 CARCINOMA OF LEFT BREAST METASTATIC TO BONE (HCC): Primary | ICD-10-CM

## 2024-12-12 DIAGNOSIS — C50.912 CARCINOMA OF LEFT BREAST METASTATIC TO BONE (HCC): Primary | ICD-10-CM

## 2024-12-12 PROCEDURE — 96372 THER/PROPH/DIAG INJ SC/IM: CPT

## 2024-12-12 PROCEDURE — 6360000002 HC RX W HCPCS: Performed by: NURSE PRACTITIONER

## 2024-12-12 PROCEDURE — 6360000002 HC RX W HCPCS: Performed by: INTERNAL MEDICINE

## 2024-12-12 PROCEDURE — 96375 TX/PRO/DX INJ NEW DRUG ADDON: CPT

## 2024-12-12 PROCEDURE — 96377 APPLICATON ON-BODY INJECTOR: CPT

## 2024-12-12 PROCEDURE — 96413 CHEMO IV INFUSION 1 HR: CPT

## 2024-12-12 PROCEDURE — 96417 CHEMO IV INFUS EACH ADDL SEQ: CPT

## 2024-12-12 PROCEDURE — 2580000003 HC RX 258: Performed by: INTERNAL MEDICINE

## 2024-12-12 RX ORDER — DIPHENHYDRAMINE HYDROCHLORIDE 50 MG/ML
50 INJECTION INTRAMUSCULAR; INTRAVENOUS
OUTPATIENT
Start: 2025-01-15

## 2024-12-12 RX ORDER — SODIUM CHLORIDE 0.9 % (FLUSH) 0.9 %
5-40 SYRINGE (ML) INJECTION PRN
Status: DISCONTINUED | OUTPATIENT
Start: 2024-12-12 | End: 2024-12-13 | Stop reason: HOSPADM

## 2024-12-12 RX ORDER — ONDANSETRON 2 MG/ML
8 INJECTION INTRAMUSCULAR; INTRAVENOUS
OUTPATIENT
Start: 2025-01-15

## 2024-12-12 RX ORDER — ACETAMINOPHEN 325 MG/1
650 TABLET ORAL
OUTPATIENT
Start: 2025-01-15

## 2024-12-12 RX ORDER — HYDROCORTISONE SODIUM SUCCINATE 100 MG/2ML
100 INJECTION INTRAMUSCULAR; INTRAVENOUS
OUTPATIENT
Start: 2025-01-15

## 2024-12-12 RX ORDER — SODIUM CHLORIDE 9 MG/ML
INJECTION, SOLUTION INTRAVENOUS CONTINUOUS
OUTPATIENT
Start: 2025-01-15

## 2024-12-12 RX ORDER — EPINEPHRINE 1 MG/ML
0.3 INJECTION, SOLUTION INTRAMUSCULAR; SUBCUTANEOUS PRN
OUTPATIENT
Start: 2025-01-15

## 2024-12-12 RX ORDER — SODIUM CHLORIDE 9 MG/ML
5-250 INJECTION, SOLUTION INTRAVENOUS PRN
Status: DISCONTINUED | OUTPATIENT
Start: 2024-12-12 | End: 2024-12-13 | Stop reason: HOSPADM

## 2024-12-12 RX ORDER — DEXAMETHASONE SODIUM PHOSPHATE 10 MG/ML
10 INJECTION, SOLUTION INTRAMUSCULAR; INTRAVENOUS ONCE
Status: COMPLETED | OUTPATIENT
Start: 2024-12-12 | End: 2024-12-12

## 2024-12-12 RX ORDER — ALBUTEROL SULFATE 90 UG/1
4 INHALANT RESPIRATORY (INHALATION) PRN
OUTPATIENT
Start: 2025-01-15

## 2024-12-12 RX ORDER — HEPARIN 100 UNIT/ML
500 SYRINGE INTRAVENOUS PRN
Status: DISCONTINUED | OUTPATIENT
Start: 2024-12-12 | End: 2024-12-13 | Stop reason: HOSPADM

## 2024-12-12 RX ORDER — PALONOSETRON 0.05 MG/ML
0.25 INJECTION, SOLUTION INTRAVENOUS ONCE
Status: COMPLETED | OUTPATIENT
Start: 2024-12-12 | End: 2024-12-12

## 2024-12-12 RX ADMIN — PALONOSETRON 0.25 MG: 0.05 INJECTION, SOLUTION INTRAVENOUS at 08:44

## 2024-12-12 RX ADMIN — SODIUM CHLORIDE, PRESERVATIVE FREE 20 ML: 5 INJECTION INTRAVENOUS at 10:40

## 2024-12-12 RX ADMIN — PEGFILGRASTIM 6 MG: KIT SUBCUTANEOUS at 10:42

## 2024-12-12 RX ADMIN — CARBOPLATIN 215 MG: 10 INJECTION, SOLUTION INTRAVENOUS at 09:34

## 2024-12-12 RX ADMIN — DEXAMETHASONE SODIUM PHOSPHATE 10 MG: 10 INJECTION INTRAMUSCULAR; INTRAVENOUS at 08:47

## 2024-12-12 RX ADMIN — DENOSUMAB 120 MG: 120 INJECTION SUBCUTANEOUS at 10:45

## 2024-12-12 RX ADMIN — GEMCITABINE HYDROCHLORIDE 1560 MG: 1 INJECTION, SOLUTION INTRAVENOUS at 10:09

## 2024-12-12 NOTE — PROGRESS NOTES
[facility] Chemotherapy Progress Note    Date: 2024    Name: Alea Lindsay    MRN: [id number]         : 1990      0800 Pt admit to [facility] for C3 D8 Carbo/Gemzar ambulatory in stable condition. Assessment completed. No new concerns voiced. Port with positive blood return. Labs sent for processing(on 24).                          No data to display                  Ms. Lindsay's vitals were reviewed.  Patient Vitals for the past 12 hrs:   Temp Pulse Resp BP   24 1045 -- 89 -- 129/66   24 0815 97.9 °F (36.6 °C) 87 18 116/79         Lab results were obtained and reviewed.  No results found for this or any previous visit (from the past 12 hour(s)).    Pre-medications  were administered as ordered and chemotherapy was initiated.  Medications Administered         CARBOplatin (PARAPLATIN) 215 mg in sodium chloride 0.9 % 100 mL chemo IVPB Admin Date  2024 Action  New Bag Dose  215 mg Rate  263 mL/hr Route  IntraVENous Documented By  Teresa Pappas RN        denosumab (XGEVA) SC injection 120 mg Admin Date  2024 Action  Given Dose  120 mg Rate   Route  SubCUTAneous Documented By  Teresa Pappas RN        dexAMETHasone (PF) (DECADRON) injection 10 mg Admin Date  2024 Action  Given Dose  10 mg Rate   Route  IntraVENous Documented By  Teresa Pappas RN        gemcitabine HCl (GEMZAR) 1,560 mg in sodium chloride 0.9 % 100 mL chemo IVPB Admin Date  2024 Action  New Bag Dose  1,560 mg Rate  302.1 mL/hr Route  IntraVENous Documented By  Teresa Pappas RN        palonosetron (ALOXI) injection 0.25 mg Admin Date  2024 Action  Given Dose  0.25 mg Rate   Route  IntraVENous Documented By  Teresa Pappas RN        pegfilgrastim (NEULASTA) on-body injector 6 mg Admin Date  2024 Action  Given Dose  6 mg Rate   Route  SubCUTAneous Documented By  Teresa Pappas RN        sodium chloride flush 0.9 % injection 5-40 mL Admin Date  2024 Action  Given  Dose  20 mL Rate   Route  IntraVENous Documented By  Severino Pappas, ZEHRA        Xgeva given sc on the right arm, OBI placed on the left arm    Two nurses verified prior to administering:Drug name, Drug doseInfusion volume or drug volume when prepared in a syringe, Rate of administration, Route of administration, Expiration dates and/or times, Appearance and physical integrity of the drugs, Rate set on infusion pump, when used, Sequencing of drug administration.      1053 Pt tolerated treatment well. Port maintained positive blood return throughout treatment. Flushed, and de-accessed per protocol. D/c home ambulatory in no distress. Pt aware of next appointment scheduled for 12/26/24.    Future Appointments   Date Time Provider Department Center   12/23/2024  1:30 PM [facility] LAB CHAIR 1 [facility]   12/26/2024  8:30 AM [facility] CHEMO CHAIR 8 [facility]   12/30/2024 10:00 AM [facility] LAB CHAIR 1 [facility]   1/2/2025  8:00 AM [facility] CHEMO CHAIR 7 [facility]   1/15/2025 10:30 AM [facility] LAB CHAIR 1 [facility]   1/15/2025 10:45 AM [facility] Christensen APRN - NP [facility]   1/16/2025  9:30 AM [facility] CHEMO CHAIR 10 [facility]   1/22/2025 10:00 AM [facility] LAB CHAIR 1 [facility]   1/23/2025  8:30 AM [facility] CHEMO CHAIR 8 [facility]   2/5/2025 10:00 AM [facility] LAB CHAIR 1 [facility]   2/6/2025  8:30 AM [facility] CHEMO CHAIR 8 [facility]   2/12/2025  9:30 AM [facility] LAB CHAIR 1 [facility]   2/13/2025  8:00 AM [facility] CHEMO CHAIR 7 [facility]   2/26/2025  9:00 AM [facility] LAB CHAIR 1 [facility]   2/27/2025  8:00 AM [facility] CHEMO CHAIR 7 [facility]   3/5/2025  9:00 AM [facility] LAB CHAIR 1 [facility]   3/6/2025  8:00 AM [facility] CHEMO CHAIR 7 [facility]   3/19/2025  9:30 AM [facility] LAB CHAIR 1 [facility]   3/20/2025  8:00 AM [facility] CHEMO CHAIR 7 [facility]   3/26/2025  9:30 AM [facility] LAB CHAIR 1 [facility]   3/27/2025  8:00 AM [facility] CHEMO CHAIR 7 [facility]         SEVERINO PAPPAS RN  December 12, 2024

## 2024-12-18 ENCOUNTER — APPOINTMENT (OUTPATIENT)
Facility: HOSPITAL | Age: 34
End: 2024-12-18
Payer: COMMERCIAL

## 2024-12-24 ENCOUNTER — APPOINTMENT (OUTPATIENT)
Facility: HOSPITAL | Age: 34
End: 2024-12-24
Payer: COMMERCIAL

## 2024-12-26 ENCOUNTER — APPOINTMENT (OUTPATIENT)
Facility: HOSPITAL | Age: 34
End: 2024-12-26
Payer: COMMERCIAL

## 2024-12-26 ENCOUNTER — HOSPITAL ENCOUNTER (OUTPATIENT)
Facility: HOSPITAL | Age: 34
Setting detail: INFUSION SERIES
Discharge: HOME | End: 2024-12-26
Payer: COMMERCIAL

## 2024-12-26 VITALS
TEMPERATURE: 97 F | HEIGHT: 64 IN | DIASTOLIC BLOOD PRESSURE: 86 MMHG | WEIGHT: 194.6 LBS | SYSTOLIC BLOOD PRESSURE: 127 MMHG | RESPIRATION RATE: 16 BRPM | BODY MASS INDEX: 33.22 KG/M2 | OXYGEN SATURATION: 98 % | HEART RATE: 100 BPM

## 2024-12-26 DIAGNOSIS — C79.51 CARCINOMA OF LEFT BREAST METASTATIC TO BONE (HCC): ICD-10-CM

## 2024-12-26 DIAGNOSIS — C77.3 BREAST CANCER METASTASIZED TO AXILLARY LYMPH NODE, LEFT (HCC): ICD-10-CM

## 2024-12-26 DIAGNOSIS — C77.3 BREAST CANCER METASTASIZED TO AXILLARY LYMPH NODE, LEFT (HCC): Primary | ICD-10-CM

## 2024-12-26 DIAGNOSIS — C50.912 CARCINOMA OF LEFT BREAST METASTATIC TO BONE (HCC): ICD-10-CM

## 2024-12-26 DIAGNOSIS — T45.1X5A ANTINEOPLASTIC CHEMOTHERAPY INDUCED ANEMIA: Primary | ICD-10-CM

## 2024-12-26 DIAGNOSIS — C50.912 BREAST CANCER METASTASIZED TO AXILLARY LYMPH NODE, LEFT (HCC): ICD-10-CM

## 2024-12-26 DIAGNOSIS — D64.81 ANTINEOPLASTIC CHEMOTHERAPY INDUCED ANEMIA: ICD-10-CM

## 2024-12-26 DIAGNOSIS — E83.51 HYPOCALCEMIA: ICD-10-CM

## 2024-12-26 DIAGNOSIS — D64.81 ANTINEOPLASTIC CHEMOTHERAPY INDUCED ANEMIA: Primary | ICD-10-CM

## 2024-12-26 DIAGNOSIS — T45.1X5A ANTINEOPLASTIC CHEMOTHERAPY INDUCED ANEMIA: ICD-10-CM

## 2024-12-26 DIAGNOSIS — C50.912 BREAST CANCER METASTASIZED TO AXILLARY LYMPH NODE, LEFT (HCC): Primary | ICD-10-CM

## 2024-12-26 LAB
ALBUMIN SERPL-MCNC: 3.6 G/DL (ref 3.5–5)
ALBUMIN/GLOB SERPL: 1.2 (ref 1.1–2.2)
ALP SERPL-CCNC: 253 U/L (ref 45–117)
ALT SERPL-CCNC: 59 U/L (ref 12–78)
ANION GAP SERPL CALC-SCNC: 8 MMOL/L (ref 2–12)
AST SERPL-CCNC: 63 U/L (ref 15–37)
BASOPHILS # BLD: 0 K/UL (ref 0–0.1)
BASOPHILS NFR BLD: 1 % (ref 0–1)
BILIRUB SERPL-MCNC: 1.4 MG/DL (ref 0.2–1)
BUN SERPL-MCNC: 7 MG/DL (ref 6–20)
BUN/CREAT SERPL: 10 (ref 12–20)
CALCIUM SERPL-MCNC: 7.1 MG/DL (ref 8.5–10.1)
CHLORIDE SERPL-SCNC: 104 MMOL/L (ref 97–108)
CO2 SERPL-SCNC: 21 MMOL/L (ref 21–32)
CREAT SERPL-MCNC: 0.71 MG/DL (ref 0.55–1.02)
DIFFERENTIAL METHOD BLD: ABNORMAL
EOSINOPHIL # BLD: 0 K/UL (ref 0–0.4)
EOSINOPHIL NFR BLD: 1 % (ref 0–7)
ERYTHROCYTE [DISTWIDTH] IN BLOOD BY AUTOMATED COUNT: 20.4 % (ref 11.5–14.5)
GLOBULIN SER CALC-MCNC: 2.9 G/DL (ref 2–4)
GLUCOSE SERPL-MCNC: 139 MG/DL (ref 65–100)
HCT VFR BLD AUTO: 17.6 % (ref 35–47)
HGB BLD-MCNC: 5.5 G/DL (ref 11.5–16)
HISTORY CHECK: NORMAL
IMM GRANULOCYTES # BLD AUTO: 0 K/UL
IMM GRANULOCYTES NFR BLD AUTO: 0 %
LYMPHOCYTES # BLD: 0.8 K/UL (ref 0.8–3.5)
LYMPHOCYTES NFR BLD: 19 % (ref 12–49)
MCH RBC QN AUTO: 30.9 PG (ref 26–34)
MCHC RBC AUTO-ENTMCNC: 31.3 G/DL (ref 30–36.5)
MCV RBC AUTO: 98.9 FL (ref 80–99)
METAMYELOCYTES NFR BLD MANUAL: 2 %
MONOCYTES # BLD: 0.3 K/UL (ref 0–1)
MONOCYTES NFR BLD: 7 % (ref 5–13)
MYELOCYTES NFR BLD MANUAL: 3 %
NEUTS BAND NFR BLD MANUAL: 3 % (ref 0–6)
NEUTS SEG # BLD: 2.9 K/UL (ref 1.8–8)
NEUTS SEG NFR BLD: 64 % (ref 32–75)
NRBC # BLD: 0.27 K/UL (ref 0–0.01)
NRBC BLD-RTO: 6.1 PER 100 WBC
PLATELET # BLD AUTO: 52 K/UL (ref 150–400)
PMV BLD AUTO: 11.4 FL (ref 8.9–12.9)
POTASSIUM SERPL-SCNC: 4 MMOL/L (ref 3.5–5.1)
PROT SERPL-MCNC: 6.5 G/DL (ref 6.4–8.2)
RBC # BLD AUTO: 1.78 M/UL (ref 3.8–5.2)
RBC MORPH BLD: ABNORMAL
SODIUM SERPL-SCNC: 133 MMOL/L (ref 136–145)
WBC # BLD AUTO: 4.4 K/UL (ref 3.6–11)
WBC MORPH BLD: ABNORMAL

## 2024-12-26 PROCEDURE — 86850 RBC ANTIBODY SCREEN: CPT

## 2024-12-26 PROCEDURE — 36415 COLL VENOUS BLD VENIPUNCTURE: CPT

## 2024-12-26 PROCEDURE — 36430 TRANSFUSION BLD/BLD COMPNT: CPT

## 2024-12-26 PROCEDURE — 85025 COMPLETE CBC W/AUTO DIFF WBC: CPT

## 2024-12-26 PROCEDURE — P9016 RBC LEUKOCYTES REDUCED: HCPCS

## 2024-12-26 PROCEDURE — 86920 COMPATIBILITY TEST SPIN: CPT

## 2024-12-26 PROCEDURE — 86901 BLOOD TYPING SEROLOGIC RH(D): CPT

## 2024-12-26 PROCEDURE — 80053 COMPREHEN METABOLIC PANEL: CPT

## 2024-12-26 PROCEDURE — 86900 BLOOD TYPING SEROLOGIC ABO: CPT

## 2024-12-26 PROCEDURE — 2580000003 HC RX 258

## 2024-12-26 RX ORDER — SODIUM CHLORIDE 9 MG/ML
20 INJECTION, SOLUTION INTRAVENOUS CONTINUOUS
Status: CANCELLED | OUTPATIENT
Start: 2024-12-26

## 2024-12-26 RX ORDER — ONDANSETRON 2 MG/ML
8 INJECTION INTRAMUSCULAR; INTRAVENOUS
Status: CANCELLED | OUTPATIENT
Start: 2024-12-26

## 2024-12-26 RX ORDER — ACETAMINOPHEN 325 MG/1
650 TABLET ORAL
Status: CANCELLED | OUTPATIENT
Start: 2024-12-26

## 2024-12-26 RX ORDER — EPINEPHRINE 1 MG/ML
0.3 INJECTION, SOLUTION INTRAMUSCULAR; SUBCUTANEOUS PRN
Status: CANCELLED | OUTPATIENT
Start: 2024-12-26

## 2024-12-26 RX ORDER — ACETAMINOPHEN 325 MG/1
650 TABLET ORAL
OUTPATIENT
Start: 2024-12-26

## 2024-12-26 RX ORDER — HYDROCORTISONE SODIUM SUCCINATE 100 MG/2ML
100 INJECTION INTRAMUSCULAR; INTRAVENOUS
OUTPATIENT
Start: 2024-12-26

## 2024-12-26 RX ORDER — EPINEPHRINE 1 MG/ML
0.3 INJECTION, SOLUTION, CONCENTRATE INTRAVENOUS PRN
OUTPATIENT
Start: 2024-12-26

## 2024-12-26 RX ORDER — DIPHENHYDRAMINE HYDROCHLORIDE 50 MG/ML
50 INJECTION INTRAMUSCULAR; INTRAVENOUS
Status: CANCELLED | OUTPATIENT
Start: 2024-12-26

## 2024-12-26 RX ORDER — SODIUM CHLORIDE 9 MG/ML
25 INJECTION, SOLUTION INTRAVENOUS PRN
Status: DISCONTINUED | OUTPATIENT
Start: 2024-12-26 | End: 2024-12-27 | Stop reason: HOSPADM

## 2024-12-26 RX ORDER — HYDROCORTISONE SODIUM SUCCINATE 100 MG/2ML
100 INJECTION INTRAMUSCULAR; INTRAVENOUS
Status: CANCELLED | OUTPATIENT
Start: 2024-12-26

## 2024-12-26 RX ORDER — SODIUM CHLORIDE 9 MG/ML
20 INJECTION, SOLUTION INTRAVENOUS CONTINUOUS
Status: DISCONTINUED | OUTPATIENT
Start: 2024-12-26 | End: 2024-12-27 | Stop reason: HOSPADM

## 2024-12-26 RX ORDER — DIPHENHYDRAMINE HYDROCHLORIDE 50 MG/ML
50 INJECTION INTRAMUSCULAR; INTRAVENOUS
OUTPATIENT
Start: 2024-12-26

## 2024-12-26 RX ORDER — ALBUTEROL SULFATE 90 UG/1
4 INHALANT RESPIRATORY (INHALATION) PRN
OUTPATIENT
Start: 2024-12-26

## 2024-12-26 RX ORDER — SODIUM CHLORIDE 9 MG/ML
25 INJECTION, SOLUTION INTRAVENOUS PRN
Status: CANCELLED | OUTPATIENT
Start: 2024-12-26

## 2024-12-26 RX ORDER — SODIUM CHLORIDE 0.9 % (FLUSH) 0.9 %
5-40 SYRINGE (ML) INJECTION PRN
Status: CANCELLED | OUTPATIENT
Start: 2024-12-26

## 2024-12-26 RX ORDER — SODIUM CHLORIDE 0.9 % (FLUSH) 0.9 %
5-40 SYRINGE (ML) INJECTION PRN
Status: DISCONTINUED | OUTPATIENT
Start: 2024-12-26 | End: 2024-12-27 | Stop reason: HOSPADM

## 2024-12-26 RX ORDER — ONDANSETRON 2 MG/ML
8 INJECTION INTRAMUSCULAR; INTRAVENOUS
OUTPATIENT
Start: 2024-12-26

## 2024-12-26 RX ORDER — SODIUM CHLORIDE 9 MG/ML
INJECTION, SOLUTION INTRAVENOUS CONTINUOUS
OUTPATIENT
Start: 2024-12-26

## 2024-12-26 RX ORDER — SODIUM CHLORIDE 9 MG/ML
INJECTION, SOLUTION INTRAVENOUS CONTINUOUS
Status: CANCELLED | OUTPATIENT
Start: 2024-12-26

## 2024-12-26 RX ORDER — ALBUTEROL SULFATE 90 UG/1
4 INHALANT RESPIRATORY (INHALATION) PRN
Status: CANCELLED | OUTPATIENT
Start: 2024-12-26

## 2024-12-26 RX ADMIN — SODIUM CHLORIDE 20 ML/HR: 9 INJECTION, SOLUTION INTRAVENOUS at 12:43

## 2024-12-26 ASSESSMENT — PAIN SCALES - GENERAL: PAINLEVEL_OUTOF10: 0

## 2024-12-26 NOTE — PROGRESS NOTES
[facility] - Chemotherapy Progress Note    0848 Pt admit to [facility] for Keytruda/Gemzar/Carboplatin C4D1 (held) ambulatory in stable condition accompanied by family. Assessment completed by access RN.  Port accessed by access RN with positive blood return. Labs drawn per order and sent.  Line flushed, clamped, Curos Cap applied to end clave.    Treatment held; Hgb, 5.5; 2 Units PRBC's to be administered; discussed w/ pt, verbalized understanding; Port flushed w/ + blood return    Please see Blood Flowsheet for documentation     /73   Pulse (!) 101   Temp 96.8 °F (36 °C)   Resp 16   Ht 1.626 m (5' 4\")   Wt 88.3 kg (194 lb 9.6 oz)   SpO2 98%   BMI 33.40 kg/m²     Medications Administered         0.9 % sodium chloride infusion Admin Date  12/26/2024 Action  New Bag Dose  20 mL/hr Rate  20 mL/hr Route  IntraVENous Documented By  Lisa Ramirez, RN            1820 Pt tolerated treatment well. Port maintained positive blood return throughout treatment, flushed with positive blood return at conclusion and de-accessed per protocol. Pt declined to remain in [facility] x1 hour for observation; discharge education given, all questions answered and concerns addressed. D/c home ambulatory in no distress. Pt aware of next [facility] appointment scheduled for 12/30/2024.    Recent Results (from the past 12 hour(s))   CBC With Auto Differential    Collection Time: 12/26/24  8:35 AM   Result Value Ref Range    WBC 4.4 3.6 - 11.0 K/uL    RBC 1.78 (L) 3.80 - 5.20 M/uL    Hemoglobin 5.5 (LL) 11.5 - 16.0 g/dL    Hematocrit 17.6 (LL) 35.0 - 47.0 %    MCV 98.9 80.0 - 99.0 FL    MCH 30.9 26.0 - 34.0 PG    MCHC 31.3 30.0 - 36.5 g/dL    RDW 20.4 (H) 11.5 - 14.5 %    Platelets 52 (L) 150 - 400 K/uL    MPV 11.4 8.9 - 12.9 FL    Nucleated RBCs 6.1 (H) 0  WBC    nRBC 0.27 (H) 0.00 - 0.01 K/uL    Neutrophils % 64 32 - 75 %    Band Neutrophils 3 0 - 6 %    Lymphocytes % 19 12 - 49 %    Monocytes % 7 5 - 13 %    Eosinophils

## 2024-12-27 LAB
ABO + RH BLD: NORMAL
BLD PROD TYP BPU: NORMAL
BLD PROD TYP BPU: NORMAL
BLOOD BANK BLOOD PRODUCT EXPIRATION DATE: NORMAL
BLOOD BANK BLOOD PRODUCT EXPIRATION DATE: NORMAL
BLOOD BANK DISPENSE STATUS: NORMAL
BLOOD BANK DISPENSE STATUS: NORMAL
BLOOD BANK ISBT PRODUCT BLOOD TYPE: 5100
BLOOD BANK ISBT PRODUCT BLOOD TYPE: 5100
BLOOD BANK PRODUCT CODE: NORMAL
BLOOD BANK PRODUCT CODE: NORMAL
BLOOD BANK UNIT TYPE AND RH: NORMAL
BLOOD BANK UNIT TYPE AND RH: NORMAL
BLOOD GROUP ANTIBODIES SERPL: NORMAL
BPU ID: NORMAL
BPU ID: NORMAL
CROSSMATCH RESULT: NORMAL
CROSSMATCH RESULT: NORMAL
SPECIMEN EXP DATE BLD: NORMAL
UNIT DIVISION: 0
UNIT DIVISION: 0
UNIT ISSUE DATE/TIME: NORMAL
UNIT ISSUE DATE/TIME: NORMAL

## 2024-12-30 ENCOUNTER — HOSPITAL ENCOUNTER (OUTPATIENT)
Facility: HOSPITAL | Age: 34
Setting detail: INFUSION SERIES
Discharge: HOME | End: 2024-12-30
Payer: COMMERCIAL

## 2024-12-30 DIAGNOSIS — C50.912 BREAST CANCER METASTASIZED TO AXILLARY LYMPH NODE, LEFT (HCC): ICD-10-CM

## 2024-12-30 DIAGNOSIS — E83.51 HYPOCALCEMIA: ICD-10-CM

## 2024-12-30 DIAGNOSIS — C79.51 CARCINOMA OF LEFT BREAST METASTATIC TO BONE (HCC): ICD-10-CM

## 2024-12-30 DIAGNOSIS — D64.81 ANTINEOPLASTIC CHEMOTHERAPY INDUCED ANEMIA: ICD-10-CM

## 2024-12-30 DIAGNOSIS — C50.912 CARCINOMA OF LEFT BREAST METASTATIC TO BONE (HCC): ICD-10-CM

## 2024-12-30 DIAGNOSIS — T45.1X5A ANTINEOPLASTIC CHEMOTHERAPY INDUCED ANEMIA: ICD-10-CM

## 2024-12-30 DIAGNOSIS — C77.3 BREAST CANCER METASTASIZED TO AXILLARY LYMPH NODE, LEFT (HCC): ICD-10-CM

## 2024-12-30 LAB
ABO + RH BLD: NORMAL
ALBUMIN SERPL-MCNC: 3.5 G/DL (ref 3.5–5)
ALBUMIN/GLOB SERPL: 1.1 (ref 1.1–2.2)
ALP SERPL-CCNC: 240 U/L (ref 45–117)
ALT SERPL-CCNC: 58 U/L (ref 12–78)
ANION GAP SERPL CALC-SCNC: 6 MMOL/L (ref 2–12)
AST SERPL-CCNC: 54 U/L (ref 15–37)
BASOPHILS # BLD: 0.1 K/UL (ref 0–0.1)
BASOPHILS NFR BLD: 3 % (ref 0–1)
BILIRUB SERPL-MCNC: 0.9 MG/DL (ref 0.2–1)
BLOOD GROUP ANTIBODIES SERPL: NORMAL
BUN SERPL-MCNC: 5 MG/DL (ref 6–20)
BUN/CREAT SERPL: 10 (ref 12–20)
CALCIUM SERPL-MCNC: 7.8 MG/DL (ref 8.5–10.1)
CHLORIDE SERPL-SCNC: 110 MMOL/L (ref 97–108)
CO2 SERPL-SCNC: 22 MMOL/L (ref 21–32)
CREAT SERPL-MCNC: 0.5 MG/DL (ref 0.55–1.02)
DIFFERENTIAL METHOD BLD: ABNORMAL
EOSINOPHIL # BLD: 0 K/UL (ref 0–0.4)
EOSINOPHIL NFR BLD: 1 % (ref 0–7)
ERYTHROCYTE [DISTWIDTH] IN BLOOD BY AUTOMATED COUNT: 17.8 % (ref 11.5–14.5)
GLOBULIN SER CALC-MCNC: 3.3 G/DL (ref 2–4)
GLUCOSE SERPL-MCNC: 121 MG/DL (ref 65–100)
HCT VFR BLD AUTO: 30.2 % (ref 35–47)
HGB BLD-MCNC: 9.3 G/DL (ref 11.5–16)
IMM GRANULOCYTES # BLD AUTO: 0 K/UL
IMM GRANULOCYTES NFR BLD AUTO: 0 %
LYMPHOCYTES # BLD: 1.3 K/UL (ref 0.8–3.5)
LYMPHOCYTES NFR BLD: 27 % (ref 12–49)
MCH RBC QN AUTO: 30.2 PG (ref 26–34)
MCHC RBC AUTO-ENTMCNC: 30.8 G/DL (ref 30–36.5)
MCV RBC AUTO: 98.1 FL (ref 80–99)
METAMYELOCYTES NFR BLD MANUAL: 1 %
MONOCYTES # BLD: 0.3 K/UL (ref 0–1)
MONOCYTES NFR BLD: 7 % (ref 5–13)
MYELOCYTES NFR BLD MANUAL: 1 %
NEUTS BAND NFR BLD MANUAL: 4 % (ref 0–6)
NEUTS SEG # BLD: 2.9 K/UL (ref 1.8–8)
NEUTS SEG NFR BLD: 56 % (ref 32–75)
NRBC # BLD: 0.34 K/UL (ref 0–0.01)
NRBC BLD-RTO: 7.1 PER 100 WBC
PLATELET # BLD AUTO: 84 K/UL (ref 150–400)
PMV BLD AUTO: 11 FL (ref 8.9–12.9)
POTASSIUM SERPL-SCNC: 4.4 MMOL/L (ref 3.5–5.1)
PROT SERPL-MCNC: 6.8 G/DL (ref 6.4–8.2)
RBC # BLD AUTO: 3.08 M/UL (ref 3.8–5.2)
RBC MORPH BLD: ABNORMAL
SODIUM SERPL-SCNC: 138 MMOL/L (ref 136–145)
SPECIMEN EXP DATE BLD: NORMAL
WBC # BLD AUTO: 4.8 K/UL (ref 3.6–11)
WBC MORPH BLD: ABNORMAL

## 2024-12-30 PROCEDURE — 86850 RBC ANTIBODY SCREEN: CPT

## 2024-12-30 PROCEDURE — 86901 BLOOD TYPING SEROLOGIC RH(D): CPT

## 2024-12-30 PROCEDURE — 86900 BLOOD TYPING SEROLOGIC ABO: CPT

## 2024-12-30 PROCEDURE — 85025 COMPLETE CBC W/AUTO DIFF WBC: CPT

## 2024-12-30 PROCEDURE — 36415 COLL VENOUS BLD VENIPUNCTURE: CPT

## 2024-12-30 PROCEDURE — 80053 COMPREHEN METABOLIC PANEL: CPT

## 2024-12-30 NOTE — PROGRESS NOTES
[facility] - Chemotherapy Progress Note    0924 Pt admit to [facility] for pre-treatment labs/possible transfusion ambulatory in stable condition. Assessment completed. No new concerns voiced. Labs drawn peripherally and sent for processing. D/c home ambulatory in no distress. Pt aware of next [facility] appointment scheduled for 01/02/2025.    Please see labs in Results tab

## 2025-01-01 PROBLEM — T45.1X5A ANEMIA DUE TO CHEMOTHERAPY: Status: ACTIVE | Noted: 2025-01-01

## 2025-01-01 PROBLEM — D64.81 ANEMIA DUE TO CHEMOTHERAPY: Status: ACTIVE | Noted: 2025-01-01

## 2025-01-02 ENCOUNTER — APPOINTMENT (OUTPATIENT)
Facility: HOSPITAL | Age: 35
End: 2025-01-02
Payer: COMMERCIAL

## 2025-01-02 ENCOUNTER — HOSPITAL ENCOUNTER (OUTPATIENT)
Facility: HOSPITAL | Age: 35
Setting detail: INFUSION SERIES
Discharge: HOME | End: 2025-01-02
Payer: COMMERCIAL

## 2025-01-02 ENCOUNTER — OFFICE VISIT (OUTPATIENT)
Age: 35
End: 2025-01-02

## 2025-01-02 VITALS
DIASTOLIC BLOOD PRESSURE: 83 MMHG | SYSTOLIC BLOOD PRESSURE: 130 MMHG | WEIGHT: 196 LBS | BODY MASS INDEX: 33.64 KG/M2 | TEMPERATURE: 98.4 F | HEART RATE: 92 BPM | RESPIRATION RATE: 18 BRPM | OXYGEN SATURATION: 100 %

## 2025-01-02 VITALS
WEIGHT: 196.6 LBS | SYSTOLIC BLOOD PRESSURE: 137 MMHG | HEIGHT: 64 IN | BODY MASS INDEX: 33.57 KG/M2 | DIASTOLIC BLOOD PRESSURE: 88 MMHG | TEMPERATURE: 97.7 F | OXYGEN SATURATION: 100 % | RESPIRATION RATE: 16 BRPM | HEART RATE: 78 BPM

## 2025-01-02 DIAGNOSIS — E55.9 VITAMIN D DEFICIENCY: ICD-10-CM

## 2025-01-02 DIAGNOSIS — Z95.828 PORT-A-CATH IN PLACE: ICD-10-CM

## 2025-01-02 DIAGNOSIS — R53.83 CHEMOTHERAPY-INDUCED FATIGUE: ICD-10-CM

## 2025-01-02 DIAGNOSIS — D69.59 CHEMOTHERAPY-INDUCED THROMBOCYTOPENIA: ICD-10-CM

## 2025-01-02 DIAGNOSIS — E83.51 HYPOCALCEMIA: Primary | ICD-10-CM

## 2025-01-02 DIAGNOSIS — C50.912 CARCINOMA OF LEFT BREAST METASTATIC TO BONE (HCC): ICD-10-CM

## 2025-01-02 DIAGNOSIS — Z80.9 FAMILY HISTORY OF CANCER: ICD-10-CM

## 2025-01-02 DIAGNOSIS — C50.919 MALIGNANT NEOPLASM OF BREAST METASTATIC TO BONE (HCC): Primary | ICD-10-CM

## 2025-01-02 DIAGNOSIS — T45.1X5A ANEMIA DUE TO CHEMOTHERAPY: ICD-10-CM

## 2025-01-02 DIAGNOSIS — C79.51 CARCINOMA OF LEFT BREAST METASTATIC TO BONE (HCC): ICD-10-CM

## 2025-01-02 DIAGNOSIS — C79.51 MALIGNANT NEOPLASM OF BREAST METASTATIC TO BONE (HCC): Primary | ICD-10-CM

## 2025-01-02 DIAGNOSIS — T45.1X5A CHEMOTHERAPY-INDUCED THROMBOCYTOPENIA: ICD-10-CM

## 2025-01-02 DIAGNOSIS — Z51.12 ENCOUNTER FOR ANTINEOPLASTIC IMMUNOTHERAPY: ICD-10-CM

## 2025-01-02 DIAGNOSIS — Z09 CHEMOTHERAPY FOLLOW-UP EXAMINATION: ICD-10-CM

## 2025-01-02 DIAGNOSIS — R45.89 ANXIETY ABOUT HEALTH: ICD-10-CM

## 2025-01-02 DIAGNOSIS — D64.81 ANEMIA DUE TO CHEMOTHERAPY: ICD-10-CM

## 2025-01-02 DIAGNOSIS — N95.9 PREMENOPAUSAL PATIENT: ICD-10-CM

## 2025-01-02 DIAGNOSIS — T45.1X5A CHEMOTHERAPY-INDUCED FATIGUE: ICD-10-CM

## 2025-01-02 LAB
ALBUMIN SERPL-MCNC: 3.5 G/DL (ref 3.5–5)
ALBUMIN/GLOB SERPL: 1.1 (ref 1.1–2.2)
ALP SERPL-CCNC: 241 U/L (ref 45–117)
ALT SERPL-CCNC: 62 U/L (ref 12–78)
ANION GAP SERPL CALC-SCNC: 5 MMOL/L (ref 2–12)
AST SERPL-CCNC: 54 U/L (ref 15–37)
BASOPHILS # BLD: 0.2 K/UL (ref 0–0.1)
BASOPHILS NFR BLD: 3 % (ref 0–1)
BILIRUB SERPL-MCNC: 0.8 MG/DL (ref 0.2–1)
BUN SERPL-MCNC: 7 MG/DL (ref 6–20)
BUN/CREAT SERPL: 13 (ref 12–20)
CALCIUM SERPL-MCNC: 8 MG/DL (ref 8.5–10.1)
CHLORIDE SERPL-SCNC: 109 MMOL/L (ref 97–108)
CO2 SERPL-SCNC: 23 MMOL/L (ref 21–32)
CREAT SERPL-MCNC: 0.56 MG/DL (ref 0.55–1.02)
DIFFERENTIAL METHOD BLD: ABNORMAL
EOSINOPHIL # BLD: 0.1 K/UL (ref 0–0.4)
EOSINOPHIL NFR BLD: 1 % (ref 0–7)
ERYTHROCYTE [DISTWIDTH] IN BLOOD BY AUTOMATED COUNT: 18.6 % (ref 11.5–14.5)
GLOBULIN SER CALC-MCNC: 3.2 G/DL (ref 2–4)
GLUCOSE SERPL-MCNC: 118 MG/DL (ref 65–100)
HCT VFR BLD AUTO: 29.8 % (ref 35–47)
HGB BLD-MCNC: 9.6 G/DL (ref 11.5–16)
IMM GRANULOCYTES # BLD AUTO: 0 K/UL
IMM GRANULOCYTES NFR BLD AUTO: 0 %
LYMPHOCYTES # BLD: 1.4 K/UL (ref 0.8–3.5)
LYMPHOCYTES NFR BLD: 25 % (ref 12–49)
MCH RBC QN AUTO: 30.9 PG (ref 26–34)
MCHC RBC AUTO-ENTMCNC: 32.2 G/DL (ref 30–36.5)
MCV RBC AUTO: 95.8 FL (ref 80–99)
MONOCYTES # BLD: 0.3 K/UL (ref 0–1)
MONOCYTES NFR BLD: 6 % (ref 5–13)
MYELOCYTES NFR BLD MANUAL: 4 %
NEUTS SEG # BLD: 3.4 K/UL (ref 1.8–8)
NEUTS SEG NFR BLD: 60 % (ref 32–75)
NRBC # BLD: 0.38 K/UL (ref 0–0.01)
NRBC BLD-RTO: 6.7 PER 100 WBC
PLATELET # BLD AUTO: 132 K/UL (ref 150–400)
PMV BLD AUTO: 10.9 FL (ref 8.9–12.9)
POTASSIUM SERPL-SCNC: 4.1 MMOL/L (ref 3.5–5.1)
PROMYELOCYTES NFR BLD MANUAL: 1 %
PROT SERPL-MCNC: 6.7 G/DL (ref 6.4–8.2)
RBC # BLD AUTO: 3.11 M/UL (ref 3.8–5.2)
RBC MORPH BLD: ABNORMAL
SODIUM SERPL-SCNC: 137 MMOL/L (ref 136–145)
WBC # BLD AUTO: 5.6 K/UL (ref 3.6–11)

## 2025-01-02 PROCEDURE — 85025 COMPLETE CBC W/AUTO DIFF WBC: CPT

## 2025-01-02 PROCEDURE — 2580000003 HC RX 258: Performed by: INTERNAL MEDICINE

## 2025-01-02 PROCEDURE — 96413 CHEMO IV INFUSION 1 HR: CPT

## 2025-01-02 PROCEDURE — 80053 COMPREHEN METABOLIC PANEL: CPT

## 2025-01-02 PROCEDURE — 96417 CHEMO IV INFUS EACH ADDL SEQ: CPT

## 2025-01-02 PROCEDURE — 6360000002 HC RX W HCPCS: Performed by: INTERNAL MEDICINE

## 2025-01-02 PROCEDURE — 36415 COLL VENOUS BLD VENIPUNCTURE: CPT

## 2025-01-02 PROCEDURE — 96375 TX/PRO/DX INJ NEW DRUG ADDON: CPT

## 2025-01-02 RX ORDER — SODIUM CHLORIDE 0.9 % (FLUSH) 0.9 %
5-40 SYRINGE (ML) INJECTION PRN
Status: DISCONTINUED | OUTPATIENT
Start: 2025-01-02 | End: 2025-01-03 | Stop reason: HOSPADM

## 2025-01-02 RX ORDER — PALONOSETRON 0.05 MG/ML
0.25 INJECTION, SOLUTION INTRAVENOUS ONCE
Status: COMPLETED | OUTPATIENT
Start: 2025-01-02 | End: 2025-01-02

## 2025-01-02 RX ORDER — HEPARIN 100 UNIT/ML
500 SYRINGE INTRAVENOUS PRN
Status: DISCONTINUED | OUTPATIENT
Start: 2025-01-02 | End: 2025-01-03 | Stop reason: HOSPADM

## 2025-01-02 RX ORDER — DEXAMETHASONE SODIUM PHOSPHATE 10 MG/ML
10 INJECTION, SOLUTION INTRAMUSCULAR; INTRAVENOUS ONCE
Status: COMPLETED | OUTPATIENT
Start: 2025-01-02 | End: 2025-01-02

## 2025-01-02 RX ORDER — SODIUM CHLORIDE 9 MG/ML
5-250 INJECTION, SOLUTION INTRAVENOUS PRN
Status: DISCONTINUED | OUTPATIENT
Start: 2025-01-02 | End: 2025-01-03 | Stop reason: HOSPADM

## 2025-01-02 RX ADMIN — PALONOSETRON 0.25 MG: 0.05 INJECTION, SOLUTION INTRAVENOUS at 11:28

## 2025-01-02 RX ADMIN — SODIUM CHLORIDE 200 MG: 9 INJECTION, SOLUTION INTRAVENOUS at 10:35

## 2025-01-02 RX ADMIN — CARBOPLATIN 220 MG: 10 INJECTION, SOLUTION INTRAVENOUS at 11:50

## 2025-01-02 RX ADMIN — DEXAMETHASONE SODIUM PHOSPHATE 10 MG: 10 INJECTION INTRAMUSCULAR; INTRAVENOUS at 11:17

## 2025-01-02 RX ADMIN — GEMCITABINE HYDROCHLORIDE 1560 MG: 1 INJECTION, SOLUTION INTRAVENOUS at 12:24

## 2025-01-02 ASSESSMENT — PAIN SCALES - GENERAL: PAINLEVEL_OUTOF10: 0

## 2025-01-02 NOTE — PROGRESS NOTES
Alea Lindsay is a 34 y.o. female    Chief Complaint   Patient presents with    Follow-up      MBC     1. Have you been to the ER, urgent care clinic since your last visit?  Hospitalized since your last visit?No    2. Have you seen or consulted any other health care providers outside of the [facility] since your last visit?  Include any pap smears or colon screening. No    
  There is no acute intrathoracic or intra-abdominal process.    NM Results (most recent):  NM BONE SCAN WHOLE BODY 09/25/2024    Narrative  EXAM: NM BONE SCAN WHOLE BODY    INDICATION: Malignant neoplasm of unspecified site of left female breast;  Secondary and unspecified malignant neoplasm of axilla and upper limb lymph  nodes    COMPARISON: None    IMAGING CORRELATION: CT chest abdomen pelvis from September 25, 2024    TRACER: 22 mCi of Tc-99m MDP.    TECHNIQUE: Imaging of the whole body was performed from the anterior and  posterior projections following intravenous administration of  Tc-99m MDP and  appropriate delay.    FINDINGS: There is diffusely increased tracer activity throughout the axial and  proximal appendicular skeleton, with very little renal activity (a \"SuperScan\"  pattern). The accompanying CT examination demonstrates diffuse osseous sclerotic  bone lesions.    Impression  Diffuse osseous metastatic disease.    23X    Electronically signed by Moses Everett    10/2/24  FINAL PATHOLOGIC DIAGNOSIS   Bone marrow, posterior iliac crest, touch imprint, and decalcified core biopsy:   Metastatic mammary carcinoma. See comments.   Myelofibrosis and osteosclerosis with trilineage hypoplasia.   Peripheral blood:   Leukoerythroblastosis straight     MRI Result (most recent):  MRI BRAIN W WO CONTRAST 10/15/2024    Narrative  EXAM:  MRI BRAIN W WO CONTRAST    Clinical history: Malignant neoplasm of unspecified site of left female breast;  Secondary and unspecified malignant neoplasm of axilla and upper limb lymph  nodes  INDICATION:   Malignant neoplasm of unspecified site of left female breast;  Secondary and unspecified malignant neoplasm of axilla and upper limb lymph  nodes    COMPARISON: None    TECHNIQUE: MR examination of the brain includes axial and sagittal T1 , axial  T2, axial FLAIR, axial gradient echo, axial DWI, coronal T1 . Pre and post  contrast axial T1-weighted imaging. Postcontrast

## 2025-01-02 NOTE — PROGRESS NOTES
A Spiritual Care Partner Volunteer visited Alea Lindsay at [facility] in [facility] on 1/2/2025     Documented by: Chaplain Maddi Watkins

## 2025-01-02 NOTE — PROGRESS NOTES
\[facility]\"" Short Note                       Date: 2025    Name: Alea Lindsay    MRN: [id number]         : 1990      Pt admit to \[facility]\"" for Keytruda/Carbo/Gemzar ambulatory in stable condition. Assessment completed and documented in flowsheets by assessment RN. No acute concerns at this time.  Please review pending lab results in CC.      Ms. Lindsay's vitals were reviewed prior to and after treatment.   Patient Vitals for the past 12 hrs:   Temp Pulse Resp BP SpO2   25 1245 -- 78 -- 137/88 --   25 0815 97.7 °F (36.5 °C) 88 16 (!) 129/92 100 %         Lab results were obtained and reviewed. Labs within parameter for treatment.  Recent Results (from the past 12 hour(s))   CBC with Auto Differential    Collection Time: 25  8:24 AM   Result Value Ref Range    WBC 5.6 3.6 - 11.0 K/uL    RBC 3.11 (L) 3.80 - 5.20 M/uL    Hemoglobin 9.6 (L) 11.5 - 16.0 g/dL    Hematocrit 29.8 (L) 35.0 - 47.0 %    MCV 95.8 80.0 - 99.0 FL    MCH 30.9 26.0 - 34.0 PG    MCHC 32.2 30.0 - 36.5 g/dL    RDW 18.6 (H) 11.5 - 14.5 %    Platelets 132 (L) 150 - 400 K/uL    MPV 10.9 8.9 - 12.9 FL    Nucleated RBCs 6.7 (H) 0  WBC    nRBC 0.38 (H) 0.00 - 0.01 K/uL    Neutrophils % 60 32 - 75 %    Lymphocytes % 25 12 - 49 %    Monocytes % 6 5 - 13 %    Eosinophils % 1 0 - 7 %    Basophils % 3 (H) 0 - 1 %    Myelocytes 4 (H) 0 %    Promyelocytes 1 (H) 0 %    Immature Granulocytes % 0 %    Neutrophils Absolute 3.4 1.8 - 8.0 K/UL    Lymphocytes Absolute 1.4 0.8 - 3.5 K/UL    Monocytes Absolute 0.3 0.0 - 1.0 K/UL    Eosinophils Absolute 0.1 0.0 - 0.4 K/UL    Basophils Absolute 0.2 (H) 0.0 - 0.1 K/UL    Immature Granulocytes Absolute 0.0 K/UL    Differential Type SMEAR SCANNED      RBC Comment ANISOCYTOSIS  1+        RBC Comment MACROCYTOSIS  1+        RBC Comment POLYCHROMASIA  1+       Comprehensive Metabolic Panel    Collection Time: 25  8:24 AM   Result Value Ref Range    Sodium 137 136 - 145 mmol/L

## 2025-01-03 ENCOUNTER — CLINICAL DOCUMENTATION (OUTPATIENT)
Facility: HOSPITAL | Age: 35
End: 2025-01-03

## 2025-01-03 NOTE — PROGRESS NOTES
Patient Assistance    Met with: Spoke to patient by phone    Navigator Type: Infusion  Documentation Type: New Patient  Contact Type: Telephone  Status of Patient Insurance Coverage: Patient has active coverage          Additional notes:      Drug Name: Keytruda  Other Drug Name: [employer]  Form of PAP Assistance: Copay / [employer] (Approved)    Drug Name: Neulasta  Form of PAP Assistance: Copay / [employer] (Approved)    Drug Name: Xgeva  Form of PAP Assistance: Copay / [employer] (Approved)

## 2025-01-08 ENCOUNTER — APPOINTMENT (OUTPATIENT)
Facility: HOSPITAL | Age: 35
End: 2025-01-08
Payer: COMMERCIAL

## 2025-01-08 ENCOUNTER — HOSPITAL ENCOUNTER (OUTPATIENT)
Facility: HOSPITAL | Age: 35
Setting detail: INFUSION SERIES
Discharge: HOME | End: 2025-01-08
Payer: COMMERCIAL

## 2025-01-08 DIAGNOSIS — C50.912 CARCINOMA OF LEFT BREAST METASTATIC TO BONE (HCC): ICD-10-CM

## 2025-01-08 DIAGNOSIS — E83.51 HYPOCALCEMIA: ICD-10-CM

## 2025-01-08 DIAGNOSIS — C79.51 CARCINOMA OF LEFT BREAST METASTATIC TO BONE (HCC): ICD-10-CM

## 2025-01-08 LAB
ALBUMIN SERPL-MCNC: 3.5 G/DL (ref 3.5–5)
ALBUMIN/GLOB SERPL: 1.1 (ref 1.1–2.2)
ALP SERPL-CCNC: 205 U/L (ref 45–117)
ALT SERPL-CCNC: 113 U/L (ref 12–78)
ANION GAP SERPL CALC-SCNC: 5 MMOL/L (ref 2–12)
AST SERPL-CCNC: 64 U/L (ref 15–37)
BASOPHILS # BLD: 0.06 K/UL (ref 0–0.1)
BASOPHILS NFR BLD: 2 % (ref 0–1)
BILIRUB SERPL-MCNC: 0.7 MG/DL (ref 0.2–1)
BUN SERPL-MCNC: 9 MG/DL (ref 6–20)
BUN/CREAT SERPL: 14 (ref 12–20)
CALCIUM SERPL-MCNC: 7.9 MG/DL (ref 8.5–10.1)
CHLORIDE SERPL-SCNC: 108 MMOL/L (ref 97–108)
CO2 SERPL-SCNC: 23 MMOL/L (ref 21–32)
CREAT SERPL-MCNC: 0.66 MG/DL (ref 0.55–1.02)
DIFFERENTIAL METHOD BLD: ABNORMAL
EOSINOPHIL # BLD: 0 K/UL (ref 0–0.4)
EOSINOPHIL NFR BLD: 0 % (ref 0–7)
ERYTHROCYTE [DISTWIDTH] IN BLOOD BY AUTOMATED COUNT: 15.9 % (ref 11.5–14.5)
GLOBULIN SER CALC-MCNC: 3.3 G/DL (ref 2–4)
GLUCOSE SERPL-MCNC: 114 MG/DL (ref 65–100)
HCT VFR BLD AUTO: 28.2 % (ref 35–47)
HGB BLD-MCNC: 9.1 G/DL (ref 11.5–16)
IMM GRANULOCYTES # BLD AUTO: 0 K/UL
IMM GRANULOCYTES NFR BLD AUTO: 0 %
LYMPHOCYTES # BLD: 1.28 K/UL (ref 0.8–3.5)
LYMPHOCYTES NFR BLD: 40 % (ref 12–49)
MCH RBC QN AUTO: 30.5 PG (ref 26–34)
MCHC RBC AUTO-ENTMCNC: 32.3 G/DL (ref 30–36.5)
MCV RBC AUTO: 94.6 FL (ref 80–99)
MONOCYTES # BLD: 0.03 K/UL (ref 0–1)
MONOCYTES NFR BLD: 1 % (ref 5–13)
NEUTS BAND NFR BLD MANUAL: 1 % (ref 0–6)
NEUTS SEG # BLD: 1.83 K/UL (ref 1.8–8)
NEUTS SEG NFR BLD: 56 % (ref 32–75)
NRBC # BLD: 0.03 K/UL (ref 0–0.01)
NRBC BLD-RTO: 1 PER 100 WBC
PLATELET # BLD AUTO: 154 K/UL (ref 150–400)
PMV BLD AUTO: 8.8 FL (ref 8.9–12.9)
POTASSIUM SERPL-SCNC: 3.9 MMOL/L (ref 3.5–5.1)
PROT SERPL-MCNC: 6.8 G/DL (ref 6.4–8.2)
RBC # BLD AUTO: 2.98 M/UL (ref 3.8–5.2)
RBC MORPH BLD: ABNORMAL
SODIUM SERPL-SCNC: 136 MMOL/L (ref 136–145)
WBC # BLD AUTO: 3.2 K/UL (ref 3.6–11)

## 2025-01-08 PROCEDURE — 80053 COMPREHEN METABOLIC PANEL: CPT

## 2025-01-08 PROCEDURE — 36415 COLL VENOUS BLD VENIPUNCTURE: CPT

## 2025-01-08 PROCEDURE — 85025 COMPLETE CBC W/AUTO DIFF WBC: CPT

## 2025-01-08 NOTE — PROGRESS NOTES
[facility] Progress Note    Date: 2025    Name: Alea Lindsay    MRN: [id number]         : 1990      Pt arrived ambulatory and in no distress, for lab visit.  Labs drawn via L forearm, patient tolerated well.        Please check Epic for Results.     Departed [facility] ambulatory and in no distress.    Future Appointments   Date Time Provider Department Center   2025  9:30 AM [facility] CHEMO CHAIR [facility]   2025 10:00 AM [facility] LAB CHAIR 1 [facility]   2025  8:30 AM [facility] CHEMO CHAIR 8 [facility]   2025  8:45 AM Sepideh Gillespie, DO [employer] BS AMB   2025  9:30 AM [facility] LAB CHAIR 1 [facility]   2025  9:30 AM [facility] CHEMO CHAIR [facility]   2025  9:30 AM [facility] LAB CHAIR 1 [facility]   2025  8:00 AM [facility] CHEMO CHAIR 7 [facility]   2025 10:00 AM [facility] LAB CHAIR 1 [facility]   2025  9:30 AM [facility] CHEMO CHAIR 10 [facility]   3/5/2025  9:00 AM [facility] LAB CHAIR 1 [facility]   3/6/2025  8:00 AM [facility] CHEMO CHAIR [facility]   3/12/2025  9:00 AM [facility] LAB CHAIR 1 [facility]   3/13/2025  9:30 AM [facility] CHEMO CHAIR 10 [facility]   3/26/2025  9:30 AM [facility] LAB CHAIR 1 [facility]   3/27/2025  8:00 AM [facility] CHEMO CHAIR 7 [facility]   2025  9:30 AM [facility] LAB CHAIR 1 [facility]   4/3/2025  8:00 AM [facility] CHEMO CHAIR 7 [facility]       Cee Calles  2025

## 2025-01-09 ENCOUNTER — HOSPITAL ENCOUNTER (OUTPATIENT)
Facility: HOSPITAL | Age: 35
Setting detail: INFUSION SERIES
Discharge: HOME | End: 2025-01-09
Payer: COMMERCIAL

## 2025-01-09 VITALS
RESPIRATION RATE: 18 BRPM | HEART RATE: 107 BPM | BODY MASS INDEX: 33.12 KG/M2 | TEMPERATURE: 97.7 F | OXYGEN SATURATION: 100 % | WEIGHT: 194 LBS | SYSTOLIC BLOOD PRESSURE: 131 MMHG | DIASTOLIC BLOOD PRESSURE: 81 MMHG | HEIGHT: 64 IN

## 2025-01-09 DIAGNOSIS — E83.51 HYPOCALCEMIA: ICD-10-CM

## 2025-01-09 DIAGNOSIS — C50.919 MALIGNANT NEOPLASM OF BREAST METASTATIC TO BONE (HCC): Primary | ICD-10-CM

## 2025-01-09 DIAGNOSIS — R79.89 ELEVATED LFTS: ICD-10-CM

## 2025-01-09 DIAGNOSIS — C79.51 MALIGNANT NEOPLASM OF BREAST METASTATIC TO BONE (HCC): Primary | ICD-10-CM

## 2025-01-09 DIAGNOSIS — C79.51 CARCINOMA OF LEFT BREAST METASTATIC TO BONE (HCC): Primary | ICD-10-CM

## 2025-01-09 DIAGNOSIS — C50.912 CARCINOMA OF LEFT BREAST METASTATIC TO BONE (HCC): Primary | ICD-10-CM

## 2025-01-09 PROCEDURE — 6360000002 HC RX W HCPCS: Performed by: INTERNAL MEDICINE

## 2025-01-09 PROCEDURE — 96413 CHEMO IV INFUSION 1 HR: CPT

## 2025-01-09 PROCEDURE — 96417 CHEMO IV INFUS EACH ADDL SEQ: CPT

## 2025-01-09 PROCEDURE — 96375 TX/PRO/DX INJ NEW DRUG ADDON: CPT

## 2025-01-09 PROCEDURE — 96377 APPLICATON ON-BODY INJECTOR: CPT

## 2025-01-09 PROCEDURE — 2580000003 HC RX 258: Performed by: INTERNAL MEDICINE

## 2025-01-09 RX ORDER — PALONOSETRON 0.05 MG/ML
0.25 INJECTION, SOLUTION INTRAVENOUS ONCE
Status: COMPLETED | OUTPATIENT
Start: 2025-01-09 | End: 2025-01-09

## 2025-01-09 RX ORDER — DEXAMETHASONE SODIUM PHOSPHATE 10 MG/ML
10 INJECTION, SOLUTION INTRAMUSCULAR; INTRAVENOUS ONCE
Status: COMPLETED | OUTPATIENT
Start: 2025-01-09 | End: 2025-01-09

## 2025-01-09 RX ORDER — SODIUM CHLORIDE 0.9 % (FLUSH) 0.9 %
5-40 SYRINGE (ML) INJECTION PRN
Status: DISCONTINUED | OUTPATIENT
Start: 2025-01-09 | End: 2025-01-10 | Stop reason: HOSPADM

## 2025-01-09 RX ORDER — HEPARIN 100 UNIT/ML
500 SYRINGE INTRAVENOUS PRN
Status: DISCONTINUED | OUTPATIENT
Start: 2025-01-09 | End: 2025-01-10 | Stop reason: HOSPADM

## 2025-01-09 RX ORDER — SODIUM CHLORIDE 9 MG/ML
5-250 INJECTION, SOLUTION INTRAVENOUS PRN
Status: DISCONTINUED | OUTPATIENT
Start: 2025-01-09 | End: 2025-01-10 | Stop reason: HOSPADM

## 2025-01-09 RX ADMIN — DEXAMETHASONE SODIUM PHOSPHATE 10 MG: 10 INJECTION INTRAMUSCULAR; INTRAVENOUS at 10:29

## 2025-01-09 RX ADMIN — PALONOSETRON 0.25 MG: 0.05 INJECTION, SOLUTION INTRAVENOUS at 10:25

## 2025-01-09 RX ADMIN — PEGFILGRASTIM 6 MG: KIT SUBCUTANEOUS at 12:30

## 2025-01-09 RX ADMIN — CARBOPLATIN 220 MG: 10 INJECTION, SOLUTION INTRAVENOUS at 11:17

## 2025-01-09 RX ADMIN — GEMCITABINE HYDROCHLORIDE 1560 MG: 1 INJECTION, SOLUTION INTRAVENOUS at 11:51

## 2025-01-09 ASSESSMENT — PAIN SCALES - GENERAL: PAINLEVEL_OUTOF10: 0

## 2025-01-09 NOTE — PROGRESS NOTES
[facility] Short Note                       Date: 2025    Name: Alea Lindsay    MRN: [id number]         : 1990      Pt admit to [facility] for Carbo/Gemzar ambulatory in stable condition. Assessment completed and documented in flowsheets by assessment RN. No acute concerns at this time.  Please review pending lab results in CC.      Ms. Lindsay's vitals were reviewed prior to and after treatment.   Patient Vitals for the past 12 hrs:   Temp Pulse Resp BP SpO2   25 1215 -- (!) 107 -- 131/81 --   25 0926 97.7 °F (36.5 °C) 97 18 119/83 100 %         Lab results were obtained and reviewed. Labs within parameter for treatment. Labs done 25.  No results found for this or any previous visit (from the past 12 hour(s)).      Medications given:  Medications Administered         CARBOplatin (PARAPLATIN) 220 mg in sodium chloride 0.9 % 100 mL chemo IVPB Admin Date  2025 Action  New Bag Dose  220 mg Rate  264 mL/hr Route  IntraVENous Documented By  Alicia Leslie RN        dexAMETHasone (PF) (DECADRON) injection 10 mg Admin Date  2025 Action  Given Dose  10 mg Rate   Route  IntraVENous Documented By  Alicia Leslie RN        gemcitabine HCl (GEMZAR) 1,560 mg in sodium chloride 0.9 % 100 mL chemo IVPB Admin Date  2025 Action  New Bag Dose  1,560 mg Rate  302.1 mL/hr Route  IntraVENous Documented By  Alicia Leslie RN        palonosetron (ALOXI) injection 0.25 mg Admin Date  2025 Action  Given Dose  0.25 mg Rate   Route  IntraVENous Documented By  Alicia Leslie RN        pegfilgrastim (NEULASTA) on-body injector 6 mg Admin Date  2025 Action  Given Dose  6 mg Rate   Route  SubCUTAneous Documented By  Alicia Leslie RN        Neulasta applied to L arm.    Port accessed with positive blood return. Port flushed and de-accessed per protocol.     Two nurses verified prior to administering: Drug name, Drug dose, Infusion volume or drug volume when prepared in a syringe,  Rate of administration, Route of administration, Expiration dates and/or times, Appearance and physical integrity of the drugs, Rate set on infusion pump, when used, and Sequencing of drug administration.    Education provided to patient about Neulasta On Body Injector including: side effects, how/when to remove the device, as well as what to do in the event of device malfunction. Opportunity for questions was provided and all questions were answered. Patient verbalized understanding.     Ms. Lindsay tolerated the infusion, and had no complaints.    [facility].     Future Appointments   Date Time Provider Department Center   1/22/2025 10:00 AM [facility] LAB CHAIR 1 [facility]   1/23/2025  8:30 AM [facility] CHEMO CHAIR 8 [facility]   1/23/2025  8:45 AM Sepideh Gillespie, DO [facility]   1/24/2025 11:30 AM [facility] 3 [facility]   1/29/2025  9:30 AM [facility] LAB CHAIR 1 [facility]   1/30/2025  9:30 AM [facility] CHEMO CHAIR 10 [facility]   2/12/2025  9:30 AM [facility] LAB CHAIR 1 [facility]   2/13/2025  8:00 AM [facility] CHEMO CHAIR 7 [facility]   2/19/2025 10:00 AM [facility] LAB CHAIR 1 [facility]   2/20/2025  9:30 AM [facility] CHEMO CHAIR 10 [facility]   3/5/2025  9:00 AM [facility] LAB CHAIR 1 [facility]   3/6/2025  8:00 AM [facility] CHEMO CHAIR 7 [facility]   3/12/2025  9:00 AM [facility] LAB CHAIR 1 [facility]   3/13/2025  9:30 AM [facility] CHEMO CHAIR 10 [facility]   3/26/2025  9:30 AM [facility] LAB CHAIR 1 [facility]   3/27/2025  8:00 AM [facility] CHEMO CHAIR 7 [facility]   4/2/2025  9:30 AM [facility] LAB CHAIR 1 [facility]   4/3/2025  8:00 AM [facility] CHEMO CHAIR 7 [facility]       TIA VALLEJO RN  January 9, 2025  12:50 PM

## 2025-01-15 ENCOUNTER — APPOINTMENT (OUTPATIENT)
Facility: HOSPITAL | Age: 35
End: 2025-01-15
Payer: COMMERCIAL

## 2025-01-16 ENCOUNTER — APPOINTMENT (OUTPATIENT)
Facility: HOSPITAL | Age: 35
End: 2025-01-16
Payer: COMMERCIAL

## 2025-01-16 RX ORDER — DIPHENHYDRAMINE HYDROCHLORIDE 50 MG/ML
50 INJECTION INTRAMUSCULAR; INTRAVENOUS
Status: CANCELLED | OUTPATIENT
Start: 2025-01-30

## 2025-01-16 RX ORDER — SODIUM CHLORIDE 9 MG/ML
INJECTION, SOLUTION INTRAVENOUS CONTINUOUS
Status: CANCELLED | OUTPATIENT
Start: 2025-01-30

## 2025-01-16 RX ORDER — DEXAMETHASONE SODIUM PHOSPHATE 10 MG/ML
10 INJECTION, SOLUTION INTRAMUSCULAR; INTRAVENOUS ONCE
Status: CANCELLED | OUTPATIENT
Start: 2025-01-30 | End: 2025-01-30

## 2025-01-16 RX ORDER — HYDROCORTISONE SODIUM SUCCINATE 100 MG/2ML
100 INJECTION INTRAMUSCULAR; INTRAVENOUS
Status: CANCELLED | OUTPATIENT
Start: 2025-01-23

## 2025-01-16 RX ORDER — PALONOSETRON 0.05 MG/ML
0.25 INJECTION, SOLUTION INTRAVENOUS ONCE
Status: CANCELLED | OUTPATIENT
Start: 2025-01-30 | End: 2025-01-30

## 2025-01-16 RX ORDER — ONDANSETRON 2 MG/ML
8 INJECTION INTRAMUSCULAR; INTRAVENOUS
Status: CANCELLED | OUTPATIENT
Start: 2025-01-23

## 2025-01-16 RX ORDER — SODIUM CHLORIDE 9 MG/ML
5-250 INJECTION, SOLUTION INTRAVENOUS PRN
Status: CANCELLED | OUTPATIENT
Start: 2025-01-30

## 2025-01-16 RX ORDER — ACETAMINOPHEN 325 MG/1
650 TABLET ORAL
Status: CANCELLED | OUTPATIENT
Start: 2025-01-30

## 2025-01-16 RX ORDER — ALBUTEROL SULFATE 90 UG/1
4 INHALANT RESPIRATORY (INHALATION) PRN
Status: CANCELLED | OUTPATIENT
Start: 2025-01-23

## 2025-01-16 RX ORDER — SODIUM CHLORIDE 9 MG/ML
5-250 INJECTION, SOLUTION INTRAVENOUS PRN
Status: CANCELLED | OUTPATIENT
Start: 2025-01-23

## 2025-01-16 RX ORDER — EPINEPHRINE 1 MG/ML
0.3 INJECTION, SOLUTION INTRAMUSCULAR; SUBCUTANEOUS PRN
Status: CANCELLED | OUTPATIENT
Start: 2025-01-30

## 2025-01-16 RX ORDER — ALBUTEROL SULFATE 90 UG/1
4 INHALANT RESPIRATORY (INHALATION) PRN
Status: CANCELLED | OUTPATIENT
Start: 2025-01-30

## 2025-01-16 RX ORDER — EPINEPHRINE 1 MG/ML
0.3 INJECTION, SOLUTION INTRAMUSCULAR; SUBCUTANEOUS PRN
Status: CANCELLED | OUTPATIENT
Start: 2025-01-23

## 2025-01-16 RX ORDER — MEPERIDINE HYDROCHLORIDE 25 MG/ML
12.5 INJECTION INTRAMUSCULAR; INTRAVENOUS; SUBCUTANEOUS PRN
Status: CANCELLED | OUTPATIENT
Start: 2025-01-23

## 2025-01-16 RX ORDER — HYDROCORTISONE SODIUM SUCCINATE 100 MG/2ML
100 INJECTION INTRAMUSCULAR; INTRAVENOUS
Status: CANCELLED | OUTPATIENT
Start: 2025-01-30

## 2025-01-16 RX ORDER — SODIUM CHLORIDE 9 MG/ML
INJECTION, SOLUTION INTRAVENOUS CONTINUOUS
Status: CANCELLED | OUTPATIENT
Start: 2025-01-23

## 2025-01-16 RX ORDER — HEPARIN 100 UNIT/ML
500 SYRINGE INTRAVENOUS PRN
Status: CANCELLED | OUTPATIENT
Start: 2025-01-30

## 2025-01-16 RX ORDER — SODIUM CHLORIDE 0.9 % (FLUSH) 0.9 %
5-40 SYRINGE (ML) INJECTION PRN
Status: CANCELLED | OUTPATIENT
Start: 2025-01-23

## 2025-01-16 RX ORDER — ONDANSETRON 2 MG/ML
8 INJECTION INTRAMUSCULAR; INTRAVENOUS
Status: CANCELLED | OUTPATIENT
Start: 2025-01-30

## 2025-01-16 RX ORDER — HEPARIN 100 UNIT/ML
500 SYRINGE INTRAVENOUS PRN
Status: CANCELLED | OUTPATIENT
Start: 2025-01-23

## 2025-01-16 RX ORDER — SODIUM CHLORIDE 0.9 % (FLUSH) 0.9 %
5-40 SYRINGE (ML) INJECTION PRN
Status: CANCELLED | OUTPATIENT
Start: 2025-01-30

## 2025-01-16 RX ORDER — MEPERIDINE HYDROCHLORIDE 25 MG/ML
12.5 INJECTION INTRAMUSCULAR; INTRAVENOUS; SUBCUTANEOUS PRN
Status: CANCELLED | OUTPATIENT
Start: 2025-01-30

## 2025-01-16 RX ORDER — ACETAMINOPHEN 325 MG/1
650 TABLET ORAL
Status: CANCELLED | OUTPATIENT
Start: 2025-01-23

## 2025-01-16 RX ORDER — DIPHENHYDRAMINE HYDROCHLORIDE 50 MG/ML
50 INJECTION INTRAMUSCULAR; INTRAVENOUS
Status: CANCELLED | OUTPATIENT
Start: 2025-01-23

## 2025-01-22 ENCOUNTER — HOSPITAL ENCOUNTER (OUTPATIENT)
Facility: HOSPITAL | Age: 35
Setting detail: INFUSION SERIES
Discharge: HOME | End: 2025-01-22
Payer: COMMERCIAL

## 2025-01-22 DIAGNOSIS — C79.51 CARCINOMA OF LEFT BREAST METASTATIC TO BONE (HCC): ICD-10-CM

## 2025-01-22 DIAGNOSIS — C50.912 CARCINOMA OF LEFT BREAST METASTATIC TO BONE (HCC): ICD-10-CM

## 2025-01-22 DIAGNOSIS — E83.51 HYPOCALCEMIA: ICD-10-CM

## 2025-01-22 LAB
ALBUMIN SERPL-MCNC: 3.9 G/DL (ref 3.5–5)
ALBUMIN/GLOB SERPL: 1.1 (ref 1.1–2.2)
ALP SERPL-CCNC: 187 U/L (ref 45–117)
ALT SERPL-CCNC: 64 U/L (ref 12–78)
ANION GAP SERPL CALC-SCNC: 7 MMOL/L (ref 2–12)
AST SERPL-CCNC: 35 U/L (ref 15–37)
BASOPHILS # BLD: 0.03 K/UL (ref 0–0.1)
BASOPHILS NFR BLD: 0.9 % (ref 0–1)
BILIRUB SERPL-MCNC: 0.8 MG/DL (ref 0.2–1)
BUN SERPL-MCNC: 5 MG/DL (ref 6–20)
BUN/CREAT SERPL: 7 (ref 12–20)
CALCIUM SERPL-MCNC: 7.5 MG/DL (ref 8.5–10.1)
CHLORIDE SERPL-SCNC: 109 MMOL/L (ref 97–108)
CO2 SERPL-SCNC: 21 MMOL/L (ref 21–32)
CREAT SERPL-MCNC: 0.7 MG/DL (ref 0.55–1.02)
DIFFERENTIAL METHOD BLD: ABNORMAL
EOSINOPHIL # BLD: 0.05 K/UL (ref 0–0.4)
EOSINOPHIL NFR BLD: 1.4 % (ref 0–7)
ERYTHROCYTE [DISTWIDTH] IN BLOOD BY AUTOMATED COUNT: 18.7 % (ref 11.5–14.5)
GLOBULIN SER CALC-MCNC: 3.4 G/DL (ref 2–4)
GLUCOSE SERPL-MCNC: 90 MG/DL (ref 65–100)
HCT VFR BLD AUTO: 27.8 % (ref 35–47)
HGB BLD-MCNC: 8.5 G/DL (ref 11.5–16)
IMM GRANULOCYTES # BLD AUTO: 0.09 K/UL (ref 0–0.04)
IMM GRANULOCYTES NFR BLD AUTO: 2.6 % (ref 0–0.5)
LYMPHOCYTES # BLD: 0.95 K/UL (ref 0.8–3.5)
LYMPHOCYTES NFR BLD: 27 % (ref 12–49)
MCH RBC QN AUTO: 30.6 PG (ref 26–34)
MCHC RBC AUTO-ENTMCNC: 30.6 G/DL (ref 30–36.5)
MCV RBC AUTO: 100 FL (ref 80–99)
MONOCYTES # BLD: 0.31 K/UL (ref 0–1)
MONOCYTES NFR BLD: 8.9 % (ref 5–13)
NEUTS SEG # BLD: 2.07 K/UL (ref 1.8–8)
NEUTS SEG NFR BLD: 59.2 % (ref 32–75)
NRBC # BLD: 0.1 K/UL (ref 0–0.01)
NRBC BLD-RTO: 2.9 PER 100 WBC
PLATELET # BLD AUTO: 146 K/UL (ref 150–400)
PMV BLD AUTO: 11.5 FL (ref 8.9–12.9)
POTASSIUM SERPL-SCNC: 3.8 MMOL/L (ref 3.5–5.1)
PROT SERPL-MCNC: 7.3 G/DL (ref 6.4–8.2)
RBC # BLD AUTO: 2.78 M/UL (ref 3.8–5.2)
RBC MORPH BLD: ABNORMAL
SODIUM SERPL-SCNC: 137 MMOL/L (ref 136–145)
TSH SERPL DL<=0.05 MIU/L-ACNC: 1.72 UIU/ML (ref 0.36–3.74)
WBC # BLD AUTO: 3.5 K/UL (ref 3.6–11)
WBC MORPH BLD: ABNORMAL

## 2025-01-22 PROCEDURE — 85025 COMPLETE CBC W/AUTO DIFF WBC: CPT

## 2025-01-22 PROCEDURE — 80053 COMPREHEN METABOLIC PANEL: CPT

## 2025-01-22 PROCEDURE — 84443 ASSAY THYROID STIM HORMONE: CPT

## 2025-01-22 PROCEDURE — 36415 COLL VENOUS BLD VENIPUNCTURE: CPT

## 2025-01-22 NOTE — PROGRESS NOTES
[facility] Progress Note    Date: 2025    Name: Alea Lindsay    MRN: [id number]         : 1990      1000:  Pt arrived ambulatory and in no distress, for lab visit.  Labs drawn via right forearm, patient tolerated well.                Departed [facility] ambulatory and in no distress.    Future Appointments   Date Time Provider Department Center   2025  8:30 AM [facility] CHEMO CHAIR [facility]   2025  8:45 AM Sepideh Gillespie, DO [facility]   2025 11:30 AM [facility] CT [facility] 3 [facility]   2025  9:30 AM [facility] LAB CHAIR 1 [facility]   2025  9:30 AM [facility] CHEMO CHAIR 10 [facility]   2025  9:30 AM [facility] LAB CHAIR 1 [facility]   2025  8:00 AM [facility] CHEMO CHAIR 7 [facility]   2025 10:00 AM [facility] LAB CHAIR 1 [facility]   2025  9:30 AM [facility] CHEMO CHAIR 10 [facility]   3/5/2025  9:00 AM [facility] LAB CHAIR 1 [facility]   3/6/2025  8:00 AM [facility] CHEMO CHAIR 7 [facility]   3/12/2025  9:00 AM [facility] LAB CHAIR 1 [facility]   3/13/2025  9:30 AM [facility] CHEMO CHAIR 10 [facility]   3/26/2025  9:30 AM [facility] LAB CHAIR 1 [facility]   3/27/2025  8:00 AM [facility] CHEMO CHAIR 7 [facility]   2025  9:30 AM [facility] LAB CHAIR 1 [facility]   4/3/2025  8:00 AM [facility] CHEMO CHAIR 7 [facility]       Chayo Montoya RN  2025

## 2025-01-23 ENCOUNTER — HOSPITAL ENCOUNTER (OUTPATIENT)
Facility: HOSPITAL | Age: 35
Setting detail: INFUSION SERIES
Discharge: HOME | End: 2025-01-23
Payer: COMMERCIAL

## 2025-01-23 ENCOUNTER — OFFICE VISIT (OUTPATIENT)
Age: 35
End: 2025-01-23
Payer: COMMERCIAL

## 2025-01-23 VITALS
HEART RATE: 93 BPM | WEIGHT: 202 LBS | OXYGEN SATURATION: 100 % | SYSTOLIC BLOOD PRESSURE: 113 MMHG | DIASTOLIC BLOOD PRESSURE: 76 MMHG | TEMPERATURE: 97.8 F | BODY MASS INDEX: 34.66 KG/M2 | RESPIRATION RATE: 18 BRPM

## 2025-01-23 VITALS
HEIGHT: 64 IN | WEIGHT: 202.8 LBS | BODY MASS INDEX: 34.62 KG/M2 | TEMPERATURE: 97.8 F | RESPIRATION RATE: 18 BRPM | HEART RATE: 93 BPM | SYSTOLIC BLOOD PRESSURE: 113 MMHG | DIASTOLIC BLOOD PRESSURE: 76 MMHG | OXYGEN SATURATION: 100 %

## 2025-01-23 DIAGNOSIS — C50.912 CARCINOMA OF LEFT BREAST METASTATIC TO BONE (HCC): ICD-10-CM

## 2025-01-23 DIAGNOSIS — D70.1 CHEMOTHERAPY INDUCED NEUTROPENIA (HCC): ICD-10-CM

## 2025-01-23 DIAGNOSIS — T45.1X5A CHEMOTHERAPY-INDUCED THROMBOCYTOPENIA: ICD-10-CM

## 2025-01-23 DIAGNOSIS — C79.51 MALIGNANT NEOPLASM OF BREAST METASTATIC TO BONE (HCC): Primary | ICD-10-CM

## 2025-01-23 DIAGNOSIS — R53.83 CHEMOTHERAPY-INDUCED FATIGUE: ICD-10-CM

## 2025-01-23 DIAGNOSIS — D69.59 CHEMOTHERAPY-INDUCED THROMBOCYTOPENIA: ICD-10-CM

## 2025-01-23 DIAGNOSIS — T45.1X5A CHEMOTHERAPY INDUCED NEUTROPENIA (HCC): ICD-10-CM

## 2025-01-23 DIAGNOSIS — T45.1X5A CHEMOTHERAPY-INDUCED FATIGUE: ICD-10-CM

## 2025-01-23 DIAGNOSIS — C79.51 CARCINOMA OF LEFT BREAST METASTATIC TO BONE (HCC): ICD-10-CM

## 2025-01-23 DIAGNOSIS — C50.919 MALIGNANT NEOPLASM OF BREAST METASTATIC TO BONE (HCC): Primary | ICD-10-CM

## 2025-01-23 DIAGNOSIS — Z95.828 PORT-A-CATH IN PLACE: ICD-10-CM

## 2025-01-23 DIAGNOSIS — T45.1X5A ANEMIA DUE TO CHEMOTHERAPY: ICD-10-CM

## 2025-01-23 DIAGNOSIS — R45.89 ANXIETY ABOUT HEALTH: ICD-10-CM

## 2025-01-23 DIAGNOSIS — E83.51 HYPOCALCEMIA: Primary | ICD-10-CM

## 2025-01-23 DIAGNOSIS — D64.81 ANEMIA DUE TO CHEMOTHERAPY: ICD-10-CM

## 2025-01-23 LAB
ANION GAP BLD CALC-SCNC: 10.5 MMOL/L (ref 10–20)
CA-I BLD-MCNC: 1.07 MMOL/L (ref 1.15–1.33)
CHLORIDE BLD-SCNC: 109 MMOL/L (ref 98–107)
CO2 BLD-SCNC: 21.5 MMOL/L (ref 21–32)
CREAT BLD-MCNC: 0.67 MG/DL (ref 0.6–1.3)
GLUCOSE BLD-MCNC: 160 MG/DL (ref 74–99)
MAGNESIUM SERPL-MCNC: 2.2 MG/DL (ref 1.6–2.4)
PHOSPHATE SERPL-MCNC: 1.8 MG/DL (ref 2.6–4.7)
POTASSIUM BLD-SCNC: 4.1 MMOL/L (ref 3.5–5.1)
SERVICE CMNT-IMP: ABNORMAL
SODIUM BLD-SCNC: 141 MMOL/L (ref 136–145)

## 2025-01-23 PROCEDURE — 96413 CHEMO IV INFUSION 1 HR: CPT

## 2025-01-23 PROCEDURE — 84100 ASSAY OF PHOSPHORUS: CPT

## 2025-01-23 PROCEDURE — 83735 ASSAY OF MAGNESIUM: CPT

## 2025-01-23 PROCEDURE — 96417 CHEMO IV INFUS EACH ADDL SEQ: CPT

## 2025-01-23 PROCEDURE — 99215 OFFICE O/P EST HI 40 MIN: CPT | Performed by: INTERNAL MEDICINE

## 2025-01-23 PROCEDURE — 6360000002 HC RX W HCPCS: Performed by: INTERNAL MEDICINE

## 2025-01-23 PROCEDURE — 80047 BASIC METABLC PNL IONIZED CA: CPT

## 2025-01-23 PROCEDURE — 2580000003 HC RX 258: Performed by: INTERNAL MEDICINE

## 2025-01-23 PROCEDURE — 96375 TX/PRO/DX INJ NEW DRUG ADDON: CPT

## 2025-01-23 PROCEDURE — 36415 COLL VENOUS BLD VENIPUNCTURE: CPT

## 2025-01-23 PROCEDURE — 6370000000 HC RX 637 (ALT 250 FOR IP): Performed by: NURSE PRACTITIONER

## 2025-01-23 RX ORDER — DEXAMETHASONE 4 MG/1
4 TABLET ORAL 2 TIMES DAILY WITH MEALS
Qty: 20 TABLET | Refills: 1 | Status: SHIPPED | OUTPATIENT
Start: 2025-01-23 | End: 2025-01-28

## 2025-01-23 RX ORDER — LORAZEPAM 0.5 MG/1
0.5 TABLET ORAL EVERY 8 HOURS PRN
Qty: 30 TABLET | Refills: 0 | Status: SHIPPED | OUTPATIENT
Start: 2025-01-23 | End: 2025-02-22

## 2025-01-23 RX ORDER — PALONOSETRON 0.05 MG/ML
0.25 INJECTION, SOLUTION INTRAVENOUS ONCE
Status: COMPLETED | OUTPATIENT
Start: 2025-01-23 | End: 2025-01-23

## 2025-01-23 RX ORDER — DEXAMETHASONE SODIUM PHOSPHATE 10 MG/ML
10 INJECTION, SOLUTION INTRAMUSCULAR; INTRAVENOUS ONCE
Status: COMPLETED | OUTPATIENT
Start: 2025-01-23 | End: 2025-01-23

## 2025-01-23 RX ADMIN — GEMCITABINE HYDROCHLORIDE 1560 MG: 1 INJECTION, SOLUTION INTRAVENOUS at 11:58

## 2025-01-23 RX ADMIN — SODIUM CHLORIDE 200 MG: 9 INJECTION, SOLUTION INTRAVENOUS at 10:46

## 2025-01-23 RX ADMIN — PALONOSETRON 0.25 MG: 0.05 INJECTION, SOLUTION INTRAVENOUS at 10:00

## 2025-01-23 RX ADMIN — DEXAMETHASONE SODIUM PHOSPHATE 10 MG: 10 INJECTION INTRAMUSCULAR; INTRAVENOUS at 10:02

## 2025-01-23 RX ADMIN — CARBOPLATIN 220 MG: 10 INJECTION, SOLUTION INTRAVENOUS at 11:21

## 2025-01-23 RX ADMIN — POTASSIUM & SODIUM PHOSPHATES POWDER PACK 280-160-250 MG 500 MG: 280-160-250 PACK at 12:33

## 2025-01-23 NOTE — PROGRESS NOTES
[facility] Chemo Progress Note    Date: 2025    Name: Alea Lindsay    MRN: [id number]         : 1990    Ms. Lindsay arrived ambulatory and in no distress for cycle 5 day 1 of Keytruda, Carboplatin and Gemzar.    Holding Xgeva today for low Ca per ZULLY Christensen.    Assessment was completed and documented in flowsheets. No acute concerns at this time. Port accessed without difficulty, labs drawn and processed.    Ms. Lindsay's vitals were reviewed.  Patient Vitals for the past 12 hrs:   Temp Pulse Resp BP SpO2   25 0830 97.8 °F (36.6 °C) 93 18 113/76 100 %         Lab results were reviewed 25.  Labs within parameter for treatment.   Recent Results (from the past 12 hour(s))   Phosphorus    Collection Time: 25  8:39 AM   Result Value Ref Range    Phosphorus 1.8 (L) 2.6 - 4.7 MG/DL   Magnesium    Collection Time: 25  8:39 AM   Result Value Ref Range    Magnesium 2.2 1.6 - 2.4 mg/dL   POC CHEM 8    Collection Time: 25  8:43 AM   Result Value Ref Range    POC Ionized Calcium 1.07 (L) 1.15 - 1.33 mmol/L    POC Sodium 141 136 - 145 mmol/L    POC Potassium 4.1 3.5 - 5.1 mmol/L    POC Chloride 109 (H) 98 - 107 mmol/L    POC TCO2 21.5 21 - 32 mmol/L    Anion Gap, POC 10.5 10 - 20 mmol/L    POC Glucose 160 (H) 74 - 99 mg/dL    POC Creatinine 0.67 0.6 - 1.3 mg/dL    eGFR, POC >90 >60 ml/min/1.73m2    UA Comment Comment Not Indicated.         Pre-medications  were administered as ordered and chemotherapy was initiated.    Two nurses verified prior to administering:  Drug name, Drug dose, Infusion volume or drug volume when prepared in a syringe, Rate of administration, Route of administration, Expiration dates and/or times, Appearance and physical integrity of the drugs, Rate set on infusion pump, when used, and Sequencing of drug administration.  Medication education reinforced and patient verbalized understanding.    Medications Administered         CARBOplatin (PARAPLATIN) 220  mg in sodium chloride 0.9 % 100 mL chemo IVPB Admin Date  01/23/2025 Action  New Bag Dose  220 mg Rate  264 mL/hr Route  IntraVENous Documented By  Silvia Redding RN        dexAMETHasone (PF) (DECADRON) injection 10 mg Admin Date  01/23/2025 Action  Given Dose  10 mg Rate   Route  IntraVENous Documented By  Silvia Redding RN        gemcitabine HCl (GEMZAR) 1,560 mg in sodium chloride 0.9 % 100 mL chemo IVPB Admin Date  01/23/2025 Action  New Bag Dose  1,560 mg Rate  302.1 mL/hr Route  IntraVENous Documented By  Silvia Redding RN        palonosetron (ALOXI) injection 0.25 mg Admin Date  01/23/2025 Action  Given Dose  0.25 mg Rate   Route  IntraVENous Documented By  Silvia Redding RN        pembrolizumab (KEYTRUDA) 200 mg in sodium chloride 0.9 % 100 mL IVPB Admin Date  01/23/2025 Action  New Bag Dose  200 mg Rate  236 mL/hr Route  IntraVENous Documented By  Silvia Redding RN        potassium & sodium phosphates (PHOS-NAK) 280-160-250 MG packet 500 mg Admin Date  01/23/2025 Action  Given Dose  500 mg Rate   Route  Oral Documented By  Silvia Redding RN            Ms. Lindsay tolerated treatment well, port flushed and de-accessed per protocol. Patient was [facility] in [facility] condition. Patient aware of next appointment.     Future Appointments   Date Time Provider Department Center   1/24/2025 11:30 AM [facility] CT ER 3 [facility]   1/29/2025  9:30 AM [facility] LAB CHAIR 1 [facility]   1/30/2025  9:30 AM [facility] CHEMO CHAIR 10 [facility]   2/12/2025  9:30 AM [facility] LAB CHAIR 1 [facility]   2/13/2025  8:00 AM [facility] CHEMO CHAIR 7 [facility]   2/19/2025 10:00 AM [facility] LAB CHAIR 1 [facility]   2/20/2025  9:30 AM [facility] CHEMO CHAIR 10 [facility]   3/5/2025  9:00 AM [facility] LAB CHAIR 1 [facility]   3/6/2025  8:00 AM [facility] CHEMO CHAIR 7 [facility]   3/12/2025  9:00 AM [facility] LAB CHAIR 1 [facility]   3/13/2025  9:30 AM [facility] CHEMO CHAIR 10 [facility]   3/26/2025  9:30 AM [facility] LAB CHAIR 1 [facility]

## 2025-01-23 NOTE — PROGRESS NOTES
Alea Lindsay is a 34 y.o. female      Chief Complaint   Patient presents with    Follow-up     Breast Cancer    Chemotherapy       1. Have you been to the ER, urgent care clinic since your last visit?  Hospitalized since your last visit?No    2. Have you seen or consulted any other health care providers outside of the [facility] since your last visit?  Include any pap smears or colon screening. No    
treatment of breast cancer.   Discussed focus on systemic therapy and no breast surgery now.   Discussed PDL1+ and immunotherapy.   Discussed breast mass will be measurable disease.   Patient was to do [id number] protocol but did not qualify due to elevated LFTs.   Unclear reason for elevated LFTs.   Discussed standard of care systemic therapy.   Discussed need to do Carbo+Gemzar+Pembro due to elevated LFTs.    Teaching and consent with PharmD.  Port-A-Cath placed on 10/8/24.    She started palliative Carbo+Gemzar+Pembro on 10/24/24.  DR eKrns AUC to 1.5 and Gemzar 20% to 800 mg/m2   Added Neulatsta on pro to Day 8 of each cycle starting 12/12/24.        She is here today for day 1 cycle 5 of palliative Carbo+Gemzar+Pembro.    feels well overall today and is tolerating treatment well with mild fatigue and constipation.  Side effect management reviewed today and tolerable per patient.   Labs (CBC and CMP) personally reviewed today.  LFTs normal today  Patient is ready for treatment today; continue same doses.    CT C/A/P and liver MRI ordered for fu and to do tmw  Follow up in 1 week in [facility].  Follow up in 3 weeks in [facility]/office.  Patient agrees with plan.  Goal is palliative.    2) Premenopausal  She has 2 young children; does not want more kids.   Discussed possible lack of fertility with systemic therapy.   Discussed birth control needed with therapy.   Continue routine GYN follow up.   LMP 11/24.     3) Family History of Cancer    Paternal grandfather - colon cancer dx 50s  Mother - lung cancer   Genetic Testing via [facility] Negative    4) Anxiety about Health  Continue Ativan 0.5 mg Q8H PRN.  Refilled today.     5) Management of High Risk Medications - Chemotherapy+IO  Toxicities include Grade 1 Fatigue, Grade 1 Constipation, Grade 1 Anemia, and Grade 1 Thrombocytopenia.    DR Kerns AUC to 1.5 and Gemzar 20% to 800 mg/m2 with D1C2.  Added Neulatsta on pro to Day 8 of each cycle starting with D8C3 on 12/12/24.  Labs

## 2025-01-24 ENCOUNTER — HOSPITAL ENCOUNTER (OUTPATIENT)
Facility: HOSPITAL | Age: 35
Discharge: HOME | End: 2025-01-27
Payer: COMMERCIAL

## 2025-01-24 DIAGNOSIS — R79.89 ELEVATED LFTS: ICD-10-CM

## 2025-01-24 DIAGNOSIS — C50.919 MALIGNANT NEOPLASM OF BREAST METASTATIC TO BONE (HCC): ICD-10-CM

## 2025-01-24 DIAGNOSIS — C79.51 MALIGNANT NEOPLASM OF BREAST METASTATIC TO BONE (HCC): ICD-10-CM

## 2025-01-24 PROCEDURE — 6360000004 HC RX CONTRAST MEDICATION: Performed by: RADIOLOGY

## 2025-01-24 PROCEDURE — 74177 CT ABD & PELVIS W/CONTRAST: CPT

## 2025-01-24 RX ORDER — IOPAMIDOL 755 MG/ML
100 INJECTION, SOLUTION INTRAVASCULAR
Status: COMPLETED | OUTPATIENT
Start: 2025-01-24 | End: 2025-01-24

## 2025-01-24 RX ADMIN — IOPAMIDOL 100 ML: 755 INJECTION, SOLUTION INTRAVENOUS at 11:34

## 2025-01-29 ENCOUNTER — APPOINTMENT (OUTPATIENT)
Facility: HOSPITAL | Age: 35
End: 2025-01-29
Payer: COMMERCIAL

## 2025-01-29 ENCOUNTER — HOSPITAL ENCOUNTER (OUTPATIENT)
Facility: HOSPITAL | Age: 35
Setting detail: INFUSION SERIES
Discharge: HOME | End: 2025-01-29
Payer: COMMERCIAL

## 2025-01-29 DIAGNOSIS — C50.912 CARCINOMA OF LEFT BREAST METASTATIC TO BONE (HCC): ICD-10-CM

## 2025-01-29 DIAGNOSIS — E83.51 HYPOCALCEMIA: ICD-10-CM

## 2025-01-29 DIAGNOSIS — C79.51 CARCINOMA OF LEFT BREAST METASTATIC TO BONE (HCC): ICD-10-CM

## 2025-01-29 LAB
ALBUMIN SERPL-MCNC: 4.1 G/DL (ref 3.5–5)
ALBUMIN/GLOB SERPL: 1 (ref 1.1–2.2)
ALP SERPL-CCNC: 173 U/L (ref 45–117)
ALT SERPL-CCNC: 130 U/L (ref 12–78)
ANION GAP SERPL CALC-SCNC: 4 MMOL/L (ref 2–12)
AST SERPL-CCNC: 61 U/L (ref 15–37)
BASOPHILS # BLD: 0.07 K/UL (ref 0–0.1)
BASOPHILS NFR BLD: 3 % (ref 0–1)
BILIRUB SERPL-MCNC: 0.7 MG/DL (ref 0.2–1)
BUN SERPL-MCNC: 9 MG/DL (ref 6–20)
BUN/CREAT SERPL: 12 (ref 12–20)
CALCIUM SERPL-MCNC: 8.9 MG/DL (ref 8.5–10.1)
CHLORIDE SERPL-SCNC: 104 MMOL/L (ref 97–108)
CO2 SERPL-SCNC: 25 MMOL/L (ref 21–32)
CREAT SERPL-MCNC: 0.77 MG/DL (ref 0.55–1.02)
DIFFERENTIAL METHOD BLD: ABNORMAL
EOSINOPHIL # BLD: 0 K/UL (ref 0–0.4)
EOSINOPHIL NFR BLD: 0 % (ref 0–7)
ERYTHROCYTE [DISTWIDTH] IN BLOOD BY AUTOMATED COUNT: 17.2 % (ref 11.5–14.5)
GLOBULIN SER CALC-MCNC: 4 G/DL (ref 2–4)
GLUCOSE SERPL-MCNC: 108 MG/DL (ref 65–100)
HCT VFR BLD AUTO: 28.1 % (ref 35–47)
HGB BLD-MCNC: 8.9 G/DL (ref 11.5–16)
IMM GRANULOCYTES # BLD AUTO: 0 K/UL
IMM GRANULOCYTES NFR BLD AUTO: 0 %
LYMPHOCYTES # BLD: 1.01 K/UL (ref 0.8–3.5)
LYMPHOCYTES NFR BLD: 44 % (ref 12–49)
MCH RBC QN AUTO: 30.9 PG (ref 26–34)
MCHC RBC AUTO-ENTMCNC: 31.7 G/DL (ref 30–36.5)
MCV RBC AUTO: 97.6 FL (ref 80–99)
METAMYELOCYTES NFR BLD MANUAL: 3 %
MONOCYTES # BLD: 0.09 K/UL (ref 0–1)
MONOCYTES NFR BLD: 4 % (ref 5–13)
MYELOCYTES NFR BLD MANUAL: 8 %
NEUTS SEG # BLD: 0.87 K/UL (ref 1.8–8)
NEUTS SEG NFR BLD: 38 % (ref 32–75)
NRBC # BLD: 0.14 K/UL (ref 0–0.01)
NRBC BLD-RTO: 6.2 PER 100 WBC
PLATELET # BLD AUTO: 296 K/UL (ref 150–400)
PMV BLD AUTO: 9.8 FL (ref 8.9–12.9)
POTASSIUM SERPL-SCNC: 4.7 MMOL/L (ref 3.5–5.1)
PROT SERPL-MCNC: 8.1 G/DL (ref 6.4–8.2)
RBC # BLD AUTO: 2.88 M/UL (ref 3.8–5.2)
RBC MORPH BLD: ABNORMAL
RBC MORPH BLD: ABNORMAL
SODIUM SERPL-SCNC: 133 MMOL/L (ref 136–145)
WBC # BLD AUTO: 2.3 K/UL (ref 3.6–11)
WBC MORPH BLD: ABNORMAL

## 2025-01-29 PROCEDURE — 36415 COLL VENOUS BLD VENIPUNCTURE: CPT

## 2025-01-29 PROCEDURE — 85025 COMPLETE CBC W/AUTO DIFF WBC: CPT

## 2025-01-29 PROCEDURE — 80053 COMPREHEN METABOLIC PANEL: CPT

## 2025-01-29 NOTE — PROGRESS NOTES
[facility]                       Date: 2025    Name: Alea Lindsay    MRN: [id number]         : 1990      0930 Pt admit to [facility] ambulatory in stable condition. Assessment completed. No new concerns voiced.  Labs drawn peripherally from R AC pos blood return  noted      Lab results were obtained       Ms. Lindsay was discharged from [facility] in stable condition. Pt aware of next appt    Future Appointments   Date Time Provider Department Center   2025  9:30 AM [facility] CHEMO CHAIR 10 [facility]   2025  9:30 AM [facility] LAB CHAIR 1 [facility]   2025  8:00 AM [facility] CHEMO CHAIR 7 [facility]   2025  8:15 AM Sepideh Gillespie DO [facility] BS AMB   2025 10:00 AM [facility] LAB CHAIR 1 [facility]   2025  9:30 AM [facility] CHEMO CHAIR 10 [facility]   3/5/2025  9:00 AM [facility] LAB CHAIR 1 [facility]   3/6/2025  8:00 AM [facility] CHEMO CHAIR 7 [facility]   3/12/2025  9:00 AM [facility] LAB CHAIR 1 [facility]   3/13/2025  9:30 AM [facility] CHEMO CHAIR 10 [facility]   3/26/2025  9:30 AM [facility] LAB CHAIR 1 [facility]   3/27/2025  8:00 AM [facility] CHEMO CHAIR 7 [facility]   2025  9:30 AM [facility] LAB CHAIR 1 [facility]   4/3/2025  8:00 AM [facility] CHEMO CHAIR 7 [facility]       Karla Nuñez RN  2025  9:31 AM

## 2025-01-30 ENCOUNTER — HOSPITAL ENCOUNTER (OUTPATIENT)
Facility: HOSPITAL | Age: 35
Setting detail: INFUSION SERIES
Discharge: HOME | End: 2025-01-30
Payer: COMMERCIAL

## 2025-01-30 VITALS
HEART RATE: 75 BPM | TEMPERATURE: 98.2 F | BODY MASS INDEX: 34.01 KG/M2 | WEIGHT: 199.2 LBS | RESPIRATION RATE: 18 BRPM | DIASTOLIC BLOOD PRESSURE: 81 MMHG | SYSTOLIC BLOOD PRESSURE: 133 MMHG | HEIGHT: 64 IN

## 2025-01-30 DIAGNOSIS — C50.912 CARCINOMA OF LEFT BREAST METASTATIC TO BONE (HCC): Primary | ICD-10-CM

## 2025-01-30 DIAGNOSIS — C79.51 CARCINOMA OF LEFT BREAST METASTATIC TO BONE (HCC): Primary | ICD-10-CM

## 2025-01-30 DIAGNOSIS — E83.51 HYPOCALCEMIA: ICD-10-CM

## 2025-01-30 LAB
BASO+EOS+MONOS # BLD AUTO: 0.3 K/UL (ref 0.2–1.2)
BASO+EOS+MONOS NFR BLD AUTO: 10 % (ref 3.2–16.9)
DIFFERENTIAL METHOD BLD: ABNORMAL
ERYTHROCYTE [DISTWIDTH] IN BLOOD BY AUTOMATED COUNT: 17.8 % (ref 11.8–15.8)
HCT VFR BLD AUTO: 25.4 % (ref 35–47)
HGB BLD-MCNC: 8 G/DL (ref 11.5–16)
LYMPHOCYTES # BLD: 1.3 K/UL (ref 0.8–3.5)
LYMPHOCYTES NFR BLD: 40.8 % (ref 12–49)
MCH RBC QN AUTO: 30.2 PG (ref 26–34)
MCHC RBC AUTO-ENTMCNC: 31.5 G/DL (ref 30–36.5)
MCV RBC AUTO: 95.8 FL (ref 80–99)
NEUTS SEG # BLD: 1.5 K/UL (ref 1.8–8)
NEUTS SEG NFR BLD: 48.9 % (ref 32–75)
PLATELET # BLD AUTO: 212 K/UL (ref 150–400)
RBC # BLD AUTO: 2.65 M/UL (ref 3.8–5.2)
WBC # BLD AUTO: 3.1 K/UL (ref 3.6–11)

## 2025-01-30 PROCEDURE — 6360000002 HC RX W HCPCS: Performed by: NURSE PRACTITIONER

## 2025-01-30 PROCEDURE — 6360000002 HC RX W HCPCS: Performed by: INTERNAL MEDICINE

## 2025-01-30 PROCEDURE — 85025 COMPLETE CBC W/AUTO DIFF WBC: CPT

## 2025-01-30 PROCEDURE — 96377 APPLICATON ON-BODY INJECTOR: CPT

## 2025-01-30 PROCEDURE — 2580000003 HC RX 258: Performed by: INTERNAL MEDICINE

## 2025-01-30 PROCEDURE — 96372 THER/PROPH/DIAG INJ SC/IM: CPT

## 2025-01-30 PROCEDURE — 96413 CHEMO IV INFUSION 1 HR: CPT

## 2025-01-30 PROCEDURE — 2500000003 HC RX 250 WO HCPCS: Performed by: INTERNAL MEDICINE

## 2025-01-30 PROCEDURE — 96417 CHEMO IV INFUS EACH ADDL SEQ: CPT

## 2025-01-30 PROCEDURE — 96375 TX/PRO/DX INJ NEW DRUG ADDON: CPT

## 2025-01-30 RX ORDER — SODIUM CHLORIDE 0.9 % (FLUSH) 0.9 %
5-40 SYRINGE (ML) INJECTION PRN
Status: DISCONTINUED | OUTPATIENT
Start: 2025-01-30 | End: 2025-01-31 | Stop reason: HOSPADM

## 2025-01-30 RX ORDER — SODIUM CHLORIDE 9 MG/ML
5-250 INJECTION, SOLUTION INTRAVENOUS PRN
Status: DISCONTINUED | OUTPATIENT
Start: 2025-01-30 | End: 2025-01-31 | Stop reason: HOSPADM

## 2025-01-30 RX ORDER — DIPHENHYDRAMINE HYDROCHLORIDE 50 MG/ML
50 INJECTION INTRAMUSCULAR; INTRAVENOUS
OUTPATIENT
Start: 2025-02-27

## 2025-01-30 RX ORDER — HYDROCORTISONE SODIUM SUCCINATE 100 MG/2ML
100 INJECTION INTRAMUSCULAR; INTRAVENOUS
OUTPATIENT
Start: 2025-02-27

## 2025-01-30 RX ORDER — SODIUM CHLORIDE 9 MG/ML
INJECTION, SOLUTION INTRAVENOUS CONTINUOUS
OUTPATIENT
Start: 2025-02-27

## 2025-01-30 RX ORDER — PALONOSETRON 0.05 MG/ML
0.25 INJECTION, SOLUTION INTRAVENOUS ONCE
Status: COMPLETED | OUTPATIENT
Start: 2025-01-30 | End: 2025-01-30

## 2025-01-30 RX ORDER — DEXAMETHASONE SODIUM PHOSPHATE 10 MG/ML
10 INJECTION, SOLUTION INTRAMUSCULAR; INTRAVENOUS ONCE
Status: COMPLETED | OUTPATIENT
Start: 2025-01-30 | End: 2025-01-30

## 2025-01-30 RX ORDER — ALBUTEROL SULFATE 90 UG/1
4 INHALANT RESPIRATORY (INHALATION) PRN
OUTPATIENT
Start: 2025-02-27

## 2025-01-30 RX ORDER — EPINEPHRINE 1 MG/ML
0.3 INJECTION, SOLUTION INTRAMUSCULAR; SUBCUTANEOUS PRN
OUTPATIENT
Start: 2025-02-27

## 2025-01-30 RX ORDER — ACETAMINOPHEN 325 MG/1
650 TABLET ORAL
OUTPATIENT
Start: 2025-02-27

## 2025-01-30 RX ORDER — ONDANSETRON 2 MG/ML
8 INJECTION INTRAMUSCULAR; INTRAVENOUS
OUTPATIENT
Start: 2025-02-27

## 2025-01-30 RX ADMIN — SODIUM CHLORIDE, PRESERVATIVE FREE 20 ML: 5 INJECTION INTRAVENOUS at 09:16

## 2025-01-30 RX ADMIN — GEMCITABINE HYDROCHLORIDE 1560 MG: 1 INJECTION, SOLUTION INTRAVENOUS at 11:33

## 2025-01-30 RX ADMIN — PALONOSETRON 0.25 MG: 0.05 INJECTION, SOLUTION INTRAVENOUS at 10:16

## 2025-01-30 RX ADMIN — DENOSUMAB 120 MG: 120 INJECTION SUBCUTANEOUS at 12:09

## 2025-01-30 RX ADMIN — SODIUM CHLORIDE, PRESERVATIVE FREE 20 ML: 5 INJECTION INTRAVENOUS at 12:02

## 2025-01-30 RX ADMIN — SODIUM CHLORIDE 50 ML/HR: 9 INJECTION, SOLUTION INTRAVENOUS at 10:13

## 2025-01-30 RX ADMIN — DEXAMETHASONE SODIUM PHOSPHATE 10 MG: 10 INJECTION INTRAMUSCULAR; INTRAVENOUS at 10:19

## 2025-01-30 RX ADMIN — CARBOPLATIN 205 MG: 10 INJECTION, SOLUTION INTRAVENOUS at 11:02

## 2025-01-30 RX ADMIN — PEGFILGRASTIM 6 MG: KIT SUBCUTANEOUS at 12:06

## 2025-01-30 ASSESSMENT — PAIN SCALES - GENERAL: PAINLEVEL_OUTOF10: 0

## 2025-01-30 NOTE — PROGRESS NOTES
[facility] Chemo Progress Note    Date: 2025    Name: Alea Lindsay    MRN: [id number]         : 1990    Ms. Lindsay Arrived ambulatory, with her sister, and in no distress for cycle 5 day 8 of Carbo/Gemzar + xgeva + neulasta.      Assessment was completed and documented in flowsheets. Port accessed without difficulty. Labs drawn and processed on 25. Order for repeat CBC today drawn. Patient denies any upcoming scheduled invasive dental work.      Ms. Lindsay's vitals were reviewed.  Patient Vitals for the past 12 hrs:   Temp Pulse Resp BP   25 1205 -- 75 -- 133/81   25 0944 98.2 °F (36.8 °C) 80 18 107/62         Lab results were obtained and reviewed (from both 25 and today).  Labs within parameter for treatment.   Recent Results (from the past 12 hour(s))   CBC with Partial Differential    Collection Time: 25  9:17 AM   Result Value Ref Range    WBC 3.1 (L) 3.6 - 11.0 K/uL    RBC 2.65 (L) 3.80 - 5.20 M/uL    Hemoglobin 8.0 (L) 11.5 - 16.0 g/dL    Hematocrit 25.4 (L) 35.0 - 47.0 %    MCV 95.8 80.0 - 99.0 FL    MCH 30.2 26.0 - 34.0 PG    MCHC 31.5 30.0 - 36.5 g/dL    RDW 17.8 (H) 11.8 - 15.8 %    Platelets 212 150 - 400 K/uL    Neutrophils % 48.9 32 - 75 %    Mixed Cells 10 3.2 - 16.9 %    Lymphocytes % 40.8 12 - 49 %    Neutrophils Absolute 1.50 (L) 1.8 - 8.0 K/UL    ABSOLUTE MIXED CELLS 0.3 0.2 - 1.2 K/uL    Lymphocytes Absolute 1.30 0.8 - 3.5 K/UL    Differential Type AUTOMATED         Pre-medications  were administered as ordered and chemotherapy was initiated.  Medications Administered         0.9 % sodium chloride infusion Admin Date  2025 Action  New Bag Dose  50 mL/hr Rate  50 mL/hr Route  IntraVENous Documented By  Sofia Rodriguez, RN        CARBOplatin (PARAPLATIN) 205 mg in sodium chloride 0.9 % 100 mL chemo IVPB Admin Date  2025 Action  New Bag Dose  205 mg Rate  261 mL/hr Route  IntraVENous Documented By  Sofia Rodriguez, RN        denosumab

## 2025-01-30 NOTE — PROGRESS NOTES
Patient is here for 5FU pump discontinue. Patient has no complaints and stated he is drinking and eating well.  5FU pump empty, positive blood return noted and flushed with NS and Heparin prior to de-access. Patient was discharged.    Spiritual Care Partner Volunteer visited patient at [facility] in [facility] on 1/30/2025    Chaplain Aziza, MDiv, [facility]  Spiritual Health Services  Paging service: [phone] (LOUIE)

## 2025-02-05 ENCOUNTER — APPOINTMENT (OUTPATIENT)
Facility: HOSPITAL | Age: 35
End: 2025-02-05
Payer: COMMERCIAL

## 2025-02-05 RX ORDER — EPINEPHRINE 1 MG/ML
0.3 INJECTION, SOLUTION INTRAMUSCULAR; SUBCUTANEOUS PRN
Status: CANCELLED | OUTPATIENT
Start: 2025-02-28

## 2025-02-05 RX ORDER — SODIUM CHLORIDE 9 MG/ML
5-250 INJECTION, SOLUTION INTRAVENOUS PRN
Status: CANCELLED | OUTPATIENT
Start: 2025-02-28

## 2025-02-05 RX ORDER — PALONOSETRON 0.05 MG/ML
0.25 INJECTION, SOLUTION INTRAVENOUS ONCE
Status: CANCELLED | OUTPATIENT
Start: 2025-02-28 | End: 2025-02-20

## 2025-02-05 RX ORDER — ACETAMINOPHEN 325 MG/1
650 TABLET ORAL
Status: CANCELLED | OUTPATIENT
Start: 2025-02-13

## 2025-02-05 RX ORDER — ONDANSETRON 2 MG/ML
8 INJECTION INTRAMUSCULAR; INTRAVENOUS
Status: CANCELLED | OUTPATIENT
Start: 2025-02-13

## 2025-02-05 RX ORDER — SODIUM CHLORIDE 9 MG/ML
INJECTION, SOLUTION INTRAVENOUS CONTINUOUS
Status: CANCELLED | OUTPATIENT
Start: 2025-02-28

## 2025-02-05 RX ORDER — HYDROCORTISONE SODIUM SUCCINATE 100 MG/2ML
100 INJECTION INTRAMUSCULAR; INTRAVENOUS
Status: CANCELLED | OUTPATIENT
Start: 2025-02-28

## 2025-02-05 RX ORDER — DEXAMETHASONE SODIUM PHOSPHATE 10 MG/ML
10 INJECTION, SOLUTION INTRAMUSCULAR; INTRAVENOUS ONCE
Status: CANCELLED | OUTPATIENT
Start: 2025-02-28 | End: 2025-02-20

## 2025-02-05 RX ORDER — HEPARIN 100 UNIT/ML
500 SYRINGE INTRAVENOUS PRN
Status: CANCELLED | OUTPATIENT
Start: 2025-02-28

## 2025-02-05 RX ORDER — SODIUM CHLORIDE 0.9 % (FLUSH) 0.9 %
5-40 SYRINGE (ML) INJECTION PRN
Status: CANCELLED | OUTPATIENT
Start: 2025-02-28

## 2025-02-05 RX ORDER — DIPHENHYDRAMINE HYDROCHLORIDE 50 MG/ML
50 INJECTION INTRAMUSCULAR; INTRAVENOUS
Status: CANCELLED | OUTPATIENT
Start: 2025-02-28

## 2025-02-05 RX ORDER — SODIUM CHLORIDE 9 MG/ML
INJECTION, SOLUTION INTRAVENOUS CONTINUOUS
Status: CANCELLED | OUTPATIENT
Start: 2025-02-13

## 2025-02-05 RX ORDER — HYDROCORTISONE SODIUM SUCCINATE 100 MG/2ML
100 INJECTION INTRAMUSCULAR; INTRAVENOUS
Status: CANCELLED | OUTPATIENT
Start: 2025-02-13

## 2025-02-05 RX ORDER — DIPHENHYDRAMINE HYDROCHLORIDE 50 MG/ML
50 INJECTION INTRAMUSCULAR; INTRAVENOUS
Status: CANCELLED | OUTPATIENT
Start: 2025-02-13

## 2025-02-05 RX ORDER — EPINEPHRINE 1 MG/ML
0.3 INJECTION, SOLUTION INTRAMUSCULAR; SUBCUTANEOUS PRN
Status: CANCELLED | OUTPATIENT
Start: 2025-02-13

## 2025-02-05 RX ORDER — ALBUTEROL SULFATE 90 UG/1
4 INHALANT RESPIRATORY (INHALATION) PRN
Status: CANCELLED | OUTPATIENT
Start: 2025-02-13

## 2025-02-05 RX ORDER — ACETAMINOPHEN 325 MG/1
650 TABLET ORAL
Status: CANCELLED | OUTPATIENT
Start: 2025-02-28

## 2025-02-05 RX ORDER — ONDANSETRON 2 MG/ML
8 INJECTION INTRAMUSCULAR; INTRAVENOUS
Status: CANCELLED | OUTPATIENT
Start: 2025-02-28

## 2025-02-05 RX ORDER — ALBUTEROL SULFATE 90 UG/1
4 INHALANT RESPIRATORY (INHALATION) PRN
Status: CANCELLED | OUTPATIENT
Start: 2025-02-28

## 2025-02-06 ENCOUNTER — APPOINTMENT (OUTPATIENT)
Facility: HOSPITAL | Age: 35
End: 2025-02-06
Payer: COMMERCIAL

## 2025-02-12 ENCOUNTER — APPOINTMENT (OUTPATIENT)
Facility: HOSPITAL | Age: 35
End: 2025-02-12
Payer: COMMERCIAL

## 2025-02-13 ENCOUNTER — HOSPITAL ENCOUNTER (OUTPATIENT)
Facility: HOSPITAL | Age: 35
Setting detail: INFUSION SERIES
Discharge: HOME | End: 2025-02-13
Payer: COMMERCIAL

## 2025-02-13 ENCOUNTER — OFFICE VISIT (OUTPATIENT)
Age: 35
End: 2025-02-13
Payer: COMMERCIAL

## 2025-02-13 VITALS
HEART RATE: 95 BPM | OXYGEN SATURATION: 99 % | BODY MASS INDEX: 34.66 KG/M2 | DIASTOLIC BLOOD PRESSURE: 73 MMHG | TEMPERATURE: 97.7 F | WEIGHT: 202 LBS | RESPIRATION RATE: 18 BRPM | SYSTOLIC BLOOD PRESSURE: 105 MMHG

## 2025-02-13 VITALS
HEART RATE: 83 BPM | TEMPERATURE: 97.7 F | SYSTOLIC BLOOD PRESSURE: 126 MMHG | DIASTOLIC BLOOD PRESSURE: 80 MMHG | BODY MASS INDEX: 34.52 KG/M2 | WEIGHT: 202.2 LBS | HEIGHT: 64 IN | OXYGEN SATURATION: 100 % | RESPIRATION RATE: 18 BRPM

## 2025-02-13 DIAGNOSIS — T45.1X5A CHEMOTHERAPY-INDUCED FATIGUE: ICD-10-CM

## 2025-02-13 DIAGNOSIS — E83.51 HYPOCALCEMIA: Primary | ICD-10-CM

## 2025-02-13 DIAGNOSIS — T45.1X5A ANTINEOPLASTIC CHEMOTHERAPY INDUCED ANEMIA: ICD-10-CM

## 2025-02-13 DIAGNOSIS — R79.89 ELEVATED LFTS: ICD-10-CM

## 2025-02-13 DIAGNOSIS — D64.81 ANTINEOPLASTIC CHEMOTHERAPY INDUCED ANEMIA: ICD-10-CM

## 2025-02-13 DIAGNOSIS — C79.51 MALIGNANT NEOPLASM OF BREAST METASTATIC TO BONE (HCC): Primary | ICD-10-CM

## 2025-02-13 DIAGNOSIS — K76.0 HEPATIC STEATOSIS: ICD-10-CM

## 2025-02-13 DIAGNOSIS — C50.912 CARCINOMA OF LEFT BREAST METASTATIC TO BONE (HCC): ICD-10-CM

## 2025-02-13 DIAGNOSIS — R53.83 CHEMOTHERAPY-INDUCED FATIGUE: ICD-10-CM

## 2025-02-13 DIAGNOSIS — C50.919 MALIGNANT NEOPLASM OF BREAST METASTATIC TO BONE (HCC): Primary | ICD-10-CM

## 2025-02-13 DIAGNOSIS — C79.51 CARCINOMA OF LEFT BREAST METASTATIC TO BONE (HCC): ICD-10-CM

## 2025-02-13 LAB
ALBUMIN SERPL-MCNC: 3.7 G/DL (ref 3.5–5)
ALBUMIN/GLOB SERPL: 1.2 (ref 1.1–2.2)
ALP SERPL-CCNC: 122 U/L (ref 45–117)
ALT SERPL-CCNC: 63 U/L (ref 12–78)
ANION GAP SERPL CALC-SCNC: 5 MMOL/L (ref 2–12)
AST SERPL-CCNC: 32 U/L (ref 15–37)
BASOPHILS # BLD: 0.03 K/UL (ref 0–0.1)
BASOPHILS NFR BLD: 1 % (ref 0–1)
BILIRUB SERPL-MCNC: 0.7 MG/DL (ref 0.2–1)
BUN SERPL-MCNC: 10 MG/DL (ref 6–20)
BUN/CREAT SERPL: 16 (ref 12–20)
CALCIUM SERPL-MCNC: 8.2 MG/DL (ref 8.5–10.1)
CHLORIDE SERPL-SCNC: 109 MMOL/L (ref 97–108)
CO2 SERPL-SCNC: 24 MMOL/L (ref 21–32)
CREAT SERPL-MCNC: 0.63 MG/DL (ref 0.55–1.02)
DIFFERENTIAL METHOD BLD: ABNORMAL
EOSINOPHIL # BLD: 0.09 K/UL (ref 0–0.4)
EOSINOPHIL NFR BLD: 3 % (ref 0–7)
ERYTHROCYTE [DISTWIDTH] IN BLOOD BY AUTOMATED COUNT: 19.9 % (ref 11.5–14.5)
GLOBULIN SER CALC-MCNC: 3 G/DL (ref 2–4)
GLUCOSE SERPL-MCNC: 118 MG/DL (ref 65–100)
HCT VFR BLD AUTO: 24.7 % (ref 35–47)
HGB BLD-MCNC: 7.7 G/DL (ref 11.5–16)
IMM GRANULOCYTES # BLD AUTO: 0 K/UL
IMM GRANULOCYTES NFR BLD AUTO: 0 %
LYMPHOCYTES # BLD: 1.05 K/UL (ref 0.8–3.5)
LYMPHOCYTES NFR BLD: 35 % (ref 12–49)
MCH RBC QN AUTO: 31.3 PG (ref 26–34)
MCHC RBC AUTO-ENTMCNC: 31.2 G/DL (ref 30–36.5)
MCV RBC AUTO: 100.4 FL (ref 80–99)
METAMYELOCYTES NFR BLD MANUAL: 1 %
MONOCYTES # BLD: 0.51 K/UL (ref 0–1)
MONOCYTES NFR BLD: 17 % (ref 5–13)
NEUTS SEG # BLD: 1.29 K/UL (ref 1.8–8)
NEUTS SEG NFR BLD: 43 % (ref 32–75)
NRBC # BLD: 0.14 K/UL (ref 0–0.01)
NRBC BLD-RTO: 4.7 PER 100 WBC
PLATELET # BLD AUTO: 103 K/UL (ref 150–400)
PMV BLD AUTO: 10.8 FL (ref 8.9–12.9)
POTASSIUM SERPL-SCNC: 4.3 MMOL/L (ref 3.5–5.1)
PROT SERPL-MCNC: 6.7 G/DL (ref 6.4–8.2)
RBC # BLD AUTO: 2.46 M/UL (ref 3.8–5.2)
RBC MORPH BLD: ABNORMAL
RBC MORPH BLD: ABNORMAL
SODIUM SERPL-SCNC: 138 MMOL/L (ref 136–145)
WBC # BLD AUTO: 3 K/UL (ref 3.6–11)

## 2025-02-13 PROCEDURE — 96375 TX/PRO/DX INJ NEW DRUG ADDON: CPT

## 2025-02-13 PROCEDURE — 2580000003 HC RX 258: Performed by: INTERNAL MEDICINE

## 2025-02-13 PROCEDURE — 6360000002 HC RX W HCPCS: Performed by: INTERNAL MEDICINE

## 2025-02-13 PROCEDURE — 80053 COMPREHEN METABOLIC PANEL: CPT

## 2025-02-13 PROCEDURE — 85025 COMPLETE CBC W/AUTO DIFF WBC: CPT

## 2025-02-13 PROCEDURE — 96417 CHEMO IV INFUS EACH ADDL SEQ: CPT

## 2025-02-13 PROCEDURE — 36415 COLL VENOUS BLD VENIPUNCTURE: CPT

## 2025-02-13 PROCEDURE — 99215 OFFICE O/P EST HI 40 MIN: CPT | Performed by: INTERNAL MEDICINE

## 2025-02-13 PROCEDURE — 96413 CHEMO IV INFUSION 1 HR: CPT

## 2025-02-13 RX ORDER — HEPARIN 100 UNIT/ML
500 SYRINGE INTRAVENOUS PRN
Status: DISCONTINUED | OUTPATIENT
Start: 2025-02-13 | End: 2025-02-14 | Stop reason: HOSPADM

## 2025-02-13 RX ORDER — SODIUM CHLORIDE 9 MG/ML
5-250 INJECTION, SOLUTION INTRAVENOUS PRN
Status: DISCONTINUED | OUTPATIENT
Start: 2025-02-13 | End: 2025-02-14 | Stop reason: HOSPADM

## 2025-02-13 RX ORDER — DEXAMETHASONE SODIUM PHOSPHATE 10 MG/ML
10 INJECTION, SOLUTION INTRAMUSCULAR; INTRAVENOUS ONCE
Status: COMPLETED | OUTPATIENT
Start: 2025-02-13 | End: 2025-02-13

## 2025-02-13 RX ORDER — PALONOSETRON 0.05 MG/ML
0.25 INJECTION, SOLUTION INTRAVENOUS ONCE
Status: COMPLETED | OUTPATIENT
Start: 2025-02-13 | End: 2025-02-13

## 2025-02-13 RX ORDER — SODIUM CHLORIDE 0.9 % (FLUSH) 0.9 %
5-40 SYRINGE (ML) INJECTION PRN
Status: DISCONTINUED | OUTPATIENT
Start: 2025-02-13 | End: 2025-02-14 | Stop reason: HOSPADM

## 2025-02-13 RX ADMIN — GEMCITABINE HYDROCHLORIDE 1560 MG: 1 INJECTION, SOLUTION INTRAVENOUS at 11:43

## 2025-02-13 RX ADMIN — DEXAMETHASONE SODIUM PHOSPHATE 10 MG: 10 INJECTION, SOLUTION INTRAMUSCULAR; INTRAVENOUS at 10:56

## 2025-02-13 RX ADMIN — PALONOSETRON 0.25 MG: 0.05 INJECTION, SOLUTION INTRAVENOUS at 10:56

## 2025-02-13 RX ADMIN — SODIUM CHLORIDE 200 MG: 9 INJECTION, SOLUTION INTRAVENOUS at 10:24

## 2025-02-13 RX ADMIN — CARBOPLATIN 220 MG: 10 INJECTION, SOLUTION INTRAVENOUS at 11:07

## 2025-02-13 ASSESSMENT — PAIN SCALES - GENERAL: PAINLEVEL_OUTOF10: 0

## 2025-02-13 NOTE — PROGRESS NOTES
Alea Lindsay is a 34 y.o. female    Chief Complaint   Patient presents with    Follow-up     MBC       1. Have you been to the ER, urgent care clinic since your last visit?  Hospitalized since your last visit?No    2. Have you seen or consulted any other health care providers outside of the [facility] since your last visit?  Include any pap smears or colon screening. No    
measuring 9 mm, with an adjacent suspicious 5 mm focus. There is also diffuse overlying skin thickening with heterogeneous enhancement. There is no evidence of direct skin or nipple invasion. There is bulky level 1 lymphadenopathy, with multiple pathologic lymph nodes, 1 of which has an associated biopsy clip. The largest of these measures 2.4 x 1.2 x 1.5 cm. There is also a malignant level 2 Mamta/interpectoral lymph node measuring 1.4 x 1.3 cm. There are also 2 suspicious internal mammary chain lymph nodes, the largest measuring 7.5 mm, and the smaller of the 2 measuring 3.5 mm. The bones are diffusely heterogeneous/abnormal in their enhancement characteristics  CT C/A/P 9/25/24: Left breast mass with scattered prominent left breast soft tissue densities/left axillary francis prominence. There is widespread osseous metastatic disease without evidence of fracture or dislocation. There is no additional evidence of recurrent/metastatic disease in the chest, abdomen or pelvis. There is no acute intrathoracic or intra-abdominal process  Bone Scan 9/25/24: There is diffusely increased tracer activity throughout the axial and proximal appendicular skeleton, with very little renal activity (a \"SuperScan\" pattern). The accompanying CT examination demonstrates diffuse osseous sclerotic bone lesions  Bone Marrow, Posterior Iliac Crest and Decalcified Core 10/2/24: PATH - Metastatic mammary carcinoma. Myelofibrosis and osteosclerosis with trilineage hypoplasia   PDL-1+ 10%  [facility]is Testing showed BRCA+  [facility] Genetic Testing BRCA negative   Port-A-Cath placed on 10/8/24  Brain MRI 10/15/24: Normal MRI of the brain. There is no evidence of intracranial metastatic disease. Osseous metastases are present. No intracranial mass, hemorrhage or evidence of acute infarction  Palliative Carbo+Gemzar+Pembro 10/24/24 - Current  D8C1 skipped due to neutropenia  DR Carbo AUC to 1.5 and Gemzar 20% to 800 mg/m2 with D1C2  Added Neulasta to Day

## 2025-02-13 NOTE — PROGRESS NOTES
Spiritual Care Partner Volunteer visited patient at [facility] in [facility] on 2/13/2025   Documented by:  Larry Kwon MDIV, [facility]

## 2025-02-13 NOTE — PROGRESS NOTES
[facility] Chemotherapy Progress Note    Date: 2025    Name: Alea Lindsay    MRN: [id number]         : 1990      0800 Pt admit to [facility] for Keytruda, Carboplatin, and Gemzar ambulatory in stable condition. Assessment completed. No new concerns voiced. Port with positive blood return. Labs sent for processing.       Ms. Lindsay's vitals were reviewed.  Patient Vitals for the past 12 hrs:   Temp Pulse Resp BP SpO2   25 1208 -- 83 -- 126/80 --   25 0815 97.7 °F (36.5 °C) 88 18 -- 100 %         Lab results were obtained and reviewed.  Recent Results (from the past 12 hour(s))   Comprehensive metabolic panel    Collection Time: 25  8:02 AM   Result Value Ref Range    Sodium 138 136 - 145 mmol/L    Potassium 4.3 3.5 - 5.1 mmol/L    Chloride 109 (H) 97 - 108 mmol/L    CO2 24 21 - 32 mmol/L    Anion Gap 5 2 - 12 mmol/L    Glucose 118 (H) 65 - 100 mg/dL    BUN 10 6 - 20 MG/DL    Creatinine 0.63 0.55 - 1.02 MG/DL    BUN/Creatinine Ratio 16 12 - 20      Est, Glom Filt Rate >90 >60 ml/min/1.73m2    Calcium 8.2 (L) 8.5 - 10.1 MG/DL    Total Bilirubin 0.7 0.2 - 1.0 MG/DL    ALT 63 12 - 78 U/L    AST 32 15 - 37 U/L    Alk Phosphatase 122 (H) 45 - 117 U/L    Total Protein 6.7 6.4 - 8.2 g/dL    Albumin 3.7 3.5 - 5.0 g/dL    Globulin 3.0 2.0 - 4.0 g/dL    Albumin/Globulin Ratio 1.2 1.1 - 2.2     CBC With Auto Differential    Collection Time: 25  8:02 AM   Result Value Ref Range    WBC 3.0 (L) 3.6 - 11.0 K/uL    RBC 2.46 (L) 3.80 - 5.20 M/uL    Hemoglobin 7.7 (L) 11.5 - 16.0 g/dL    Hematocrit 24.7 (L) 35.0 - 47.0 %    .4 (H) 80.0 - 99.0 FL    MCH 31.3 26.0 - 34.0 PG    MCHC 31.2 30.0 - 36.5 g/dL    RDW 19.9 (H) 11.5 - 14.5 %    Platelets 103 (L) 150 - 400 K/uL    MPV 10.8 8.9 - 12.9 FL    Nucleated RBCs 4.7 (H) 0  WBC    nRBC 0.14 (H) 0.00 - 0.01 K/uL    Neutrophils % 43 32 - 75 %    Lymphocytes % 35 12 - 49 %    Monocytes % 17 (H) 5 - 13 %    Eosinophils % 3 0 - 7 %     and de-accessed per protocol. D/c home ambulatory in no distress. Pt aware of next appointment scheduled.    Future Appointments   Date Time Provider Department Center   2/19/2025 10:00 AM [facility] LAB CHAIR 1 [facility]   2/20/2025  9:30 AM [facility] CHEMO CHAIR 10 [facility]   3/5/2025  9:00 AM [facility] LAB CHAIR 1 [facility]   3/6/2025  8:00 AM [facility] CHEMO CHAIR 7 [facility]   3/6/2025  8:15 AM Sepideh Gillespie DO [facility] BS AMB   3/12/2025  9:00 AM [facility] LAB CHAIR 1 [facility]   3/13/2025  9:30 AM [facility] CHEMO CHAIR 10 [facility]   3/26/2025  9:30 AM [facility] LAB CHAIR 1 [facility]   3/27/2025  8:00 AM [facility] CHEMO CHAIR 7 [facility]   3/27/2025  9:15 AM Sepideh Gillespie DO [facility] BS AMB   4/2/2025  9:30 AM [facility] LAB CHAIR 1 [facility]   4/3/2025  8:00 AM [facility] CHEMO CHAIR 7 [facility]         Chayo Montoya RN  February 13, 2025

## 2025-02-19 ENCOUNTER — HOSPITAL ENCOUNTER (OUTPATIENT)
Facility: HOSPITAL | Age: 35
Setting detail: INFUSION SERIES
End: 2025-02-19

## 2025-02-20 ENCOUNTER — HOSPITAL ENCOUNTER (OUTPATIENT)
Facility: HOSPITAL | Age: 35
Setting detail: INFUSION SERIES
End: 2025-02-20

## 2025-02-20 DIAGNOSIS — C79.51 CARCINOMA OF LEFT BREAST METASTATIC TO BONE (HCC): ICD-10-CM

## 2025-02-20 DIAGNOSIS — C50.912 CARCINOMA OF LEFT BREAST METASTATIC TO BONE (HCC): ICD-10-CM

## 2025-02-20 DIAGNOSIS — E83.51 HYPOCALCEMIA: Primary | ICD-10-CM

## 2025-02-20 RX ORDER — HYDROCORTISONE SODIUM SUCCINATE 100 MG/2ML
100 INJECTION INTRAMUSCULAR; INTRAVENOUS
OUTPATIENT
Start: 2025-02-28

## 2025-02-20 RX ORDER — SODIUM CHLORIDE 9 MG/ML
INJECTION, SOLUTION INTRAVENOUS CONTINUOUS
OUTPATIENT
Start: 2025-02-28

## 2025-02-20 RX ORDER — ACETAMINOPHEN 325 MG/1
650 TABLET ORAL
OUTPATIENT
Start: 2025-02-28

## 2025-02-20 RX ORDER — ONDANSETRON 2 MG/ML
8 INJECTION INTRAMUSCULAR; INTRAVENOUS
OUTPATIENT
Start: 2025-02-28

## 2025-02-20 RX ORDER — SODIUM CHLORIDE 9 MG/ML
5-250 INJECTION, SOLUTION INTRAVENOUS PRN
OUTPATIENT
Start: 2025-02-28

## 2025-02-20 RX ORDER — DEXAMETHASONE SODIUM PHOSPHATE 10 MG/ML
10 INJECTION, SOLUTION INTRAMUSCULAR; INTRAVENOUS ONCE
OUTPATIENT
Start: 2025-02-28 | End: 2025-02-28

## 2025-02-20 RX ORDER — ALBUTEROL SULFATE 90 UG/1
4 INHALANT RESPIRATORY (INHALATION) PRN
OUTPATIENT
Start: 2025-02-28

## 2025-02-20 RX ORDER — DIPHENHYDRAMINE HYDROCHLORIDE 50 MG/ML
50 INJECTION INTRAMUSCULAR; INTRAVENOUS
OUTPATIENT
Start: 2025-02-28

## 2025-02-20 RX ORDER — HEPARIN 100 UNIT/ML
500 SYRINGE INTRAVENOUS PRN
OUTPATIENT
Start: 2025-02-28

## 2025-02-20 RX ORDER — EPINEPHRINE 1 MG/ML
0.3 INJECTION, SOLUTION, CONCENTRATE INTRAVENOUS PRN
OUTPATIENT
Start: 2025-02-28

## 2025-02-20 RX ORDER — PALONOSETRON 0.05 MG/ML
0.25 INJECTION, SOLUTION INTRAVENOUS ONCE
OUTPATIENT
Start: 2025-02-28 | End: 2025-02-28

## 2025-02-20 RX ORDER — SODIUM CHLORIDE 0.9 % (FLUSH) 0.9 %
5-40 SYRINGE (ML) INJECTION PRN
OUTPATIENT
Start: 2025-02-28

## 2025-02-26 ENCOUNTER — HOSPITAL ENCOUNTER (OUTPATIENT)
Facility: HOSPITAL | Age: 35
Setting detail: INFUSION SERIES
Discharge: HOME | End: 2025-02-26
Payer: COMMERCIAL

## 2025-02-26 DIAGNOSIS — C79.51 CARCINOMA OF LEFT BREAST METASTATIC TO BONE (HCC): ICD-10-CM

## 2025-02-26 DIAGNOSIS — C50.912 CARCINOMA OF LEFT BREAST METASTATIC TO BONE (HCC): ICD-10-CM

## 2025-02-26 DIAGNOSIS — E83.51 HYPOCALCEMIA: ICD-10-CM

## 2025-02-26 LAB
ALBUMIN SERPL-MCNC: 4 G/DL (ref 3.5–5)
ALBUMIN/GLOB SERPL: 1.3 (ref 1.1–2.2)
ALP SERPL-CCNC: 117 U/L (ref 45–117)
ALT SERPL-CCNC: 72 U/L (ref 12–78)
ANION GAP SERPL CALC-SCNC: 6 MMOL/L (ref 2–12)
AST SERPL-CCNC: 40 U/L (ref 15–37)
BASOPHILS # BLD: 0.02 K/UL (ref 0–0.1)
BASOPHILS NFR BLD: 0.5 % (ref 0–1)
BILIRUB SERPL-MCNC: 1 MG/DL (ref 0.2–1)
BUN SERPL-MCNC: 7 MG/DL (ref 6–20)
BUN/CREAT SERPL: 9 (ref 12–20)
CALCIUM SERPL-MCNC: 9 MG/DL (ref 8.5–10.1)
CHLORIDE SERPL-SCNC: 105 MMOL/L (ref 97–108)
CO2 SERPL-SCNC: 25 MMOL/L (ref 21–32)
CREAT SERPL-MCNC: 0.76 MG/DL (ref 0.55–1.02)
DIFFERENTIAL METHOD BLD: ABNORMAL
EOSINOPHIL # BLD: 0.08 K/UL (ref 0–0.4)
EOSINOPHIL NFR BLD: 1.9 % (ref 0–7)
ERYTHROCYTE [DISTWIDTH] IN BLOOD BY AUTOMATED COUNT: 19 % (ref 11.5–14.5)
GLOBULIN SER CALC-MCNC: 3.1 G/DL (ref 2–4)
GLUCOSE SERPL-MCNC: 104 MG/DL (ref 65–100)
HCT VFR BLD AUTO: 24.8 % (ref 35–47)
HGB BLD-MCNC: 7.7 G/DL (ref 11.5–16)
IMM GRANULOCYTES # BLD AUTO: 0.07 K/UL (ref 0–0.04)
IMM GRANULOCYTES NFR BLD AUTO: 1.6 % (ref 0–0.5)
LYMPHOCYTES # BLD: 1.06 K/UL (ref 0.8–3.5)
LYMPHOCYTES NFR BLD: 24.9 % (ref 12–49)
MCH RBC QN AUTO: 31.2 PG (ref 26–34)
MCHC RBC AUTO-ENTMCNC: 31 G/DL (ref 30–36.5)
MCV RBC AUTO: 100.4 FL (ref 80–99)
MONOCYTES # BLD: 0.49 K/UL (ref 0–1)
MONOCYTES NFR BLD: 11.5 % (ref 5–13)
NEUTS SEG # BLD: 2.53 K/UL (ref 1.8–8)
NEUTS SEG NFR BLD: 59.6 % (ref 32–75)
NRBC # BLD: 0.06 K/UL (ref 0–0.01)
NRBC BLD-RTO: 1.4 PER 100 WBC
PLATELET # BLD AUTO: 204 K/UL (ref 150–400)
PMV BLD AUTO: 10.8 FL (ref 8.9–12.9)
POTASSIUM SERPL-SCNC: 4.6 MMOL/L (ref 3.5–5.1)
PROT SERPL-MCNC: 7.1 G/DL (ref 6.4–8.2)
RBC # BLD AUTO: 2.47 M/UL (ref 3.8–5.2)
SODIUM SERPL-SCNC: 136 MMOL/L (ref 136–145)
WBC # BLD AUTO: 4.3 K/UL (ref 3.6–11)

## 2025-02-26 PROCEDURE — 85025 COMPLETE CBC W/AUTO DIFF WBC: CPT

## 2025-02-26 PROCEDURE — 36415 COLL VENOUS BLD VENIPUNCTURE: CPT

## 2025-02-26 PROCEDURE — 80053 COMPREHEN METABOLIC PANEL: CPT

## 2025-02-26 NOTE — PROGRESS NOTES
Pt arrived to [facility] for labs in stable condition. Labs drawn peripherally from the right ac and sent for processing.     Please see pending results in EPIC.    Pt tolerated treatment well. D/Cd from [facility] in no distress.  Future Appointments   Date Time Provider Department Center   2/28/2025  9:30 AM [facility] CHEMO CHAIR 10 [facility]   3/12/2025  9:00 AM [facility] LAB CHAIR 1 [facility]   3/13/2025  9:30 AM [facility] CHEMO CHAIR 10 [facility]   3/13/2025  9:45 AM Sepideh Gilelspie,  [facility] BS AMB   3/19/2025 10:00 AM [facility] LAB CHAIR 1 [facility]   3/20/2025 10:30 AM [facility] CHEMO CHAIR 12 [facility]   3/20/2025 11:15 AM Sepideh Gillespie,  [facility] BS AMB   4/2/2025  9:30 AM [facility] LAB CHAIR 1 [facility]   4/3/2025  8:00 AM [facility] CHEMO CHAIR 7 [facility]   4/9/2025  8:30 AM [facility] LAB CHAIR 1 [facility]   4/10/2025  8:30 AM [facility] CHEMO CHAIR 8 [facility]   4/23/2025  9:30 AM [facility] LAB CHAIR 1 [facility]   4/24/2025  8:00 AM [facility] CHEMO CHAIR 7 [facility]   4/30/2025  8:30 AM [facility] LAB CHAIR 1 [facility]   5/1/2025  8:00 AM [facility] CHEMO CHAIR 7 [facility]   5/14/2025  9:00 AM [facility] LAB CHAIR 1 [facility]   5/15/2025  8:00 AM [facility] CHEMO CHAIR 7 [facility]

## 2025-02-27 ENCOUNTER — APPOINTMENT (OUTPATIENT)
Facility: HOSPITAL | Age: 35
End: 2025-02-27
Payer: COMMERCIAL

## 2025-02-28 ENCOUNTER — HOSPITAL ENCOUNTER (OUTPATIENT)
Facility: HOSPITAL | Age: 35
Discharge: HOME | End: 2025-02-28
Payer: COMMERCIAL

## 2025-02-28 ENCOUNTER — HOSPITAL ENCOUNTER (OUTPATIENT)
Facility: HOSPITAL | Age: 35
Setting detail: INFUSION SERIES
Discharge: HOME | End: 2025-02-28
Payer: COMMERCIAL

## 2025-02-28 VITALS
DIASTOLIC BLOOD PRESSURE: 82 MMHG | TEMPERATURE: 97 F | WEIGHT: 200 LBS | HEART RATE: 72 BPM | RESPIRATION RATE: 16 BRPM | SYSTOLIC BLOOD PRESSURE: 115 MMHG | BODY MASS INDEX: 34.31 KG/M2

## 2025-02-28 DIAGNOSIS — M79.89 RIGHT LEG SWELLING: Primary | ICD-10-CM

## 2025-02-28 DIAGNOSIS — M79.604 RIGHT LEG PAIN: ICD-10-CM

## 2025-02-28 DIAGNOSIS — C79.51 CARCINOMA OF LEFT BREAST METASTATIC TO BONE (HCC): Primary | ICD-10-CM

## 2025-02-28 DIAGNOSIS — M79.89 RIGHT LEG SWELLING: ICD-10-CM

## 2025-02-28 DIAGNOSIS — E83.51 HYPOCALCEMIA: ICD-10-CM

## 2025-02-28 DIAGNOSIS — C50.912 CARCINOMA OF LEFT BREAST METASTATIC TO BONE (HCC): Primary | ICD-10-CM

## 2025-02-28 PROCEDURE — 2580000003 HC RX 258: Performed by: NURSE PRACTITIONER

## 2025-02-28 PROCEDURE — 93971 EXTREMITY STUDY: CPT

## 2025-02-28 PROCEDURE — 96413 CHEMO IV INFUSION 1 HR: CPT

## 2025-02-28 PROCEDURE — 96375 TX/PRO/DX INJ NEW DRUG ADDON: CPT

## 2025-02-28 PROCEDURE — 96417 CHEMO IV INFUS EACH ADDL SEQ: CPT

## 2025-02-28 PROCEDURE — 6360000002 HC RX W HCPCS: Performed by: NURSE PRACTITIONER

## 2025-02-28 PROCEDURE — 96377 APPLICATON ON-BODY INJECTOR: CPT

## 2025-02-28 RX ORDER — DEXAMETHASONE SODIUM PHOSPHATE 10 MG/ML
10 INJECTION, SOLUTION INTRAMUSCULAR; INTRAVENOUS ONCE
Status: COMPLETED | OUTPATIENT
Start: 2025-02-28 | End: 2025-02-28

## 2025-02-28 RX ORDER — PALONOSETRON 0.05 MG/ML
0.25 INJECTION, SOLUTION INTRAVENOUS ONCE
Status: COMPLETED | OUTPATIENT
Start: 2025-02-28 | End: 2025-02-28

## 2025-02-28 RX ORDER — SODIUM CHLORIDE 9 MG/ML
5-250 INJECTION, SOLUTION INTRAVENOUS PRN
Status: DISCONTINUED | OUTPATIENT
Start: 2025-02-28 | End: 2025-03-01 | Stop reason: HOSPADM

## 2025-02-28 RX ADMIN — CARBOPLATIN 205 MG: 10 INJECTION, SOLUTION INTRAVENOUS at 11:07

## 2025-02-28 RX ADMIN — DEXAMETHASONE SODIUM PHOSPHATE 10 MG: 10 INJECTION, SOLUTION INTRAMUSCULAR; INTRAVENOUS at 10:24

## 2025-02-28 RX ADMIN — GEMCITABINE HYDROCHLORIDE 1560 MG: 1 INJECTION, SOLUTION INTRAVENOUS at 11:44

## 2025-02-28 RX ADMIN — PALONOSETRON 0.25 MG: 0.05 INJECTION, SOLUTION INTRAVENOUS at 10:21

## 2025-02-28 RX ADMIN — PEGFILGRASTIM 6 MG: KIT SUBCUTANEOUS at 12:15

## 2025-02-28 ASSESSMENT — PAIN SCALES - GENERAL: PAINLEVEL_OUTOF10: 2

## 2025-02-28 NOTE — PROGRESS NOTES
[facility] - Chemotherapy Progress Note    0930 Pt admit to [facility] for Carboplatin/Gemzar C6D8/OBI ambulatory in stable condition accompanied by family. Assessment completed. Pt reports bilateral leg swelling and R leg sensitive to touch; MD notified. Port accessed with positive blood return. Labs drawn prior to today's visit.        /82   Pulse 72   Temp 97 °F (36.1 °C) (Temporal)   Resp 16   Wt 90.7 kg (200 lb)   BMI 34.31 kg/m²     Medications Administered         CARBOplatin (PARAPLATIN) 205 mg in sodium chloride 0.9 % 250 mL chemo IVPB Admin Date  02/28/2025 Action  New Bag Dose  205 mg Rate  591 mL/hr Route  IntraVENous Documented By  Lisa Ramirez RN        dexAMETHasone (PF) (DECADRON) injection 10 mg Admin Date  02/28/2025 Action  Given Dose  10 mg Rate   Route  IntraVENous Documented By  Lisa Ramirez RN        gemcitabine HCl (GEMZAR) 1,560 mg in sodium chloride 0.9 % 100 mL chemo IVPB Admin Date  02/28/2025 Action  New Bag Dose  1,560 mg Rate  302.1 mL/hr Route  IntraVENous Documented By  Lisa Ramirez RN        palonosetron (ALOXI) injection 0.25 mg Admin Date  02/28/2025 Action  Given Dose  0.25 mg Rate   Route  IntraVENous Documented By  Lisa Ramirez RN        pegfilgrastim (NEULASTA) on-body injector 6 mg Admin Date  02/28/2025 Action  Given Dose  6 mg Rate   Route  SubCUTAneous Documented By  Lisa Ramirez RN          (OBI, SC L arm)      Two nurses verified prior to administering:, Drug name, Drug dose, Infusion volume or drug volume when prepared in a syringe, Rate of administration, Route of administration, Expiration dates and/or times, Appearance and physical integrity of the drugs, Rate set on infusion pump, when used, Sequencing of drug administration         1220 Pt tolerated treatment well. Discussed when to remove OBI device; verbalized understanding. Port maintained positive blood return throughout treatment, flushed with positive blood

## 2025-03-05 ENCOUNTER — HOSPITAL ENCOUNTER (OUTPATIENT)
Facility: HOSPITAL | Age: 35
Setting detail: INFUSION SERIES
End: 2025-03-05
Payer: COMMERCIAL

## 2025-03-05 ENCOUNTER — APPOINTMENT (OUTPATIENT)
Facility: HOSPITAL | Age: 35
End: 2025-03-05
Payer: COMMERCIAL

## 2025-03-05 RX ORDER — SODIUM CHLORIDE 9 MG/ML
5-250 INJECTION, SOLUTION INTRAVENOUS PRN
Status: CANCELLED | OUTPATIENT
Start: 2025-03-20

## 2025-03-05 RX ORDER — HEPARIN 100 UNIT/ML
500 SYRINGE INTRAVENOUS PRN
Status: CANCELLED | OUTPATIENT
Start: 2025-03-20

## 2025-03-05 RX ORDER — ALBUTEROL SULFATE 90 UG/1
4 INHALANT RESPIRATORY (INHALATION) PRN
Status: CANCELLED | OUTPATIENT
Start: 2025-03-20

## 2025-03-05 RX ORDER — ACETAMINOPHEN 325 MG/1
650 TABLET ORAL
OUTPATIENT
Start: 2025-03-27

## 2025-03-05 RX ORDER — HYDROCORTISONE SODIUM SUCCINATE 100 MG/2ML
100 INJECTION INTRAMUSCULAR; INTRAVENOUS
Status: CANCELLED | OUTPATIENT
Start: 2025-03-20

## 2025-03-05 RX ORDER — SODIUM CHLORIDE 9 MG/ML
INJECTION, SOLUTION INTRAVENOUS CONTINUOUS
OUTPATIENT
Start: 2025-03-27

## 2025-03-05 RX ORDER — ONDANSETRON 2 MG/ML
8 INJECTION INTRAMUSCULAR; INTRAVENOUS
OUTPATIENT
Start: 2025-03-27

## 2025-03-05 RX ORDER — SODIUM CHLORIDE 0.9 % (FLUSH) 0.9 %
5-40 SYRINGE (ML) INJECTION PRN
Status: CANCELLED | OUTPATIENT
Start: 2025-03-20

## 2025-03-05 RX ORDER — ALBUTEROL SULFATE 90 UG/1
4 INHALANT RESPIRATORY (INHALATION) PRN
OUTPATIENT
Start: 2025-03-27

## 2025-03-05 RX ORDER — HEPARIN 100 UNIT/ML
500 SYRINGE INTRAVENOUS PRN
OUTPATIENT
Start: 2025-03-27

## 2025-03-05 RX ORDER — DEXAMETHASONE SODIUM PHOSPHATE 10 MG/ML
10 INJECTION, SOLUTION INTRAMUSCULAR; INTRAVENOUS ONCE
Status: CANCELLED | OUTPATIENT
Start: 2025-03-20 | End: 2025-03-13

## 2025-03-05 RX ORDER — HYDROCORTISONE SODIUM SUCCINATE 100 MG/2ML
100 INJECTION INTRAMUSCULAR; INTRAVENOUS
OUTPATIENT
Start: 2025-03-27

## 2025-03-05 RX ORDER — SODIUM CHLORIDE 9 MG/ML
5-250 INJECTION, SOLUTION INTRAVENOUS PRN
OUTPATIENT
Start: 2025-03-27

## 2025-03-05 RX ORDER — EPINEPHRINE 1 MG/ML
0.3 INJECTION, SOLUTION INTRAMUSCULAR; SUBCUTANEOUS PRN
OUTPATIENT
Start: 2025-03-27

## 2025-03-05 RX ORDER — ONDANSETRON 2 MG/ML
8 INJECTION INTRAMUSCULAR; INTRAVENOUS
Status: CANCELLED | OUTPATIENT
Start: 2025-03-20

## 2025-03-05 RX ORDER — PALONOSETRON 0.05 MG/ML
0.25 INJECTION, SOLUTION INTRAVENOUS ONCE
Status: CANCELLED | OUTPATIENT
Start: 2025-03-20 | End: 2025-03-13

## 2025-03-05 RX ORDER — SODIUM CHLORIDE 0.9 % (FLUSH) 0.9 %
5-40 SYRINGE (ML) INJECTION PRN
OUTPATIENT
Start: 2025-03-27

## 2025-03-05 RX ORDER — SODIUM CHLORIDE 9 MG/ML
INJECTION, SOLUTION INTRAVENOUS CONTINUOUS
Status: CANCELLED | OUTPATIENT
Start: 2025-03-20

## 2025-03-05 RX ORDER — DEXAMETHASONE SODIUM PHOSPHATE 10 MG/ML
10 INJECTION, SOLUTION INTRAMUSCULAR; INTRAVENOUS ONCE
OUTPATIENT
Start: 2025-03-27 | End: 2025-03-20

## 2025-03-05 RX ORDER — DIPHENHYDRAMINE HYDROCHLORIDE 50 MG/ML
50 INJECTION, SOLUTION INTRAMUSCULAR; INTRAVENOUS
Status: CANCELLED | OUTPATIENT
Start: 2025-03-20

## 2025-03-05 RX ORDER — EPINEPHRINE 1 MG/ML
0.3 INJECTION, SOLUTION INTRAMUSCULAR; SUBCUTANEOUS PRN
Status: CANCELLED | OUTPATIENT
Start: 2025-03-20

## 2025-03-05 RX ORDER — ACETAMINOPHEN 325 MG/1
650 TABLET ORAL
Status: CANCELLED | OUTPATIENT
Start: 2025-03-20

## 2025-03-05 RX ORDER — PALONOSETRON 0.05 MG/ML
0.25 INJECTION, SOLUTION INTRAVENOUS ONCE
OUTPATIENT
Start: 2025-03-27 | End: 2025-03-20

## 2025-03-05 RX ORDER — DIPHENHYDRAMINE HYDROCHLORIDE 50 MG/ML
50 INJECTION, SOLUTION INTRAMUSCULAR; INTRAVENOUS
OUTPATIENT
Start: 2025-03-27

## 2025-03-06 ENCOUNTER — APPOINTMENT (OUTPATIENT)
Facility: HOSPITAL | Age: 35
End: 2025-03-06
Payer: COMMERCIAL

## 2025-03-12 ENCOUNTER — HOSPITAL ENCOUNTER (OUTPATIENT)
Facility: HOSPITAL | Age: 35
Setting detail: INFUSION SERIES
Discharge: HOME | End: 2025-03-12
Payer: COMMERCIAL

## 2025-03-12 DIAGNOSIS — C50.912 CARCINOMA OF LEFT BREAST METASTATIC TO BONE: ICD-10-CM

## 2025-03-12 DIAGNOSIS — E83.51 HYPOCALCEMIA: ICD-10-CM

## 2025-03-12 DIAGNOSIS — C79.51 CARCINOMA OF LEFT BREAST METASTATIC TO BONE: ICD-10-CM

## 2025-03-12 LAB
ALBUMIN SERPL-MCNC: 3.7 G/DL (ref 3.5–5)
ALBUMIN/GLOB SERPL: 1 (ref 1.1–2.2)
ALP SERPL-CCNC: 126 U/L (ref 45–117)
ALT SERPL-CCNC: 64 U/L (ref 12–78)
ANION GAP SERPL CALC-SCNC: 6 MMOL/L (ref 2–12)
AST SERPL-CCNC: 41 U/L (ref 15–37)
BASOPHILS # BLD: 0 K/UL (ref 0–0.1)
BASOPHILS NFR BLD: 0 % (ref 0–1)
BILIRUB SERPL-MCNC: 0.7 MG/DL (ref 0.2–1)
BUN SERPL-MCNC: 9 MG/DL (ref 6–20)
BUN/CREAT SERPL: 11 (ref 12–20)
CALCIUM SERPL-MCNC: 8.9 MG/DL (ref 8.5–10.1)
CHLORIDE SERPL-SCNC: 108 MMOL/L (ref 97–108)
CO2 SERPL-SCNC: 24 MMOL/L (ref 21–32)
CREAT SERPL-MCNC: 0.79 MG/DL (ref 0.55–1.02)
DIFFERENTIAL METHOD BLD: ABNORMAL
EOSINOPHIL # BLD: 0.09 K/UL (ref 0–0.4)
EOSINOPHIL NFR BLD: 2 % (ref 0–7)
ERYTHROCYTE [DISTWIDTH] IN BLOOD BY AUTOMATED COUNT: 18.3 % (ref 11.5–14.5)
GLOBULIN SER CALC-MCNC: 3.7 G/DL (ref 2–4)
GLUCOSE SERPL-MCNC: 111 MG/DL (ref 65–100)
HCT VFR BLD AUTO: 25.3 % (ref 35–47)
HGB BLD-MCNC: 7.9 G/DL (ref 11.5–16)
IMM GRANULOCYTES # BLD AUTO: 0 K/UL
IMM GRANULOCYTES NFR BLD AUTO: 0 %
LYMPHOCYTES # BLD: 0.97 K/UL (ref 0.8–3.5)
LYMPHOCYTES NFR BLD: 21 % (ref 12–49)
MCH RBC QN AUTO: 31.6 PG (ref 26–34)
MCHC RBC AUTO-ENTMCNC: 31.2 G/DL (ref 30–36.5)
MCV RBC AUTO: 101.2 FL (ref 80–99)
METAMYELOCYTES NFR BLD MANUAL: 1 %
MONOCYTES # BLD: 0.46 K/UL (ref 0–1)
MONOCYTES NFR BLD: 10 % (ref 5–13)
NEUTS SEG # BLD: 3.04 K/UL (ref 1.8–8)
NEUTS SEG NFR BLD: 66 % (ref 32–75)
NRBC # BLD: 0.1 K/UL (ref 0–0.01)
NRBC BLD-RTO: 2.2 PER 100 WBC
PLATELET # BLD AUTO: 77 K/UL (ref 150–400)
PMV BLD AUTO: 11.1 FL (ref 8.9–12.9)
POTASSIUM SERPL-SCNC: 4 MMOL/L (ref 3.5–5.1)
PROT SERPL-MCNC: 7.4 G/DL (ref 6.4–8.2)
RBC # BLD AUTO: 2.5 M/UL (ref 3.8–5.2)
RBC MORPH BLD: ABNORMAL
RBC MORPH BLD: ABNORMAL
SODIUM SERPL-SCNC: 138 MMOL/L (ref 136–145)
TSH SERPL DL<=0.05 MIU/L-ACNC: 1.67 UIU/ML (ref 0.36–3.74)
WBC # BLD AUTO: 4.6 K/UL (ref 3.6–11)
WBC MORPH BLD: ABNORMAL

## 2025-03-12 PROCEDURE — 85025 COMPLETE CBC W/AUTO DIFF WBC: CPT

## 2025-03-12 PROCEDURE — 80053 COMPREHEN METABOLIC PANEL: CPT

## 2025-03-12 PROCEDURE — 84443 ASSAY THYROID STIM HORMONE: CPT

## 2025-03-13 ENCOUNTER — TELEPHONE (OUTPATIENT)
Age: 35
End: 2025-03-13

## 2025-03-13 ENCOUNTER — CLINICAL DOCUMENTATION (OUTPATIENT)
Age: 35
End: 2025-03-13

## 2025-03-13 ENCOUNTER — HOSPITAL ENCOUNTER (OUTPATIENT)
Facility: HOSPITAL | Age: 35
Setting detail: INFUSION SERIES
End: 2025-03-13

## 2025-03-13 DIAGNOSIS — C79.51 CARCINOMA OF LEFT BREAST METASTATIC TO BONE: ICD-10-CM

## 2025-03-13 DIAGNOSIS — E83.51 HYPOCALCEMIA: ICD-10-CM

## 2025-03-13 DIAGNOSIS — C79.51 CARCINOMA OF LEFT BREAST METASTATIC TO BONE (HCC): Primary | ICD-10-CM

## 2025-03-13 DIAGNOSIS — C50.912 CARCINOMA OF LEFT BREAST METASTATIC TO BONE (HCC): Primary | ICD-10-CM

## 2025-03-13 DIAGNOSIS — E83.51 HYPOCALCEMIA: Primary | ICD-10-CM

## 2025-03-13 DIAGNOSIS — C50.912 CARCINOMA OF LEFT BREAST METASTATIC TO BONE: ICD-10-CM

## 2025-03-13 NOTE — TELEPHONE ENCOUNTER
Call to patient, ID verified X 2, [phone].  Provider message relayed: PLTs 77k yesterday. HOLD chemo today. Re-try in 1 week. Please call patient ASAP and adjust [facility] schedule.   Understanding verbalized.  Will keep appts for 3/20/25 [facility]/OV as scheduled, understands schedule will be adjusted as needed.   Update to Provider.

## 2025-03-13 NOTE — PROGRESS NOTES
PLTs 77k yesterday. HOLD chemo today. Re-try in 1 week. Please call patient ASAP and adjust [facility] schedule.

## 2025-03-19 ENCOUNTER — HOSPITAL ENCOUNTER (OUTPATIENT)
Facility: HOSPITAL | Age: 35
Setting detail: INFUSION SERIES
Discharge: HOME | End: 2025-03-19
Payer: COMMERCIAL

## 2025-03-19 ENCOUNTER — APPOINTMENT (OUTPATIENT)
Facility: HOSPITAL | Age: 35
End: 2025-03-19
Payer: COMMERCIAL

## 2025-03-19 VITALS — WEIGHT: 204.4 LBS | HEIGHT: 64 IN | BODY MASS INDEX: 34.89 KG/M2

## 2025-03-19 DIAGNOSIS — C50.912 CARCINOMA OF LEFT BREAST METASTATIC TO BONE: Primary | ICD-10-CM

## 2025-03-19 DIAGNOSIS — C79.51 CARCINOMA OF LEFT BREAST METASTATIC TO BONE: Primary | ICD-10-CM

## 2025-03-19 DIAGNOSIS — E83.51 HYPOCALCEMIA: ICD-10-CM

## 2025-03-19 LAB
ALBUMIN SERPL-MCNC: 4.1 G/DL (ref 3.5–5)
ALBUMIN/GLOB SERPL: 1.1 (ref 1.1–2.2)
ALP SERPL-CCNC: 118 U/L (ref 45–117)
ALT SERPL-CCNC: 60 U/L (ref 12–78)
ANION GAP SERPL CALC-SCNC: 6 MMOL/L (ref 2–12)
AST SERPL-CCNC: 46 U/L (ref 15–37)
BASOPHILS # BLD: 0 K/UL (ref 0–0.1)
BASOPHILS NFR BLD: 0 % (ref 0–1)
BILIRUB SERPL-MCNC: 0.8 MG/DL (ref 0.2–1)
BUN SERPL-MCNC: 7 MG/DL (ref 6–20)
BUN/CREAT SERPL: 8 (ref 12–20)
CALCIUM SERPL-MCNC: 9.6 MG/DL (ref 8.5–10.1)
CHLORIDE SERPL-SCNC: 104 MMOL/L (ref 97–108)
CO2 SERPL-SCNC: 26 MMOL/L (ref 21–32)
CREAT SERPL-MCNC: 0.86 MG/DL (ref 0.55–1.02)
DIFFERENTIAL METHOD BLD: ABNORMAL
EOSINOPHIL # BLD: 0.1 K/UL (ref 0–0.4)
EOSINOPHIL NFR BLD: 2 % (ref 0–7)
ERYTHROCYTE [DISTWIDTH] IN BLOOD BY AUTOMATED COUNT: 19.5 % (ref 11.5–14.5)
GLOBULIN SER CALC-MCNC: 3.8 G/DL (ref 2–4)
GLUCOSE SERPL-MCNC: 94 MG/DL (ref 65–100)
HCT VFR BLD AUTO: 27 % (ref 35–47)
HGB BLD-MCNC: 8.8 G/DL (ref 11.5–16)
IMM GRANULOCYTES # BLD AUTO: 0 K/UL
IMM GRANULOCYTES NFR BLD AUTO: 0 %
LYMPHOCYTES # BLD: 1.27 K/UL (ref 0.8–3.5)
LYMPHOCYTES NFR BLD: 26 % (ref 12–49)
MAGNESIUM SERPL-MCNC: 2 MG/DL (ref 1.6–2.4)
MCH RBC QN AUTO: 32.7 PG (ref 26–34)
MCHC RBC AUTO-ENTMCNC: 32.6 G/DL (ref 30–36.5)
MCV RBC AUTO: 100.4 FL (ref 80–99)
METAMYELOCYTES NFR BLD MANUAL: 1 %
MONOCYTES # BLD: 0.34 K/UL (ref 0–1)
MONOCYTES NFR BLD: 7 % (ref 5–13)
MYELOCYTES NFR BLD MANUAL: 1 %
NEUTS SEG # BLD: 3.09 K/UL (ref 1.8–8)
NEUTS SEG NFR BLD: 63 % (ref 32–75)
NRBC # BLD: 0.11 K/UL (ref 0–0.01)
NRBC BLD-RTO: 2.3 PER 100 WBC
PHOSPHATE SERPL-MCNC: 3.4 MG/DL (ref 2.6–4.7)
PLATELET # BLD AUTO: 162 K/UL (ref 150–400)
PMV BLD AUTO: 9.8 FL (ref 8.9–12.9)
POTASSIUM SERPL-SCNC: 4.2 MMOL/L (ref 3.5–5.1)
PROT SERPL-MCNC: 7.9 G/DL (ref 6.4–8.2)
RBC # BLD AUTO: 2.69 M/UL (ref 3.8–5.2)
RBC MORPH BLD: ABNORMAL
SODIUM SERPL-SCNC: 136 MMOL/L (ref 136–145)
TSH SERPL DL<=0.05 MIU/L-ACNC: 1.54 UIU/ML (ref 0.36–3.74)
WBC # BLD AUTO: 4.9 K/UL (ref 3.6–11)

## 2025-03-19 PROCEDURE — 84100 ASSAY OF PHOSPHORUS: CPT

## 2025-03-19 PROCEDURE — 84443 ASSAY THYROID STIM HORMONE: CPT

## 2025-03-19 PROCEDURE — 83735 ASSAY OF MAGNESIUM: CPT

## 2025-03-19 PROCEDURE — 85025 COMPLETE CBC W/AUTO DIFF WBC: CPT

## 2025-03-19 PROCEDURE — 80053 COMPREHEN METABOLIC PANEL: CPT

## 2025-03-19 NOTE — PROGRESS NOTES
[facility] Progress Note    Date: 2025    Name: Alea Lindsay    MRN: [id number]         : 1990      :  Pt arrived ambulatory and in no distress, for lab visit.  Labs drawn via Left ac, patient tolerated well.        There were no vitals filed for this visit.    Lab results were obtained and reviewed.  No results found for this or any previous visit (from the past 12 hours).        Departed [facility] ambulatory and in no distress.    Future Appointments   Date Time Provider Department Center   3/20/2025 10:30 AM [facility] CHEMO CHAIR 12 [facility]   3/20/2025 11:15 AM Sepideh Jurado, DO [facility] BS AMB   3/26/2025  8:00 AM [facility] LAB CHAIR 1 [facility]   3/27/2025  8:30 AM [facility] CHEMO CHAIR 8 [facility]   2025  8:30 AM [facility] LAB CHAIR 1 [facility]   4/10/2025  8:30 AM [facility] CHEMO CHAIR 8 [facility]   2025  8:30 AM [facility] LAB CHAIR 1 [facility]   2025  8:00 AM [facility] CHEMO CHAIR 7 [facility]   2025  8:00 AM [facility] CHEMO CHAIR 7 [facility]   2025  8:30 AM [facility] LAB CHAIR 1 [facility]   2025  8:30 AM [facility] CHEMO CHAIR 8 [facility]   2025  8:30 AM [facility] LAB CHAIR 1 [facility]   2025  8:30 AM [facility] CHEMO CHAIR 8 [facility]   2025  8:00 AM [facility] LAB CHAIR 1 [facility]   2025  8:30 AM [facility] CHEMO CHAIR 8 [facility]       SEVERINO REYES RN  2025

## 2025-03-20 ENCOUNTER — HOSPITAL ENCOUNTER (OUTPATIENT)
Facility: HOSPITAL | Age: 35
Setting detail: INFUSION SERIES
Discharge: HOME | End: 2025-03-20
Payer: COMMERCIAL

## 2025-03-20 ENCOUNTER — OFFICE VISIT (OUTPATIENT)
Age: 35
End: 2025-03-20
Payer: COMMERCIAL

## 2025-03-20 VITALS — HEIGHT: 64 IN | BODY MASS INDEX: 34.49 KG/M2 | WEIGHT: 202 LBS

## 2025-03-20 VITALS
RESPIRATION RATE: 16 BRPM | SYSTOLIC BLOOD PRESSURE: 133 MMHG | WEIGHT: 202 LBS | BODY MASS INDEX: 34.66 KG/M2 | OXYGEN SATURATION: 99 % | HEART RATE: 100 BPM | TEMPERATURE: 98 F | DIASTOLIC BLOOD PRESSURE: 84 MMHG

## 2025-03-20 DIAGNOSIS — C79.51 MALIGNANT NEOPLASM OF BREAST METASTATIC TO BONE: Primary | ICD-10-CM

## 2025-03-20 DIAGNOSIS — D69.59 CHEMOTHERAPY-INDUCED THROMBOCYTOPENIA: ICD-10-CM

## 2025-03-20 DIAGNOSIS — T45.1X5A CHEMOTHERAPY-INDUCED THROMBOCYTOPENIA: ICD-10-CM

## 2025-03-20 DIAGNOSIS — C50.919 MALIGNANT NEOPLASM OF BREAST METASTATIC TO BONE: Primary | ICD-10-CM

## 2025-03-20 DIAGNOSIS — K76.0 HEPATIC STEATOSIS: ICD-10-CM

## 2025-03-20 DIAGNOSIS — C79.51 CARCINOMA OF LEFT BREAST METASTATIC TO BONE: Primary | ICD-10-CM

## 2025-03-20 DIAGNOSIS — R79.89 ELEVATED LFTS: ICD-10-CM

## 2025-03-20 DIAGNOSIS — E83.51 HYPOCALCEMIA: ICD-10-CM

## 2025-03-20 DIAGNOSIS — T45.1X5A CHEMOTHERAPY INDUCED NEUTROPENIA: ICD-10-CM

## 2025-03-20 DIAGNOSIS — D70.1 CHEMOTHERAPY INDUCED NEUTROPENIA: ICD-10-CM

## 2025-03-20 DIAGNOSIS — T45.1X5A CHEMOTHERAPY-INDUCED FATIGUE: ICD-10-CM

## 2025-03-20 DIAGNOSIS — C50.912 CARCINOMA OF LEFT BREAST METASTATIC TO BONE: Primary | ICD-10-CM

## 2025-03-20 DIAGNOSIS — R53.83 CHEMOTHERAPY-INDUCED FATIGUE: ICD-10-CM

## 2025-03-20 PROCEDURE — 2580000003 HC RX 258: Performed by: INTERNAL MEDICINE

## 2025-03-20 PROCEDURE — 96375 TX/PRO/DX INJ NEW DRUG ADDON: CPT

## 2025-03-20 PROCEDURE — 96413 CHEMO IV INFUSION 1 HR: CPT

## 2025-03-20 PROCEDURE — 6360000002 HC RX W HCPCS: Performed by: NURSE PRACTITIONER

## 2025-03-20 PROCEDURE — 96417 CHEMO IV INFUS EACH ADDL SEQ: CPT

## 2025-03-20 PROCEDURE — 6360000002 HC RX W HCPCS: Performed by: INTERNAL MEDICINE

## 2025-03-20 PROCEDURE — 99205 OFFICE O/P NEW HI 60 MIN: CPT | Performed by: INTERNAL MEDICINE

## 2025-03-20 PROCEDURE — 96372 THER/PROPH/DIAG INJ SC/IM: CPT

## 2025-03-20 RX ORDER — ONDANSETRON 2 MG/ML
8 INJECTION INTRAMUSCULAR; INTRAVENOUS
OUTPATIENT
Start: 2025-04-23

## 2025-03-20 RX ORDER — EPINEPHRINE 1 MG/ML
0.3 INJECTION, SOLUTION INTRAMUSCULAR; SUBCUTANEOUS PRN
Status: DISCONTINUED | OUTPATIENT
Start: 2025-03-20 | End: 2025-03-21 | Stop reason: HOSPADM

## 2025-03-20 RX ORDER — HEPARIN 100 UNIT/ML
500 SYRINGE INTRAVENOUS PRN
Status: DISCONTINUED | OUTPATIENT
Start: 2025-03-20 | End: 2025-03-21 | Stop reason: HOSPADM

## 2025-03-20 RX ORDER — SODIUM CHLORIDE 9 MG/ML
5-250 INJECTION, SOLUTION INTRAVENOUS PRN
Status: DISCONTINUED | OUTPATIENT
Start: 2025-03-20 | End: 2025-03-21 | Stop reason: HOSPADM

## 2025-03-20 RX ORDER — DEXAMETHASONE SODIUM PHOSPHATE 10 MG/ML
10 INJECTION, SOLUTION INTRAMUSCULAR; INTRAVENOUS ONCE
Status: COMPLETED | OUTPATIENT
Start: 2025-03-20 | End: 2025-03-20

## 2025-03-20 RX ORDER — ONDANSETRON 2 MG/ML
8 INJECTION INTRAMUSCULAR; INTRAVENOUS
Status: DISCONTINUED | OUTPATIENT
Start: 2025-03-20 | End: 2025-03-21 | Stop reason: HOSPADM

## 2025-03-20 RX ORDER — ALBUTEROL SULFATE 90 UG/1
4 INHALANT RESPIRATORY (INHALATION) PRN
OUTPATIENT
Start: 2025-04-23

## 2025-03-20 RX ORDER — DIPHENHYDRAMINE HYDROCHLORIDE 50 MG/ML
50 INJECTION, SOLUTION INTRAMUSCULAR; INTRAVENOUS
Status: DISCONTINUED | OUTPATIENT
Start: 2025-03-20 | End: 2025-03-21 | Stop reason: HOSPADM

## 2025-03-20 RX ORDER — ACETAMINOPHEN 325 MG/1
650 TABLET ORAL
Status: DISCONTINUED | OUTPATIENT
Start: 2025-03-20 | End: 2025-03-21 | Stop reason: HOSPADM

## 2025-03-20 RX ORDER — SODIUM CHLORIDE 9 MG/ML
INJECTION, SOLUTION INTRAVENOUS CONTINUOUS
Status: DISCONTINUED | OUTPATIENT
Start: 2025-03-20 | End: 2025-03-21 | Stop reason: HOSPADM

## 2025-03-20 RX ORDER — SODIUM CHLORIDE 0.9 % (FLUSH) 0.9 %
5-40 SYRINGE (ML) INJECTION PRN
Status: DISCONTINUED | OUTPATIENT
Start: 2025-03-20 | End: 2025-03-21 | Stop reason: HOSPADM

## 2025-03-20 RX ORDER — HYDROCORTISONE SODIUM SUCCINATE 100 MG/2ML
100 INJECTION INTRAMUSCULAR; INTRAVENOUS
Status: DISCONTINUED | OUTPATIENT
Start: 2025-03-20 | End: 2025-03-21 | Stop reason: HOSPADM

## 2025-03-20 RX ORDER — PALONOSETRON 0.05 MG/ML
0.25 INJECTION, SOLUTION INTRAVENOUS ONCE
Status: COMPLETED | OUTPATIENT
Start: 2025-03-20 | End: 2025-03-20

## 2025-03-20 RX ORDER — DIPHENHYDRAMINE HYDROCHLORIDE 50 MG/ML
50 INJECTION, SOLUTION INTRAMUSCULAR; INTRAVENOUS
OUTPATIENT
Start: 2025-04-23

## 2025-03-20 RX ORDER — ACETAMINOPHEN 325 MG/1
650 TABLET ORAL
OUTPATIENT
Start: 2025-04-23

## 2025-03-20 RX ORDER — ALBUTEROL SULFATE 90 UG/1
4 INHALANT RESPIRATORY (INHALATION) PRN
Status: DISCONTINUED | OUTPATIENT
Start: 2025-03-20 | End: 2025-03-21 | Stop reason: HOSPADM

## 2025-03-20 RX ORDER — HYDROCORTISONE SODIUM SUCCINATE 100 MG/2ML
100 INJECTION INTRAMUSCULAR; INTRAVENOUS
OUTPATIENT
Start: 2025-04-23

## 2025-03-20 RX ORDER — EPINEPHRINE 1 MG/ML
0.3 INJECTION, SOLUTION INTRAMUSCULAR; SUBCUTANEOUS PRN
OUTPATIENT
Start: 2025-04-23

## 2025-03-20 RX ORDER — SODIUM CHLORIDE 9 MG/ML
INJECTION, SOLUTION INTRAVENOUS CONTINUOUS
OUTPATIENT
Start: 2025-04-23

## 2025-03-20 RX ADMIN — DEXAMETHASONE SODIUM PHOSPHATE 10 MG: 10 INJECTION, SOLUTION INTRAMUSCULAR; INTRAVENOUS at 14:03

## 2025-03-20 RX ADMIN — CARBOPLATIN 190 MG: 10 INJECTION, SOLUTION INTRAVENOUS at 14:30

## 2025-03-20 RX ADMIN — PALONOSETRON 0.25 MG: 0.05 INJECTION, SOLUTION INTRAVENOUS at 13:59

## 2025-03-20 RX ADMIN — SODIUM CHLORIDE 200 MG: 9 INJECTION, SOLUTION INTRAVENOUS at 13:25

## 2025-03-20 RX ADMIN — DENOSUMAB 120 MG: 120 INJECTION SUBCUTANEOUS at 15:09

## 2025-03-20 RX ADMIN — SODIUM CHLORIDE 50 ML/HR: 9 INJECTION, SOLUTION INTRAVENOUS at 13:21

## 2025-03-20 RX ADMIN — GEMCITABINE HYDROCHLORIDE 1560 MG: 1 INJECTION, SOLUTION INTRAVENOUS at 15:09

## 2025-03-20 NOTE — PROGRESS NOTES
Alea Lindsay is a 34 y.o. female    Chief Complaint   Patient presents with    Follow-up     MBC       1. Have you been to the ER, urgent care clinic since your last visit?  Hospitalized since your last visit?No    2. Have you seen or consulted any other health care providers outside of the [facility] since your last visit?  Include any pap smears or colon screening. No    
  IMPRESSION:  Right Breast:  1. BI-RADS Assessment Category 1: Negative. No evidence of breast carcinoma  within the right breast.     Left Breast:  1. BI-RADS Assessment Category 6: Known biopsy proven malignancy- Appropriate  action should be taken. Newly diagnosed multicentric, multifocal breast  carcinoma throughout the central and lateral left breast; there are 3  biopsy-proven malignancies and several additional malignant breast masses  present.  2. BI-RADS Assessment Category 6: Known biopsy proven malignancy- Appropriate  action should be taken. Newly diagnosed metastatic left axillary  lymphadenopathy. There are numerous malignant level 1 lymph nodes, in addition  to one malignant level 2 Mamta lymph node.  3. There are 2 abnormal left-sided internal mammary chain lymph nodes,  representing a suspicious finding.   4. Diffuse left breast skin thickening.     Other: Abnormal appearance of the bones throughout the visualized ribs and  sternum, concerning for bony metastatic disease.     RECOMMENDATIONS:  Appropriate action is recommended.  Staging studies are recommended, given the  findings above, including the diffusely abnormal appearance of the bones.     CT C/A/P 9/25/24  IMPRESSION:  Left breast mass with scattered prominent left breast soft tissue densities/left  axillary francis prominence as described above.     There is widespread osseous metastatic disease without evidence of fracture or  dislocation.  There is no additional evidence of recurrent/metastatic disease in the chest,  abdomen or pelvis.     There is no acute intrathoracic or intra-abdominal process.    NM Results (most recent):  NM BONE SCAN WHOLE BODY 09/25/2024    Narrative  EXAM: NM BONE SCAN WHOLE BODY    INDICATION: Malignant neoplasm of unspecified site of left female breast;  Secondary and unspecified malignant neoplasm of axilla and upper limb lymph  nodes    COMPARISON: None    IMAGING CORRELATION: CT chest abdomen pelvis from

## 2025-03-20 NOTE — PROGRESS NOTES
[facility] Chemotherapy Progress Note    Date: 2025    Name: Alea Lindsay    MRN: [id number]         : 1990       Pt admit to [facility] for C7D1 Keytruda/Carboplatin/Gemzar ambulatory in stable condition. Assessment completed. No new concerns voiced. Port with positive blood return.       Pre-medications  were administered as ordered and chemotherapy was initiated.  Medications Administered         0.9 % sodium chloride infusion Admin Date  2025 Action  New Bag Dose  50 mL/hr Rate  50 mL/hr Route  IntraVENous Documented By  Hiral Vega RN        CARBOplatin (PARAPLATIN) 190 mg in sodium chloride 0.9 % 250 mL chemo IVPB Admin Date  2025 Action  New Bag Dose  190 mg Rate  588 mL/hr Route  IntraVENous Documented By  Hiral Vega RN        denosumab (XGEVA) SC injection 120 mg Admin Date  2025 Action  Given Dose  120 mg Rate   Route  SubCUTAneous Documented By  Hiral Vega RN        dexAMETHasone (PF) (DECADRON) injection 10 mg Admin Date  2025 Action  Given Dose  10 mg Rate   Route  IntraVENous Documented By  Hiral Vega RN        gemcitabine HCl (GEMZAR) 1,560 mg in sodium chloride 0.9 % 250 mL chemo IVPB Admin Date  2025 Action  New Bag Dose  1,560 mg Rate  632.1 mL/hr Route  IntraVENous Documented By  Hiral Vega RN        palonosetron (ALOXI) injection 0.25 mg Admin Date  2025 Action  Given Dose  0.25 mg Rate   Route  IntraVENous Documented By  Hiral Vega RN        pembrolizumab (KEYTRUDA) 200 mg in sodium chloride 0.9 % 100 mL IVPB Admin Date  2025 Action  New Bag Dose  200 mg Rate  236 mL/hr Route  IntraVENous Documented By  Hiral Vega, ZEHRA            Two nurses verified prior to administering:Drug name, Drug doseInfusion volume or drug volume when prepared in a syringe, Rate of administration, Route of administration, Expiration dates and/or times, Appearance and physical integrity of the drugs, Rate set on infusion pump,

## 2025-03-26 ENCOUNTER — APPOINTMENT (OUTPATIENT)
Facility: HOSPITAL | Age: 35
End: 2025-03-26
Payer: COMMERCIAL

## 2025-03-26 ENCOUNTER — HOSPITAL ENCOUNTER (OUTPATIENT)
Facility: HOSPITAL | Age: 35
Setting detail: INFUSION SERIES
Discharge: HOME | End: 2025-03-26
Payer: COMMERCIAL

## 2025-03-26 DIAGNOSIS — C50.912 CARCINOMA OF LEFT BREAST METASTATIC TO BONE: ICD-10-CM

## 2025-03-26 DIAGNOSIS — E83.51 HYPOCALCEMIA: Primary | ICD-10-CM

## 2025-03-26 DIAGNOSIS — C79.51 CARCINOMA OF LEFT BREAST METASTATIC TO BONE: ICD-10-CM

## 2025-03-26 LAB
ALBUMIN SERPL-MCNC: 3.8 G/DL (ref 3.5–5)
ALBUMIN/GLOB SERPL: 1.1 (ref 1.1–2.2)
ALP SERPL-CCNC: 114 U/L (ref 45–117)
ALT SERPL-CCNC: 105 U/L (ref 12–78)
ANION GAP SERPL CALC-SCNC: 4 MMOL/L (ref 2–12)
AST SERPL-CCNC: 61 U/L (ref 15–37)
BASOPHILS # BLD: 0.03 K/UL (ref 0–0.1)
BASOPHILS NFR BLD: 1 % (ref 0–1)
BILIRUB SERPL-MCNC: 0.7 MG/DL (ref 0.2–1)
BUN SERPL-MCNC: 12 MG/DL (ref 6–20)
BUN/CREAT SERPL: 18 (ref 12–20)
CALCIUM SERPL-MCNC: 8.8 MG/DL (ref 8.5–10.1)
CHLORIDE SERPL-SCNC: 106 MMOL/L (ref 97–108)
CO2 SERPL-SCNC: 26 MMOL/L (ref 21–32)
COMMENT:: NORMAL
CREAT SERPL-MCNC: 0.65 MG/DL (ref 0.55–1.02)
DIFFERENTIAL METHOD BLD: ABNORMAL
EOSINOPHIL # BLD: 0.06 K/UL (ref 0–0.4)
EOSINOPHIL NFR BLD: 2.1 % (ref 0–7)
ERYTHROCYTE [DISTWIDTH] IN BLOOD BY AUTOMATED COUNT: 16.5 % (ref 11.5–14.5)
GLOBULIN SER CALC-MCNC: 3.6 G/DL (ref 2–4)
GLUCOSE SERPL-MCNC: 99 MG/DL (ref 65–100)
HCT VFR BLD AUTO: 26.9 % (ref 35–47)
HGB BLD-MCNC: 8.6 G/DL (ref 11.5–16)
IMM GRANULOCYTES # BLD AUTO: 0.04 K/UL (ref 0–0.04)
IMM GRANULOCYTES NFR BLD AUTO: 1.4 % (ref 0–0.5)
LYMPHOCYTES # BLD: 1.32 K/UL (ref 0.8–3.5)
LYMPHOCYTES NFR BLD: 45.5 % (ref 12–49)
MCH RBC QN AUTO: 31.6 PG (ref 26–34)
MCHC RBC AUTO-ENTMCNC: 32 G/DL (ref 30–36.5)
MCV RBC AUTO: 98.9 FL (ref 80–99)
MONOCYTES # BLD: 0.04 K/UL (ref 0–1)
MONOCYTES NFR BLD: 1.4 % (ref 5–13)
NEUTS SEG # BLD: 1.41 K/UL (ref 1.8–8)
NEUTS SEG NFR BLD: 48.6 % (ref 32–75)
NRBC # BLD: 0.02 K/UL (ref 0–0.01)
NRBC BLD-RTO: 0.7 PER 100 WBC
PLATELET # BLD AUTO: 165 K/UL (ref 150–400)
POTASSIUM SERPL-SCNC: 4.3 MMOL/L (ref 3.5–5.1)
PROT SERPL-MCNC: 7.4 G/DL (ref 6.4–8.2)
RBC # BLD AUTO: 2.72 M/UL (ref 3.8–5.2)
RBC MORPH BLD: ABNORMAL
SODIUM SERPL-SCNC: 136 MMOL/L (ref 136–145)
SPECIMEN HOLD: NORMAL
WBC # BLD AUTO: 2.9 K/UL (ref 3.6–11)
WBC MORPH BLD: ABNORMAL

## 2025-03-26 PROCEDURE — 80053 COMPREHEN METABOLIC PANEL: CPT

## 2025-03-26 PROCEDURE — 85025 COMPLETE CBC W/AUTO DIFF WBC: CPT

## 2025-03-27 ENCOUNTER — APPOINTMENT (OUTPATIENT)
Facility: HOSPITAL | Age: 35
End: 2025-03-27
Payer: COMMERCIAL

## 2025-03-27 ENCOUNTER — HOSPITAL ENCOUNTER (OUTPATIENT)
Facility: HOSPITAL | Age: 35
Setting detail: INFUSION SERIES
Discharge: HOME | End: 2025-03-27
Payer: COMMERCIAL

## 2025-03-27 VITALS
SYSTOLIC BLOOD PRESSURE: 122 MMHG | BODY MASS INDEX: 35.13 KG/M2 | DIASTOLIC BLOOD PRESSURE: 74 MMHG | TEMPERATURE: 97.3 F | OXYGEN SATURATION: 98 % | RESPIRATION RATE: 16 BRPM | WEIGHT: 205.8 LBS | HEIGHT: 64 IN | HEART RATE: 88 BPM

## 2025-03-27 DIAGNOSIS — C50.912 CARCINOMA OF LEFT BREAST METASTATIC TO BONE: Primary | ICD-10-CM

## 2025-03-27 DIAGNOSIS — E83.51 HYPOCALCEMIA: ICD-10-CM

## 2025-03-27 DIAGNOSIS — C79.51 CARCINOMA OF LEFT BREAST METASTATIC TO BONE: Primary | ICD-10-CM

## 2025-03-27 PROCEDURE — 6360000002 HC RX W HCPCS: Performed by: INTERNAL MEDICINE

## 2025-03-27 PROCEDURE — 96377 APPLICATON ON-BODY INJECTOR: CPT

## 2025-03-27 PROCEDURE — 2580000003 HC RX 258: Performed by: INTERNAL MEDICINE

## 2025-03-27 PROCEDURE — 96415 CHEMO IV INFUSION ADDL HR: CPT

## 2025-03-27 PROCEDURE — 96413 CHEMO IV INFUSION 1 HR: CPT

## 2025-03-27 PROCEDURE — 96375 TX/PRO/DX INJ NEW DRUG ADDON: CPT

## 2025-03-27 RX ORDER — DEXAMETHASONE SODIUM PHOSPHATE 10 MG/ML
10 INJECTION, SOLUTION INTRAMUSCULAR; INTRAVENOUS ONCE
Status: COMPLETED | OUTPATIENT
Start: 2025-03-27 | End: 2025-03-27

## 2025-03-27 RX ORDER — SODIUM CHLORIDE 0.9 % (FLUSH) 0.9 %
5-40 SYRINGE (ML) INJECTION PRN
Status: DISCONTINUED | OUTPATIENT
Start: 2025-03-27 | End: 2025-03-28 | Stop reason: HOSPADM

## 2025-03-27 RX ORDER — SODIUM CHLORIDE 9 MG/ML
5-250 INJECTION, SOLUTION INTRAVENOUS PRN
Status: DISCONTINUED | OUTPATIENT
Start: 2025-03-27 | End: 2025-03-28 | Stop reason: HOSPADM

## 2025-03-27 RX ORDER — PALONOSETRON 0.05 MG/ML
0.25 INJECTION, SOLUTION INTRAVENOUS ONCE
Status: COMPLETED | OUTPATIENT
Start: 2025-03-27 | End: 2025-03-27

## 2025-03-27 RX ADMIN — DEXAMETHASONE SODIUM PHOSPHATE 10 MG: 10 INJECTION, SOLUTION INTRAMUSCULAR; INTRAVENOUS at 09:05

## 2025-03-27 RX ADMIN — PEGFILGRASTIM 6 MG: KIT SUBCUTANEOUS at 10:52

## 2025-03-27 RX ADMIN — PALONOSETRON 0.25 MG: 0.05 INJECTION, SOLUTION INTRAVENOUS at 09:04

## 2025-03-27 RX ADMIN — GEMCITABINE HYDROCHLORIDE 1560 MG: 1 INJECTION, SOLUTION INTRAVENOUS at 10:19

## 2025-03-27 RX ADMIN — CARBOPLATIN 225 MG: 10 INJECTION, SOLUTION INTRAVENOUS at 09:44

## 2025-03-27 RX ADMIN — SODIUM CHLORIDE 25 ML/HR: 0.9 INJECTION, SOLUTION INTRAVENOUS at 09:04

## 2025-03-27 ASSESSMENT — PAIN SCALES - GENERAL: PAINLEVEL_OUTOF10: 0

## 2025-03-27 NOTE — PROGRESS NOTES
[facility] Short Note                       Date: 2025    Name: Alea Lindsay    MRN: [id number]         : 1990      0830 Pt admit to [facility] for Carbo/Gemzar/Neulasta OBI ambulatory in stable condition. Assessment completed. No new concerns voiced.        Ms. Lindsay's vitals were reviewed prior to and after treatment.   Patient Vitals for the past 12 hrs:   Temp Pulse Resp BP SpO2   25 0830 97.3 °F (36.3 °C) 88 16 127/78 98 %         Lab results were reviewed.      Medications given:   Medications Administered         0.9 % sodium chloride infusion Admin Date  2025 Action  New Bag Dose  25 mL/hr Rate  25 mL/hr Route  IntraVENous Documented By  Karla Nuñez RN        0.9 % sodium chloride infusion Admin Date  2025 Action  Rate/Dose Verify Dose   Rate  25 mL/hr Route  IntraVENous Documented By  Karla Nuñez RN        CARBOplatin (PARAPLATIN) 225 mg in sodium chloride 0.9 % 250 mL chemo IVPB Admin Date  2025 Action  New Bag Dose  225 mg Rate  595 mL/hr Route  IntraVENous Documented By  Karla Nuñez RN        dexAMETHasone (PF) (DECADRON) injection 10 mg Admin Date  2025 Action  Given Dose  10 mg Rate   Route  IntraVENous Documented By  Karla Nuñez RN        gemcitabine HCl (GEMZAR) 1,560 mg in sodium chloride 0.9 % 250 mL chemo IVPB Admin Date  2025 Action  New Bag Dose  1,560 mg Rate  632.1 mL/hr Route  IntraVENous Documented By  Karla Nuñez RN        palonosetron (ALOXI) injection 0.25 mg Admin Date  2025 Action  Given Dose  0.25 mg Rate   Route  IntraVENous Documented By  Karla Nuñez RN        pegfilgrastim (NEULASTA) on-body injector 6 mg Admin Date  2025 Action  Given Dose  6 mg Rate   Route  SubCUTAneous Documented By  Karla Nuñez RN        Neulasta OBI applied to L upper arm    Port accessed with positive blood return. Port flushed, and de-accessed per protocol.     Ms. Lindsay tolerated the infusion, and had no

## 2025-04-02 ENCOUNTER — APPOINTMENT (OUTPATIENT)
Facility: HOSPITAL | Age: 35
End: 2025-04-02
Payer: COMMERCIAL

## 2025-04-02 RX ORDER — ALBUTEROL SULFATE 90 UG/1
4 INHALANT RESPIRATORY (INHALATION) PRN
Status: CANCELLED | OUTPATIENT
Start: 2025-04-17

## 2025-04-02 RX ORDER — ALBUTEROL SULFATE 90 UG/1
4 INHALANT RESPIRATORY (INHALATION) PRN
Status: CANCELLED | OUTPATIENT
Start: 2025-04-10

## 2025-04-02 RX ORDER — DIPHENHYDRAMINE HYDROCHLORIDE 50 MG/ML
50 INJECTION, SOLUTION INTRAMUSCULAR; INTRAVENOUS
Status: CANCELLED | OUTPATIENT
Start: 2025-04-10

## 2025-04-02 RX ORDER — ACETAMINOPHEN 325 MG/1
650 TABLET ORAL
Status: CANCELLED | OUTPATIENT
Start: 2025-04-17

## 2025-04-02 RX ORDER — SODIUM CHLORIDE 9 MG/ML
5-250 INJECTION, SOLUTION INTRAVENOUS PRN
Status: CANCELLED | OUTPATIENT
Start: 2025-04-17

## 2025-04-02 RX ORDER — SODIUM CHLORIDE 0.9 % (FLUSH) 0.9 %
5-40 SYRINGE (ML) INJECTION PRN
Status: CANCELLED | OUTPATIENT
Start: 2025-04-17

## 2025-04-02 RX ORDER — EPINEPHRINE 1 MG/ML
0.3 INJECTION, SOLUTION INTRAMUSCULAR; SUBCUTANEOUS PRN
Status: CANCELLED | OUTPATIENT
Start: 2025-04-10

## 2025-04-02 RX ORDER — SODIUM CHLORIDE 9 MG/ML
5-250 INJECTION, SOLUTION INTRAVENOUS PRN
Status: CANCELLED | OUTPATIENT
Start: 2025-04-10

## 2025-04-02 RX ORDER — HYDROCORTISONE SODIUM SUCCINATE 100 MG/2ML
100 INJECTION INTRAMUSCULAR; INTRAVENOUS
Status: CANCELLED | OUTPATIENT
Start: 2025-04-10

## 2025-04-02 RX ORDER — PALONOSETRON 0.05 MG/ML
0.25 INJECTION, SOLUTION INTRAVENOUS ONCE
Status: CANCELLED | OUTPATIENT
Start: 2025-04-10 | End: 2025-04-10

## 2025-04-02 RX ORDER — SODIUM CHLORIDE 0.9 % (FLUSH) 0.9 %
5-40 SYRINGE (ML) INJECTION PRN
Status: CANCELLED | OUTPATIENT
Start: 2025-04-10

## 2025-04-02 RX ORDER — ACETAMINOPHEN 325 MG/1
650 TABLET ORAL
Status: CANCELLED | OUTPATIENT
Start: 2025-04-10

## 2025-04-02 RX ORDER — ONDANSETRON 2 MG/ML
8 INJECTION INTRAMUSCULAR; INTRAVENOUS
Status: CANCELLED | OUTPATIENT
Start: 2025-04-10

## 2025-04-02 RX ORDER — EPINEPHRINE 1 MG/ML
0.3 INJECTION, SOLUTION INTRAMUSCULAR; SUBCUTANEOUS PRN
Status: CANCELLED | OUTPATIENT
Start: 2025-04-17

## 2025-04-02 RX ORDER — DEXAMETHASONE SODIUM PHOSPHATE 10 MG/ML
10 INJECTION, SOLUTION INTRAMUSCULAR; INTRAVENOUS ONCE
Status: CANCELLED | OUTPATIENT
Start: 2025-04-17 | End: 2025-04-17

## 2025-04-02 RX ORDER — DIPHENHYDRAMINE HYDROCHLORIDE 50 MG/ML
50 INJECTION, SOLUTION INTRAMUSCULAR; INTRAVENOUS
Status: CANCELLED | OUTPATIENT
Start: 2025-04-17

## 2025-04-02 RX ORDER — HEPARIN 100 UNIT/ML
500 SYRINGE INTRAVENOUS PRN
Status: CANCELLED | OUTPATIENT
Start: 2025-04-10

## 2025-04-02 RX ORDER — HYDROCORTISONE SODIUM SUCCINATE 100 MG/2ML
100 INJECTION INTRAMUSCULAR; INTRAVENOUS
Status: CANCELLED | OUTPATIENT
Start: 2025-04-17

## 2025-04-02 RX ORDER — DEXAMETHASONE SODIUM PHOSPHATE 10 MG/ML
10 INJECTION, SOLUTION INTRAMUSCULAR; INTRAVENOUS ONCE
Status: CANCELLED | OUTPATIENT
Start: 2025-04-10 | End: 2025-04-10

## 2025-04-02 RX ORDER — SODIUM CHLORIDE 9 MG/ML
INJECTION, SOLUTION INTRAVENOUS CONTINUOUS
Status: CANCELLED | OUTPATIENT
Start: 2025-04-10

## 2025-04-02 RX ORDER — ONDANSETRON 2 MG/ML
8 INJECTION INTRAMUSCULAR; INTRAVENOUS
Status: CANCELLED | OUTPATIENT
Start: 2025-04-17

## 2025-04-02 RX ORDER — PALONOSETRON 0.05 MG/ML
0.25 INJECTION, SOLUTION INTRAVENOUS ONCE
Status: CANCELLED | OUTPATIENT
Start: 2025-04-17 | End: 2025-04-17

## 2025-04-02 RX ORDER — HEPARIN 100 UNIT/ML
500 SYRINGE INTRAVENOUS PRN
Status: CANCELLED | OUTPATIENT
Start: 2025-04-17

## 2025-04-02 RX ORDER — SODIUM CHLORIDE 9 MG/ML
INJECTION, SOLUTION INTRAVENOUS CONTINUOUS
Status: CANCELLED | OUTPATIENT
Start: 2025-04-17

## 2025-04-03 ENCOUNTER — APPOINTMENT (OUTPATIENT)
Facility: HOSPITAL | Age: 35
End: 2025-04-03
Payer: COMMERCIAL

## 2025-04-09 ENCOUNTER — HOSPITAL ENCOUNTER (OUTPATIENT)
Facility: HOSPITAL | Age: 35
Setting detail: INFUSION SERIES
Discharge: HOME | End: 2025-04-09
Payer: COMMERCIAL

## 2025-04-09 DIAGNOSIS — C79.51 CARCINOMA OF LEFT BREAST METASTATIC TO BONE: ICD-10-CM

## 2025-04-09 DIAGNOSIS — E83.51 HYPOCALCEMIA: ICD-10-CM

## 2025-04-09 DIAGNOSIS — C50.912 CARCINOMA OF LEFT BREAST METASTATIC TO BONE: ICD-10-CM

## 2025-04-09 LAB
ALBUMIN SERPL-MCNC: 4 G/DL (ref 3.5–5)
ALBUMIN/GLOB SERPL: 1.2 (ref 1.1–2.2)
ALP SERPL-CCNC: 136 U/L (ref 45–117)
ALT SERPL-CCNC: 67 U/L (ref 12–78)
ANION GAP SERPL CALC-SCNC: 6 MMOL/L (ref 2–12)
AST SERPL-CCNC: 34 U/L (ref 15–37)
BASOPHILS # BLD: 0 K/UL (ref 0–0.1)
BASOPHILS NFR BLD: 0 % (ref 0–1)
BILIRUB SERPL-MCNC: 0.7 MG/DL (ref 0.2–1)
BUN SERPL-MCNC: 11 MG/DL (ref 6–20)
BUN/CREAT SERPL: 12 (ref 12–20)
CALCIUM SERPL-MCNC: 8.5 MG/DL (ref 8.5–10.1)
CHLORIDE SERPL-SCNC: 105 MMOL/L (ref 97–108)
CO2 SERPL-SCNC: 25 MMOL/L (ref 21–32)
CREAT SERPL-MCNC: 0.9 MG/DL (ref 0.55–1.02)
DIFFERENTIAL METHOD BLD: ABNORMAL
EOSINOPHIL # BLD: 0 K/UL (ref 0–0.4)
EOSINOPHIL NFR BLD: 0 % (ref 0–7)
ERYTHROCYTE [DISTWIDTH] IN BLOOD BY AUTOMATED COUNT: 17.5 % (ref 11.5–14.5)
GLOBULIN SER CALC-MCNC: 3.3 G/DL (ref 2–4)
GLUCOSE SERPL-MCNC: 124 MG/DL (ref 65–100)
HCT VFR BLD AUTO: 25.3 % (ref 35–47)
HGB BLD-MCNC: 8 G/DL (ref 11.5–16)
IMM GRANULOCYTES # BLD AUTO: 0 K/UL
IMM GRANULOCYTES NFR BLD AUTO: 0 %
LYMPHOCYTES # BLD: 1.44 K/UL (ref 0.8–3.5)
LYMPHOCYTES NFR BLD: 37 % (ref 12–49)
MCH RBC QN AUTO: 32 PG (ref 26–34)
MCHC RBC AUTO-ENTMCNC: 31.6 G/DL (ref 30–36.5)
MCV RBC AUTO: 101.2 FL (ref 80–99)
MONOCYTES # BLD: 0.23 K/UL (ref 0–1)
MONOCYTES NFR BLD: 6 % (ref 5–13)
NEUTS SEG # BLD: 2.23 K/UL (ref 1.8–8)
NEUTS SEG NFR BLD: 57 % (ref 32–75)
NRBC # BLD: 0.12 K/UL (ref 0–0.01)
NRBC BLD-RTO: 3.1 PER 100 WBC
PLATELET # BLD AUTO: 138 K/UL (ref 150–400)
PMV BLD AUTO: 11.2 FL (ref 8.9–12.9)
POTASSIUM SERPL-SCNC: 4 MMOL/L (ref 3.5–5.1)
PROT SERPL-MCNC: 7.3 G/DL (ref 6.4–8.2)
RBC # BLD AUTO: 2.5 M/UL (ref 3.8–5.2)
RBC MORPH BLD: ABNORMAL
SODIUM SERPL-SCNC: 136 MMOL/L (ref 136–145)
WBC # BLD AUTO: 3.9 K/UL (ref 3.6–11)

## 2025-04-09 PROCEDURE — 80053 COMPREHEN METABOLIC PANEL: CPT

## 2025-04-09 PROCEDURE — 85025 COMPLETE CBC W/AUTO DIFF WBC: CPT

## 2025-04-09 NOTE — PROGRESS NOTES
[facility] Progress Note    Date: 2025    Name: Alea Lindsay    MRN: [id number]         : 1990      0830:  Pt arrived ambulatory and in no distress, for lab visit.  Labs drawn via R AC, patient tolerated well.      Departed [facility] ambulatory and in no distress.    Future Appointments   Date Time Provider Department Center   4/10/2025  8:30 AM [facility] LAB CHAIR 1 [facility]   4/10/2025  8:45 AM Radha Christensen APRN - NP [facility] BS AMB   4/10/2025  9:30 AM [facility] CHEMO CHAIR 7 [facility]   2025  8:30 AM [facility] LAB CHAIR 1 [facility]   2025  8:00 AM [facility] CHEMO CHAIR 7 [facility]   2025  8:00 AM [facility] CHEMO CHAIR 7 [facility]   2025  8:15 AM Sepideh Jurado, DO [facility] BS AMB   2025  8:30 AM [facility] LAB CHAIR 1 [facility]   2025  8:30 AM [facility] CHEMO CHAIR 8 [facility]   2025  8:30 AM [facility] LAB CHAIR 1 [facility]   2025  8:30 AM [facility] CHEMO CHAIR 8 [facility]   2025  8:00 AM [facility] LAB CHAIR 1 [facility]   2025  8:30 AM [facility] CHEMO CHAIR 8 [facility]       Beth Dodson RN  2025

## 2025-04-10 ENCOUNTER — HOSPITAL ENCOUNTER (OUTPATIENT)
Facility: HOSPITAL | Age: 35
Setting detail: INFUSION SERIES
Discharge: HOME | End: 2025-04-10
Payer: COMMERCIAL

## 2025-04-10 ENCOUNTER — HOSPITAL ENCOUNTER (OUTPATIENT)
Facility: HOSPITAL | Age: 35
Setting detail: INFUSION SERIES
End: 2025-04-10
Payer: COMMERCIAL

## 2025-04-10 ENCOUNTER — APPOINTMENT (OUTPATIENT)
Facility: HOSPITAL | Age: 35
End: 2025-04-10
Payer: COMMERCIAL

## 2025-04-10 ENCOUNTER — OFFICE VISIT (OUTPATIENT)
Age: 35
End: 2025-04-10

## 2025-04-10 VITALS
DIASTOLIC BLOOD PRESSURE: 84 MMHG | BODY MASS INDEX: 35 KG/M2 | RESPIRATION RATE: 16 BRPM | HEIGHT: 64 IN | HEART RATE: 92 BPM | SYSTOLIC BLOOD PRESSURE: 126 MMHG | TEMPERATURE: 97.9 F | WEIGHT: 204.98 LBS | OXYGEN SATURATION: 99 %

## 2025-04-10 VITALS
TEMPERATURE: 98 F | OXYGEN SATURATION: 98 % | RESPIRATION RATE: 18 BRPM | BODY MASS INDEX: 35.17 KG/M2 | SYSTOLIC BLOOD PRESSURE: 130 MMHG | WEIGHT: 205 LBS | DIASTOLIC BLOOD PRESSURE: 84 MMHG | HEART RATE: 78 BPM

## 2025-04-10 VITALS — HEART RATE: 84 BPM | SYSTOLIC BLOOD PRESSURE: 124 MMHG | DIASTOLIC BLOOD PRESSURE: 78 MMHG

## 2025-04-10 DIAGNOSIS — C79.51 CARCINOMA OF LEFT BREAST METASTATIC TO BONE: ICD-10-CM

## 2025-04-10 DIAGNOSIS — Z51.12 ENCOUNTER FOR ANTINEOPLASTIC IMMUNOTHERAPY: ICD-10-CM

## 2025-04-10 DIAGNOSIS — E55.9 VITAMIN D DEFICIENCY: ICD-10-CM

## 2025-04-10 DIAGNOSIS — N95.9 PREMENOPAUSAL PATIENT: ICD-10-CM

## 2025-04-10 DIAGNOSIS — E83.51 HYPOCALCEMIA: Primary | ICD-10-CM

## 2025-04-10 DIAGNOSIS — R79.89 ELEVATED LFTS: ICD-10-CM

## 2025-04-10 DIAGNOSIS — C79.51 MALIGNANT NEOPLASM OF BREAST METASTATIC TO BONE: Primary | ICD-10-CM

## 2025-04-10 DIAGNOSIS — C77.3 BREAST CANCER METASTASIZED TO AXILLARY LYMPH NODE, LEFT: ICD-10-CM

## 2025-04-10 DIAGNOSIS — C79.51 CARCINOMA OF LEFT BREAST METASTATIC TO BONE: Primary | ICD-10-CM

## 2025-04-10 DIAGNOSIS — C50.919 MALIGNANT NEOPLASM OF BREAST METASTATIC TO BONE: Primary | ICD-10-CM

## 2025-04-10 DIAGNOSIS — C50.912 CARCINOMA OF LEFT BREAST METASTATIC TO BONE: Primary | ICD-10-CM

## 2025-04-10 DIAGNOSIS — D64.81 ANTINEOPLASTIC CHEMOTHERAPY INDUCED ANEMIA: ICD-10-CM

## 2025-04-10 DIAGNOSIS — D69.59 CHEMOTHERAPY-INDUCED THROMBOCYTOPENIA: ICD-10-CM

## 2025-04-10 DIAGNOSIS — Z80.9 FAMILY HISTORY OF CANCER: ICD-10-CM

## 2025-04-10 DIAGNOSIS — Z09 CHEMOTHERAPY FOLLOW-UP EXAMINATION: ICD-10-CM

## 2025-04-10 DIAGNOSIS — Z95.828 PORT-A-CATH IN PLACE: ICD-10-CM

## 2025-04-10 DIAGNOSIS — C50.912 CARCINOMA OF LEFT BREAST METASTATIC TO BONE: ICD-10-CM

## 2025-04-10 DIAGNOSIS — K76.0 HEPATIC STEATOSIS: ICD-10-CM

## 2025-04-10 DIAGNOSIS — C50.912 BREAST CANCER METASTASIZED TO AXILLARY LYMPH NODE, LEFT: ICD-10-CM

## 2025-04-10 DIAGNOSIS — T45.1X5A ANTINEOPLASTIC CHEMOTHERAPY INDUCED ANEMIA: ICD-10-CM

## 2025-04-10 DIAGNOSIS — E83.51 HYPOCALCEMIA: ICD-10-CM

## 2025-04-10 DIAGNOSIS — R45.89 ANXIETY ABOUT HEALTH: ICD-10-CM

## 2025-04-10 DIAGNOSIS — T45.1X5A CHEMOTHERAPY-INDUCED THROMBOCYTOPENIA: ICD-10-CM

## 2025-04-10 PROCEDURE — 96375 TX/PRO/DX INJ NEW DRUG ADDON: CPT

## 2025-04-10 PROCEDURE — 2580000003 HC RX 258: Performed by: INTERNAL MEDICINE

## 2025-04-10 PROCEDURE — 96417 CHEMO IV INFUS EACH ADDL SEQ: CPT

## 2025-04-10 PROCEDURE — 6360000002 HC RX W HCPCS: Performed by: INTERNAL MEDICINE

## 2025-04-10 PROCEDURE — 96413 CHEMO IV INFUSION 1 HR: CPT

## 2025-04-10 RX ORDER — DEXAMETHASONE 4 MG/1
TABLET ORAL
Qty: 60 TABLET | Refills: 1 | Status: SHIPPED | OUTPATIENT
Start: 2025-04-10

## 2025-04-10 RX ORDER — DEXAMETHASONE SODIUM PHOSPHATE 10 MG/ML
10 INJECTION, SOLUTION INTRAMUSCULAR; INTRAVENOUS ONCE
Status: COMPLETED | OUTPATIENT
Start: 2025-04-10 | End: 2025-04-10

## 2025-04-10 RX ORDER — SODIUM CHLORIDE 9 MG/ML
5-250 INJECTION, SOLUTION INTRAVENOUS PRN
Status: DISCONTINUED | OUTPATIENT
Start: 2025-04-10 | End: 2025-04-11 | Stop reason: HOSPADM

## 2025-04-10 RX ORDER — BUPROPION HYDROCHLORIDE 100 MG/1
150 TABLET ORAL DAILY
COMMUNITY

## 2025-04-10 RX ORDER — PALONOSETRON 0.05 MG/ML
0.25 INJECTION, SOLUTION INTRAVENOUS ONCE
Status: COMPLETED | OUTPATIENT
Start: 2025-04-10 | End: 2025-04-10

## 2025-04-10 RX ORDER — HEPARIN 100 UNIT/ML
500 SYRINGE INTRAVENOUS PRN
Status: DISCONTINUED | OUTPATIENT
Start: 2025-04-10 | End: 2025-04-11 | Stop reason: HOSPADM

## 2025-04-10 RX ORDER — SODIUM CHLORIDE 0.9 % (FLUSH) 0.9 %
5-40 SYRINGE (ML) INJECTION PRN
Status: DISCONTINUED | OUTPATIENT
Start: 2025-04-10 | End: 2025-04-11 | Stop reason: HOSPADM

## 2025-04-10 RX ADMIN — CARBOPLATIN 180 MG: 10 INJECTION, SOLUTION INTRAVENOUS at 11:07

## 2025-04-10 RX ADMIN — DEXAMETHASONE SODIUM PHOSPHATE 10 MG: 10 INJECTION, SOLUTION INTRAMUSCULAR; INTRAVENOUS at 11:02

## 2025-04-10 RX ADMIN — PALONOSETRON 0.25 MG: 0.05 INJECTION, SOLUTION INTRAVENOUS at 11:01

## 2025-04-10 RX ADMIN — GEMCITABINE 1560 MG: 38 INJECTION, SOLUTION INTRAVENOUS at 11:48

## 2025-04-10 RX ADMIN — SODIUM CHLORIDE 200 MG: 9 INJECTION, SOLUTION INTRAVENOUS at 10:30

## 2025-04-10 RX ADMIN — SODIUM CHLORIDE 25 ML/HR: 0.9 INJECTION, SOLUTION INTRAVENOUS at 10:07

## 2025-04-10 ASSESSMENT — PAIN SCALES - GENERAL: PAINLEVEL_OUTOF10: 0

## 2025-04-10 NOTE — PROGRESS NOTES
Alea Lindsay is a 35 y.o. female    Chief Complaint   Patient presents with    Follow-up     Triple Negative Metastatic Breast Cancer       1. Have you been to the ER, urgent care clinic since your last visit?  Hospitalized since your last visit?No    2. Have you seen or consulted any other health care providers outside of the [facility] since your last visit?  Include any pap smears or colon screening. No    
Medications - Chemotherapy+IO  Toxicities include Grade 1 Fatigue, Grade 1 Anemia, and Grade 1 Thrombocytopenia.    DR Kerns AUC to 1.5 and Gemzar 20% to 800 mg/m2 with D1C2.  Added Neulatsta on pro to Day 8 of each cycle starting with D8C3 on 12/12/24.  Labs (CBC and CMP) from yesterday reviewed today.  Continue chemo; no dose adjustments today   Will continue to monitor for side effects.    7) Psychosocial  Mood good, coping well overall.   She has great family support;  supportive.   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   NN/SW support as needed.    Call if questions.  Follow up in 1 week in [facility] and 3 weeks in [facility]/office  I appreciate the opportunity to participate in Ms. Alea Lindsay's care.    Signed By: Joycelyn Whittington

## 2025-04-10 NOTE — PROGRESS NOTES
[facility] Chemotherapy Progress Note    Date: April 10, 2025    Name: Alea Lindsay    MRN: [id number]         : 1990      0830 Pt admit to [facility] for Keytruda, Carboplatin, and Gemzar ambulatory in stable condition. Assessment completed. No new concerns voiced. Port with positive blood return. Labs sent for processing.           Ms. Lindsay's vitals were reviewed.  Patient Vitals for the past 12 hrs:   Pulse BP   04/10/25 1215 84 124/78         Lab results were obtained and reviewed.      Pre-medications  were administered as ordered and chemotherapy was initiated.  Medications Administered         0.9 % sodium chloride infusion Admin Date  04/10/2025 Action  New Bag Dose  25 mL/hr Rate  25 mL/hr Route  IntraVENous Documented By  Chayo Montoya, RN        0.9 % sodium chloride infusion Admin Date  04/10/2025 Action  Rate/Dose Change Dose   Rate  200 mL/hr Route  IntraVENous Documented By  Chayo Montoya RN        0.9 % sodium chloride infusion Admin Date  04/10/2025 Action  Rate/Dose Change Dose   Rate  25 mL/hr Route  IntraVENous Documented By  Chayo Montoya RN        0.9 % sodium chloride infusion Admin Date  04/10/2025 Action  Rate/Dose Change Dose   Rate  200 mL/hr Route  IntraVENous Documented By  Chayo Montoya RN        0.9 % sodium chloride infusion Admin Date  04/10/2025 Action  Rate/Dose Change Dose   Rate  25 mL/hr Route  IntraVENous Documented By  Chayo Montoya RN        0.9 % sodium chloride infusion Admin Date  04/10/2025 Action  Rate/Dose Change Dose   Rate  20 mL/hr Route  IntraVENous Documented By  Chayo Montoya, ZEHRA        CARBOplatin (PARAPLATIN) 180 mg in sodium chloride 0.9 % 250 mL chemo IVPB Admin Date  04/10/2025 Action  New Bag Dose  180 mg Rate  586 mL/hr Route  IntraVENous Documented By  Chayo Montoya RN        CARBOplatin (PARAPLATIN) 180 mg in sodium chloride 0.9 % 250 mL chemo IVPB Admin Date  04/10/2025 Action  Rate/Dose Verify Dose   Rate  586 mL/hr

## 2025-04-16 ENCOUNTER — APPOINTMENT (OUTPATIENT)
Facility: HOSPITAL | Age: 35
End: 2025-04-16
Payer: COMMERCIAL

## 2025-04-16 ENCOUNTER — RESULTS FOLLOW-UP (OUTPATIENT)
Age: 35
End: 2025-04-16

## 2025-04-16 ENCOUNTER — HOSPITAL ENCOUNTER (OUTPATIENT)
Facility: HOSPITAL | Age: 35
Setting detail: INFUSION SERIES
Discharge: HOME | End: 2025-04-16
Payer: COMMERCIAL

## 2025-04-16 DIAGNOSIS — E83.51 HYPOCALCEMIA: ICD-10-CM

## 2025-04-16 DIAGNOSIS — C50.912 CARCINOMA OF LEFT BREAST METASTATIC TO BONE (HCC): ICD-10-CM

## 2025-04-16 DIAGNOSIS — C79.51 CARCINOMA OF LEFT BREAST METASTATIC TO BONE (HCC): ICD-10-CM

## 2025-04-16 LAB
ALBUMIN SERPL-MCNC: 3.8 G/DL (ref 3.5–5)
ALBUMIN/GLOB SERPL: 1 (ref 1.1–2.2)
ALP SERPL-CCNC: 109 U/L (ref 45–117)
ALT SERPL-CCNC: 106 U/L (ref 12–78)
ANION GAP SERPL CALC-SCNC: 5 MMOL/L (ref 2–12)
AST SERPL-CCNC: 41 U/L (ref 15–37)
BASOPHILS # BLD: 0 K/UL (ref 0–0.1)
BASOPHILS NFR BLD: 0 % (ref 0–1)
BILIRUB SERPL-MCNC: 0.5 MG/DL (ref 0.2–1)
BUN SERPL-MCNC: 12 MG/DL (ref 6–20)
BUN/CREAT SERPL: 14 (ref 12–20)
CALCIUM SERPL-MCNC: 8.9 MG/DL (ref 8.5–10.1)
CHLORIDE SERPL-SCNC: 105 MMOL/L (ref 97–108)
CO2 SERPL-SCNC: 26 MMOL/L (ref 21–32)
CREAT SERPL-MCNC: 0.86 MG/DL (ref 0.55–1.02)
DIFFERENTIAL METHOD BLD: ABNORMAL
EOSINOPHIL # BLD: 0.02 K/UL (ref 0–0.4)
EOSINOPHIL NFR BLD: 1 % (ref 0–7)
ERYTHROCYTE [DISTWIDTH] IN BLOOD BY AUTOMATED COUNT: 15.9 % (ref 11.5–14.5)
GLOBULIN SER CALC-MCNC: 3.7 G/DL (ref 2–4)
GLUCOSE SERPL-MCNC: 109 MG/DL (ref 65–100)
HCT VFR BLD AUTO: 23.8 % (ref 35–47)
HGB BLD-MCNC: 7.8 G/DL (ref 11.5–16)
IMM GRANULOCYTES # BLD AUTO: 0 K/UL
IMM GRANULOCYTES NFR BLD AUTO: 0 %
LYMPHOCYTES # BLD: 1.03 K/UL (ref 0.8–3.5)
LYMPHOCYTES NFR BLD: 54 % (ref 12–49)
MCH RBC QN AUTO: 32 PG (ref 26–34)
MCHC RBC AUTO-ENTMCNC: 32.8 G/DL (ref 30–36.5)
MCV RBC AUTO: 97.5 FL (ref 80–99)
MONOCYTES # BLD: 0.11 K/UL (ref 0–1)
MONOCYTES NFR BLD: 6 % (ref 5–13)
NEUTS SEG # BLD: 0.74 K/UL (ref 1.8–8)
NEUTS SEG NFR BLD: 39 % (ref 32–75)
NRBC # BLD: 0.03 K/UL (ref 0–0.01)
NRBC BLD-RTO: 1.6 PER 100 WBC
PLATELET # BLD AUTO: 233 K/UL (ref 150–400)
PMV BLD AUTO: 9.3 FL (ref 8.9–12.9)
POTASSIUM SERPL-SCNC: 4.2 MMOL/L (ref 3.5–5.1)
PROT SERPL-MCNC: 7.5 G/DL (ref 6.4–8.2)
RBC # BLD AUTO: 2.44 M/UL (ref 3.8–5.2)
RBC MORPH BLD: ABNORMAL
SODIUM SERPL-SCNC: 136 MMOL/L (ref 136–145)
WBC # BLD AUTO: 1.9 K/UL (ref 3.6–11)

## 2025-04-16 PROCEDURE — 80053 COMPREHEN METABOLIC PANEL: CPT

## 2025-04-16 PROCEDURE — 85025 COMPLETE CBC W/AUTO DIFF WBC: CPT

## 2025-04-16 NOTE — PROGRESS NOTES
[facility]    Date: 2025    Name: Alea Lindsay    MRN: [id number]         : 1990      0745:  Pt arrived ambulatory and in no distress, for lab visit.  Labs drawn via left AC, patient tolerated well.              Departed [facility] ambulatory and in no distress.    Future Appointments   Date Time Provider Department Center   2025  8:30 AM [facility] LAB CHAIR 1 [facility]   2025  8:00 AM [facility] SO CHAIR 7 [facility]   2025  8:00 AM [facility] SO CHAIR 7 [facility]   2025  8:15 AM Sepideh Jurado, DO [facility] BS AMB   2025  8:30 AM [facility] LAB CHAIR 3 [facility]   2025  8:30 AM RAD[facility] SO CHAIR 8 [facility]   2025  8:30 AM [facility] LAB CHAIR 1 [facility]   2025  8:30 AM [facility] SO CHAIR 8 [facility]   2025  8:00 AM [facility] LAB CHAIR 1 [facility]   2025  8:30 AM [facility] SO CHAIR 8 [facility]       Chayo Montoya RN  2025

## 2025-04-17 ENCOUNTER — APPOINTMENT (OUTPATIENT)
Facility: HOSPITAL | Age: 35
End: 2025-04-17
Payer: COMMERCIAL

## 2025-04-17 ENCOUNTER — HOSPITAL ENCOUNTER (OUTPATIENT)
Facility: HOSPITAL | Age: 35
Setting detail: INFUSION SERIES
Discharge: HOME | End: 2025-04-17

## 2025-04-17 NOTE — PROGRESS NOTES
Patient's treatment held today due to Low ANC. Patient reported no difficulties/leaking with last On-Pro.     Patient notified that this treatment will be held and she will resume treatment on her regularly scheduled appt on May 1st, per Alfredo ANDREA

## 2025-04-23 RX ORDER — SODIUM CHLORIDE 9 MG/ML
5-250 INJECTION, SOLUTION INTRAVENOUS PRN
OUTPATIENT
Start: 2025-05-08

## 2025-04-23 RX ORDER — SODIUM CHLORIDE 9 MG/ML
INJECTION, SOLUTION INTRAVENOUS CONTINUOUS
Status: CANCELLED | OUTPATIENT
Start: 2025-05-01

## 2025-04-23 RX ORDER — EPINEPHRINE 1 MG/ML
0.3 INJECTION, SOLUTION INTRAMUSCULAR; SUBCUTANEOUS PRN
Status: CANCELLED | OUTPATIENT
Start: 2025-05-01

## 2025-04-23 RX ORDER — PALONOSETRON 0.05 MG/ML
0.25 INJECTION, SOLUTION INTRAVENOUS ONCE
Status: CANCELLED | OUTPATIENT
Start: 2025-05-01 | End: 2025-05-01

## 2025-04-23 RX ORDER — SODIUM CHLORIDE 9 MG/ML
5-250 INJECTION, SOLUTION INTRAVENOUS PRN
Status: CANCELLED | OUTPATIENT
Start: 2025-05-08

## 2025-04-23 RX ORDER — ACETAMINOPHEN 325 MG/1
650 TABLET ORAL
Status: CANCELLED | OUTPATIENT
Start: 2025-05-01

## 2025-04-23 RX ORDER — SODIUM CHLORIDE 0.9 % (FLUSH) 0.9 %
5-40 SYRINGE (ML) INJECTION PRN
OUTPATIENT
Start: 2025-05-08

## 2025-04-23 RX ORDER — HEPARIN 100 UNIT/ML
500 SYRINGE INTRAVENOUS PRN
Status: CANCELLED | OUTPATIENT
Start: 2025-05-01

## 2025-04-23 RX ORDER — DIPHENHYDRAMINE HYDROCHLORIDE 50 MG/ML
50 INJECTION, SOLUTION INTRAMUSCULAR; INTRAVENOUS
Status: CANCELLED | OUTPATIENT
Start: 2025-05-01

## 2025-04-23 RX ORDER — HEPARIN 100 UNIT/ML
500 SYRINGE INTRAVENOUS PRN
OUTPATIENT
Start: 2025-05-08

## 2025-04-23 RX ORDER — SODIUM CHLORIDE 9 MG/ML
5-250 INJECTION, SOLUTION INTRAVENOUS PRN
Status: CANCELLED | OUTPATIENT
Start: 2025-05-01

## 2025-04-23 RX ORDER — ONDANSETRON 2 MG/ML
8 INJECTION INTRAMUSCULAR; INTRAVENOUS
Status: CANCELLED | OUTPATIENT
Start: 2025-05-01

## 2025-04-23 RX ORDER — HYDROCORTISONE SODIUM SUCCINATE 100 MG/2ML
100 INJECTION INTRAMUSCULAR; INTRAVENOUS
OUTPATIENT
Start: 2025-05-08

## 2025-04-23 RX ORDER — ACETAMINOPHEN 325 MG/1
650 TABLET ORAL
OUTPATIENT
Start: 2025-05-08

## 2025-04-23 RX ORDER — ALBUTEROL SULFATE 90 UG/1
4 INHALANT RESPIRATORY (INHALATION) PRN
OUTPATIENT
Start: 2025-05-08

## 2025-04-23 RX ORDER — SODIUM CHLORIDE 0.9 % (FLUSH) 0.9 %
5-40 SYRINGE (ML) INJECTION PRN
Status: CANCELLED | OUTPATIENT
Start: 2025-05-01

## 2025-04-23 RX ORDER — DEXAMETHASONE SODIUM PHOSPHATE 10 MG/ML
10 INJECTION, SOLUTION INTRAMUSCULAR; INTRAVENOUS ONCE
Status: CANCELLED | OUTPATIENT
Start: 2025-05-01 | End: 2025-05-01

## 2025-04-23 RX ORDER — HYDROCORTISONE SODIUM SUCCINATE 100 MG/2ML
100 INJECTION INTRAMUSCULAR; INTRAVENOUS
Status: CANCELLED | OUTPATIENT
Start: 2025-05-01

## 2025-04-23 RX ORDER — ONDANSETRON 2 MG/ML
8 INJECTION INTRAMUSCULAR; INTRAVENOUS
OUTPATIENT
Start: 2025-05-08

## 2025-04-23 RX ORDER — DIPHENHYDRAMINE HYDROCHLORIDE 50 MG/ML
50 INJECTION, SOLUTION INTRAMUSCULAR; INTRAVENOUS
OUTPATIENT
Start: 2025-05-08

## 2025-04-23 RX ORDER — DEXAMETHASONE SODIUM PHOSPHATE 10 MG/ML
10 INJECTION, SOLUTION INTRAMUSCULAR; INTRAVENOUS ONCE
Status: CANCELLED | OUTPATIENT
Start: 2025-05-08 | End: 2025-05-08

## 2025-04-23 RX ORDER — PALONOSETRON 0.05 MG/ML
0.25 INJECTION, SOLUTION INTRAVENOUS ONCE
Status: CANCELLED | OUTPATIENT
Start: 2025-05-08 | End: 2025-05-08

## 2025-04-23 RX ORDER — EPINEPHRINE 1 MG/ML
0.3 INJECTION, SOLUTION INTRAMUSCULAR; SUBCUTANEOUS PRN
OUTPATIENT
Start: 2025-05-08

## 2025-04-23 RX ORDER — ALBUTEROL SULFATE 90 UG/1
4 INHALANT RESPIRATORY (INHALATION) PRN
Status: CANCELLED | OUTPATIENT
Start: 2025-05-01

## 2025-04-23 RX ORDER — SODIUM CHLORIDE 9 MG/ML
INJECTION, SOLUTION INTRAVENOUS CONTINUOUS
OUTPATIENT
Start: 2025-05-08

## 2025-04-30 ENCOUNTER — APPOINTMENT (OUTPATIENT)
Facility: HOSPITAL | Age: 35
End: 2025-04-30
Payer: COMMERCIAL

## 2025-04-30 ENCOUNTER — HOSPITAL ENCOUNTER (OUTPATIENT)
Facility: HOSPITAL | Age: 35
Setting detail: INFUSION SERIES
Discharge: HOME | End: 2025-04-30
Payer: COMMERCIAL

## 2025-04-30 DIAGNOSIS — C79.51 CARCINOMA OF LEFT BREAST METASTATIC TO BONE (HCC): ICD-10-CM

## 2025-04-30 DIAGNOSIS — C50.912 CARCINOMA OF LEFT BREAST METASTATIC TO BONE (HCC): ICD-10-CM

## 2025-04-30 DIAGNOSIS — E83.51 HYPOCALCEMIA: ICD-10-CM

## 2025-04-30 LAB
ALBUMIN SERPL-MCNC: 3.7 G/DL (ref 3.5–5)
ALBUMIN/GLOB SERPL: 0.9 (ref 1.1–2.2)
ALP SERPL-CCNC: 122 U/L (ref 45–117)
ALT SERPL-CCNC: 41 U/L (ref 12–78)
ANION GAP SERPL CALC-SCNC: 5 MMOL/L (ref 2–12)
AST SERPL-CCNC: 41 U/L (ref 15–37)
BASOPHILS # BLD: 0 K/UL (ref 0–0.1)
BASOPHILS NFR BLD: 0 % (ref 0–1)
BILIRUB SERPL-MCNC: 0.6 MG/DL (ref 0.2–1)
BUN SERPL-MCNC: 8 MG/DL (ref 6–20)
BUN/CREAT SERPL: 9 (ref 12–20)
CALCIUM SERPL-MCNC: 9.2 MG/DL (ref 8.5–10.1)
CHLORIDE SERPL-SCNC: 104 MMOL/L (ref 97–108)
CO2 SERPL-SCNC: 25 MMOL/L (ref 21–32)
CREAT SERPL-MCNC: 0.87 MG/DL (ref 0.55–1.02)
DIFFERENTIAL METHOD BLD: ABNORMAL
EOSINOPHIL # BLD: 0.15 K/UL (ref 0–0.4)
EOSINOPHIL NFR BLD: 3 % (ref 0–7)
ERYTHROCYTE [DISTWIDTH] IN BLOOD BY AUTOMATED COUNT: 16.4 % (ref 11.5–14.5)
GLOBULIN SER CALC-MCNC: 4.1 G/DL (ref 2–4)
GLUCOSE SERPL-MCNC: 120 MG/DL (ref 65–100)
HCT VFR BLD AUTO: 29.5 % (ref 35–47)
HGB BLD-MCNC: 9 G/DL (ref 11.5–16)
IMM GRANULOCYTES # BLD AUTO: 0 K/UL
IMM GRANULOCYTES NFR BLD AUTO: 0 %
LYMPHOCYTES # BLD: 1.35 K/UL (ref 0.8–3.5)
LYMPHOCYTES NFR BLD: 27 % (ref 12–49)
MCH RBC QN AUTO: 30.7 PG (ref 26–34)
MCHC RBC AUTO-ENTMCNC: 30.5 G/DL (ref 30–36.5)
MCV RBC AUTO: 100.7 FL (ref 80–99)
MONOCYTES # BLD: 0.4 K/UL (ref 0–1)
MONOCYTES NFR BLD: 8 % (ref 5–13)
NEUTS SEG # BLD: 3.1 K/UL (ref 1.8–8)
NEUTS SEG NFR BLD: 62 % (ref 32–75)
NRBC # BLD: 0.06 K/UL (ref 0–0.01)
NRBC BLD-RTO: 1.2 PER 100 WBC
PLATELET # BLD AUTO: 363 K/UL (ref 150–400)
PMV BLD AUTO: 9.4 FL (ref 8.9–12.9)
POTASSIUM SERPL-SCNC: 4.5 MMOL/L (ref 3.5–5.1)
PROT SERPL-MCNC: 7.8 G/DL (ref 6.4–8.2)
RBC # BLD AUTO: 2.93 M/UL (ref 3.8–5.2)
RBC MORPH BLD: ABNORMAL
RBC MORPH BLD: ABNORMAL
SODIUM SERPL-SCNC: 134 MMOL/L (ref 136–145)
TSH SERPL DL<=0.05 MIU/L-ACNC: 2.32 UIU/ML (ref 0.36–3.74)
WBC # BLD AUTO: 5 K/UL (ref 3.6–11)

## 2025-04-30 PROCEDURE — 85025 COMPLETE CBC W/AUTO DIFF WBC: CPT

## 2025-04-30 PROCEDURE — 80053 COMPREHEN METABOLIC PANEL: CPT

## 2025-04-30 PROCEDURE — 84443 ASSAY THYROID STIM HORMONE: CPT

## 2025-04-30 NOTE — PROGRESS NOTES
[facility] Short Note                       Date: 2025    Name: Alea Lindsay    MRN: [id number]         : 1990       Pt admit to [facility] for Pre treatment lab work ambulatory in stable condition.  No new concerns voiced.  Please review pending lab results in CC.    [facility].     Future Appointments   Date Time Provider Department Center   2025  8:00 AM [facility] LAB CHAIR 1 [facility]   2025  8:15 AM Radha Christensen, APRN - NP [facility] BS AMB   2025  9:00 AM [facility] SO CHAIR 3 [facility]   2025 10:00 AM [facility] NM INJ RM 1 [facility]   2025 10:45 AM [facility] CT ER 2 [facility]   2025  1:00 PM [facility] NM RM 1 [facility]   2025  8:30 AM [facility] LAB CHAIR 3 [facility]   2025  8:30 AM [facility] SO CHAIR 8 [facility]   2025  8:30 AM [facility] LAB CHAIR 1 [facility]   2025  8:30 AM [facility] SO CHAIR 8 [facility]   2025 11:00 AM [facility] LAB CHAIR 1 [facility]   2025  8:30 AM [facility] SO CHAIR 17 [facility]       Chula Jackson RN  2025  7:52 AM

## 2025-05-01 ENCOUNTER — HOSPITAL ENCOUNTER (OUTPATIENT)
Facility: HOSPITAL | Age: 35
Setting detail: INFUSION SERIES
End: 2025-05-01

## 2025-05-01 ENCOUNTER — HOSPITAL ENCOUNTER (OUTPATIENT)
Facility: HOSPITAL | Age: 35
Setting detail: INFUSION SERIES
Discharge: HOME | End: 2025-05-01
Payer: COMMERCIAL

## 2025-05-01 ENCOUNTER — OFFICE VISIT (OUTPATIENT)
Age: 35
End: 2025-05-01

## 2025-05-01 VITALS
HEART RATE: 100 BPM | WEIGHT: 206 LBS | BODY MASS INDEX: 35.36 KG/M2 | DIASTOLIC BLOOD PRESSURE: 85 MMHG | RESPIRATION RATE: 16 BRPM | OXYGEN SATURATION: 97 % | SYSTOLIC BLOOD PRESSURE: 124 MMHG | TEMPERATURE: 97.8 F

## 2025-05-01 VITALS
HEART RATE: 100 BPM | DIASTOLIC BLOOD PRESSURE: 85 MMHG | TEMPERATURE: 97.8 F | RESPIRATION RATE: 16 BRPM | SYSTOLIC BLOOD PRESSURE: 124 MMHG | OXYGEN SATURATION: 100 % | BODY MASS INDEX: 35.34 KG/M2 | WEIGHT: 207 LBS | HEIGHT: 64 IN

## 2025-05-01 VITALS — HEART RATE: 94 BPM | DIASTOLIC BLOOD PRESSURE: 67 MMHG | SYSTOLIC BLOOD PRESSURE: 104 MMHG

## 2025-05-01 DIAGNOSIS — T45.1X5A ANEMIA DUE TO CHEMOTHERAPY: ICD-10-CM

## 2025-05-01 DIAGNOSIS — C50.912 CARCINOMA OF LEFT BREAST METASTATIC TO BONE (HCC): Primary | ICD-10-CM

## 2025-05-01 DIAGNOSIS — Z80.9 FAMILY HISTORY OF CANCER: ICD-10-CM

## 2025-05-01 DIAGNOSIS — D70.1 CHEMOTHERAPY INDUCED NEUTROPENIA: ICD-10-CM

## 2025-05-01 DIAGNOSIS — D64.81 ANEMIA DUE TO CHEMOTHERAPY: ICD-10-CM

## 2025-05-01 DIAGNOSIS — R45.89 ANXIETY ABOUT HEALTH: ICD-10-CM

## 2025-05-01 DIAGNOSIS — N95.9 PREMENOPAUSAL PATIENT: ICD-10-CM

## 2025-05-01 DIAGNOSIS — Z09 CHEMOTHERAPY FOLLOW-UP EXAMINATION: ICD-10-CM

## 2025-05-01 DIAGNOSIS — C50.919 MALIGNANT NEOPLASM OF BREAST METASTATIC TO BONE (HCC): Primary | ICD-10-CM

## 2025-05-01 DIAGNOSIS — T45.1X5A CHEMOTHERAPY-INDUCED THROMBOCYTOPENIA: ICD-10-CM

## 2025-05-01 DIAGNOSIS — Z95.828 PORT-A-CATH IN PLACE: ICD-10-CM

## 2025-05-01 DIAGNOSIS — D69.59 CHEMOTHERAPY-INDUCED THROMBOCYTOPENIA: ICD-10-CM

## 2025-05-01 DIAGNOSIS — K76.0 HEPATIC STEATOSIS: ICD-10-CM

## 2025-05-01 DIAGNOSIS — C50.912 CARCINOMA OF LEFT BREAST METASTATIC TO BONE (HCC): ICD-10-CM

## 2025-05-01 DIAGNOSIS — T45.1X5A CHEMOTHERAPY INDUCED NEUTROPENIA: ICD-10-CM

## 2025-05-01 DIAGNOSIS — C79.51 MALIGNANT NEOPLASM OF BREAST METASTATIC TO BONE (HCC): Primary | ICD-10-CM

## 2025-05-01 DIAGNOSIS — E83.51 HYPOCALCEMIA: Primary | ICD-10-CM

## 2025-05-01 DIAGNOSIS — C79.51 CARCINOMA OF LEFT BREAST METASTATIC TO BONE (HCC): Primary | ICD-10-CM

## 2025-05-01 DIAGNOSIS — E83.51 HYPOCALCEMIA: ICD-10-CM

## 2025-05-01 DIAGNOSIS — C79.51 CARCINOMA OF LEFT BREAST METASTATIC TO BONE (HCC): ICD-10-CM

## 2025-05-01 DIAGNOSIS — R53.83 CHEMOTHERAPY-INDUCED FATIGUE: ICD-10-CM

## 2025-05-01 DIAGNOSIS — T45.1X5A CHEMOTHERAPY-INDUCED FATIGUE: ICD-10-CM

## 2025-05-01 PROCEDURE — 96413 CHEMO IV INFUSION 1 HR: CPT

## 2025-05-01 PROCEDURE — 96417 CHEMO IV INFUS EACH ADDL SEQ: CPT

## 2025-05-01 PROCEDURE — 96372 THER/PROPH/DIAG INJ SC/IM: CPT

## 2025-05-01 PROCEDURE — 96375 TX/PRO/DX INJ NEW DRUG ADDON: CPT

## 2025-05-01 PROCEDURE — 6360000002 HC RX W HCPCS: Performed by: NURSE PRACTITIONER

## 2025-05-01 PROCEDURE — 6360000002 HC RX W HCPCS: Performed by: INTERNAL MEDICINE

## 2025-05-01 PROCEDURE — 2580000003 HC RX 258: Performed by: INTERNAL MEDICINE

## 2025-05-01 RX ORDER — SODIUM CHLORIDE 9 MG/ML
5-250 INJECTION, SOLUTION INTRAVENOUS PRN
Status: DISCONTINUED | OUTPATIENT
Start: 2025-05-01 | End: 2025-05-02 | Stop reason: HOSPADM

## 2025-05-01 RX ORDER — HYDROCORTISONE SODIUM SUCCINATE 100 MG/2ML
100 INJECTION INTRAMUSCULAR; INTRAVENOUS
OUTPATIENT
Start: 2025-06-08

## 2025-05-01 RX ORDER — ACETAMINOPHEN 325 MG/1
650 TABLET ORAL
OUTPATIENT
Start: 2025-06-08

## 2025-05-01 RX ORDER — ALBUTEROL SULFATE 90 UG/1
4 INHALANT RESPIRATORY (INHALATION) PRN
OUTPATIENT
Start: 2025-06-08

## 2025-05-01 RX ORDER — DIPHENHYDRAMINE HYDROCHLORIDE 50 MG/ML
50 INJECTION, SOLUTION INTRAMUSCULAR; INTRAVENOUS
OUTPATIENT
Start: 2025-06-08

## 2025-05-01 RX ORDER — ONDANSETRON 2 MG/ML
8 INJECTION INTRAMUSCULAR; INTRAVENOUS
OUTPATIENT
Start: 2025-06-08

## 2025-05-01 RX ORDER — SODIUM CHLORIDE 9 MG/ML
INJECTION, SOLUTION INTRAVENOUS CONTINUOUS
OUTPATIENT
Start: 2025-06-08

## 2025-05-01 RX ORDER — EPINEPHRINE 1 MG/ML
0.3 INJECTION, SOLUTION INTRAMUSCULAR; SUBCUTANEOUS PRN
OUTPATIENT
Start: 2025-06-08

## 2025-05-01 RX ORDER — DEXAMETHASONE SODIUM PHOSPHATE 10 MG/ML
10 INJECTION, SOLUTION INTRAMUSCULAR; INTRAVENOUS ONCE
Status: COMPLETED | OUTPATIENT
Start: 2025-05-01 | End: 2025-05-01

## 2025-05-01 RX ORDER — PALONOSETRON 0.05 MG/ML
0.25 INJECTION, SOLUTION INTRAVENOUS ONCE
Status: COMPLETED | OUTPATIENT
Start: 2025-05-01 | End: 2025-05-01

## 2025-05-01 RX ORDER — SODIUM CHLORIDE 0.9 % (FLUSH) 0.9 %
5-40 SYRINGE (ML) INJECTION PRN
Status: DISCONTINUED | OUTPATIENT
Start: 2025-05-01 | End: 2025-05-02 | Stop reason: HOSPADM

## 2025-05-01 RX ADMIN — SODIUM CHLORIDE 50 ML/HR: 9 INJECTION, SOLUTION INTRAVENOUS at 10:17

## 2025-05-01 RX ADMIN — GEMCITABINE HYDROCHLORIDE 1560 MG: 1 INJECTION, SOLUTION INTRAVENOUS at 12:05

## 2025-05-01 RX ADMIN — DEXAMETHASONE SODIUM PHOSPHATE 10 MG: 10 INJECTION, SOLUTION INTRAMUSCULAR; INTRAVENOUS at 10:56

## 2025-05-01 RX ADMIN — DENOSUMAB 120 MG: 120 INJECTION SUBCUTANEOUS at 09:50

## 2025-05-01 RX ADMIN — CARBOPLATIN 185 MG: 10 INJECTION, SOLUTION INTRAVENOUS at 11:26

## 2025-05-01 RX ADMIN — SODIUM CHLORIDE 200 MG: 9 INJECTION, SOLUTION INTRAVENOUS at 10:16

## 2025-05-01 RX ADMIN — PALONOSETRON 0.25 MG: 0.05 INJECTION, SOLUTION INTRAVENOUS at 10:51

## 2025-05-01 NOTE — PROGRESS NOTES
[facility] Chemotherapy Progress Note    Date: May 1, 2025    Name: Alea Lindsay    MRN: [id number]         : 1990      Pt admit to [facility] for Keytruda, Carboplatin, and Gemzar ambulatory in stable condition. Assessment completed and port accessed by assessment RN. Pt denies recent and upcoming invasive dental procedures.      Ms. Lindsay's vitals were reviewed.  Patient Vitals for the past 12 hrs:   Pulse BP   25 1240 94 104/67     Lab results were obtained and reviewed.    Pre-medications  were administered as ordered and chemotherapy was initiated.  Medications Administered         0.9 % sodium chloride infusion Admin Date  2025 Action  New Bag Dose  50 mL/hr Rate  50 mL/hr Route  IntraVENous Documented By  Rosalba Bacon RN        CARBOplatin (PARAPLATIN) 185 mg in sodium chloride 0.9 % 250 mL chemo IVPB Admin Date  2025 Action  New Bag Dose  185 mg Rate  587 mL/hr Route  IntraVENous Documented By  Rosalba Bacon RN        denosumab (XGEVA) SC injection 120 mg Admin Date  2025 Action  Given Dose  120 mg Rate   Route  SubCUTAneous Documented By  Rosalba Bacon RN        dexAMETHasone (PF) (DECADRON) injection 10 mg Admin Date  2025 Action  Given Dose  10 mg Rate   Route  IntraVENous Documented By  Rosalba Bacon RN        gemcitabine HCl (GEMZAR) 1,560 mg in sodium chloride 0.9 % 250 mL chemo IVPB Admin Date  2025 Action  New Bag Dose  1,560 mg Rate  632.1 mL/hr Route  IntraVENous Documented By  Rosalba Bacon RN        palonosetron (ALOXI) injection 0.25 mg Admin Date  2025 Action  Given Dose  0.25 mg Rate   Route  IntraVENous Documented By  Rosalba Bacon RN        pembrolizumab (KEYTRUDA) 200 mg in sodium chloride 0.9 % 100 mL IVPB Admin Date  2025 Action  New Bag Dose  200 mg Rate  236 mL/hr Route  IntraVENous Documented By  Rosalba Bacon RN          Two nurses verified prior to administering:Drug name, Drug doseInfusion volume or drug volume when prepared in a

## 2025-05-01 NOTE — PROGRESS NOTES
Alea Lindsay is a 35 y.o. female    Chief Complaint   Patient presents with    Follow-up     Triple Negative Metastatic Breast Cancer       1. Have you been to the ER, urgent care clinic since your last visit?  Hospitalized since your last visit?No    2. Have you seen or consulted any other health care providers outside of the [facility] since your last visit?  Include any pap smears or colon screening. No      
No  mesenteric adenopathy.  PERITONEUM: Unremarkable.  APPENDIX: Unremarkable.  BLADDER/REPRODUCTIVE ORGANS: No mass or calculus.  RETROPERITONEUM: Unremarkable. The abdominal aorta is normal in caliber. No  aneurysm. No retroperitoneal adenopathy.  OSSEOUS STRUCTURES: Numerous scattered sclerotic foci are stable consistent with  osseous metastatic disease.    Impression  Imaging findings are consistent with interval response to therapy.  Essentially resolved left breast mass with resolved scattered prominent left  breast soft tissue densities, in addition resolved left retropectoral and left  axillary francis prominence as described above.    There is stable widespread osseous metastatic disease without evidence of  fracture or dislocation.  There is no additional evidence of recurrent/metastatic disease in the chest,  abdomen or pelvis.    There is no acute intrathoracic or intra-abdominal process.  Incidental/nonemergent findings are as described above..        Electronically signed by DAYANA MADDEN    Records reviewed and summarized above.  Pathology report(s) reviewed above.  Radiology report(s) reviewed above.    Assessment:/PLAN   1) Stage 4 Triple Negative LEFT Breast Cancer post Breast / LN Biopsy 9/9/24 at [facility] and post BMB 10/2/24  Staging Bone Scan and CT 9/25/24 with bone mets  Path from [facility] biopsy breast/LN  ER 0 negative  TN 0 negative  Her2 0 negative  Ki-67 >95    BMB 10/2/24 path - Metastatic mammary carcinoma.   Myelofibrosis and osteosclerosis with trilineage hypoplasia.     PDL1+  BMB + for stage 4 cancer.    Genetic testing negative via [employer].  Discussed dx, stage and treatment of breast cancer.   Discussed focus on systemic therapy and no breast surgery now.   Discussed PDL1+ and immunotherapy.   Discussed breast mass will be measurable disease.   Patient was to do [id number] protocol but did not qualify due to elevated LFTs.   Unclear reason for elevated LFTs.   Discussed standard of care systemic

## 2025-05-06 ENCOUNTER — HOSPITAL ENCOUNTER (OUTPATIENT)
Facility: HOSPITAL | Age: 35
Discharge: HOME | End: 2025-05-09
Payer: COMMERCIAL

## 2025-05-06 ENCOUNTER — RESULTS FOLLOW-UP (OUTPATIENT)
Age: 35
End: 2025-05-06

## 2025-05-06 DIAGNOSIS — C79.51 MALIGNANT NEOPLASM OF BREAST METASTATIC TO BONE (HCC): ICD-10-CM

## 2025-05-06 DIAGNOSIS — C50.919 MALIGNANT NEOPLASM OF BREAST METASTATIC TO BONE (HCC): ICD-10-CM

## 2025-05-06 PROCEDURE — 3430000000 HC RX DIAGNOSTIC RADIOPHARMACEUTICAL: Performed by: NURSE PRACTITIONER

## 2025-05-06 PROCEDURE — 6360000004 HC RX CONTRAST MEDICATION: Performed by: RADIOLOGY

## 2025-05-06 PROCEDURE — A9503 TC99M MEDRONATE: HCPCS | Performed by: NURSE PRACTITIONER

## 2025-05-06 PROCEDURE — 78306 BONE IMAGING WHOLE BODY: CPT | Performed by: NURSE PRACTITIONER

## 2025-05-06 PROCEDURE — 74177 CT ABD & PELVIS W/CONTRAST: CPT

## 2025-05-06 RX ORDER — TC 99M MEDRONATE 20 MG/10ML
21.6 INJECTION, POWDER, LYOPHILIZED, FOR SOLUTION INTRAVENOUS
Status: COMPLETED | OUTPATIENT
Start: 2025-05-06 | End: 2025-05-06

## 2025-05-06 RX ORDER — IOPAMIDOL 755 MG/ML
100 INJECTION, SOLUTION INTRAVASCULAR
Status: COMPLETED | OUTPATIENT
Start: 2025-05-06 | End: 2025-05-06

## 2025-05-06 RX ADMIN — IOPAMIDOL 100 ML: 755 INJECTION, SOLUTION INTRAVENOUS at 11:49

## 2025-05-06 RX ADMIN — TC 99M MEDRONATE 21.6 MILLICURIE: 20 INJECTION, POWDER, LYOPHILIZED, FOR SOLUTION INTRAVENOUS at 10:15

## 2025-05-07 ENCOUNTER — APPOINTMENT (OUTPATIENT)
Facility: HOSPITAL | Age: 35
End: 2025-05-07
Payer: COMMERCIAL

## 2025-05-07 ENCOUNTER — HOSPITAL ENCOUNTER (OUTPATIENT)
Facility: HOSPITAL | Age: 35
Setting detail: INFUSION SERIES
Discharge: HOME | End: 2025-05-07
Payer: COMMERCIAL

## 2025-05-07 DIAGNOSIS — C79.51 CARCINOMA OF LEFT BREAST METASTATIC TO BONE (HCC): ICD-10-CM

## 2025-05-07 DIAGNOSIS — C50.912 CARCINOMA OF LEFT BREAST METASTATIC TO BONE (HCC): ICD-10-CM

## 2025-05-07 DIAGNOSIS — E83.51 HYPOCALCEMIA: ICD-10-CM

## 2025-05-07 LAB
ALBUMIN SERPL-MCNC: 3.7 G/DL (ref 3.5–5)
ALBUMIN/GLOB SERPL: 1.1 (ref 1.1–2.2)
ALP SERPL-CCNC: 108 U/L (ref 45–117)
ALT SERPL-CCNC: 85 U/L (ref 12–78)
ANION GAP SERPL CALC-SCNC: 2 MMOL/L (ref 2–12)
AST SERPL-CCNC: 50 U/L (ref 15–37)
BASOPHILS # BLD: 0.03 K/UL (ref 0–0.1)
BASOPHILS NFR BLD: 1 % (ref 0–1)
BILIRUB SERPL-MCNC: 0.5 MG/DL (ref 0.2–1)
BUN SERPL-MCNC: 9 MG/DL (ref 6–20)
BUN/CREAT SERPL: 11 (ref 12–20)
CALCIUM SERPL-MCNC: 9.1 MG/DL (ref 8.5–10.1)
CHLORIDE SERPL-SCNC: 104 MMOL/L (ref 97–108)
CO2 SERPL-SCNC: 27 MMOL/L (ref 21–32)
CREAT SERPL-MCNC: 0.8 MG/DL (ref 0.55–1.02)
DIFFERENTIAL METHOD BLD: ABNORMAL
EOSINOPHIL # BLD: 0.1 K/UL (ref 0–0.4)
EOSINOPHIL NFR BLD: 3 % (ref 0–7)
ERYTHROCYTE [DISTWIDTH] IN BLOOD BY AUTOMATED COUNT: 14.9 % (ref 11.5–14.5)
GLOBULIN SER CALC-MCNC: 3.5 G/DL (ref 2–4)
GLUCOSE SERPL-MCNC: 107 MG/DL (ref 65–100)
HCT VFR BLD AUTO: 27.3 % (ref 35–47)
HGB BLD-MCNC: 9 G/DL (ref 11.5–16)
IMM GRANULOCYTES # BLD AUTO: 0 K/UL
IMM GRANULOCYTES NFR BLD AUTO: 0 %
LYMPHOCYTES # BLD: 0.99 K/UL (ref 0.8–3.5)
LYMPHOCYTES NFR BLD: 29 % (ref 12–49)
MCH RBC QN AUTO: 30.7 PG (ref 26–34)
MCHC RBC AUTO-ENTMCNC: 33 G/DL (ref 30–36.5)
MCV RBC AUTO: 93.2 FL (ref 80–99)
MONOCYTES # BLD: 0.03 K/UL (ref 0–1)
MONOCYTES NFR BLD: 1 % (ref 5–13)
NEUTS SEG # BLD: 2.21 K/UL (ref 1.8–8)
NEUTS SEG NFR BLD: 65 % (ref 32–75)
NRBC # BLD: 0.04 K/UL (ref 0–0.01)
NRBC BLD-RTO: 1.2 PER 100 WBC
PLATELET # BLD AUTO: 231 K/UL (ref 150–400)
PMV BLD AUTO: 9.2 FL (ref 8.9–12.9)
POTASSIUM SERPL-SCNC: 4.1 MMOL/L (ref 3.5–5.1)
PROMYELOCYTES NFR BLD MANUAL: 1 %
PROT SERPL-MCNC: 7.2 G/DL (ref 6.4–8.2)
RBC # BLD AUTO: 2.93 M/UL (ref 3.8–5.2)
RBC MORPH BLD: ABNORMAL
RBC MORPH BLD: ABNORMAL
SODIUM SERPL-SCNC: 133 MMOL/L (ref 136–145)
WBC # BLD AUTO: 3.4 K/UL (ref 3.6–11)

## 2025-05-07 PROCEDURE — 85025 COMPLETE CBC W/AUTO DIFF WBC: CPT

## 2025-05-07 PROCEDURE — 80053 COMPREHEN METABOLIC PANEL: CPT

## 2025-05-07 NOTE — PROGRESS NOTES
[facility] Progress Note    Date: May 7, 2025    Name: Alea Lindsay    MRN: [id number]         : 1990      Pt arrived ambulatory and in no distress, for lab visit.  Labs drawn via L forearm, patient tolerated well.        Please check Epic for Results.     Departed [facility] ambulatory and in no distress.    Future Appointments   Date Time Provider Department Center   2025  8:30 AM [facility] SO CHAIR 8 [facility]   2025  8:30 AM [facility] LAB CHAIR 1 [facility]   2025  8:30 AM [facility] SO CHAIR 8 [facility]   2025  8:45 AM Sepideh Jurado, DO [facility] BS AMB   2025 11:00 AM [facility] LAB CHAIR 1 [facility]   2025  8:30 AM [facility] SO CHAIR 17 [facility]   2025  9:30 AM [facility] LAB CHAIR 3 [facility]   2025 10:45 AM [facility] SO CHAIR 9 [facility]   2025  8:00 AM [facility] LAB CHAIR 3 [facility]   2025  8:15 AM [facility] SO CHAIR 12 [facility]   7/3/2025  8:30 AM [facility] LAB CHAIR 1 [facility]   7/3/2025  8:45 AM [facility] SO CHAIR 10 [facility]   7/10/2025  8:00 AM [facility] LAB CHAIR 3 [facility]   7/10/2025  9:00 AM [facility] SO CHAIR 14 [facility]       VeraEmma Cobb  May 7, 2025

## 2025-05-08 ENCOUNTER — HOSPITAL ENCOUNTER (OUTPATIENT)
Facility: HOSPITAL | Age: 35
Setting detail: INFUSION SERIES
Discharge: HOME | End: 2025-05-08
Payer: COMMERCIAL

## 2025-05-08 VITALS
HEIGHT: 64 IN | BODY MASS INDEX: 35.34 KG/M2 | TEMPERATURE: 97.3 F | WEIGHT: 207 LBS | HEART RATE: 85 BPM | SYSTOLIC BLOOD PRESSURE: 132 MMHG | DIASTOLIC BLOOD PRESSURE: 77 MMHG | OXYGEN SATURATION: 100 % | RESPIRATION RATE: 16 BRPM

## 2025-05-08 DIAGNOSIS — C50.912 CARCINOMA OF LEFT BREAST METASTATIC TO BONE (HCC): Primary | ICD-10-CM

## 2025-05-08 DIAGNOSIS — C79.51 CARCINOMA OF LEFT BREAST METASTATIC TO BONE (HCC): Primary | ICD-10-CM

## 2025-05-08 DIAGNOSIS — E83.51 HYPOCALCEMIA: ICD-10-CM

## 2025-05-08 PROCEDURE — 6360000002 HC RX W HCPCS: Performed by: INTERNAL MEDICINE

## 2025-05-08 PROCEDURE — 96413 CHEMO IV INFUSION 1 HR: CPT

## 2025-05-08 PROCEDURE — 96417 CHEMO IV INFUS EACH ADDL SEQ: CPT

## 2025-05-08 PROCEDURE — 2580000003 HC RX 258: Performed by: INTERNAL MEDICINE

## 2025-05-08 PROCEDURE — 96375 TX/PRO/DX INJ NEW DRUG ADDON: CPT

## 2025-05-08 RX ORDER — PALONOSETRON 0.05 MG/ML
0.25 INJECTION, SOLUTION INTRAVENOUS ONCE
Status: COMPLETED | OUTPATIENT
Start: 2025-05-08 | End: 2025-05-08

## 2025-05-08 RX ORDER — DEXAMETHASONE SODIUM PHOSPHATE 10 MG/ML
10 INJECTION, SOLUTION INTRAMUSCULAR; INTRAVENOUS ONCE
Status: COMPLETED | OUTPATIENT
Start: 2025-05-08 | End: 2025-05-08

## 2025-05-08 RX ORDER — SODIUM CHLORIDE 9 MG/ML
5-250 INJECTION, SOLUTION INTRAVENOUS PRN
Status: DISCONTINUED | OUTPATIENT
Start: 2025-05-08 | End: 2025-05-09 | Stop reason: HOSPADM

## 2025-05-08 RX ADMIN — DEXAMETHASONE SODIUM PHOSPHATE 10 MG: 10 INJECTION INTRAMUSCULAR; INTRAVENOUS at 09:00

## 2025-05-08 RX ADMIN — CARBOPLATIN 200 MG: 10 INJECTION, SOLUTION INTRAVENOUS at 10:11

## 2025-05-08 RX ADMIN — GEMCITABINE HYDROCHLORIDE 1560 MG: 1 INJECTION, SOLUTION INTRAVENOUS at 10:56

## 2025-05-08 RX ADMIN — PALONOSETRON 0.25 MG: 0.05 INJECTION, SOLUTION INTRAVENOUS at 08:55

## 2025-05-08 RX ADMIN — SODIUM CHLORIDE 50 ML/HR: 0.9 INJECTION, SOLUTION INTRAVENOUS at 08:46

## 2025-05-08 ASSESSMENT — PAIN SCALES - GENERAL: PAINLEVEL_OUTOF10: 0

## 2025-05-08 NOTE — PROGRESS NOTES
[facility] Chemotherapy Progress Note  Date: May 8, 2025  Name: lAea Lindsay  MRN: [id number]       : 1990    Pt admit to [facility] for C9 D8 Carbo/Gemzar   Assessment and port access completed by Sofia RODRIGUEZ RN     Port with positive blood return.     Ms. Lindsay's vitals were reviewed.  Patient Vitals for the past 12 hrs:   Temp Pulse Resp BP SpO2   25 0835 97.3 °F (36.3 °C) 85 16 132/77 100 %       Lab results were obtained and reviewed from 25    Pre-medications  were administered as ordered and chemotherapy was initiated.  Medications Administered         0.9 % sodium chloride infusion Admin Date  2025 Action  New Bag Dose  50 mL/hr Rate  50 mL/hr Route  IntraVENous Documented By  Sofia Alba RN        CARBOplatin (PARAPLATIN) 200 mg in sodium chloride 0.9 % 250 mL chemo IVPB Admin Date  2025 Action  New Bag Dose  200 mg Rate  590 mL/hr Route  IntraVENous Documented By  Sofia Alba RN        dexAMETHasone (PF) (DECADRON) injection 10 mg Admin Date  2025 Action  Given Dose  10 mg Rate   Route  IntraVENous Documented By  Sofia Alba RN        gemcitabine HCl (GEMZAR) 1,560 mg in sodium chloride 0.9 % 250 mL chemo IVPB Admin Date  2025 Action  New Bag Dose  1,560 mg Rate  632.1 mL/hr Route  IntraVENous Documented By  Sofia Alba RN        palonosetron (ALOXI) injection 0.25 mg Admin Date  2025 Action  Given Dose  0.25 mg Rate   Route  IntraVENous Documented By  Sofia Alba RN            Prior to chemotherapy/immunotherapy administration the following were verified with a second chemotherapy/immunotherapy certified nurse: Patient name, Patient  or CSN, Drug name, Drug dose, Infusion/drug volume, Rate of administration, Route of administration, Expiration dates/times, Appearance and physical integrity of the drug(s)    Please see MAR for specific drug names and time of administration.    Patient was monitored appropriately. Tolerated infusion well

## 2025-05-14 ENCOUNTER — CLINICAL DOCUMENTATION (OUTPATIENT)
Age: 35
End: 2025-05-14

## 2025-05-14 ENCOUNTER — RESEARCH ENCOUNTER (OUTPATIENT)
Age: 35
End: 2025-05-14

## 2025-05-14 ENCOUNTER — APPOINTMENT (OUTPATIENT)
Facility: HOSPITAL | Age: 35
End: 2025-05-14
Payer: COMMERCIAL

## 2025-05-14 ENCOUNTER — INITIAL CONSULT (OUTPATIENT)
Age: 35
End: 2025-05-14

## 2025-05-14 VITALS
SYSTOLIC BLOOD PRESSURE: 140 MMHG | WEIGHT: 208 LBS | TEMPERATURE: 97.9 F | HEART RATE: 99 BPM | HEIGHT: 64 IN | RESPIRATION RATE: 18 BRPM | DIASTOLIC BLOOD PRESSURE: 85 MMHG | BODY MASS INDEX: 35.51 KG/M2 | OXYGEN SATURATION: 100 %

## 2025-05-14 DIAGNOSIS — C79.51 MALIGNANT NEOPLASM OF BREAST METASTATIC TO BONE (HCC): Primary | ICD-10-CM

## 2025-05-14 DIAGNOSIS — C50.919 MALIGNANT NEOPLASM OF BREAST METASTATIC TO BONE (HCC): Primary | ICD-10-CM

## 2025-05-14 PROCEDURE — 99215 OFFICE O/P EST HI 40 MIN: CPT | Performed by: INTERNAL MEDICINE

## 2025-05-14 RX ORDER — HYDROCORTISONE SODIUM SUCCINATE 100 MG/2ML
100 INJECTION INTRAMUSCULAR; INTRAVENOUS
Status: CANCELLED | OUTPATIENT
Start: 2025-05-29

## 2025-05-14 RX ORDER — SODIUM CHLORIDE 9 MG/ML
5-250 INJECTION, SOLUTION INTRAVENOUS PRN
Status: CANCELLED | OUTPATIENT
Start: 2025-05-22

## 2025-05-14 RX ORDER — ACETAMINOPHEN 325 MG/1
650 TABLET ORAL
Status: CANCELLED | OUTPATIENT
Start: 2025-05-22

## 2025-05-14 RX ORDER — ONDANSETRON 2 MG/ML
8 INJECTION INTRAMUSCULAR; INTRAVENOUS
Status: CANCELLED | OUTPATIENT
Start: 2025-05-22

## 2025-05-14 RX ORDER — EPINEPHRINE 1 MG/ML
0.3 INJECTION, SOLUTION INTRAMUSCULAR; SUBCUTANEOUS PRN
Status: CANCELLED | OUTPATIENT
Start: 2025-05-22

## 2025-05-14 RX ORDER — SODIUM CHLORIDE 9 MG/ML
5-250 INJECTION, SOLUTION INTRAVENOUS PRN
Status: CANCELLED | OUTPATIENT
Start: 2025-05-29

## 2025-05-14 RX ORDER — DEXAMETHASONE SODIUM PHOSPHATE 10 MG/ML
10 INJECTION, SOLUTION INTRAMUSCULAR; INTRAVENOUS ONCE
Status: CANCELLED | OUTPATIENT
Start: 2025-05-29 | End: 2025-05-29

## 2025-05-14 RX ORDER — SODIUM CHLORIDE 9 MG/ML
INJECTION, SOLUTION INTRAVENOUS CONTINUOUS
Status: CANCELLED | OUTPATIENT
Start: 2025-05-29

## 2025-05-14 RX ORDER — PALONOSETRON 0.05 MG/ML
0.25 INJECTION, SOLUTION INTRAVENOUS ONCE
Status: CANCELLED | OUTPATIENT
Start: 2025-05-22 | End: 2025-05-22

## 2025-05-14 RX ORDER — DIPHENHYDRAMINE HYDROCHLORIDE 50 MG/ML
50 INJECTION, SOLUTION INTRAMUSCULAR; INTRAVENOUS
Status: CANCELLED | OUTPATIENT
Start: 2025-05-29

## 2025-05-14 RX ORDER — LORAZEPAM 0.5 MG/1
0.5 TABLET ORAL EVERY 6 HOURS PRN
COMMUNITY

## 2025-05-14 RX ORDER — ALBUTEROL SULFATE 90 UG/1
4 INHALANT RESPIRATORY (INHALATION) PRN
Status: CANCELLED | OUTPATIENT
Start: 2025-05-22

## 2025-05-14 RX ORDER — DIPHENHYDRAMINE HYDROCHLORIDE 50 MG/ML
50 INJECTION, SOLUTION INTRAMUSCULAR; INTRAVENOUS
Status: CANCELLED | OUTPATIENT
Start: 2025-05-22

## 2025-05-14 RX ORDER — SODIUM CHLORIDE 9 MG/ML
INJECTION, SOLUTION INTRAVENOUS CONTINUOUS
Status: CANCELLED | OUTPATIENT
Start: 2025-05-22

## 2025-05-14 RX ORDER — HYDROCORTISONE SODIUM SUCCINATE 100 MG/2ML
100 INJECTION INTRAMUSCULAR; INTRAVENOUS
Status: CANCELLED | OUTPATIENT
Start: 2025-05-22

## 2025-05-14 RX ORDER — PALONOSETRON 0.05 MG/ML
0.25 INJECTION, SOLUTION INTRAVENOUS ONCE
Status: CANCELLED | OUTPATIENT
Start: 2025-05-29 | End: 2025-05-29

## 2025-05-14 RX ORDER — ONDANSETRON 2 MG/ML
8 INJECTION INTRAMUSCULAR; INTRAVENOUS
Status: CANCELLED | OUTPATIENT
Start: 2025-05-29

## 2025-05-14 RX ORDER — DEXAMETHASONE SODIUM PHOSPHATE 10 MG/ML
10 INJECTION, SOLUTION INTRAMUSCULAR; INTRAVENOUS ONCE
Status: CANCELLED | OUTPATIENT
Start: 2025-05-22 | End: 2025-05-22

## 2025-05-14 RX ORDER — SODIUM CHLORIDE 0.9 % (FLUSH) 0.9 %
5-40 SYRINGE (ML) INJECTION PRN
Status: CANCELLED | OUTPATIENT
Start: 2025-05-29

## 2025-05-14 RX ORDER — EPINEPHRINE 1 MG/ML
0.3 INJECTION, SOLUTION INTRAMUSCULAR; SUBCUTANEOUS PRN
Status: CANCELLED | OUTPATIENT
Start: 2025-05-29

## 2025-05-14 RX ORDER — HEPARIN 100 UNIT/ML
500 SYRINGE INTRAVENOUS PRN
Status: CANCELLED | OUTPATIENT
Start: 2025-05-22

## 2025-05-14 RX ORDER — SODIUM CHLORIDE 0.9 % (FLUSH) 0.9 %
5-40 SYRINGE (ML) INJECTION PRN
Status: CANCELLED | OUTPATIENT
Start: 2025-05-22

## 2025-05-14 RX ORDER — ACETAMINOPHEN 325 MG/1
650 TABLET ORAL
Status: CANCELLED | OUTPATIENT
Start: 2025-05-29

## 2025-05-14 RX ORDER — ALBUTEROL SULFATE 90 UG/1
4 INHALANT RESPIRATORY (INHALATION) PRN
Status: CANCELLED | OUTPATIENT
Start: 2025-05-29

## 2025-05-14 RX ORDER — HEPARIN 100 UNIT/ML
500 SYRINGE INTRAVENOUS PRN
Status: CANCELLED | OUTPATIENT
Start: 2025-05-29

## 2025-05-14 ASSESSMENT — PATIENT HEALTH QUESTIONNAIRE - PHQ9
SUM OF ALL RESPONSES TO PHQ QUESTIONS 1-9: 0
1. LITTLE INTEREST OR PLEASURE IN DOING THINGS: NOT AT ALL
SUM OF ALL RESPONSES TO PHQ QUESTIONS 1-9: 0
2. FEELING DOWN, DEPRESSED OR HOPELESS: NOT AT ALL

## 2025-05-14 NOTE — PROGRESS NOTES
Alea Lindsay is a 35 y.o. female is here today as a new patient consult.    
Surgical History:   Procedure Laterality Date    CT BIOPSY SUPERFICIAL BONE PERCUTANEOUS  10/2/2024    CT BIOPSY PERCUTANEOUS SUPERFICIAL BONE 10/2/2024 [facility] RAD CT    PORT SURGERY Right 10/8/2024    PORT PLACEMENT performed by Juliana Navarro MD at [facility] AMBULATORY OR    US BIOPSY LYMPH NODE  2024    RODDY US BIOPSY LYMPH NODE    US BREAST BIOPSY W LOC DEVICE EACH ADDL LESION LEFT Left 2024    US BREAST BIOPSY NEEDLE ADDITIONAL LEFT      Social History     Tobacco Use    Smoking status: Former     Current packs/day: 0.00     Average packs/day: 0.3 packs/day for 10.0 years (2.5 ttl pk-yrs)     Types: Cigarettes     Quit date: 2024     Years since quittin.6    Smokeless tobacco: Never    Tobacco comments:     QUIT SMOKING CIGARETTES    Substance Use Topics    Alcohol use: Yes     Alcohol/week: 2.0 standard drinks of alcohol     Types: 2 Glasses of wine per week      Family History   Problem Relation Age of Onset    Lung Cancer Mother     Cancer Mother         Lung Cancer -      Cancer Father         Blood Cancer -      Colon Cancer Paternal Grandfather     Anesth Problems Neg Hx      Current Outpatient Medications   Medication Sig    LORazepam (ATIVAN) 0.5 MG tablet Take 1 tablet by mouth every 6 hours as needed for Anxiety. Max Daily Amount: 2 mg    buPROPion (WELLBUTRIN) 100 MG tablet Take 1.5 tablets by mouth daily    dexAMETHasone (DECADRON) 4 MG tablet Take 2 tabs (8 mg) by mouth on days 2 and 3 after chemotherapy    lidocaine-prilocaine (EMLA) 2.5-2.5 % cream Apply thin layer to port a cath site 30-60 minutes prior to port access    ondansetron (ZOFRAN-ODT) 4 MG disintegrating tablet Take 1-2 tablets by mouth every 8 hours as needed for Nausea or Vomiting    prochlorperazine (COMPAZINE) 5 MG tablet Take 1-2 tablets by mouth every 6 hours as needed for Nausea     No current facility-administered medications for this visit.      No Known Allergies     Review of Systems: A

## 2025-05-14 NOTE — PROGRESS NOTES
Pt has been screened for all eligibility/ineligibility criteria and has been determined eligible for this protocol. Informed consent was reviewed by RN with patient prior to signing.  Patient was informed that participation is voluntary and she has the right to withdraw at anytime without prejudice.  The patient has been deemed of adequate mental and emotional capacity to give an informed consent per Dr. Murillo. Possible side effects were discussed in detail, with patient being advised to inform RN or Dr. Murillo immediately in the event of any side effects once drug is started or at any time should she have any questions.  All questions were answered adequately by RN or Dr. Murillo.   Patient signed consent today for study [id number].  The patient was accompanied by her .  A copy of ICF given to patient.  No additional questions at this time.

## 2025-05-14 NOTE — PROGRESS NOTES
[facility] Oncology Social Work   Psychosocial Assessment      Location: Medical Oncology at [facility]  Patient: Alea Lindsay (1990)    Reason for Assessment: Social Work Referral    Advance Care Planning: ACP conversation deferred at this time    Sources of Information: Patient, Family, Staff, Medical Record    Mental Status: Alert, Oriented to Person, Oriented to Place, Oriented to Time, Oriented to Situation    Relationship Status:     Living Circumstances: Family/Significant Other in Household, Children in the Home    Employment Status: Employed Full-time    Transportation Resources: Self, Family/Friends    Barriers to Learning: No Barriers Identified    Financial/Legal Concerns: Insured but concerned about affording out-of-pockets medical costs    Episcopal/Spiritual/Existential: Conversation deferred    Support System: Strong Support: The patient has a reliable, consistent, and engaged network of family, friends, or community resources that effectively meet their emotional, social, and practical needs.  Patient's Primary Support Person/Group: Jose Ramon ()    History of Mental Health / Substance Use Disorders: Unknown  Conversation deferred    Coping with Illness: Coping Well           Concerns/Barriers to Care: n/a    Narrative: Met with patient to introduce oncology social work role, share support resources, and build rapport with patient. Patient is accompanied by her , Jose Ramon.     Patient is transitioning care from Dr. Gillespie to Dr. Murillo and endorsed awareness of psychosocial supports resources.     She lives with her  and two young children in their private residence. 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    Patient is well supported by practical and emotional needs. She is actively coping with diagnosis and treatment.     Patient has the [employer] Financial Assistance program. Confirmed coverage is across the market and she does not need to reapply for [employer]

## 2025-05-15 ENCOUNTER — APPOINTMENT (OUTPATIENT)
Facility: HOSPITAL | Age: 35
End: 2025-05-15
Payer: COMMERCIAL

## 2025-05-15 DIAGNOSIS — Z79.899 ENCOUNTER FOR MONITORING CARDIOTOXIC DRUG THERAPY: Primary | ICD-10-CM

## 2025-05-15 DIAGNOSIS — Z51.81 ENCOUNTER FOR MONITORING CARDIOTOXIC DRUG THERAPY: Primary | ICD-10-CM

## 2025-05-16 NOTE — PROGRESS NOTES
NCCN Distress Thermometer    Medical Oncology at [facility]    Date Screening Completed: 5/14/225    Screening Declined:  [] Yes    Number that best describes how much distress you've experienced in the past week, including today?  0 [] - No distress 1 []      2 [x]      3 []      4 []       5 []       6 []      7 []      8 []      9 []       10 [] - Extreme distress    PROBLEM LIST  Have you had concerns about any of the items below in the past week, including today?      Physical Concerns Practical Concerns   [] Pain [] Taking care of myself    [] Sleep [] Taking care of others    [] Fatigue [] Safety   [] Tobacco use  [] Work   [] Substance use  [] School   [] Memory or concentration [] Housing/Utilities   [] Sexual health [] Finances   [] Changes in eating  [] Insurance   [] Loss or change of physical abilities  [] Transportation    []    Emotional Concerns [] Having enough food   [x] Worry or anxiety [] Access to medicine   [] Sadness or depression [] Treatment decisions   [] Loss of interest or enjoyment     [] Grief or loss  Spiritual or Roman Catholic Concerns   [] Fear [] Sense of meaning or purpose   [] Loneliness  [] Changes in mira or beliefs   [] Anger [] Death, dying, or afterlife   [] Changes in appearance [] Conflict between beliefs and cancer treatments    [] Feelings of worthlessness or being a burden [] Relationship with the sacred    [] Ritual or dietary needs    Social Concerns     [] Relationship with spouse or partner     [] Relationship with children    [] Relationship with family members     [] Relationship with friends or coworkers     [] Communication with health care team     [] Ability to have children     [] Prejudice or discrimination        Other Concerns:

## 2025-05-21 DIAGNOSIS — C77.3 BREAST CANCER METASTASIZED TO AXILLARY LYMPH NODE, LEFT (HCC): Primary | ICD-10-CM

## 2025-05-21 DIAGNOSIS — C50.912 BREAST CANCER METASTASIZED TO AXILLARY LYMPH NODE, LEFT (HCC): Primary | ICD-10-CM

## 2025-05-21 DIAGNOSIS — C79.51 MALIGNANT NEOPLASM OF BREAST METASTATIC TO BONE (HCC): ICD-10-CM

## 2025-05-21 DIAGNOSIS — C50.919 MALIGNANT NEOPLASM OF BREAST METASTATIC TO BONE (HCC): ICD-10-CM

## 2025-05-22 ENCOUNTER — HOSPITAL ENCOUNTER (OUTPATIENT)
Facility: HOSPITAL | Age: 35
Setting detail: INFUSION SERIES
End: 2025-05-22

## 2025-05-22 ENCOUNTER — APPOINTMENT (OUTPATIENT)
Facility: HOSPITAL | Age: 35
End: 2025-05-22
Payer: COMMERCIAL

## 2025-05-22 DIAGNOSIS — C50.912 CARCINOMA OF LEFT BREAST METASTATIC TO BONE (HCC): ICD-10-CM

## 2025-05-22 DIAGNOSIS — C79.51 CARCINOMA OF LEFT BREAST METASTATIC TO BONE (HCC): ICD-10-CM

## 2025-05-22 DIAGNOSIS — E83.51 HYPOCALCEMIA: Primary | ICD-10-CM

## 2025-05-23 ENCOUNTER — HOSPITAL ENCOUNTER (OUTPATIENT)
Facility: HOSPITAL | Age: 35
Discharge: HOME | End: 2025-05-25
Attending: INTERNAL MEDICINE
Payer: COMMERCIAL

## 2025-05-23 ENCOUNTER — HOSPITAL ENCOUNTER (OUTPATIENT)
Facility: HOSPITAL | Age: 35
Setting detail: INFUSION SERIES
Discharge: HOME | End: 2025-05-23
Payer: COMMERCIAL

## 2025-05-23 ENCOUNTER — APPOINTMENT (OUTPATIENT)
Facility: HOSPITAL | Age: 35
End: 2025-05-23
Payer: COMMERCIAL

## 2025-05-23 ENCOUNTER — HOSPITAL ENCOUNTER (OUTPATIENT)
Facility: HOSPITAL | Age: 35
Discharge: HOME | End: 2025-05-26
Payer: COMMERCIAL

## 2025-05-23 VITALS — DIASTOLIC BLOOD PRESSURE: 70 MMHG | SYSTOLIC BLOOD PRESSURE: 135 MMHG | HEART RATE: 98 BPM

## 2025-05-23 DIAGNOSIS — C77.3 BREAST CANCER METASTASIZED TO AXILLARY LYMPH NODE, LEFT (HCC): ICD-10-CM

## 2025-05-23 DIAGNOSIS — C50.912 BREAST CANCER METASTASIZED TO AXILLARY LYMPH NODE, LEFT (HCC): ICD-10-CM

## 2025-05-23 DIAGNOSIS — Z51.81 ENCOUNTER FOR MONITORING CARDIOTOXIC DRUG THERAPY: ICD-10-CM

## 2025-05-23 DIAGNOSIS — C50.919 MALIGNANT NEOPLASM OF BREAST METASTATIC TO BONE (HCC): ICD-10-CM

## 2025-05-23 DIAGNOSIS — Z79.899 ENCOUNTER FOR MONITORING CARDIOTOXIC DRUG THERAPY: ICD-10-CM

## 2025-05-23 DIAGNOSIS — C79.51 MALIGNANT NEOPLASM OF BREAST METASTATIC TO BONE (HCC): ICD-10-CM

## 2025-05-23 LAB
ALBUMIN SERPL-MCNC: 3.7 G/DL (ref 3.5–5)
ALBUMIN/GLOB SERPL: 1 (ref 1.1–2.2)
ALP SERPL-CCNC: 98 U/L (ref 45–117)
ALT SERPL-CCNC: 69 U/L (ref 12–78)
AMYLASE SERPL-CCNC: 35 U/L (ref 25–115)
ANION GAP SERPL CALC-SCNC: 4 MMOL/L (ref 2–12)
APPEARANCE UR: CLEAR
APTT PPP: 27.7 SEC (ref 22.1–31)
AST SERPL-CCNC: 50 U/L (ref 15–37)
BACTERIA URNS QL MICRO: NEGATIVE /HPF
BASOPHILS # BLD: 0.03 K/UL (ref 0–0.1)
BASOPHILS NFR BLD: 1.2 % (ref 0–1)
BILIRUB DIRECT SERPL-MCNC: 0.2 MG/DL (ref 0–0.2)
BILIRUB SERPL-MCNC: 0.5 MG/DL (ref 0.2–1)
BILIRUB UR QL: NEGATIVE
BUN SERPL-MCNC: 8 MG/DL (ref 6–20)
BUN/CREAT SERPL: 10 (ref 12–20)
CALCIUM SERPL-MCNC: 9.2 MG/DL (ref 8.5–10.1)
CHLORIDE SERPL-SCNC: 109 MMOL/L (ref 97–108)
CHOLEST SERPL-MCNC: 196 MG/DL
CO2 SERPL-SCNC: 25 MMOL/L (ref 21–32)
COLOR UR: ABNORMAL
CREAT SERPL-MCNC: 0.83 MG/DL (ref 0.55–1.02)
CRP SERPL-MCNC: 0.41 MG/DL (ref 0–0.3)
DIFFERENTIAL METHOD BLD: ABNORMAL
ECHO AV PEAK GRADIENT: 6 MMHG
ECHO AV PEAK VELOCITY: 1.3 M/S
ECHO AV VELOCITY RATIO: 0.85
ECHO EST RA PRESSURE: 3 MMHG
ECHO LA DIAMETER: 3.6 CM
ECHO LA VOL A-L A4C: 38 ML (ref 22–52)
ECHO LA VOL MOD A4C: 36 ML (ref 22–52)
ECHO LV E' LATERAL VELOCITY: 13.54 CM/S
ECHO LV E' SEPTAL VELOCITY: 11.09 CM/S
ECHO LV EF PHYSICIAN: 55 %
ECHO LV EJECTION FRACTION A2C: 66 %
ECHO LV EJECTION FRACTION A4C: 44 %
ECHO LV FRACTIONAL SHORTENING: 31 % (ref 28–44)
ECHO LV INTERNAL DIMENSION DIASTOLIC: 4.5 CM (ref 3.9–5.3)
ECHO LV INTERNAL DIMENSION SYSTOLIC: 3.1 CM
ECHO LV IVSD: 0.7 CM (ref 0.6–0.9)
ECHO LV MASS 2D: 104.5 G (ref 67–162)
ECHO LV POSTERIOR WALL DIASTOLIC: 0.8 CM (ref 0.6–0.9)
ECHO LV RELATIVE WALL THICKNESS RATIO: 0.36
ECHO LVOT PEAK GRADIENT: 5 MMHG
ECHO LVOT PEAK VELOCITY: 1.1 M/S
ECHO MV A VELOCITY: 0.7 M/S
ECHO MV E VELOCITY: 0.75 M/S
ECHO MV E/A RATIO: 1.07
ECHO MV E/E' LATERAL: 5.54
ECHO MV E/E' RATIO (AVERAGED): 6.15
ECHO MV E/E' SEPTAL: 6.76
ECHO PULMONARY ARTERY END DIASTOLIC PRESSURE: 9 MMHG
ECHO RIGHT VENTRICULAR SYSTOLIC PRESSURE (RVSP): 34 MMHG
ECHO RV INTERNAL DIMENSION: 4.7 CM
ECHO RV TAPSE: 2.4 CM (ref 1.7–?)
ECHO TV REGURGITANT MAX VELOCITY: 2.78 M/S
ECHO TV REGURGITANT PEAK GRADIENT: 31 MMHG
EKG ATRIAL RATE: 67 BPM
EKG ATRIAL RATE: 69 BPM
EKG ATRIAL RATE: 70 BPM
EKG DIAGNOSIS: NORMAL
EKG P AXIS: 15 DEGREES
EKG P AXIS: 32 DEGREES
EKG P AXIS: 8 DEGREES
EKG P-R INTERVAL: 124 MS
EKG P-R INTERVAL: 128 MS
EKG P-R INTERVAL: 134 MS
EKG Q-T INTERVAL: 392 MS
EKG Q-T INTERVAL: 398 MS
EKG Q-T INTERVAL: 400 MS
EKG QRS DURATION: 76 MS
EKG QRS DURATION: 78 MS
EKG QRS DURATION: 80 MS
EKG QTC CALCULATION (BAZETT): 422 MS
EKG QTC CALCULATION (BAZETT): 423 MS
EKG QTC CALCULATION (BAZETT): 426 MS
EKG R AXIS: 10 DEGREES
EKG R AXIS: 11 DEGREES
EKG R AXIS: 9 DEGREES
EKG T AXIS: 3 DEGREES
EKG T AXIS: 7 DEGREES
EKG T AXIS: 8 DEGREES
EKG VENTRICULAR RATE: 67 BPM
EKG VENTRICULAR RATE: 69 BPM
EKG VENTRICULAR RATE: 70 BPM
EOSINOPHIL # BLD: 0.09 K/UL (ref 0–0.4)
EOSINOPHIL NFR BLD: 3.6 % (ref 0–7)
EPITH CASTS URNS QL MICRO: ABNORMAL /LPF
ERYTHROCYTE [DISTWIDTH] IN BLOOD BY AUTOMATED COUNT: 17.2 % (ref 11.5–14.5)
EST. AVERAGE GLUCOSE BLD GHB EST-MCNC: 91 MG/DL
GGT SERPL-CCNC: 70 U/L (ref 5–55)
GLOBULIN SER CALC-MCNC: 3.6 G/DL (ref 2–4)
GLUCOSE SERPL-MCNC: 107 MG/DL (ref 65–100)
GLUCOSE UR STRIP.AUTO-MCNC: NEGATIVE MG/DL
HBA1C MFR BLD: 4.8 % (ref 4–5.6)
HCT VFR BLD AUTO: 29.7 % (ref 35–47)
HDLC SERPL-MCNC: 43 MG/DL
HDLC SERPL: 4.6 (ref 0–5)
HGB BLD-MCNC: 9 G/DL (ref 11.5–16)
HGB UR QL STRIP: NEGATIVE
HYALINE CASTS URNS QL MICRO: ABNORMAL /LPF (ref 0–5)
IMM GRANULOCYTES # BLD AUTO: 0.01 K/UL (ref 0–0.04)
IMM GRANULOCYTES NFR BLD AUTO: 0.4 % (ref 0–0.5)
INR PPP: 1 (ref 0.9–1.1)
KETONES UR QL STRIP.AUTO: NEGATIVE MG/DL
LDH SERPL L TO P-CCNC: 256 U/L (ref 81–246)
LDLC SERPL CALC-MCNC: 112.2 MG/DL (ref 0–100)
LEUKOCYTE ESTERASE UR QL STRIP.AUTO: ABNORMAL
LIPASE SERPL-CCNC: 23 U/L (ref 13–75)
LYMPHOCYTES # BLD: 0.96 K/UL (ref 0.8–3.5)
LYMPHOCYTES NFR BLD: 38.9 % (ref 12–49)
MCH RBC QN AUTO: 30.7 PG (ref 26–34)
MCHC RBC AUTO-ENTMCNC: 30.3 G/DL (ref 30–36.5)
MCV RBC AUTO: 101.4 FL (ref 80–99)
MONOCYTES # BLD: 0.36 K/UL (ref 0–1)
MONOCYTES NFR BLD: 14.6 % (ref 5–13)
NEUTS SEG # BLD: 1.02 K/UL (ref 1.8–8)
NEUTS SEG NFR BLD: 41.3 % (ref 32–75)
NITRITE UR QL STRIP.AUTO: NEGATIVE
NRBC # BLD: 0.02 K/UL (ref 0–0.01)
NRBC BLD-RTO: 0.8 PER 100 WBC
PH UR STRIP: 7 (ref 5–8)
PLATELET # BLD AUTO: 260 K/UL (ref 150–400)
PMV BLD AUTO: 9.9 FL (ref 8.9–12.9)
POTASSIUM SERPL-SCNC: 4.1 MMOL/L (ref 3.5–5.1)
PROT SERPL-MCNC: 7.3 G/DL (ref 6.4–8.2)
PROT UR STRIP-MCNC: NEGATIVE MG/DL
PROTHROMBIN TIME: 10.9 SEC (ref 9.2–11.2)
RBC # BLD AUTO: 2.93 M/UL (ref 3.8–5.2)
RBC #/AREA URNS HPF: ABNORMAL /HPF (ref 0–5)
SODIUM SERPL-SCNC: 138 MMOL/L (ref 136–145)
SP GR UR REFRACTOMETRY: 1.01 (ref 1–1.03)
T4 FREE SERPL-MCNC: 1.1 NG/DL (ref 0.8–1.5)
THERAPEUTIC RANGE: NORMAL SECS (ref 58–77)
TRIGL SERPL-MCNC: 204 MG/DL
TROPONIN I SERPL HS-MCNC: <4 NG/L (ref 0–51)
TSH SERPL DL<=0.05 MIU/L-ACNC: 1.68 UIU/ML (ref 0.36–3.74)
URINE CULTURE IF INDICATED: ABNORMAL
UROBILINOGEN UR QL STRIP.AUTO: 0.2 EU/DL (ref 0.2–1)
VLDLC SERPL CALC-MCNC: 40.8 MG/DL
WBC # BLD AUTO: 2.5 K/UL (ref 3.6–11)
WBC URNS QL MICRO: ABNORMAL /HPF (ref 0–4)

## 2025-05-23 PROCEDURE — 85610 PROTHROMBIN TIME: CPT

## 2025-05-23 PROCEDURE — 84439 ASSAY OF FREE THYROXINE: CPT

## 2025-05-23 PROCEDURE — 83690 ASSAY OF LIPASE: CPT

## 2025-05-23 PROCEDURE — 93005 ELECTROCARDIOGRAM TRACING: CPT

## 2025-05-23 PROCEDURE — 84484 ASSAY OF TROPONIN QUANT: CPT

## 2025-05-23 PROCEDURE — 85730 THROMBOPLASTIN TIME PARTIAL: CPT

## 2025-05-23 PROCEDURE — 83615 LACTATE (LD) (LDH) ENZYME: CPT

## 2025-05-23 PROCEDURE — 82248 BILIRUBIN DIRECT: CPT

## 2025-05-23 PROCEDURE — 83036 HEMOGLOBIN GLYCOSYLATED A1C: CPT

## 2025-05-23 PROCEDURE — 81001 URINALYSIS AUTO W/SCOPE: CPT

## 2025-05-23 PROCEDURE — 86140 C-REACTIVE PROTEIN: CPT

## 2025-05-23 PROCEDURE — 84443 ASSAY THYROID STIM HORMONE: CPT

## 2025-05-23 PROCEDURE — 80061 LIPID PANEL: CPT

## 2025-05-23 PROCEDURE — 82150 ASSAY OF AMYLASE: CPT

## 2025-05-23 PROCEDURE — 93306 TTE W/DOPPLER COMPLETE: CPT

## 2025-05-23 PROCEDURE — 36591 DRAW BLOOD OFF VENOUS DEVICE: CPT

## 2025-05-23 PROCEDURE — 80053 COMPREHEN METABOLIC PANEL: CPT

## 2025-05-23 PROCEDURE — 82977 ASSAY OF GGT: CPT

## 2025-05-23 PROCEDURE — 85025 COMPLETE CBC W/AUTO DIFF WBC: CPT

## 2025-05-23 NOTE — PROGRESS NOTES
[facility] Progress Note    Date: May 23, 2025    Name: Alea Lindsay    MRN: [id number]         : 1990      :  Pt arrived ambulatory and in no distress, for lab visit.  Labs drawn via Port, patient tolerated well.        Vitals:    25 0815   BP: 135/70   Pulse: 98       Lab results were obtained and reviewed.  Recent Results (from the past 12 hours)   TSH    Collection Time: 25  8:29 AM   Result Value Ref Range    TSH, 3rd Generation 1.68 0.36 - 3.74 uIU/mL   T4, Free    Collection Time: 25  8:29 AM   Result Value Ref Range    T4 Free 1.1 0.8 - 1.5 NG/DL   Urinalysis with Reflex to Culture    Collection Time: 25  8:29 AM    Specimen: Urine   Result Value Ref Range    Color, UA YELLOW/STRAW      Appearance CLEAR CLEAR      Specific Gravity, UA 1.015 1.003 - 1.030      pH, Urine 7.0 5.0 - 8.0      Protein, UA Negative NEG mg/dL    Glucose, Ur Negative NEG mg/dL    Ketones, Urine Negative NEG mg/dL    Bilirubin, Urine Negative NEG      Blood, Urine Negative NEG      Urobilinogen, Urine 0.2 0.2 - 1.0 EU/dL    Nitrite, Urine Negative NEG      Leukocyte Esterase, Urine TRACE (A) NEG      WBC, UA PENDING /hpf    RBC, UA PENDING /hpf    Epithelial Cells, UA PENDING /lpf    BACTERIA, URINE PENDING /hpf    Urine Culture if Indicated PENDING    APTT    Collection Time: 25  8:29 AM   Result Value Ref Range    APTT 27.7 22.1 - 31.0 sec    Therapeutic Range   58.0 - 77.0 SECS   Protime-INR    Collection Time: 25  8:29 AM   Result Value Ref Range    INR 1.0 0.9 - 1.1      Protime 10.9 9.2 - 11.2 sec   Troponin    Collection Time: 25  8:29 AM   Result Value Ref Range    Troponin, High Sensitivity <4 0 - 51 ng/L   CBC with Auto Differential    Collection Time: 25  8:29 AM   Result Value Ref Range    WBC 2.5 (L) 3.6 - 11.0 K/uL    RBC 2.93 (L) 3.80 - 5.20 M/uL    Hemoglobin 9.0 (L) 11.5 - 16.0 g/dL    Hematocrit 29.7 (L) 35.0 - 47.0 %    .4 (H) 80.0 - 99.0 FL    MCH 30.7

## 2025-05-28 ENCOUNTER — APPOINTMENT (OUTPATIENT)
Facility: HOSPITAL | Age: 35
End: 2025-05-28
Payer: COMMERCIAL

## 2025-05-28 DIAGNOSIS — C50.912 BREAST CANCER METASTASIZED TO AXILLARY LYMPH NODE, LEFT (HCC): Primary | ICD-10-CM

## 2025-05-28 DIAGNOSIS — C77.3 BREAST CANCER METASTASIZED TO AXILLARY LYMPH NODE, LEFT (HCC): Primary | ICD-10-CM

## 2025-05-29 ENCOUNTER — HOSPITAL ENCOUNTER (OUTPATIENT)
Facility: HOSPITAL | Age: 35
Setting detail: INFUSION SERIES
Discharge: HOME | End: 2025-05-29
Payer: COMMERCIAL

## 2025-05-29 DIAGNOSIS — C50.912 BREAST CANCER METASTASIZED TO AXILLARY LYMPH NODE, LEFT (HCC): ICD-10-CM

## 2025-05-29 DIAGNOSIS — C77.3 BREAST CANCER METASTASIZED TO AXILLARY LYMPH NODE, LEFT (HCC): ICD-10-CM

## 2025-05-29 LAB
BASOPHILS # BLD: 0.05 K/UL (ref 0–0.1)
BASOPHILS NFR BLD: 1.2 % (ref 0–1)
DIFFERENTIAL METHOD BLD: ABNORMAL
EOSINOPHIL # BLD: 0.13 K/UL (ref 0–0.4)
EOSINOPHIL NFR BLD: 3.1 % (ref 0–7)
ERYTHROCYTE [DISTWIDTH] IN BLOOD BY AUTOMATED COUNT: 16.7 % (ref 11.5–14.5)
HCT VFR BLD AUTO: 32.1 % (ref 35–47)
HGB BLD-MCNC: 9.8 G/DL (ref 11.5–16)
IMM GRANULOCYTES # BLD AUTO: 0.04 K/UL (ref 0–0.04)
IMM GRANULOCYTES NFR BLD AUTO: 0.9 % (ref 0–0.5)
LYMPHOCYTES # BLD: 1.47 K/UL (ref 0.8–3.5)
LYMPHOCYTES NFR BLD: 34.5 % (ref 12–49)
MCH RBC QN AUTO: 30.1 PG (ref 26–34)
MCHC RBC AUTO-ENTMCNC: 30.5 G/DL (ref 30–36.5)
MCV RBC AUTO: 98.5 FL (ref 80–99)
MONOCYTES # BLD: 0.55 K/UL (ref 0–1)
MONOCYTES NFR BLD: 12.9 % (ref 5–13)
NEUTS SEG # BLD: 2.02 K/UL (ref 1.8–8)
NEUTS SEG NFR BLD: 47.4 % (ref 32–75)
NRBC # BLD: 0 K/UL (ref 0–0.01)
NRBC BLD-RTO: 0 PER 100 WBC
PLATELET # BLD AUTO: 344 K/UL (ref 150–400)
PMV BLD AUTO: 9.5 FL (ref 8.9–12.9)
RBC # BLD AUTO: 3.26 M/UL (ref 3.8–5.2)
WBC # BLD AUTO: 4.3 K/UL (ref 3.6–11)

## 2025-05-29 PROCEDURE — 85025 COMPLETE CBC W/AUTO DIFF WBC: CPT

## 2025-05-29 NOTE — PROGRESS NOTES
[facility] Lab visit:     1530: Patient arrived ambulatory and in no distress.  Labs drawn from R hand.   Departed [facility] ambulatory and in no distress.     Visit Vitals:  Patient declined vitals     Labs: See CC for pending results.

## 2025-05-30 DIAGNOSIS — C50.912 BREAST CANCER METASTASIZED TO AXILLARY LYMPH NODE, LEFT (HCC): Primary | ICD-10-CM

## 2025-05-30 DIAGNOSIS — C79.51 MALIGNANT NEOPLASM OF BREAST METASTATIC TO BONE (HCC): ICD-10-CM

## 2025-05-30 DIAGNOSIS — C50.919 MALIGNANT NEOPLASM OF BREAST METASTATIC TO BONE (HCC): ICD-10-CM

## 2025-05-30 DIAGNOSIS — C77.3 BREAST CANCER METASTASIZED TO AXILLARY LYMPH NODE, LEFT (HCC): Primary | ICD-10-CM

## 2025-06-02 ENCOUNTER — HOSPITAL ENCOUNTER (OUTPATIENT)
Facility: HOSPITAL | Age: 35
Setting detail: INFUSION SERIES
Discharge: HOME | End: 2025-06-02
Payer: COMMERCIAL

## 2025-06-02 DIAGNOSIS — C50.919 MALIGNANT NEOPLASM OF BREAST METASTATIC TO BONE (HCC): ICD-10-CM

## 2025-06-02 DIAGNOSIS — C79.51 MALIGNANT NEOPLASM OF BREAST METASTATIC TO BONE (HCC): ICD-10-CM

## 2025-06-02 DIAGNOSIS — C77.3 BREAST CANCER METASTASIZED TO AXILLARY LYMPH NODE, LEFT (HCC): ICD-10-CM

## 2025-06-02 DIAGNOSIS — C50.912 BREAST CANCER METASTASIZED TO AXILLARY LYMPH NODE, LEFT (HCC): ICD-10-CM

## 2025-06-02 LAB — HCG SERPL QL: NEGATIVE

## 2025-06-02 PROCEDURE — 84703 CHORIONIC GONADOTROPIN ASSAY: CPT

## 2025-06-02 NOTE — PROGRESS NOTES
[facility] Progress Note    Date: 2025    Name: Alea Lindsay    MRN: [id number]         : 1990      0730:  Pt arrived ambulatory and in no distress for lab visit.  Lab drawn via R AC, patient tolerated well.      Departed [facility] ambulatory and in no distress.    Future Appointments   Date Time Provider Department Center   2025  9:30 AM SS CHEMO CHAIR 29 [facility] INF [facility]   2025  9:45 AM Juanito Murillo MD ONCSF BS AMB   2025  3:30 PM RADHA FASTTRACK 1 [facility]   6/10/2025  7:30 AM RADHA FASTTRACK 1 [facility]   2025  1:00 PM SS CHEMO CHAIR 2 [facility] INF [facility]   2025  1:30 PM Grace Vasquez APRN - NP ONCSF BS AMB   2025  8:00 AM RADHA FASTTRACK 3 [facility]   2025  1:00 PM SS CHEMO CHAIR 2 [facility]   2025  1:30 PM Grace Vasquez APRN - NP ONCSF BS AMB   2025  8:00 AM RADHA FASTTRACK 3 [facility]   2025  1:00 PM SS CHEMO CHAIR 27 [facility] INF [facility]   2025  1:30 PM Grace Vasquez APRN - NP ONCSF BS AMB   2025  7:30 AM RADHA FASTTRACK 1 [facility]   2025  1:00 PM SS CHEMO CHAIR 2 [facility] INF [facility]   2025  1:30 PM Grace Vasquez APRN - NP ONCSF BS AMB   7/15/2025  8:00 AM RADHA FASTTRACK 3 [facility]   2025  1:00 PM SS CHEMO CHAIR 21 [facility] INF [facility]   2025  1:30 PM Grace Vasquez APRN - NP ONCSF BS AMB       Beth Lopez RN  2025

## 2025-06-03 ENCOUNTER — HOSPITAL ENCOUNTER (OUTPATIENT)
Age: 35
Discharge: HOME | End: 2025-06-06
Payer: COMMERCIAL

## 2025-06-03 DIAGNOSIS — C50.919 CARCINOMA OF BREAST METASTATIC TO BONE, UNSPECIFIED LATERALITY (HCC): ICD-10-CM

## 2025-06-03 DIAGNOSIS — C50.912 BREAST CANCER METASTASIZED TO AXILLARY LYMPH NODE, LEFT (HCC): ICD-10-CM

## 2025-06-03 DIAGNOSIS — C77.3 BREAST CANCER METASTASIZED TO AXILLARY LYMPH NODE, LEFT (HCC): ICD-10-CM

## 2025-06-03 DIAGNOSIS — C50.919 MALIGNANT NEOPLASM OF BREAST METASTATIC TO BONE (HCC): Primary | ICD-10-CM

## 2025-06-03 DIAGNOSIS — C79.51 MALIGNANT NEOPLASM OF BREAST METASTATIC TO BONE (HCC): ICD-10-CM

## 2025-06-03 DIAGNOSIS — C50.919 MALIGNANT NEOPLASM OF BREAST METASTATIC TO BONE (HCC): ICD-10-CM

## 2025-06-03 DIAGNOSIS — C79.51 CARCINOMA OF BREAST METASTATIC TO BONE, UNSPECIFIED LATERALITY (HCC): ICD-10-CM

## 2025-06-03 DIAGNOSIS — C79.51 MALIGNANT NEOPLASM OF BREAST METASTATIC TO BONE (HCC): Primary | ICD-10-CM

## 2025-06-03 PROCEDURE — 6360000004 HC RX CONTRAST MEDICATION: Performed by: RADIOLOGY

## 2025-06-03 PROCEDURE — 74177 CT ABD & PELVIS W/CONTRAST: CPT

## 2025-06-03 RX ORDER — IOPAMIDOL 755 MG/ML
100 INJECTION, SOLUTION INTRAVASCULAR
Status: COMPLETED | OUTPATIENT
Start: 2025-06-03 | End: 2025-06-03

## 2025-06-03 RX ADMIN — IOPAMIDOL 100 ML: 755 INJECTION, SOLUTION INTRAVENOUS at 14:27

## 2025-06-04 ENCOUNTER — HOSPITAL ENCOUNTER (OUTPATIENT)
Facility: HOSPITAL | Age: 35
Setting detail: INFUSION SERIES
End: 2025-06-04
Payer: COMMERCIAL

## 2025-06-04 ENCOUNTER — APPOINTMENT (OUTPATIENT)
Facility: HOSPITAL | Age: 35
End: 2025-06-04
Payer: COMMERCIAL

## 2025-06-04 DIAGNOSIS — E83.51 HYPOCALCEMIA: Primary | ICD-10-CM

## 2025-06-04 DIAGNOSIS — C79.51 CARCINOMA OF LEFT BREAST METASTATIC TO BONE (HCC): ICD-10-CM

## 2025-06-04 DIAGNOSIS — C50.912 CARCINOMA OF LEFT BREAST METASTATIC TO BONE (HCC): ICD-10-CM

## 2025-06-04 RX ORDER — ONDANSETRON 2 MG/ML
8 INJECTION INTRAMUSCULAR; INTRAVENOUS
OUTPATIENT
Start: 2025-06-18

## 2025-06-04 RX ORDER — HYDROCORTISONE SODIUM SUCCINATE 100 MG/2ML
100 INJECTION INTRAMUSCULAR; INTRAVENOUS
OUTPATIENT
Start: 2025-06-18

## 2025-06-04 RX ORDER — MEPERIDINE HYDROCHLORIDE 50 MG/ML
12.5 INJECTION INTRAMUSCULAR; INTRAVENOUS; SUBCUTANEOUS PRN
Status: CANCELLED | OUTPATIENT
Start: 2025-06-05

## 2025-06-04 RX ORDER — DIPHENHYDRAMINE HYDROCHLORIDE 50 MG/ML
25 INJECTION, SOLUTION INTRAMUSCULAR; INTRAVENOUS ONCE
OUTPATIENT
Start: 2025-06-11 | End: 2025-06-11

## 2025-06-04 RX ORDER — ALBUTEROL SULFATE 90 UG/1
4 INHALANT RESPIRATORY (INHALATION) PRN
Status: CANCELLED | OUTPATIENT
Start: 2025-06-05

## 2025-06-04 RX ORDER — ONDANSETRON 2 MG/ML
8 INJECTION INTRAMUSCULAR; INTRAVENOUS
Status: CANCELLED | OUTPATIENT
Start: 2025-06-05

## 2025-06-04 RX ORDER — SODIUM CHLORIDE 0.9 % (FLUSH) 0.9 %
5-40 SYRINGE (ML) INJECTION PRN
Status: CANCELLED | OUTPATIENT
Start: 2025-06-05

## 2025-06-04 RX ORDER — SODIUM CHLORIDE 9 MG/ML
5-250 INJECTION, SOLUTION INTRAVENOUS PRN
Status: CANCELLED | OUTPATIENT
Start: 2025-06-05

## 2025-06-04 RX ORDER — HEPARIN SODIUM (PORCINE) LOCK FLUSH IV SOLN 100 UNIT/ML 100 UNIT/ML
500 SOLUTION INTRAVENOUS PRN
Status: CANCELLED | OUTPATIENT
Start: 2025-06-05

## 2025-06-04 RX ORDER — DIPHENHYDRAMINE HYDROCHLORIDE 50 MG/ML
50 INJECTION, SOLUTION INTRAMUSCULAR; INTRAVENOUS
OUTPATIENT
Start: 2025-06-18

## 2025-06-04 RX ORDER — SODIUM CHLORIDE 9 MG/ML
INJECTION, SOLUTION INTRAVENOUS CONTINUOUS
OUTPATIENT
Start: 2025-06-18

## 2025-06-04 RX ORDER — ONDANSETRON 2 MG/ML
8 INJECTION INTRAMUSCULAR; INTRAVENOUS
OUTPATIENT
Start: 2025-06-11

## 2025-06-04 RX ORDER — SODIUM CHLORIDE 9 MG/ML
5-250 INJECTION, SOLUTION INTRAVENOUS PRN
OUTPATIENT
Start: 2025-06-18

## 2025-06-04 RX ORDER — SODIUM CHLORIDE 0.9 % (FLUSH) 0.9 %
5-40 SYRINGE (ML) INJECTION PRN
OUTPATIENT
Start: 2025-06-18

## 2025-06-04 RX ORDER — FAMOTIDINE 10 MG/ML
20 INJECTION, SOLUTION INTRAVENOUS ONCE
Status: CANCELLED | OUTPATIENT
Start: 2025-06-05 | End: 2025-06-04

## 2025-06-04 RX ORDER — DEXAMETHASONE SODIUM PHOSPHATE 10 MG/ML
10 INJECTION, SOLUTION INTRA-ARTICULAR; INTRALESIONAL; INTRAMUSCULAR; INTRAVENOUS; SOFT TISSUE ONCE
OUTPATIENT
Start: 2025-06-11 | End: 2025-06-11

## 2025-06-04 RX ORDER — HYDROCORTISONE SODIUM SUCCINATE 100 MG/2ML
100 INJECTION INTRAMUSCULAR; INTRAVENOUS
OUTPATIENT
Start: 2025-06-11

## 2025-06-04 RX ORDER — SODIUM CHLORIDE 0.9 % (FLUSH) 0.9 %
5-40 SYRINGE (ML) INJECTION PRN
OUTPATIENT
Start: 2025-06-11

## 2025-06-04 RX ORDER — ACETAMINOPHEN 325 MG/1
650 TABLET ORAL
Status: CANCELLED | OUTPATIENT
Start: 2025-06-05

## 2025-06-04 RX ORDER — DIPHENHYDRAMINE HYDROCHLORIDE 50 MG/ML
50 INJECTION, SOLUTION INTRAMUSCULAR; INTRAVENOUS
OUTPATIENT
Start: 2025-06-11

## 2025-06-04 RX ORDER — EPINEPHRINE 1 MG/ML
0.3 INJECTION, SOLUTION, CONCENTRATE INTRAVENOUS PRN
OUTPATIENT
Start: 2025-06-18

## 2025-06-04 RX ORDER — EPINEPHRINE 1 MG/ML
0.3 INJECTION, SOLUTION, CONCENTRATE INTRAVENOUS PRN
Status: CANCELLED | OUTPATIENT
Start: 2025-06-05

## 2025-06-04 RX ORDER — HEPARIN SODIUM (PORCINE) LOCK FLUSH IV SOLN 100 UNIT/ML 100 UNIT/ML
500 SOLUTION INTRAVENOUS PRN
OUTPATIENT
Start: 2025-06-18

## 2025-06-04 RX ORDER — DIPHENHYDRAMINE HYDROCHLORIDE 50 MG/ML
25 INJECTION, SOLUTION INTRAMUSCULAR; INTRAVENOUS ONCE
OUTPATIENT
Start: 2025-06-18 | End: 2025-06-18

## 2025-06-04 RX ORDER — FAMOTIDINE 10 MG/ML
20 INJECTION, SOLUTION INTRAVENOUS
OUTPATIENT
Start: 2025-06-18

## 2025-06-04 RX ORDER — ALBUTEROL SULFATE 90 UG/1
4 INHALANT RESPIRATORY (INHALATION) PRN
OUTPATIENT
Start: 2025-06-11

## 2025-06-04 RX ORDER — SODIUM CHLORIDE 9 MG/ML
5-250 INJECTION, SOLUTION INTRAVENOUS PRN
OUTPATIENT
Start: 2025-06-11

## 2025-06-04 RX ORDER — ACETAMINOPHEN 325 MG/1
650 TABLET ORAL
OUTPATIENT
Start: 2025-06-11

## 2025-06-04 RX ORDER — DIPHENHYDRAMINE HYDROCHLORIDE 50 MG/ML
25 INJECTION, SOLUTION INTRAMUSCULAR; INTRAVENOUS ONCE
Status: CANCELLED | OUTPATIENT
Start: 2025-06-05 | End: 2025-06-04

## 2025-06-04 RX ORDER — FAMOTIDINE 10 MG/ML
20 INJECTION, SOLUTION INTRAVENOUS
Status: CANCELLED | OUTPATIENT
Start: 2025-06-05

## 2025-06-04 RX ORDER — DIPHENHYDRAMINE HYDROCHLORIDE 50 MG/ML
50 INJECTION, SOLUTION INTRAMUSCULAR; INTRAVENOUS
Status: CANCELLED | OUTPATIENT
Start: 2025-06-05

## 2025-06-04 RX ORDER — FAMOTIDINE 10 MG/ML
20 INJECTION, SOLUTION INTRAVENOUS
OUTPATIENT
Start: 2025-06-11

## 2025-06-04 RX ORDER — FAMOTIDINE 10 MG/ML
20 INJECTION, SOLUTION INTRAVENOUS ONCE
OUTPATIENT
Start: 2025-06-18 | End: 2025-06-18

## 2025-06-04 RX ORDER — HYDROCORTISONE SODIUM SUCCINATE 100 MG/2ML
100 INJECTION INTRAMUSCULAR; INTRAVENOUS
Status: CANCELLED | OUTPATIENT
Start: 2025-06-05

## 2025-06-04 RX ORDER — ALBUTEROL SULFATE 90 UG/1
4 INHALANT RESPIRATORY (INHALATION) PRN
OUTPATIENT
Start: 2025-06-18

## 2025-06-04 RX ORDER — SODIUM CHLORIDE 9 MG/ML
INJECTION, SOLUTION INTRAVENOUS CONTINUOUS
Status: CANCELLED | OUTPATIENT
Start: 2025-06-05

## 2025-06-04 RX ORDER — ACETAMINOPHEN 325 MG/1
650 TABLET ORAL
OUTPATIENT
Start: 2025-06-18

## 2025-06-04 RX ORDER — FAMOTIDINE 10 MG/ML
20 INJECTION, SOLUTION INTRAVENOUS ONCE
OUTPATIENT
Start: 2025-06-11 | End: 2025-06-11

## 2025-06-04 RX ORDER — MEPERIDINE HYDROCHLORIDE 50 MG/ML
12.5 INJECTION INTRAMUSCULAR; INTRAVENOUS; SUBCUTANEOUS PRN
OUTPATIENT
Start: 2025-06-11

## 2025-06-04 RX ORDER — EPINEPHRINE 1 MG/ML
0.3 INJECTION, SOLUTION, CONCENTRATE INTRAVENOUS PRN
OUTPATIENT
Start: 2025-06-11

## 2025-06-04 RX ORDER — MEPERIDINE HYDROCHLORIDE 50 MG/ML
12.5 INJECTION INTRAMUSCULAR; INTRAVENOUS; SUBCUTANEOUS PRN
OUTPATIENT
Start: 2025-06-18

## 2025-06-04 RX ORDER — DEXAMETHASONE SODIUM PHOSPHATE 10 MG/ML
10 INJECTION, SOLUTION INTRA-ARTICULAR; INTRALESIONAL; INTRAMUSCULAR; INTRAVENOUS; SOFT TISSUE ONCE
Status: CANCELLED | OUTPATIENT
Start: 2025-06-05 | End: 2025-06-04

## 2025-06-04 RX ORDER — SODIUM CHLORIDE 9 MG/ML
INJECTION, SOLUTION INTRAVENOUS CONTINUOUS
OUTPATIENT
Start: 2025-06-11

## 2025-06-04 RX ORDER — HEPARIN SODIUM (PORCINE) LOCK FLUSH IV SOLN 100 UNIT/ML 100 UNIT/ML
500 SOLUTION INTRAVENOUS PRN
OUTPATIENT
Start: 2025-06-11

## 2025-06-05 ENCOUNTER — OFFICE VISIT (OUTPATIENT)
Age: 35
End: 2025-06-05

## 2025-06-05 ENCOUNTER — HOSPITAL ENCOUNTER (OUTPATIENT)
Facility: HOSPITAL | Age: 35
Setting detail: INFUSION SERIES
Discharge: HOME | End: 2025-06-05
Payer: COMMERCIAL

## 2025-06-05 ENCOUNTER — APPOINTMENT (OUTPATIENT)
Facility: HOSPITAL | Age: 35
End: 2025-06-05
Payer: COMMERCIAL

## 2025-06-05 ENCOUNTER — RESEARCH ENCOUNTER (OUTPATIENT)
Age: 35
End: 2025-06-05

## 2025-06-05 VITALS
WEIGHT: 208.4 LBS | RESPIRATION RATE: 16 BRPM | DIASTOLIC BLOOD PRESSURE: 59 MMHG | OXYGEN SATURATION: 97 % | BODY MASS INDEX: 35.58 KG/M2 | TEMPERATURE: 98 F | SYSTOLIC BLOOD PRESSURE: 115 MMHG | HEART RATE: 85 BPM | HEIGHT: 64 IN

## 2025-06-05 VITALS
SYSTOLIC BLOOD PRESSURE: 115 MMHG | BODY MASS INDEX: 37.03 KG/M2 | HEART RATE: 88 BPM | DIASTOLIC BLOOD PRESSURE: 77 MMHG | TEMPERATURE: 98.1 F | WEIGHT: 209 LBS | RESPIRATION RATE: 18 BRPM | HEIGHT: 63 IN | OXYGEN SATURATION: 98 %

## 2025-06-05 DIAGNOSIS — E83.51 HYPOCALCEMIA: Primary | ICD-10-CM

## 2025-06-05 DIAGNOSIS — C79.51 CARCINOMA OF LEFT BREAST METASTATIC TO BONE (HCC): ICD-10-CM

## 2025-06-05 DIAGNOSIS — C50.912 CARCINOMA OF LEFT BREAST METASTATIC TO BONE (HCC): ICD-10-CM

## 2025-06-05 DIAGNOSIS — Z95.828 PORT-A-CATH IN PLACE: ICD-10-CM

## 2025-06-05 DIAGNOSIS — C79.51 MALIGNANT NEOPLASM OF BREAST METASTATIC TO BONE (HCC): Primary | ICD-10-CM

## 2025-06-05 DIAGNOSIS — C50.919 MALIGNANT NEOPLASM OF BREAST METASTATIC TO BONE (HCC): Primary | ICD-10-CM

## 2025-06-05 LAB
ALBUMIN SERPL-MCNC: 3.6 G/DL (ref 3.5–5)
ALBUMIN/GLOB SERPL: 1.1 (ref 1.1–2.2)
ALP SERPL-CCNC: 90 U/L (ref 45–117)
ALT SERPL-CCNC: 41 U/L (ref 12–78)
AMYLASE SERPL-CCNC: 32 U/L (ref 25–115)
ANION GAP SERPL CALC-SCNC: 3 MMOL/L (ref 2–12)
APPEARANCE UR: CLEAR
AST SERPL-CCNC: 35 U/L (ref 15–37)
BACTERIA URNS QL MICRO: ABNORMAL /HPF
BASOPHILS # BLD: 0.04 K/UL (ref 0–0.1)
BASOPHILS NFR BLD: 1 % (ref 0–1)
BILIRUB DIRECT SERPL-MCNC: 0.1 MG/DL (ref 0–0.2)
BILIRUB SERPL-MCNC: 0.4 MG/DL (ref 0.2–1)
BILIRUB UR QL: NEGATIVE
BUN SERPL-MCNC: 9 MG/DL (ref 6–20)
BUN/CREAT SERPL: 12 (ref 12–20)
CALCIUM SERPL-MCNC: 8.4 MG/DL (ref 8.5–10.1)
CHLORIDE SERPL-SCNC: 108 MMOL/L (ref 97–108)
CHOLEST SERPL-MCNC: 160 MG/DL
CO2 SERPL-SCNC: 26 MMOL/L (ref 21–32)
COLOR UR: ABNORMAL
CREAT SERPL-MCNC: 0.73 MG/DL (ref 0.55–1.02)
CRP SERPL-MCNC: 0.55 MG/DL (ref 0–0.3)
DIFFERENTIAL METHOD BLD: ABNORMAL
EOSINOPHIL # BLD: 0.23 K/UL (ref 0–0.4)
EOSINOPHIL NFR BLD: 5.9 % (ref 0–7)
EPITH CASTS URNS QL MICRO: ABNORMAL /LPF
ERYTHROCYTE [DISTWIDTH] IN BLOOD BY AUTOMATED COUNT: 16 % (ref 11.5–14.5)
GGT SERPL-CCNC: 56 U/L (ref 5–55)
GLOBULIN SER CALC-MCNC: 3.2 G/DL (ref 2–4)
GLUCOSE SERPL-MCNC: 131 MG/DL (ref 65–100)
GLUCOSE UR STRIP.AUTO-MCNC: NEGATIVE MG/DL
HCG SERPL QL: NEGATIVE
HCT VFR BLD AUTO: 32.4 % (ref 35–47)
HDLC SERPL-MCNC: 40 MG/DL
HDLC SERPL: 4 (ref 0–5)
HGB BLD-MCNC: 10.2 G/DL (ref 11.5–16)
HGB UR QL STRIP: NEGATIVE
HYALINE CASTS URNS QL MICRO: ABNORMAL /LPF (ref 0–2)
IMM GRANULOCYTES # BLD AUTO: 0.08 K/UL (ref 0–0.04)
IMM GRANULOCYTES NFR BLD AUTO: 2 % (ref 0–0.5)
KETONES UR QL STRIP.AUTO: NEGATIVE MG/DL
LDH SERPL L TO P-CCNC: 231 U/L (ref 81–246)
LDLC SERPL CALC-MCNC: 85.6 MG/DL (ref 0–100)
LEUKOCYTE ESTERASE UR QL STRIP.AUTO: NEGATIVE
LIPASE SERPL-CCNC: 20 U/L (ref 13–75)
LYMPHOCYTES # BLD: 1.14 K/UL (ref 0.8–3.5)
LYMPHOCYTES NFR BLD: 29.1 % (ref 12–49)
MAGNESIUM SERPL-MCNC: 2 MG/DL (ref 1.6–2.4)
MCH RBC QN AUTO: 30.1 PG (ref 26–34)
MCHC RBC AUTO-ENTMCNC: 31.5 G/DL (ref 30–36.5)
MCV RBC AUTO: 95.6 FL (ref 80–99)
MONOCYTES # BLD: 0.42 K/UL (ref 0–1)
MONOCYTES NFR BLD: 10.7 % (ref 5–13)
NEUTS SEG # BLD: 2.01 K/UL (ref 1.8–8)
NEUTS SEG NFR BLD: 51.3 % (ref 32–75)
NITRITE UR QL STRIP.AUTO: NEGATIVE
NRBC # BLD: 0 K/UL (ref 0–0.01)
NRBC BLD-RTO: 0 PER 100 WBC
PH UR STRIP: 8 (ref 5–8)
PLATELET # BLD AUTO: 200 K/UL (ref 150–400)
PMV BLD AUTO: 9.5 FL (ref 8.9–12.9)
POTASSIUM SERPL-SCNC: 3.8 MMOL/L (ref 3.5–5.1)
PROT SERPL-MCNC: 6.8 G/DL (ref 6.4–8.2)
PROT UR STRIP-MCNC: NEGATIVE MG/DL
RBC # BLD AUTO: 3.39 M/UL (ref 3.8–5.2)
RBC #/AREA URNS HPF: ABNORMAL /HPF (ref 0–5)
SODIUM SERPL-SCNC: 137 MMOL/L (ref 136–145)
SP GR UR REFRACTOMETRY: 1.02 (ref 1–1.03)
TRIGL SERPL-MCNC: 172 MG/DL
URINE CULTURE IF INDICATED: ABNORMAL
UROBILINOGEN UR QL STRIP.AUTO: 1 EU/DL (ref 0.2–1)
VLDLC SERPL CALC-MCNC: 34.4 MG/DL
WBC # BLD AUTO: 3.9 K/UL (ref 3.6–11)
WBC URNS QL MICRO: ABNORMAL /HPF (ref 0–4)

## 2025-06-05 PROCEDURE — 2500000003 HC RX 250 WO HCPCS: Performed by: INTERNAL MEDICINE

## 2025-06-05 PROCEDURE — 82248 BILIRUBIN DIRECT: CPT

## 2025-06-05 PROCEDURE — 83735 ASSAY OF MAGNESIUM: CPT

## 2025-06-05 PROCEDURE — 96375 TX/PRO/DX INJ NEW DRUG ADDON: CPT

## 2025-06-05 PROCEDURE — 6360000002 HC RX W HCPCS: Performed by: INTERNAL MEDICINE

## 2025-06-05 PROCEDURE — 96417 CHEMO IV INFUS EACH ADDL SEQ: CPT

## 2025-06-05 PROCEDURE — 80053 COMPREHEN METABOLIC PANEL: CPT

## 2025-06-05 PROCEDURE — 83690 ASSAY OF LIPASE: CPT

## 2025-06-05 PROCEDURE — 85025 COMPLETE CBC W/AUTO DIFF WBC: CPT

## 2025-06-05 PROCEDURE — 81001 URINALYSIS AUTO W/SCOPE: CPT

## 2025-06-05 PROCEDURE — 86140 C-REACTIVE PROTEIN: CPT

## 2025-06-05 PROCEDURE — 83615 LACTATE (LD) (LDH) ENZYME: CPT

## 2025-06-05 PROCEDURE — 96413 CHEMO IV INFUSION 1 HR: CPT

## 2025-06-05 PROCEDURE — 84703 CHORIONIC GONADOTROPIN ASSAY: CPT

## 2025-06-05 PROCEDURE — 2580000003 HC RX 258: Performed by: INTERNAL MEDICINE

## 2025-06-05 PROCEDURE — 82150 ASSAY OF AMYLASE: CPT

## 2025-06-05 PROCEDURE — 82977 ASSAY OF GGT: CPT

## 2025-06-05 PROCEDURE — 96415 CHEMO IV INFUSION ADDL HR: CPT

## 2025-06-05 PROCEDURE — 80061 LIPID PANEL: CPT

## 2025-06-05 RX ORDER — FAMOTIDINE 10 MG/ML
20 INJECTION, SOLUTION INTRAVENOUS ONCE
Status: COMPLETED | OUTPATIENT
Start: 2025-06-05 | End: 2025-06-05

## 2025-06-05 RX ORDER — FAMOTIDINE 10 MG/ML
20 INJECTION, SOLUTION INTRAVENOUS
Status: DISCONTINUED | OUTPATIENT
Start: 2025-06-05 | End: 2025-06-06 | Stop reason: HOSPADM

## 2025-06-05 RX ORDER — ACETAMINOPHEN 325 MG/1
650 TABLET ORAL
Status: DISCONTINUED | OUTPATIENT
Start: 2025-06-05 | End: 2025-06-06 | Stop reason: HOSPADM

## 2025-06-05 RX ORDER — ALBUTEROL SULFATE 90 UG/1
4 INHALANT RESPIRATORY (INHALATION) PRN
Status: DISCONTINUED | OUTPATIENT
Start: 2025-06-05 | End: 2025-06-06 | Stop reason: HOSPADM

## 2025-06-05 RX ORDER — LIDOCAINE AND PRILOCAINE 25; 25 MG/G; MG/G
CREAM TOPICAL
Qty: 30 G | Refills: 1 | Status: SHIPPED | OUTPATIENT
Start: 2025-06-05

## 2025-06-05 RX ORDER — MEPERIDINE HYDROCHLORIDE 25 MG/ML
12.5 INJECTION INTRAMUSCULAR; INTRAVENOUS; SUBCUTANEOUS PRN
Status: DISCONTINUED | OUTPATIENT
Start: 2025-06-05 | End: 2025-06-06 | Stop reason: HOSPADM

## 2025-06-05 RX ORDER — DIPHENHYDRAMINE HYDROCHLORIDE 50 MG/ML
50 INJECTION, SOLUTION INTRAMUSCULAR; INTRAVENOUS
Status: DISCONTINUED | OUTPATIENT
Start: 2025-06-05 | End: 2025-06-06 | Stop reason: HOSPADM

## 2025-06-05 RX ORDER — DEXAMETHASONE SODIUM PHOSPHATE 10 MG/ML
10 INJECTION, SOLUTION INTRA-ARTICULAR; INTRALESIONAL; INTRAMUSCULAR; INTRAVENOUS; SOFT TISSUE ONCE
Status: COMPLETED | OUTPATIENT
Start: 2025-06-05 | End: 2025-06-05

## 2025-06-05 RX ORDER — ONDANSETRON 2 MG/ML
8 INJECTION INTRAMUSCULAR; INTRAVENOUS
Status: DISCONTINUED | OUTPATIENT
Start: 2025-06-05 | End: 2025-06-06 | Stop reason: HOSPADM

## 2025-06-05 RX ORDER — DIPHENHYDRAMINE HYDROCHLORIDE 50 MG/ML
25 INJECTION, SOLUTION INTRAMUSCULAR; INTRAVENOUS ONCE
Status: COMPLETED | OUTPATIENT
Start: 2025-06-05 | End: 2025-06-05

## 2025-06-05 RX ORDER — SODIUM CHLORIDE 0.9 % (FLUSH) 0.9 %
5-40 SYRINGE (ML) INJECTION PRN
Status: DISCONTINUED | OUTPATIENT
Start: 2025-06-05 | End: 2025-06-06 | Stop reason: HOSPADM

## 2025-06-05 RX ORDER — HYDROCORTISONE SODIUM SUCCINATE 100 MG/2ML
100 INJECTION INTRAMUSCULAR; INTRAVENOUS
Status: DISCONTINUED | OUTPATIENT
Start: 2025-06-05 | End: 2025-06-06 | Stop reason: HOSPADM

## 2025-06-05 RX ORDER — SODIUM CHLORIDE 9 MG/ML
5-250 INJECTION, SOLUTION INTRAVENOUS PRN
Status: DISCONTINUED | OUTPATIENT
Start: 2025-06-05 | End: 2025-06-06 | Stop reason: HOSPADM

## 2025-06-05 RX ORDER — SODIUM CHLORIDE 9 MG/ML
INJECTION, SOLUTION INTRAVENOUS CONTINUOUS
Status: DISCONTINUED | OUTPATIENT
Start: 2025-06-05 | End: 2025-06-06 | Stop reason: HOSPADM

## 2025-06-05 RX ORDER — EPINEPHRINE 1 MG/ML
0.3 INJECTION, SOLUTION INTRAMUSCULAR; SUBCUTANEOUS PRN
Status: DISCONTINUED | OUTPATIENT
Start: 2025-06-05 | End: 2025-06-06 | Stop reason: HOSPADM

## 2025-06-05 RX ADMIN — FAMOTIDINE 20 MG: 10 INJECTION INTRAVENOUS at 12:37

## 2025-06-05 RX ADMIN — SODIUM CHLORIDE 25 ML/HR: 0.9 INJECTION, SOLUTION INTRAVENOUS at 10:18

## 2025-06-05 RX ADMIN — PACLITAXEL 186 MG: 6 INJECTION, SOLUTION INTRAVENOUS at 13:23

## 2025-06-05 RX ADMIN — Medication 1400 MG: at 10:24

## 2025-06-05 RX ADMIN — DEXAMETHASONE SODIUM PHOSPHATE 10 MG: 10 INJECTION INTRAMUSCULAR; INTRAVENOUS at 12:41

## 2025-06-05 RX ADMIN — SODIUM CHLORIDE, PRESERVATIVE FREE 30 ML: 5 INJECTION INTRAVENOUS at 14:55

## 2025-06-05 RX ADMIN — DIPHENHYDRAMINE HYDROCHLORIDE 25 MG: 50 INJECTION, SOLUTION INTRAMUSCULAR; INTRAVENOUS at 12:46

## 2025-06-05 ASSESSMENT — PAIN SCALES - GENERAL: PAINLEVEL_OUTOF10: 0

## 2025-06-05 NOTE — PROGRESS NOTES
Patient in today for labs and follow-up visit with Dr. Murillo. Today is C1D1 on study [id number].  QOLs completed via ePRO device. Patient reports the following adverse events: gr 1 sob, gr 1 neuropathy. Vital signs are stable.  Patient to receive BYM926 + paclitaxel. Next appt is 2025.    Pre-dose EK, 0959, 1000  BNT start: 1024  BNT stop: 1156  Post-dose EK, 1210, 1211    Post-dose BNT IRR observation: [id number]

## 2025-06-05 NOTE — PROGRESS NOTES
Alea Lindsay is a 35 y.o. female patient here today for f/u visit.      1. Have you been to the ER, urgent care clinic since your last visit?  Hospitalized since your last visit?No    2. Have you seen or consulted any other health care providers outside of the [facility] since your last visit?  Include any pap smears or colon screening. No    
Numerous scattered sclerotic foci are stable consistent with  osseous metastatic disease.     RECIST   Baseline measurements for research study.  TARGET LESIONS:         Lesion (description)         Location (series/slice)                Size                                              1. Left breast mass    2-32    44 x 34 mm in size     2. Left axillary adenopathy    2-18    17 x 15 mm     3.     4.     NONTARGET LESION:  Widespread osseous metastatic disease.           IMPRESSION:  Imaging findings are consistent with interval progression of metastatic disease.  Enlarged left breast mass.  Enlarged new inferior left breast mass  Increase left axillary adenopathy.     There is stable widespread osseous metastatic disease without evidence of  fracture or dislocation.  There is no additional evidence of recurrent/metastatic disease in the chest,  abdomen or pelvis.     There is no acute intrathoracic or intra-abdominal process.  Incidental/nonemergent findings are as described above..    Records reviewed and summarized above.  Pathology report(s) reviewed above.  Radiology report(s) reviewed above.      Assessment/plan:   1. Left breast IDC, triple negative, mets to bone, stage IV, cT2 cN3b pM1    Discussed that while this is treatable, it is not curable, goal of therapy is palliative    CARIS testing shows somatic BRCA2 mutation    Progression on 5/6/25 scans and 6/3/25 scans with her breast mass    Discussed 1L clinical trial:  RKA125-91, which be one of 3 arms of our choosing: XBU089 IV on days 1, 15 + paclitaxel 90 mg/m2 on days 1, 8, 15 of 28 d cycle; 2. NBK261 IV q 3 weeks + gemcitabine 1000 mg/m2 IV + carboplatin Auc 2 IV on d 1, 8 or 21 d cycle; 3. BNT q 3 weeks IV + eribulin 1.4 mg/m2 on d 1, 8 of 21 d cycle.       OQL139 is a bispecific ab targeting PD-L1/VEGF.  TRAEs  have been neutrophil count decreases, white blood cell count decreases, anemia, proteinuria, and alopecia. Immune-related AEs were

## 2025-06-05 NOTE — PROGRESS NOTES
[facility] - Chemotherapy Progress Note    [facility] for C1D1 RES VA YNT688: Cohort 2 Arm 1 (LYQ167 and PACLitaxel)  in stable condition. Assessment completed.  PAC with 1\" sheriff with positive blood return.    No new concerns voiced.  Pt reported to assessment nurse that she has occasional tingling and/or restlessness in her feet.    VS  Vitals:    06/05/25 0745 06/05/25 0750 06/05/25 1452   BP:  113/66 (!) 115/59   Pulse:  84 85   Resp:  16    Temp:  98 °F (36.7 °C)    SpO2:  97%    Weight: 94.5 kg (208 lb 6.4 oz)     Height: 1.613 m (5' 3.5\")           Pt denies pregnancy     OK to treat received from research team.  Pretreatment research tubes drawn and picked up by research team.   Research team notified when VYD565 started.  1156 POB673 completed.  Research team notified.   1206 Initial post treatment lab drawn. Tube given to research team.   1459 Second post treatment lab drawn.  Research RNMelly notified for p/u.Verified with ZEHRA Pickard that nothing additional was due today and that pt could be Dc'd home.     Medications:  Medications Administered         (WWM456-78 QNY992) investigational 1,400 mg in sodium chloride 0.9 % 250 mL IVPB Admin Date  06/05/2025 Action  New Bag Dose  1,400 mg Rate  183.3 mL/hr Route  IntraVENous Documented By  Veronica Ramírez RN        *[facility]* PACLitaxel (TAXOL) 186 mg in sodium chloride 0.9 % 250 mL chemo infusion Admin Date  06/05/2025 Action  New Bag Dose  186 mg Rate  306 mL/hr Route  IntraVENous Documented By  Veronica Ramírez RN        0.9 % sodium chloride infusion Admin Date  06/05/2025 Action  New Bag Dose  25 mL/hr Rate  25 mL/hr Route  IntraVENous Documented By  Veronica Ramírez RN        dexAMETHasone (DECADRON) injection 10 mg Admin Date  06/05/2025 Action  Given Dose  10 mg Rate   Route  IntraVENous Documented By  Veronica Ramírez RN        diphenhydrAMINE (BENADRYL) injection 25 mg Admin Date  06/05/2025

## 2025-06-06 ENCOUNTER — HOSPITAL ENCOUNTER (OUTPATIENT)
Facility: HOSPITAL | Age: 35
Setting detail: INFUSION SERIES
Discharge: HOME | End: 2025-06-06
Payer: COMMERCIAL

## 2025-06-06 VITALS
RESPIRATION RATE: 18 BRPM | OXYGEN SATURATION: 99 % | HEART RATE: 82 BPM | SYSTOLIC BLOOD PRESSURE: 129 MMHG | TEMPERATURE: 98.6 F | DIASTOLIC BLOOD PRESSURE: 75 MMHG

## 2025-06-06 DIAGNOSIS — C50.912 CARCINOMA OF LEFT BREAST METASTATIC TO BONE (HCC): Primary | ICD-10-CM

## 2025-06-06 DIAGNOSIS — E83.51 HYPOCALCEMIA: ICD-10-CM

## 2025-06-06 DIAGNOSIS — Z76.89 ENCOUNTER FOR PREVENTION OF NEUTROPENIA DUE TO CHEMOTHERAPY: ICD-10-CM

## 2025-06-06 DIAGNOSIS — C79.51 CARCINOMA OF LEFT BREAST METASTATIC TO BONE (HCC): Primary | ICD-10-CM

## 2025-06-06 DIAGNOSIS — Z76.89 ENCOUNTER FOR PREVENTION OF NEUTROPENIA DUE TO CHEMOTHERAPY: Primary | ICD-10-CM

## 2025-06-06 PROCEDURE — 6360000002 HC RX W HCPCS: Performed by: INTERNAL MEDICINE

## 2025-06-06 PROCEDURE — 96372 THER/PROPH/DIAG INJ SC/IM: CPT

## 2025-06-06 RX ORDER — FILGRASTIM-AAFI 480 UG/.8ML
480 INJECTION, SOLUTION SUBCUTANEOUS DAILY
Qty: 2.4 ML | Refills: 3 | Status: ACTIVE | OUTPATIENT
Start: 2025-06-06 | End: 2025-06-09

## 2025-06-06 RX ADMIN — FILGRASTIM-SNDZ 480 MCG: 480 INJECTION, SOLUTION INTRAVENOUS; SUBCUTANEOUS at 14:47

## 2025-06-06 NOTE — PROGRESS NOTES
[facility] Progress Note    Date: 2025    Name: Alea Lindsay    MRN: [id number]         : 1990    Ms. Lindsay arrived ambulatory and in no distress for Zarxio (Day 2 of Cycle 1 RES XFL651).      Assessment was completed and documented in flowsheets. No acute concerns at this time. Research labs drawn from port. Port flushed with normal saline and deaccessed per protocol.    Ms. Lindsay's vital signs for this visit.  Vitals:    25 1442   BP: 129/75   Pulse: 82   Resp: 18   Temp: 98.6 °F (37 °C)   SpO2: 99%          Medications given:   Medications Administered         filgrastim-sndz (ZARXIO) injection 480 mcg Admin Date  2025 Action  Given Dose  480 mcg Route  SubCUTAneous Documented By  Lionel Redding, RN          Given in the R arm.     Ms. Lindsay tolerated the injection, and had no complaints.    Ms. Lindsay was [facility] in [facility] condition and is aware of future appointments.     Future Appointments   Date Time Provider Department Center   2025 11:30 AM RADHA FASTTRACK 1 [facility]   2025 11:30 AM RAD[facility] FASTTRACK 1 [facility]   6/10/2025  7:30 AM RADHA FASTTRACK 1 [facility]   2025  1:00 PM SS CHEMO CHAIR 2 [facility] INF [facility]   2025  1:30 PM Grace Vasquez APRN - NP ONCSF BS AMB   2025  8:00 AM RADHA FASTTRACK 3 [facility]   2025  1:00 PM SS CHEMO CHAIR 2 [facility] INF [facility]   2025  1:30 PM Grace Vasquez APRN - NP ONCSF BS AMB   2025  8:00 AM RADHA FASTTRACK 3 [facility]   2025  1:00 PM SS CHEMO CHAIR 27 [facility] INF [facility]   2025  1:30 PM Grace Vasquez APRN - NP ONCSF BS AMB   2025  7:30 AM RADHA FASTTRACK 1 [facility]   2025  1:00 PM SS CHEMO CHAIR 2 [facility] IN[facility]   2025  1:30 PM Grace Vasquez APRN - NP ONCSF JOB AMB   7/15/2025  8:00 AM RADHA FASTTRACK 3 [facility]   2025  1[facility]00 [facility]   2025  1:30 PM Grace Vasquez APRN - NP ONCSF JOB ARMENDARIZ       LIONEL  ZEHRA MICHEL  June 6, 2025  3:51 PM

## 2025-06-07 ENCOUNTER — HOSPITAL ENCOUNTER (OUTPATIENT)
Facility: HOSPITAL | Age: 35
Setting detail: INFUSION SERIES
Discharge: HOME | End: 2025-06-07
Payer: COMMERCIAL

## 2025-06-07 VITALS
OXYGEN SATURATION: 97 % | HEART RATE: 89 BPM | TEMPERATURE: 97.1 F | SYSTOLIC BLOOD PRESSURE: 138 MMHG | DIASTOLIC BLOOD PRESSURE: 93 MMHG | RESPIRATION RATE: 16 BRPM

## 2025-06-07 DIAGNOSIS — E83.51 HYPOCALCEMIA: ICD-10-CM

## 2025-06-07 DIAGNOSIS — C50.912 CARCINOMA OF LEFT BREAST METASTATIC TO BONE (HCC): Primary | ICD-10-CM

## 2025-06-07 DIAGNOSIS — C79.51 CARCINOMA OF LEFT BREAST METASTATIC TO BONE (HCC): Primary | ICD-10-CM

## 2025-06-07 PROCEDURE — 96372 THER/PROPH/DIAG INJ SC/IM: CPT

## 2025-06-07 PROCEDURE — 6360000002 HC RX W HCPCS: Performed by: INTERNAL MEDICINE

## 2025-06-07 RX ADMIN — FILGRASTIM-SNDZ 480 MCG: 480 INJECTION, SOLUTION INTRAVENOUS; SUBCUTANEOUS at 11:36

## 2025-06-07 ASSESSMENT — PAIN SCALES - GENERAL: PAINLEVEL_OUTOF10: 0

## 2025-06-07 NOTE — PROGRESS NOTES
[facility] Visit Notes                 Date: 2025  Name: Alea Lindsay  MRN: [id number]       : 1990    Pt admit to [facility] for Zarxio    No acute distress noted. Patient is ambulatory and in stable condition.   Denies flu-like symptoms. No immediate needs or concerns for this visit.     Injection given in Right Arm SC. Bandaid and Gauze Applied to site.   Patient aware of upcoming appointment. Patient declined post- monitoring time. Education provided.     Ms. Lindsay's vitals were reviewed prior to treatment.   Patient Vitals for the past 12 hrs:   Temp Pulse Resp BP SpO2   25 1130 97.1 °F (36.2 °C) 89 16 (!) 138/93 97 %       Medications Administered         filgrastim-sndz (ZARXIO) injection 480 mcg Admin Date  2025 Action  Given Dose  480 mcg Route  SubCUTAneous Documented By  Angela Garvey, RN          Future Appointments   Date Time Provider Department Center   2025 11:30 AM RADHA FASTTRACK 1 [facility]   6/10/2025  7:30 AM RADHA FASTTRACK 1 [facility]   2025  1:00 PM SS CHEMO CHAIR 2 [facility] INF [facility]   2025  1:30 PM Grace Vasquez APRN - NP ONCSF BS AMB   2025  8:00 AM RADHA FASTTRACK 3 [facility]   2025  1:00 PM SS CHEMO CHAIR 2 [facility] INF [facility]   2025  1:30 PM Grace Vasquez APRN - NP ONCSF BS AMB   2025  8:00 AM RADHA FASTTRACK 3 [facility]   2025  1:00 PM SS CHEMO CHAIR 27 [facility] INF [facility]   2025  1:30 PM Grace Vasquez APRN - NP ONCSF BS AMB   2025  7:30 AM RADHA FASTTRACK 1 [facility]   2025  1:00 PM SS CHEMO CHAIR 2 [facility] INF [facility]   2025  1:30 PM Grace Vasquez APRN - NP ONCSF BS AMB   7/15/2025  8:00 AM RADHA FASTTRACK 3 [facility]   2025  1:00 PM SS CHEMO CHAIR 21 MID INF [facility]   2025  1:30 PM Grace Vasquez APRN - NP ONCSF BS AMB     Angela Garvey RN  2025

## 2025-06-08 ENCOUNTER — HOSPITAL ENCOUNTER (OUTPATIENT)
Facility: HOSPITAL | Age: 35
Setting detail: INFUSION SERIES
Discharge: HOME | End: 2025-06-08
Payer: COMMERCIAL

## 2025-06-08 VITALS — RESPIRATION RATE: 16 BRPM | OXYGEN SATURATION: 94 % | HEART RATE: 101 BPM | TEMPERATURE: 97.5 F

## 2025-06-08 DIAGNOSIS — C50.912 CARCINOMA OF LEFT BREAST METASTATIC TO BONE (HCC): Primary | ICD-10-CM

## 2025-06-08 DIAGNOSIS — C79.51 CARCINOMA OF LEFT BREAST METASTATIC TO BONE (HCC): Primary | ICD-10-CM

## 2025-06-08 DIAGNOSIS — E83.51 HYPOCALCEMIA: ICD-10-CM

## 2025-06-08 PROCEDURE — 6360000002 HC RX W HCPCS: Performed by: INTERNAL MEDICINE

## 2025-06-08 PROCEDURE — 96372 THER/PROPH/DIAG INJ SC/IM: CPT

## 2025-06-08 RX ADMIN — FILGRASTIM-SNDZ 480 MCG: 480 INJECTION, SOLUTION INTRAVENOUS; SUBCUTANEOUS at 10:25

## 2025-06-08 ASSESSMENT — PAIN DESCRIPTION - LOCATION: LOCATION: GENERALIZED

## 2025-06-08 ASSESSMENT — PAIN SCALES - GENERAL: PAINLEVEL_OUTOF10: 1

## 2025-06-08 NOTE — PROGRESS NOTES
[facility] Visit Notes                 Date: 2025  Name: Alea Lindsay  MRN: [id number]       : 1990    [facility] Zarxio    No acute distress noted. Patient is ambulatory and in stable condition.   Denies flu-like symptoms. No immediate needs or concerns for this visit.      Injection given in Left Arm SC. Band-aid applied to site.     Ms. Lindsay's vitals were reviewed prior to treatment.   Patient Vitals for the past 12 hrs:   Temp Pulse Resp SpO2   25 1024 97.5 °F (36.4 °C) (!) 101 16 94 %     Medications Administered         filgrastim-sndz (ZARXIO) injection 480 mcg Admin Date  2025 Action  Given Dose  480 mcg Route  SubCUTAneous Documented By  Angela Garvey, RN          Future Appointments   Date Time Provider Department Center   2025 11:30 AM RADHA FASTTRACK 1 [facility]   6/10/2025  7:30 AM RADHA FASTTRACK 1 [facility]   2025  1:00 PM SS CHEMO CHAIR 2 [facility] INF [facility]   2025  1:30 PM Grace Vasquez APRN - NP ONCSF BS AMB   2025  8:00 AM RADHA FASTTRACK 3 [facility]   2025  1:00 PM SS CHEMO CHAIR 2 [facility]   2025  1:30 PM Grace Vasquez APRN - NP ONCSF BS AMB   2025  8:00 AM RADHA FASTTRACK 3 [facility]   2025  1:00 PM SS CHEMO CHAIR 27 [facility] IN[facility]   2025  1:30 PM Grace Vasquez APRN - NP ONCSF BS AMB   2025  7:30 AM RADHA FASTTRACK 1 [facility]   2025  1:00 PM SS CHEMO CHAIR 2 [facility] IN[facility]   2025  1:30 PM Grace Vasquez APRN - NP ONCSF BS AMB   7/15/2025  8:00 AM RADHA FASTTRACK 3 [facility]   2025  1:00 PM SS CHEMO CHAIR 21 [facility] INF [facility]   2025  1:30 PM Grace Vasquez, BRETT - NP ONCSF BS AMB     Angela Gravey RN  2025

## 2025-06-09 RX ORDER — FILGRASTIM-SNDZ 480 UG/.8ML
480 INJECTION, SOLUTION INTRAVENOUS; SUBCUTANEOUS EVERY EVENING
Qty: 7.2 ML | Refills: 5 | Status: SHIPPED | OUTPATIENT
Start: 2025-06-09 | End: 2025-06-10

## 2025-06-09 RX ORDER — FILGRASTIM-AAFI 480 UG/.8ML
480 INJECTION, SOLUTION SUBCUTANEOUS DAILY
Qty: 9.6 ML | Refills: 3 | Status: ACTIVE | OUTPATIENT
Start: 2025-06-09 | End: 2025-06-09

## 2025-06-10 ENCOUNTER — HOSPITAL ENCOUNTER (OUTPATIENT)
Facility: HOSPITAL | Age: 35
Setting detail: INFUSION SERIES
Discharge: HOME | End: 2025-06-10
Payer: COMMERCIAL

## 2025-06-10 DIAGNOSIS — T45.1X5A CHEMOTHERAPY INDUCED NEUTROPENIA: Primary | ICD-10-CM

## 2025-06-10 DIAGNOSIS — C79.51 CARCINOMA OF LEFT BREAST METASTATIC TO BONE (HCC): Primary | ICD-10-CM

## 2025-06-10 DIAGNOSIS — E83.51 HYPOCALCEMIA: ICD-10-CM

## 2025-06-10 DIAGNOSIS — C50.912 CARCINOMA OF LEFT BREAST METASTATIC TO BONE (HCC): Primary | ICD-10-CM

## 2025-06-10 DIAGNOSIS — D70.1 CHEMOTHERAPY INDUCED NEUTROPENIA: ICD-10-CM

## 2025-06-10 DIAGNOSIS — D70.1 CHEMOTHERAPY INDUCED NEUTROPENIA: Primary | ICD-10-CM

## 2025-06-10 DIAGNOSIS — T45.1X5A CHEMOTHERAPY INDUCED NEUTROPENIA: ICD-10-CM

## 2025-06-10 LAB
ALBUMIN SERPL-MCNC: 3.3 G/DL (ref 3.5–5)
ALBUMIN/GLOB SERPL: 1.1 (ref 1.1–2.2)
ALP SERPL-CCNC: 151 U/L (ref 45–117)
ALT SERPL-CCNC: 48 U/L (ref 12–78)
AMYLASE SERPL-CCNC: 29 U/L (ref 25–115)
ANION GAP SERPL CALC-SCNC: 4 MMOL/L (ref 2–12)
AST SERPL-CCNC: 30 U/L (ref 15–37)
BASOPHILS # BLD: 0.14 K/UL (ref 0–0.1)
BASOPHILS NFR BLD: 1 % (ref 0–1)
BILIRUB DIRECT SERPL-MCNC: 0.1 MG/DL (ref 0–0.2)
BILIRUB SERPL-MCNC: 0.3 MG/DL (ref 0.2–1)
BUN SERPL-MCNC: 12 MG/DL (ref 6–20)
BUN/CREAT SERPL: 15 (ref 12–20)
CALCIUM SERPL-MCNC: 8.5 MG/DL (ref 8.5–10.1)
CHLORIDE SERPL-SCNC: 107 MMOL/L (ref 97–108)
CHOLEST SERPL-MCNC: 159 MG/DL
CO2 SERPL-SCNC: 25 MMOL/L (ref 21–32)
CREAT SERPL-MCNC: 0.8 MG/DL (ref 0.55–1.02)
CRP SERPL-MCNC: 1.01 MG/DL (ref 0–0.3)
DIFFERENTIAL METHOD BLD: ABNORMAL
EOSINOPHIL # BLD: 0 K/UL (ref 0–0.4)
EOSINOPHIL NFR BLD: 0 % (ref 0–7)
ERYTHROCYTE [DISTWIDTH] IN BLOOD BY AUTOMATED COUNT: 15.9 % (ref 11.5–14.5)
GGT SERPL-CCNC: 59 U/L (ref 5–55)
GLOBULIN SER CALC-MCNC: 3.1 G/DL (ref 2–4)
GLUCOSE SERPL-MCNC: 114 MG/DL (ref 65–100)
HCT VFR BLD AUTO: 34.3 % (ref 35–47)
HDLC SERPL-MCNC: 37 MG/DL
HDLC SERPL: 4.3 (ref 0–5)
HGB BLD-MCNC: 10.5 G/DL (ref 11.5–16)
IMM GRANULOCYTES # BLD AUTO: 0 K/UL
IMM GRANULOCYTES NFR BLD AUTO: 0 %
LDH SERPL L TO P-CCNC: 250 U/L (ref 81–246)
LDLC SERPL CALC-MCNC: 70.4 MG/DL (ref 0–100)
LIPASE SERPL-CCNC: 21 U/L (ref 13–75)
LYMPHOCYTES # BLD: 4.03 K/UL (ref 0.8–3.5)
LYMPHOCYTES NFR BLD: 28 % (ref 12–49)
MAGNESIUM SERPL-MCNC: 1.9 MG/DL (ref 1.6–2.4)
MCH RBC QN AUTO: 30.3 PG (ref 26–34)
MCHC RBC AUTO-ENTMCNC: 30.6 G/DL (ref 30–36.5)
MCV RBC AUTO: 98.8 FL (ref 80–99)
METAMYELOCYTES NFR BLD MANUAL: 2 %
MONOCYTES # BLD: 0 K/UL (ref 0–1)
MONOCYTES NFR BLD: 0 % (ref 5–13)
MYELOCYTES NFR BLD MANUAL: 1 %
NEUTS SEG # BLD: 9.79 K/UL (ref 1.8–8)
NEUTS SEG NFR BLD: 68 % (ref 32–75)
NRBC # BLD: 0.02 K/UL (ref 0–0.01)
NRBC BLD-RTO: 0.1 PER 100 WBC
PLATELET # BLD AUTO: 194 K/UL (ref 150–400)
PMV BLD AUTO: 10.5 FL (ref 8.9–12.9)
POTASSIUM SERPL-SCNC: 4.3 MMOL/L (ref 3.5–5.1)
PROT SERPL-MCNC: 6.4 G/DL (ref 6.4–8.2)
RBC # BLD AUTO: 3.47 M/UL (ref 3.8–5.2)
RBC MORPH BLD: ABNORMAL
SODIUM SERPL-SCNC: 136 MMOL/L (ref 136–145)
TRIGL SERPL-MCNC: 258 MG/DL
VLDLC SERPL CALC-MCNC: 51.6 MG/DL
WBC # BLD AUTO: 14.4 K/UL (ref 3.6–11)

## 2025-06-10 PROCEDURE — 80061 LIPID PANEL: CPT

## 2025-06-10 PROCEDURE — 82150 ASSAY OF AMYLASE: CPT

## 2025-06-10 PROCEDURE — 83615 LACTATE (LD) (LDH) ENZYME: CPT

## 2025-06-10 PROCEDURE — 82248 BILIRUBIN DIRECT: CPT

## 2025-06-10 PROCEDURE — 83690 ASSAY OF LIPASE: CPT

## 2025-06-10 PROCEDURE — 82977 ASSAY OF GGT: CPT

## 2025-06-10 PROCEDURE — 86140 C-REACTIVE PROTEIN: CPT

## 2025-06-10 PROCEDURE — 80053 COMPREHEN METABOLIC PANEL: CPT

## 2025-06-10 PROCEDURE — 83735 ASSAY OF MAGNESIUM: CPT

## 2025-06-10 PROCEDURE — 85025 COMPLETE CBC W/AUTO DIFF WBC: CPT

## 2025-06-10 PROCEDURE — 36591 DRAW BLOOD OFF VENOUS DEVICE: CPT

## 2025-06-10 RX ORDER — FILGRASTIM-AAFI 480 UG/.8ML
480 INJECTION, SOLUTION SUBCUTANEOUS EVERY EVENING
Qty: 2.4 ML | Refills: 3 | Status: ACTIVE | OUTPATIENT
Start: 2025-06-10

## 2025-06-10 RX ORDER — FILGRASTIM-AAFI 480 UG/.8ML
480 INJECTION, SOLUTION SUBCUTANEOUS EVERY EVENING
Qty: 7.2 ML | Refills: 5 | Status: SHIPPED | OUTPATIENT
Start: 2025-06-10 | End: 2025-06-10

## 2025-06-10 NOTE — PROGRESS NOTES
[facility] Short Note                   Pt admit to [facility] for pre-treatment labs ambulatory in stable condition.   Port accessed with positive blood return. Labs drawn and sent for processing. Port flushed/capped for treatment tomorrow.  Ms. Lindsay was [facility] in [facility] condition.   Future Appointments   Date Time Provider Department Center   6/11/2025  1:00 PM SS CHEMO CHAIR 2 [facility] IN[facility]   6/11/2025  1:30 PM Grace Vasquez APRN - NP ONCSF BS AMB   6/17/2025  8:00 AM RADHA FASTTRACK 3 [facility]   6/18/2025  1:00 PM SS CHEMO CHAIR 2 [facility] INF [facility]   6/18/2025  1:30 PM Grace Vasquez APRN - NP ONCSF BS AMB   7/1/2025  8:00 AM RADHA FASTTRACK 3 [facility]   7/2/2025  1:00 PM SS CHEMO CHAIR 27 [facility] INF [facility]   7/2/2025  1:30 PM Grace Vasquez APRN - NP ONCSF BS AMB   7/8/2025  7:30 AM RADHA FASTTRACK 1 [facility]   7/9/2025  1:00 PM SS CHEMO CHAIR 2 [facility] INF [facility]   7/9/2025  1:30 PM Grace Vasquez APRN - NP ONCSF BS AMB   7/15/2025  8:00 AM RADHA FASTTRACK 3 [facility]   7/16/2025  1:00 PM SS CHEMO CHAIR 21 [facility] INF [facility]   7/16/2025  1:30 PM Grace Vasquez APRN - NP ONCSF BS AMB     JANET HEREDIA RN  Mildred 10, 2025  7:51 AM

## 2025-06-11 ENCOUNTER — RESEARCH ENCOUNTER (OUTPATIENT)
Age: 35
End: 2025-06-11

## 2025-06-11 ENCOUNTER — APPOINTMENT (OUTPATIENT)
Facility: HOSPITAL | Age: 35
End: 2025-06-11
Payer: COMMERCIAL

## 2025-06-11 ENCOUNTER — HOSPITAL ENCOUNTER (OUTPATIENT)
Facility: HOSPITAL | Age: 35
Setting detail: INFUSION SERIES
Discharge: HOME | End: 2025-06-11
Payer: COMMERCIAL

## 2025-06-11 ENCOUNTER — OFFICE VISIT (OUTPATIENT)
Age: 35
End: 2025-06-11

## 2025-06-11 VITALS
BODY MASS INDEX: 34.41 KG/M2 | HEIGHT: 63 IN | DIASTOLIC BLOOD PRESSURE: 70 MMHG | HEART RATE: 99 BPM | RESPIRATION RATE: 18 BRPM | OXYGEN SATURATION: 95 % | SYSTOLIC BLOOD PRESSURE: 125 MMHG | TEMPERATURE: 97.2 F | WEIGHT: 194.2 LBS

## 2025-06-11 VITALS
BODY MASS INDEX: 35.7 KG/M2 | SYSTOLIC BLOOD PRESSURE: 106 MMHG | HEART RATE: 84 BPM | TEMPERATURE: 97.2 F | DIASTOLIC BLOOD PRESSURE: 78 MMHG | WEIGHT: 194 LBS | RESPIRATION RATE: 18 BRPM | HEIGHT: 62 IN | OXYGEN SATURATION: 99 %

## 2025-06-11 DIAGNOSIS — E83.51 HYPOCALCEMIA: ICD-10-CM

## 2025-06-11 DIAGNOSIS — C50.919 MALIGNANT NEOPLASM OF BREAST METASTATIC TO BONE (HCC): Primary | ICD-10-CM

## 2025-06-11 DIAGNOSIS — C50.912 CARCINOMA OF LEFT BREAST METASTATIC TO BONE (HCC): Primary | ICD-10-CM

## 2025-06-11 DIAGNOSIS — C79.51 CARCINOMA OF LEFT BREAST METASTATIC TO BONE (HCC): Primary | ICD-10-CM

## 2025-06-11 DIAGNOSIS — C79.51 MALIGNANT NEOPLASM OF BREAST METASTATIC TO BONE (HCC): Primary | ICD-10-CM

## 2025-06-11 PROCEDURE — 2580000003 HC RX 258: Performed by: INTERNAL MEDICINE

## 2025-06-11 PROCEDURE — 99215 OFFICE O/P EST HI 40 MIN: CPT | Performed by: INTERNAL MEDICINE

## 2025-06-11 PROCEDURE — 96375 TX/PRO/DX INJ NEW DRUG ADDON: CPT

## 2025-06-11 PROCEDURE — 2500000003 HC RX 250 WO HCPCS: Performed by: INTERNAL MEDICINE

## 2025-06-11 PROCEDURE — 6360000002 HC RX W HCPCS: Performed by: INTERNAL MEDICINE

## 2025-06-11 PROCEDURE — 96413 CHEMO IV INFUSION 1 HR: CPT

## 2025-06-11 RX ORDER — FAMOTIDINE 10 MG/ML
20 INJECTION, SOLUTION INTRAVENOUS ONCE
Status: COMPLETED | OUTPATIENT
Start: 2025-06-11 | End: 2025-06-11

## 2025-06-11 RX ORDER — DEXAMETHASONE SODIUM PHOSPHATE 10 MG/ML
10 INJECTION, SOLUTION INTRA-ARTICULAR; INTRALESIONAL; INTRAMUSCULAR; INTRAVENOUS; SOFT TISSUE ONCE
Status: COMPLETED | OUTPATIENT
Start: 2025-06-11 | End: 2025-06-11

## 2025-06-11 RX ORDER — DIPHENHYDRAMINE HYDROCHLORIDE 50 MG/ML
25 INJECTION, SOLUTION INTRAMUSCULAR; INTRAVENOUS ONCE
Status: COMPLETED | OUTPATIENT
Start: 2025-06-11 | End: 2025-06-11

## 2025-06-11 RX ORDER — SODIUM CHLORIDE 0.9 % (FLUSH) 0.9 %
5-40 SYRINGE (ML) INJECTION PRN
Status: DISCONTINUED | OUTPATIENT
Start: 2025-06-11 | End: 2025-06-12 | Stop reason: HOSPADM

## 2025-06-11 RX ORDER — SODIUM CHLORIDE 9 MG/ML
5-250 INJECTION, SOLUTION INTRAVENOUS PRN
Status: DISCONTINUED | OUTPATIENT
Start: 2025-06-11 | End: 2025-06-12 | Stop reason: HOSPADM

## 2025-06-11 RX ADMIN — FAMOTIDINE 20 MG: 10 INJECTION INTRAVENOUS at 13:47

## 2025-06-11 RX ADMIN — SODIUM CHLORIDE 250 ML/HR: 0.9 INJECTION, SOLUTION INTRAVENOUS at 13:40

## 2025-06-11 RX ADMIN — DEXAMETHASONE SODIUM PHOSPHATE 10 MG: 10 INJECTION INTRAMUSCULAR; INTRAVENOUS at 13:55

## 2025-06-11 RX ADMIN — DIPHENHYDRAMINE HYDROCHLORIDE 25 MG: 50 INJECTION, SOLUTION INTRAMUSCULAR; INTRAVENOUS at 14:05

## 2025-06-11 RX ADMIN — SODIUM CHLORIDE 10 ML: 9 INJECTION, SOLUTION INTRAMUSCULAR; INTRAVENOUS; SUBCUTANEOUS at 13:48

## 2025-06-11 RX ADMIN — PACLITAXEL 186 MG: 6 INJECTION, SOLUTION INTRAVENOUS at 14:48

## 2025-06-11 ASSESSMENT — PATIENT HEALTH QUESTIONNAIRE - PHQ9
SUM OF ALL RESPONSES TO PHQ QUESTIONS 1-9: 0
SUM OF ALL RESPONSES TO PHQ QUESTIONS 1-9: 0
2. FEELING DOWN, DEPRESSED OR HOPELESS: NOT AT ALL
SUM OF ALL RESPONSES TO PHQ QUESTIONS 1-9: 0
1. LITTLE INTEREST OR PLEASURE IN DOING THINGS: NOT AT ALL
SUM OF ALL RESPONSES TO PHQ QUESTIONS 1-9: 0

## 2025-06-11 ASSESSMENT — PAIN DESCRIPTION - LOCATION: LOCATION: GENERALIZED

## 2025-06-11 ASSESSMENT — PAIN SCALES - GENERAL: PAINLEVEL_OUTOF10: 2

## 2025-06-11 NOTE — PROGRESS NOTES
Patient in today for labs and follow-up visit with Dr. Murillo. Today is C1D8 on study [id number].  Patient reports the following adverse events: gr 1 fatigue. Vital signs are stable.  Patient to receive paclitaxel. Next appt is 6/18/2025.

## 2025-06-11 NOTE — PROGRESS NOTES
[facility] - Chemotherapy Progress Note    Pt admit to [facility] for C1D8 RES VA NAV998: Cohort 2 Arm 1 (YNX023 and PACLitaxel)  in stable condition. Assessment completed.  PAC accessed yesterday and dressing C/D/I. Positive blood return noted and research labs obtained. Treatment labs reviewed from yesterday. OKTT received from research post MD appointment.    VS  Vitals:    06/11/25 1107 06/11/25 1550   BP: 106/78 125/70   Pulse: 84 99   Resp: 18    Temp: 97.2 °F (36.2 °C)    TempSrc: Temporal    SpO2:  95%   Weight: 88.1 kg (194 lb 3.2 oz)    Height: 1.6 m (5' 2.99\")      Medications:  Medications Administered         *[facility]* PACLitaxel (TAXOL) 186 mg in sodium chloride 0.9 % 250 mL chemo infusion Admin Date  06/11/2025 Action  New Bag Dose  186 mg Rate  306 mL/hr Route  IntraVENous Documented By  Rosalba Bacon RN        0.9 % sodium chloride infusion Admin Date  06/11/2025 Action  New Bag Dose  250 mL/hr Rate  250 mL/hr Route  IntraVENous Documented By  Rosalba Bacon RN        dexAMETHasone (DECADRON) injection 10 mg Admin Date  06/11/2025 Action  Given Dose  10 mg Rate   Route  IntraVENous Documented By  Rosalba Bacon RN        diphenhydrAMINE (BENADRYL) injection 25 mg Admin Date  06/11/2025 Action  Given Dose  25 mg Rate   Route  IntraVENous Documented By  Rosalba Bacon RN        famotidine (PEPCID) injection 20 mg Admin Date  06/11/2025 Action  Given Dose  20 mg Rate   Route  IntraVENous Documented By  Rosalba Bacon RN        sodium chloride flush 0.9 % injection 5-40 mL Admin Date  06/11/2025 Action  Given Dose  10 mL Rate   Route  IntraVENous Documented By  Rosalba Bacon, ZEHRA          Two nurses verified prior to administering:  drug name, drug dose, infusion volume or drug volume when prepared in a syringe, rate of administration, route of administration, expiration dates and/or times, appearance and physical integrity of the drugs, rate set on infusion pump, when used, sequencing of drug

## 2025-06-11 NOTE — PROGRESS NOTES
[facility] [place] [facility]  [address], [address] [place] [address]  W: [phone]  F: [phone]      Reason for Visit:   Alea Lindsay is a 35 y.o. female who is seen for treatment of breast cancer.    Treatment History:   Initially presented to [facility] with LEFT breast pain  Mammo 9/6/24 with 2.8 cm spiculated mass with two other suspicious masses on LEFT  09/09/24 -  (A) LEFT Breast 4:00 biopsy: PATH - IDC, 1.7 cm, grade 3, ER negative, MN negative, HER2 negative, Ki 67 >95%, with tumor necrosis.  Lymphovascular invasion identified  (B) LEFT Breast 3:00 biopsy: PATH - IDC, 7 mm, grade 3, ER negative, MN negative, HER2 negative, Ki 67 >95%,  with tumor necrosis  (C) LEFT Breast 1:00 biopsy: PATH - IDC, 1 cm, grade 3, ER negative, MN negative, HER2 negative, Ki 67 >95%, with tumor necrosis, Lymphovascular invasion identified  (D) Lymph Node LEFT Axilla - Metastatic carcinoma  Bone Marrow, Posterior Iliac Crest and Decalcified Core 10/2/24: PATH - Metastatic mammary carcinoma. Myelofibrosis and osteosclerosis with trilineage hypoplasia, ER negative, MN negative, HER 2 negative  PDL-1+ 10%  Caris Testing showed BRCA+  [facility] Genetic Testing negative  Port-A-Cath placed on 10/8/24  Brain MRI 10/15/24: Normal MRI of the brain. There is no evidence of intracranial metastatic disease. Osseous metastases are present. No intracranial mass, hemorrhage or evidence of acute infarction  Palliative Carbo+Gemcitabine+Pembro 10/24/24 - 5/8/25 (PD)  D8C1 skipped due to neutropenia  DR Carbo AUC to 1.5 and Gemcitabine 20% to 800 mg/m2 with D1C2  Added Neulasta to Day 8 starting with D8C3      MBX307 + paclitaxel 6/5/25-    History of Present Illness:   She has noticed her left breast mass getting larger over the past 3 weeks.  Otherwise, feels well    Interval history:  reports gr 1 fatigue    Past Medical History:   Diagnosis Date    Breast cancer (HCC) 9/13/2024    Cancer (HCC)

## 2025-06-11 NOTE — PROGRESS NOTES
Alea Lindsay is a 35 y.o. female is here today for a follow up.    1. Have you been to the ER, urgent care clinic since your last visit?  Hospitalized since your last visit?No    2. Have you seen or consulted any other health care providers outside of the [facility] since your last visit?  Include any pap smears or colon screening. No       6/11/2025  11:07 AM   Vitals    SYSTOLIC 106    DIASTOLIC 78    BP Site    Patient Position    BP Cuff Size    Pulse 84    Temp 97.2 °F (36.2 °C)    Respirations 18    SpO2    Weight - Scale 194 lb 3.2 oz    Height 5' 2.992\"    Body Mass Index 34.41 kg/m2 (H)    Pain Score    Pain Loc    Pain Level 2       Legend:  (H) High

## 2025-06-12 ENCOUNTER — APPOINTMENT (OUTPATIENT)
Facility: HOSPITAL | Age: 35
End: 2025-06-12
Payer: COMMERCIAL

## 2025-06-12 ENCOUNTER — HOSPITAL ENCOUNTER (OUTPATIENT)
Facility: HOSPITAL | Age: 35
Setting detail: INFUSION SERIES
Discharge: HOME | End: 2025-06-12
Payer: COMMERCIAL

## 2025-06-12 DIAGNOSIS — C50.912 CARCINOMA OF LEFT BREAST METASTATIC TO BONE (HCC): Primary | ICD-10-CM

## 2025-06-12 DIAGNOSIS — C79.51 CARCINOMA OF LEFT BREAST METASTATIC TO BONE (HCC): Primary | ICD-10-CM

## 2025-06-12 DIAGNOSIS — E83.51 HYPOCALCEMIA: ICD-10-CM

## 2025-06-12 PROCEDURE — 6360000002 HC RX W HCPCS: Performed by: INTERNAL MEDICINE

## 2025-06-12 PROCEDURE — 96372 THER/PROPH/DIAG INJ SC/IM: CPT

## 2025-06-12 RX ADMIN — FILGRASTIM-SNDZ 480 MCG: 480 INJECTION, SOLUTION INTRAVENOUS; SUBCUTANEOUS at 15:54

## 2025-06-12 NOTE — PROGRESS NOTES
[facility] Visit Notes                 Date: 2025  Name: Alea Lindsay  MRN: [id number]       : 1990    Pt admit to [facility] for Zarxio   No acute distress noted. Patient is ambulatory and in stable condition. No immediate needs or concerns for this visit.     Zarxio given in Left Arm SC. Band-aid and Gauze Applied to site.   Patient aware of upcoming appointment. Patient declined post- monitoring time. Education provided.     Medications Administered         filgrastim-sndz (ZARXIO) injection 480 mcg Admin Date  2025 Action  Given Dose  480 mcg Route  SubCUTAneous Documented By  Angela Garvey RN          Future Appointments   Date Time Provider Department Center   2025  4:00 PM [facility] FASTTRACK 1 [facility]   2025  3:30 PM [facility] FASTTRACK 1 [facility]   2025 11:30 AM [facility] FASTTRACK 1 [facility]   2025  8:00 AM RAD[facility] FASTTRACK 3 [facility]   2025 11:30 AM SS CHEMO CHAIR 12 MIDLO INF [facility]   2025  8:00 AM RADHA FASTTRACK 3 [facility]   2025 11:00 AM SS CHEMO CHAIR 1 [facility]F [facility]   2025 11:15 AM Juanito Murillo MD ONCSF BS AMB   2025  7:30 AM RADHA FASTTRACK 1 [facility] H   2025  1:00 PM SS CHEMO CHAIR 2 MIDLO INF [facility]   2025  1:30 PM Grace Vasquez APRN - NP ONCSF BS AMB   7/15/2025  8:00 AM RADHA FASTTRACK 3 [facility]   2025  1:00 PM SS CHEMO CHAIR 21 MIDLO INF [facility]   2025  1:30 PM Grace Vasquez APRN - NP ONCSF BS AMB     Angela Garvey RN  2025

## 2025-06-13 ENCOUNTER — HOSPITAL ENCOUNTER (OUTPATIENT)
Facility: HOSPITAL | Age: 35
Setting detail: INFUSION SERIES
Discharge: HOME | End: 2025-06-13
Payer: COMMERCIAL

## 2025-06-13 VITALS
SYSTOLIC BLOOD PRESSURE: 112 MMHG | HEART RATE: 92 BPM | RESPIRATION RATE: 18 BRPM | DIASTOLIC BLOOD PRESSURE: 72 MMHG | TEMPERATURE: 97.4 F

## 2025-06-13 DIAGNOSIS — C79.51 CARCINOMA OF LEFT BREAST METASTATIC TO BONE (HCC): Primary | ICD-10-CM

## 2025-06-13 DIAGNOSIS — E83.51 HYPOCALCEMIA: ICD-10-CM

## 2025-06-13 DIAGNOSIS — C50.912 CARCINOMA OF LEFT BREAST METASTATIC TO BONE (HCC): Primary | ICD-10-CM

## 2025-06-13 PROCEDURE — 96372 THER/PROPH/DIAG INJ SC/IM: CPT

## 2025-06-13 PROCEDURE — 6360000002 HC RX W HCPCS: Performed by: INTERNAL MEDICINE

## 2025-06-13 RX ADMIN — FILGRASTIM-SNDZ 480 MCG: 480 INJECTION, SOLUTION INTRAVENOUS; SUBCUTANEOUS at 15:38

## 2025-06-13 ASSESSMENT — PAIN SCALES - GENERAL: PAINLEVEL_OUTOF10: 0

## 2025-06-13 NOTE — PROGRESS NOTES
[facility] Short Note                       Date: 2025    Name: Alea Lindsay    MRN: [id number]         : 1990      Pt admit to [facility] for Zarxio ambulatory in stable condition. Assessment completed and documented in flowsheets.      Ms. Lindsay's vitals were reviewed:  Patient Vitals for the past 12 hrs:   Temp Pulse Resp BP   25 1533 97.4 °F (36.3 °C) 92 18 112/72         Medications given: SC in RIGHT arm  Medications Administered         filgrastim-sndz (ZARXIO) injection 480 mcg Admin Date  2025 Action  Given Dose  480 mcg Route  SubCUTAneous Documented By  Sofia Rodriguez, ZEHRA            Ms. Lindsay tolerated the injection and [facility] condition. Patient is aware if future appointments.    Future Appointments   Date Time Provider Department Center   2025 11:30 AM RADHA FASTTRACK 1 [facility]   2025  8:00 AM RADHA FASTTRACK 3 [facility]   2025 11:30 AM SS CHEMO CHAIR 12 MIDLO INF [facility]   2025  8:00 AM RADHA FASTTRACK 3 [facility]   2025 11:00 AM SS CHEMO CHAIR 1 [facility] INF [facility]   2025 11:15 AM Juanito Murillo MD ONCSF [facility]   2025  7:30 AM RADHA FASTTRACK 1 [facility]   2025  1:00 PM SS CHEMO CHAIR 2 MIDLO INF [facility]   2025  1:30 PM Grace Vasquez APRN - NP ONCSF JOB AMB   7/15/2025  8:00 AM RADHA FASTTRACK 3 [facility]   2025  1:00 PM SS CHEMO CHAIR 21 MIDLO INF [facility]   2025  1:30 PM Grace Vasquez APRN - NP ONCSF JOB Rodriguez RN  2025  3:42 PM

## 2025-06-14 ENCOUNTER — HOSPITAL ENCOUNTER (OUTPATIENT)
Facility: HOSPITAL | Age: 35
Setting detail: INFUSION SERIES
Discharge: HOME | End: 2025-06-14
Payer: COMMERCIAL

## 2025-06-14 VITALS — TEMPERATURE: 97.8 F

## 2025-06-14 DIAGNOSIS — E83.51 HYPOCALCEMIA: ICD-10-CM

## 2025-06-14 DIAGNOSIS — C50.912 CARCINOMA OF LEFT BREAST METASTATIC TO BONE (HCC): Primary | ICD-10-CM

## 2025-06-14 DIAGNOSIS — C79.51 CARCINOMA OF LEFT BREAST METASTATIC TO BONE (HCC): Primary | ICD-10-CM

## 2025-06-14 PROCEDURE — 96372 THER/PROPH/DIAG INJ SC/IM: CPT

## 2025-06-14 PROCEDURE — 6360000002 HC RX W HCPCS: Performed by: INTERNAL MEDICINE

## 2025-06-14 RX ADMIN — FILGRASTIM-SNDZ 480 MCG: 480 INJECTION, SOLUTION INTRAVENOUS; SUBCUTANEOUS at 11:17

## 2025-06-14 NOTE — PROGRESS NOTES
[facility]                 Date: 2025  Name: Alea Lindsay  MRN: [id number]       : 1990    Pt admit to [facility] for Zarxio   No acute distress noted. Patient is ambulatory and in stable condition.   Denies flu-like symptoms. No immediate needs or concerns for this visit.     Injection given in Left Arm SC. Tolerated well. Band-aid and Gauze Applied to site.   Patient aware of upcoming appointment. Patient declined post- monitoring time. Education provided.     Ms. Lindsay's vitals were reviewed prior to treatment.   Patient Vitals for the past 12 hrs:   Temp   25 1115 97.8 °F (36.6 °C)     Medications Administered         filgrastim-sndz (ZARXIO) injection 480 mcg Admin Date  2025 Action  Given Dose  480 mcg Route  SubCUTAneous Documented By  Angela Garvey, RN          Future Appointments   Date Time Provider Department Center   2025 11:30 AM RADHA FASTTRACK 1 [facility]   2025  8:00 AM RADHA FASTTRACK 3 [facility]   2025 11:30 AM SS CHEMO CHAIR 12 [facility] INF [facility]   2025  8:00 AM RADHA FASTTRACK 3 [facility]   2025 11:00 AM SS CHEMO CHAIR 1 [facility]   2025 11:15 AM Juanito Murillo MD ONCSF JOB [facility]   2025  7:30 AM RADHA FASTTRACK 1 [facility]   2025  1:00 PM SS CHEMO CHAIR 2 [facility] INF [facility]   2025  1:30 PM Grace Vasquez APRN - NP ONCSF JOB AMB   7/15/2025  8:00 AM RADHA FASTTRACK 3 [facility]   2025  1:00 PM SS CHEMO CHAIR 21 [facility]LO INF [facility]   2025  1:30 PM Grace Vasquez APRN - NP ONCSF BS AMB     Angela Garvey RN  2025

## 2025-06-17 ENCOUNTER — HOSPITAL ENCOUNTER (OUTPATIENT)
Facility: HOSPITAL | Age: 35
Setting detail: INFUSION SERIES
Discharge: HOME | End: 2025-06-17
Payer: COMMERCIAL

## 2025-06-17 DIAGNOSIS — C79.51 CARCINOMA OF LEFT BREAST METASTATIC TO BONE (HCC): ICD-10-CM

## 2025-06-17 DIAGNOSIS — E83.51 HYPOCALCEMIA: ICD-10-CM

## 2025-06-17 DIAGNOSIS — C50.912 CARCINOMA OF LEFT BREAST METASTATIC TO BONE (HCC): ICD-10-CM

## 2025-06-17 LAB
ALBUMIN SERPL-MCNC: 3.4 G/DL (ref 3.5–5)
ALBUMIN/GLOB SERPL: 1.2 (ref 1.1–2.2)
ALP SERPL-CCNC: 200 U/L (ref 45–117)
ALT SERPL-CCNC: 84 U/L (ref 12–78)
AMYLASE SERPL-CCNC: 32 U/L (ref 25–115)
ANION GAP SERPL CALC-SCNC: 7 MMOL/L (ref 2–12)
APPEARANCE UR: ABNORMAL
AST SERPL-CCNC: 43 U/L (ref 15–37)
BACTERIA URNS QL MICRO: ABNORMAL /HPF
BASOPHILS # BLD: 0.07 K/UL (ref 0–0.1)
BASOPHILS NFR BLD: 1 % (ref 0–1)
BILIRUB DIRECT SERPL-MCNC: 0.1 MG/DL (ref 0–0.2)
BILIRUB SERPL-MCNC: 0.2 MG/DL (ref 0.2–1)
BILIRUB UR QL: NEGATIVE
BUN SERPL-MCNC: 12 MG/DL (ref 6–20)
BUN/CREAT SERPL: 15 (ref 12–20)
CALCIUM SERPL-MCNC: 8.3 MG/DL (ref 8.5–10.1)
CHLORIDE SERPL-SCNC: 109 MMOL/L (ref 97–108)
CHOLEST SERPL-MCNC: 171 MG/DL
CO2 SERPL-SCNC: 22 MMOL/L (ref 21–32)
COLOR UR: ABNORMAL
CREAT SERPL-MCNC: 0.78 MG/DL (ref 0.55–1.02)
CRP SERPL-MCNC: 0.45 MG/DL (ref 0–0.3)
DIFFERENTIAL METHOD BLD: ABNORMAL
EOSINOPHIL # BLD: 0 K/UL (ref 0–0.4)
EOSINOPHIL NFR BLD: 0 % (ref 0–7)
EPITH CASTS URNS QL MICRO: ABNORMAL /LPF
ERYTHROCYTE [DISTWIDTH] IN BLOOD BY AUTOMATED COUNT: 15.9 % (ref 11.5–14.5)
GGT SERPL-CCNC: 78 U/L (ref 5–55)
GLOBULIN SER CALC-MCNC: 2.8 G/DL (ref 2–4)
GLUCOSE SERPL-MCNC: 88 MG/DL (ref 65–100)
GLUCOSE UR STRIP.AUTO-MCNC: NEGATIVE MG/DL
HCT VFR BLD AUTO: 31.5 % (ref 35–47)
HDLC SERPL-MCNC: 32 MG/DL
HDLC SERPL: 5.3 (ref 0–5)
HGB BLD-MCNC: 10.3 G/DL (ref 11.5–16)
HGB UR QL STRIP: ABNORMAL
IMM GRANULOCYTES # BLD AUTO: 0 K/UL
IMM GRANULOCYTES NFR BLD AUTO: 0 %
KETONES UR QL STRIP.AUTO: NEGATIVE MG/DL
LDH SERPL L TO P-CCNC: 266 U/L (ref 81–246)
LDLC SERPL CALC-MCNC: ABNORMAL MG/DL (ref 0–100)
LDLC SERPL DIRECT ASSAY-MCNC: 94 MG/DL (ref 0–100)
LEUKOCYTE ESTERASE UR QL STRIP.AUTO: ABNORMAL
LIPASE SERPL-CCNC: 25 U/L (ref 13–75)
LYMPHOCYTES # BLD: 1.34 K/UL (ref 0.8–3.5)
LYMPHOCYTES NFR BLD: 20 % (ref 12–49)
MAGNESIUM SERPL-MCNC: 1.9 MG/DL (ref 1.6–2.4)
MCH RBC QN AUTO: 30.7 PG (ref 26–34)
MCHC RBC AUTO-ENTMCNC: 32.7 G/DL (ref 30–36.5)
MCV RBC AUTO: 94 FL (ref 80–99)
MONOCYTES # BLD: 0.6 K/UL (ref 0–1)
MONOCYTES NFR BLD: 9 % (ref 5–13)
NEUTS BAND NFR BLD MANUAL: 9 % (ref 0–6)
NEUTS SEG # BLD: 4.69 K/UL (ref 1.8–8)
NEUTS SEG NFR BLD: 61 % (ref 32–75)
NITRITE UR QL STRIP.AUTO: NEGATIVE
NRBC # BLD: 0.05 K/UL (ref 0–0.01)
NRBC BLD-RTO: 0.7 PER 100 WBC
PH UR STRIP: 5.5 (ref 5–8)
PLATELET # BLD AUTO: 195 K/UL (ref 150–400)
PMV BLD AUTO: 10.6 FL (ref 8.9–12.9)
POTASSIUM SERPL-SCNC: 4.1 MMOL/L (ref 3.5–5.1)
PROT SERPL-MCNC: 6.2 G/DL (ref 6.4–8.2)
PROT UR STRIP-MCNC: NEGATIVE MG/DL
RBC # BLD AUTO: 3.35 M/UL (ref 3.8–5.2)
RBC #/AREA URNS HPF: ABNORMAL /HPF (ref 0–5)
RBC MORPH BLD: ABNORMAL
SODIUM SERPL-SCNC: 138 MMOL/L (ref 136–145)
SP GR UR REFRACTOMETRY: 1.02 (ref 1–1.03)
TRIGL SERPL-MCNC: 432 MG/DL
URINE CULTURE IF INDICATED: ABNORMAL
UROBILINOGEN UR QL STRIP.AUTO: 0.2 EU/DL (ref 0.2–1)
VLDLC SERPL CALC-MCNC: ABNORMAL MG/DL
WBC # BLD AUTO: 6.7 K/UL (ref 3.6–11)
WBC URNS QL MICRO: ABNORMAL /HPF (ref 0–4)

## 2025-06-17 PROCEDURE — 83615 LACTATE (LD) (LDH) ENZYME: CPT

## 2025-06-17 PROCEDURE — 83721 ASSAY OF BLOOD LIPOPROTEIN: CPT

## 2025-06-17 PROCEDURE — 82977 ASSAY OF GGT: CPT

## 2025-06-17 PROCEDURE — 36591 DRAW BLOOD OFF VENOUS DEVICE: CPT

## 2025-06-17 PROCEDURE — 82150 ASSAY OF AMYLASE: CPT

## 2025-06-17 PROCEDURE — 82248 BILIRUBIN DIRECT: CPT

## 2025-06-17 PROCEDURE — 81001 URINALYSIS AUTO W/SCOPE: CPT

## 2025-06-17 PROCEDURE — 80061 LIPID PANEL: CPT

## 2025-06-17 PROCEDURE — 86140 C-REACTIVE PROTEIN: CPT

## 2025-06-17 PROCEDURE — 83690 ASSAY OF LIPASE: CPT

## 2025-06-17 PROCEDURE — 85025 COMPLETE CBC W/AUTO DIFF WBC: CPT

## 2025-06-17 PROCEDURE — 83735 ASSAY OF MAGNESIUM: CPT

## 2025-06-17 PROCEDURE — 80053 COMPREHEN METABOLIC PANEL: CPT

## 2025-06-17 NOTE — PROGRESS NOTES
[facility] Short Note                   Pt admit to [facility] for pre-treatment labs ambulatory in stable condition.   Port accessed with positive blood return. Labs drawn and sent for processing. Port flushed/capped for treatment tomorrow.  Ms. Lindsay was [facility] in [facility] condition.   Future Appointments   Date Time Provider Department Center   6/18/2025 11:30 AM SS CHEMO CHAIR 12 [facility] INF [facility]   7/1/2025  8:00 AM RADHA FASTTRACK 3 [facility]   7/2/2025 11:00 [facility]   7/2/2025 11:15 AM Juanito Murillo MD ONCSF BS AMB   7/8/2025  7:30 AM RADHA FASTTRACK 1 [facility]   7/9/2025  1:00 PM [facility]   7/9/2025  1:30 PM Grace Vasquez APRN - NP ONCSF BS AMB   7/15/2025  8:00 AM RADHA FASTTRACK 3 [facility]   7/16/2025  1:00 PM SS CHEMO CHAIR 21 [facility] INF [facility]   7/16/2025  1:30 PM Grace Vasquez APRN - NP ONCSF BS AMB     Sofia Rodriguez RN  June 17, 2025  9:46 AM

## 2025-06-18 ENCOUNTER — HOSPITAL ENCOUNTER (OUTPATIENT)
Facility: HOSPITAL | Age: 35
Setting detail: INFUSION SERIES
Discharge: HOME | End: 2025-06-18
Payer: COMMERCIAL

## 2025-06-18 ENCOUNTER — RESEARCH ENCOUNTER (OUTPATIENT)
Age: 35
End: 2025-06-18

## 2025-06-18 ENCOUNTER — APPOINTMENT (OUTPATIENT)
Facility: HOSPITAL | Age: 35
End: 2025-06-18
Payer: COMMERCIAL

## 2025-06-18 VITALS
RESPIRATION RATE: 18 BRPM | SYSTOLIC BLOOD PRESSURE: 117 MMHG | BODY MASS INDEX: 35.48 KG/M2 | WEIGHT: 194 LBS | DIASTOLIC BLOOD PRESSURE: 80 MMHG | TEMPERATURE: 98 F | OXYGEN SATURATION: 95 % | HEART RATE: 99 BPM

## 2025-06-18 DIAGNOSIS — C50.919 MALIGNANT NEOPLASM OF BREAST METASTATIC TO BONE (HCC): Primary | ICD-10-CM

## 2025-06-18 DIAGNOSIS — E83.51 HYPOCALCEMIA: ICD-10-CM

## 2025-06-18 DIAGNOSIS — C50.919 CARCINOMA OF BREAST METASTATIC TO BONE, UNSPECIFIED LATERALITY (HCC): ICD-10-CM

## 2025-06-18 DIAGNOSIS — C50.912 CARCINOMA OF LEFT BREAST METASTATIC TO BONE (HCC): Primary | ICD-10-CM

## 2025-06-18 DIAGNOSIS — C79.51 CARCINOMA OF BREAST METASTATIC TO BONE, UNSPECIFIED LATERALITY (HCC): ICD-10-CM

## 2025-06-18 DIAGNOSIS — C79.51 MALIGNANT NEOPLASM OF BREAST METASTATIC TO BONE (HCC): Primary | ICD-10-CM

## 2025-06-18 DIAGNOSIS — C79.51 CARCINOMA OF LEFT BREAST METASTATIC TO BONE (HCC): Primary | ICD-10-CM

## 2025-06-18 PROCEDURE — 2580000003 HC RX 258: Performed by: INTERNAL MEDICINE

## 2025-06-18 PROCEDURE — 96413 CHEMO IV INFUSION 1 HR: CPT

## 2025-06-18 PROCEDURE — 6360000002 HC RX W HCPCS: Performed by: INTERNAL MEDICINE

## 2025-06-18 PROCEDURE — 2500000003 HC RX 250 WO HCPCS: Performed by: INTERNAL MEDICINE

## 2025-06-18 PROCEDURE — 96375 TX/PRO/DX INJ NEW DRUG ADDON: CPT

## 2025-06-18 PROCEDURE — 96411 CHEMO IV PUSH ADDL DRUG: CPT

## 2025-06-18 RX ORDER — SODIUM CHLORIDE 9 MG/ML
5-250 INJECTION, SOLUTION INTRAVENOUS PRN
Status: DISCONTINUED | OUTPATIENT
Start: 2025-06-18 | End: 2025-06-19 | Stop reason: HOSPADM

## 2025-06-18 RX ORDER — DIPHENHYDRAMINE HYDROCHLORIDE 50 MG/ML
25 INJECTION, SOLUTION INTRAMUSCULAR; INTRAVENOUS ONCE
Status: COMPLETED | OUTPATIENT
Start: 2025-06-18 | End: 2025-06-18

## 2025-06-18 RX ORDER — DEXAMETHASONE SODIUM PHOSPHATE 10 MG/ML
10 INJECTION, SOLUTION INTRA-ARTICULAR; INTRALESIONAL; INTRAMUSCULAR; INTRAVENOUS; SOFT TISSUE ONCE
Status: COMPLETED | OUTPATIENT
Start: 2025-06-18 | End: 2025-06-18

## 2025-06-18 RX ORDER — FAMOTIDINE 10 MG/ML
20 INJECTION, SOLUTION INTRAVENOUS ONCE
Status: COMPLETED | OUTPATIENT
Start: 2025-06-18 | End: 2025-06-18

## 2025-06-18 RX ADMIN — DIPHENHYDRAMINE HYDROCHLORIDE 25 MG: 50 INJECTION INTRAMUSCULAR; INTRAVENOUS at 14:47

## 2025-06-18 RX ADMIN — DEXAMETHASONE SODIUM PHOSPHATE 10 MG: 10 INJECTION INTRAMUSCULAR; INTRAVENOUS at 14:51

## 2025-06-18 RX ADMIN — FAMOTIDINE 20 MG: 10 INJECTION INTRAVENOUS at 14:46

## 2025-06-18 RX ADMIN — SODIUM CHLORIDE 25 ML/HR: 900 INJECTION, SOLUTION INTRAVENOUS at 16:14

## 2025-06-18 RX ADMIN — PACLITAXEL 186 MG: 6 INJECTION, SOLUTION INTRAVENOUS at 15:49

## 2025-06-18 RX ADMIN — Medication 1400 MG: at 13:00

## 2025-06-18 ASSESSMENT — PAIN SCALES - GENERAL: PAINLEVEL_OUTOF10: 0

## 2025-06-18 NOTE — PROGRESS NOTES
OK TO TREAT    Pt was informed of ICF changes per protocol requirement prior to treatment.  No questions at this time.

## 2025-06-19 ENCOUNTER — APPOINTMENT (OUTPATIENT)
Facility: HOSPITAL | Age: 35
End: 2025-06-19
Payer: COMMERCIAL

## 2025-06-23 RX ORDER — MEPERIDINE HYDROCHLORIDE 25 MG/ML
12.5 INJECTION INTRAMUSCULAR; INTRAVENOUS; SUBCUTANEOUS PRN
Status: CANCELLED | OUTPATIENT
Start: 2025-07-02

## 2025-06-23 RX ORDER — EPINEPHRINE 1 MG/ML
0.3 INJECTION, SOLUTION INTRAMUSCULAR; SUBCUTANEOUS PRN
Status: CANCELLED | OUTPATIENT
Start: 2025-07-02

## 2025-06-23 RX ORDER — DIPHENHYDRAMINE HYDROCHLORIDE 50 MG/ML
50 INJECTION, SOLUTION INTRAMUSCULAR; INTRAVENOUS
Status: CANCELLED | OUTPATIENT
Start: 2025-07-02

## 2025-06-23 RX ORDER — HEPARIN 100 UNIT/ML
500 SYRINGE INTRAVENOUS PRN
Status: CANCELLED | OUTPATIENT
Start: 2025-07-02

## 2025-06-23 RX ORDER — HYDROCORTISONE SODIUM SUCCINATE 100 MG/2ML
100 INJECTION INTRAMUSCULAR; INTRAVENOUS
Status: CANCELLED | OUTPATIENT
Start: 2025-07-02

## 2025-06-23 RX ORDER — ACETAMINOPHEN 325 MG/1
650 TABLET ORAL
Status: CANCELLED | OUTPATIENT
Start: 2025-07-02

## 2025-06-23 RX ORDER — ONDANSETRON 2 MG/ML
8 INJECTION INTRAMUSCULAR; INTRAVENOUS
Status: CANCELLED | OUTPATIENT
Start: 2025-07-02

## 2025-06-23 RX ORDER — ALBUTEROL SULFATE 90 UG/1
4 INHALANT RESPIRATORY (INHALATION) PRN
Status: CANCELLED | OUTPATIENT
Start: 2025-07-02

## 2025-06-23 RX ORDER — FAMOTIDINE 10 MG/ML
20 INJECTION, SOLUTION INTRAVENOUS
Status: CANCELLED | OUTPATIENT
Start: 2025-07-02

## 2025-06-23 RX ORDER — SODIUM CHLORIDE 9 MG/ML
INJECTION, SOLUTION INTRAVENOUS CONTINUOUS
Status: CANCELLED | OUTPATIENT
Start: 2025-07-02

## 2025-06-23 RX ORDER — SODIUM CHLORIDE 9 MG/ML
5-250 INJECTION, SOLUTION INTRAVENOUS PRN
Status: CANCELLED | OUTPATIENT
Start: 2025-07-02

## 2025-07-01 ENCOUNTER — HOSPITAL ENCOUNTER (OUTPATIENT)
Facility: HOSPITAL | Age: 35
Setting detail: INFUSION SERIES
Discharge: HOME | End: 2025-07-01
Payer: COMMERCIAL

## 2025-07-01 ENCOUNTER — RESULTS FOLLOW-UP (OUTPATIENT)
Age: 35
End: 2025-07-01

## 2025-07-01 DIAGNOSIS — C50.912 CARCINOMA OF LEFT BREAST METASTATIC TO BONE (HCC): ICD-10-CM

## 2025-07-01 DIAGNOSIS — E83.51 HYPOCALCEMIA: ICD-10-CM

## 2025-07-01 DIAGNOSIS — C79.51 CARCINOMA OF LEFT BREAST METASTATIC TO BONE (HCC): ICD-10-CM

## 2025-07-01 LAB
ALBUMIN SERPL-MCNC: 3.4 G/DL (ref 3.5–5)
ALBUMIN/GLOB SERPL: 1 (ref 1.1–2.2)
ALP SERPL-CCNC: 132 U/L (ref 45–117)
ALT SERPL-CCNC: 64 U/L (ref 12–78)
AMYLASE SERPL-CCNC: 36 U/L (ref 25–115)
ANION GAP SERPL CALC-SCNC: 9 MMOL/L (ref 2–12)
APPEARANCE UR: CLEAR
AST SERPL-CCNC: 38 U/L (ref 15–37)
BACTERIA URNS QL MICRO: ABNORMAL /HPF
BASOPHILS # BLD: 0 K/UL (ref 0–0.1)
BASOPHILS NFR BLD: 0 % (ref 0–1)
BILIRUB DIRECT SERPL-MCNC: <0.1 MG/DL (ref 0–0.2)
BILIRUB SERPL-MCNC: 0.5 MG/DL (ref 0.2–1)
BILIRUB UR QL: NEGATIVE
BUN SERPL-MCNC: 9 MG/DL (ref 6–20)
BUN/CREAT SERPL: 10 (ref 12–20)
CALCIUM SERPL-MCNC: 8.6 MG/DL (ref 8.5–10.1)
CHLORIDE SERPL-SCNC: 110 MMOL/L (ref 97–108)
CO2 SERPL-SCNC: 19 MMOL/L (ref 21–32)
COLOR UR: ABNORMAL
CREAT SERPL-MCNC: 0.91 MG/DL (ref 0.55–1.02)
CRP SERPL-MCNC: 0.42 MG/DL (ref 0–0.3)
DIFFERENTIAL METHOD BLD: ABNORMAL
EOSINOPHIL # BLD: 0.03 K/UL (ref 0–0.4)
EOSINOPHIL NFR BLD: 1 % (ref 0–7)
EPITH CASTS URNS QL MICRO: ABNORMAL /LPF
ERYTHROCYTE [DISTWIDTH] IN BLOOD BY AUTOMATED COUNT: 16.6 % (ref 11.5–14.5)
GGT SERPL-CCNC: 84 U/L (ref 5–55)
GLOBULIN SER CALC-MCNC: 3.4 G/DL (ref 2–4)
GLUCOSE SERPL-MCNC: 144 MG/DL (ref 65–100)
GLUCOSE UR STRIP.AUTO-MCNC: NEGATIVE MG/DL
HCG SERPL QL: NEGATIVE
HCT VFR BLD AUTO: 33.2 % (ref 35–47)
HGB BLD-MCNC: 10.1 G/DL (ref 11.5–16)
HGB UR QL STRIP: NEGATIVE
HYALINE CASTS URNS QL MICRO: ABNORMAL /LPF (ref 0–5)
IMM GRANULOCYTES # BLD AUTO: 0 K/UL
IMM GRANULOCYTES NFR BLD AUTO: 0 %
KETONES UR QL STRIP.AUTO: NEGATIVE MG/DL
LDH SERPL L TO P-CCNC: 314 U/L (ref 81–246)
LEUKOCYTE ESTERASE UR QL STRIP.AUTO: ABNORMAL
LIPASE SERPL-CCNC: 31 U/L (ref 13–75)
LYMPHOCYTES # BLD: 1.15 K/UL (ref 0.8–3.5)
LYMPHOCYTES NFR BLD: 37 % (ref 12–49)
MAGNESIUM SERPL-MCNC: 1.9 MG/DL (ref 1.6–2.4)
MCH RBC QN AUTO: 30 PG (ref 26–34)
MCHC RBC AUTO-ENTMCNC: 30.4 G/DL (ref 30–36.5)
MCV RBC AUTO: 98.5 FL (ref 80–99)
MONOCYTES # BLD: 0.09 K/UL (ref 0–1)
MONOCYTES NFR BLD: 3 % (ref 5–13)
NEUTS SEG # BLD: 1.83 K/UL (ref 1.8–8)
NEUTS SEG NFR BLD: 59 % (ref 32–75)
NITRITE UR QL STRIP.AUTO: NEGATIVE
NRBC # BLD: 0 K/UL (ref 0–0.01)
NRBC BLD-RTO: 0 PER 100 WBC
PH UR STRIP: 5.5 (ref 5–8)
PLATELET # BLD AUTO: 190 K/UL (ref 150–400)
PMV BLD AUTO: 9.9 FL (ref 8.9–12.9)
POTASSIUM SERPL-SCNC: 4.6 MMOL/L (ref 3.5–5.1)
PROT SERPL-MCNC: 6.8 G/DL (ref 6.4–8.2)
PROT UR STRIP-MCNC: NEGATIVE MG/DL
RBC # BLD AUTO: 3.37 M/UL (ref 3.8–5.2)
RBC #/AREA URNS HPF: ABNORMAL /HPF (ref 0–5)
RBC MORPH BLD: ABNORMAL
SODIUM SERPL-SCNC: 138 MMOL/L (ref 136–145)
SP GR UR REFRACTOMETRY: 1.02 (ref 1–1.03)
URINE CULTURE IF INDICATED: ABNORMAL
UROBILINOGEN UR QL STRIP.AUTO: 0.2 EU/DL (ref 0.2–1)
WBC # BLD AUTO: 3.1 K/UL (ref 3.6–11)
WBC URNS QL MICRO: ABNORMAL /HPF (ref 0–4)

## 2025-07-01 PROCEDURE — 84703 CHORIONIC GONADOTROPIN ASSAY: CPT

## 2025-07-01 PROCEDURE — 83721 ASSAY OF BLOOD LIPOPROTEIN: CPT

## 2025-07-01 PROCEDURE — 82248 BILIRUBIN DIRECT: CPT

## 2025-07-01 PROCEDURE — 80053 COMPREHEN METABOLIC PANEL: CPT

## 2025-07-01 PROCEDURE — 86140 C-REACTIVE PROTEIN: CPT

## 2025-07-01 PROCEDURE — 87086 URINE CULTURE/COLONY COUNT: CPT

## 2025-07-01 PROCEDURE — 83690 ASSAY OF LIPASE: CPT

## 2025-07-01 PROCEDURE — 81001 URINALYSIS AUTO W/SCOPE: CPT

## 2025-07-01 PROCEDURE — 82977 ASSAY OF GGT: CPT

## 2025-07-01 PROCEDURE — 83735 ASSAY OF MAGNESIUM: CPT

## 2025-07-01 PROCEDURE — 82150 ASSAY OF AMYLASE: CPT

## 2025-07-01 PROCEDURE — 36591 DRAW BLOOD OFF VENOUS DEVICE: CPT

## 2025-07-01 PROCEDURE — 80061 LIPID PANEL: CPT

## 2025-07-01 PROCEDURE — 85025 COMPLETE CBC W/AUTO DIFF WBC: CPT

## 2025-07-01 PROCEDURE — 83615 LACTATE (LD) (LDH) ENZYME: CPT

## 2025-07-01 NOTE — PROGRESS NOTES
[facility]                 Date: [facility]  Name: Alea Lindsay  MRN: [id number]       : 1990    Pt admit to [facility] for Pre-treatment Labs   No acute distress noted. Patient is ambulatory and in stable condition.   Denies flu-like symptoms. No immediate needs or concerns for this visit.     Chest port accessed today 25 with positive blood return.   Labs ordered/ drawn through port. See Epic Results Review for details.  Port flushed with NS and remains accessed for treatment tomorrow. CHG patch with special dressing placed to port site. New alcohol-infused cap placed at end of catheter.     Patient aware of upcoming appointment. Patient declined post- monitoring time. Education provided.     Future Appointments   Date Time Provider Department Center   2025 11:00 AM [facility]   2025 11:15 AM Juanito Murillo MD ONCSF BS AMB   2025  7:30 AM RADHA FASTTRACK 1 [facility]   2025  1:00 PM [facility]   2025  1:30 PM Grace Vasquez APRN - NP ONCSF BS AMB   7/15/2025  8:00 AM RADHA FASTTRACK 3 [facility]   2025  1:00 PM [facility]   2025  1:30 PM Grace Vasquez APRN - NP ONCSF BS AMB   2025 10:30 AM [facility] NM INJ RM 1 [facility]   2025  1:30 PM [facility] NM RM 1 [facility]   2025  8:30 AM [facility] CT ER 3 [facility]     Angela Garvey RN  2025

## 2025-07-02 ENCOUNTER — RESEARCH ENCOUNTER (OUTPATIENT)
Age: 35
End: 2025-07-02

## 2025-07-02 ENCOUNTER — APPOINTMENT (OUTPATIENT)
Facility: HOSPITAL | Age: 35
End: 2025-07-02
Payer: COMMERCIAL

## 2025-07-02 ENCOUNTER — OFFICE VISIT (OUTPATIENT)
Age: 35
End: 2025-07-02

## 2025-07-02 ENCOUNTER — HOSPITAL ENCOUNTER (OUTPATIENT)
Facility: HOSPITAL | Age: 35
Setting detail: INFUSION SERIES
Discharge: HOME | End: 2025-07-02
Payer: COMMERCIAL

## 2025-07-02 VITALS
OXYGEN SATURATION: 100 % | SYSTOLIC BLOOD PRESSURE: 136 MMHG | HEART RATE: 89 BPM | RESPIRATION RATE: 18 BRPM | DIASTOLIC BLOOD PRESSURE: 99 MMHG | TEMPERATURE: 97.6 F

## 2025-07-02 VITALS
BODY MASS INDEX: 35.7 KG/M2 | SYSTOLIC BLOOD PRESSURE: 132 MMHG | OXYGEN SATURATION: 100 % | HEIGHT: 62 IN | HEART RATE: 89 BPM | TEMPERATURE: 97.6 F | DIASTOLIC BLOOD PRESSURE: 84 MMHG | WEIGHT: 194 LBS

## 2025-07-02 DIAGNOSIS — Z51.12 ENCOUNTER FOR ANTINEOPLASTIC IMMUNOTHERAPY: ICD-10-CM

## 2025-07-02 DIAGNOSIS — C50.912 CARCINOMA OF LEFT BREAST METASTATIC TO BONE (HCC): ICD-10-CM

## 2025-07-02 DIAGNOSIS — T45.1X5A CHEMOTHERAPY INDUCED NEUTROPENIA: ICD-10-CM

## 2025-07-02 DIAGNOSIS — C79.51 CARCINOMA OF LEFT BREAST METASTATIC TO BONE (HCC): Primary | ICD-10-CM

## 2025-07-02 DIAGNOSIS — C50.919 MALIGNANT NEOPLASM OF BREAST METASTATIC TO BONE (HCC): Primary | ICD-10-CM

## 2025-07-02 DIAGNOSIS — D70.1 CHEMOTHERAPY INDUCED NEUTROPENIA: ICD-10-CM

## 2025-07-02 DIAGNOSIS — C79.51 MALIGNANT NEOPLASM OF BREAST METASTATIC TO BONE (HCC): Primary | ICD-10-CM

## 2025-07-02 DIAGNOSIS — E83.51 HYPOCALCEMIA: Primary | ICD-10-CM

## 2025-07-02 DIAGNOSIS — C79.51 CARCINOMA OF LEFT BREAST METASTATIC TO BONE (HCC): ICD-10-CM

## 2025-07-02 DIAGNOSIS — C50.912 CARCINOMA OF LEFT BREAST METASTATIC TO BONE (HCC): Primary | ICD-10-CM

## 2025-07-02 LAB
BACTERIA SPEC CULT: NORMAL
CHOLEST SERPL-MCNC: 234 MG/DL
HDLC SERPL-MCNC: 34 MG/DL
HDLC SERPL: 6.9 (ref 0–5)
LDLC SERPL CALC-MCNC: ABNORMAL MG/DL (ref 0–100)
LDLC SERPL DIRECT ASSAY-MCNC: NORMAL MG/DL
SERVICE CMNT-IMP: NORMAL
TRIGL SERPL-MCNC: 431 MG/DL
VLDLC SERPL CALC-MCNC: ABNORMAL MG/DL

## 2025-07-02 PROCEDURE — 6360000002 HC RX W HCPCS: Performed by: INTERNAL MEDICINE

## 2025-07-02 PROCEDURE — 2500000003 HC RX 250 WO HCPCS: Performed by: INTERNAL MEDICINE

## 2025-07-02 PROCEDURE — 96413 CHEMO IV INFUSION 1 HR: CPT

## 2025-07-02 PROCEDURE — 2580000003 HC RX 258: Performed by: INTERNAL MEDICINE

## 2025-07-02 PROCEDURE — 96375 TX/PRO/DX INJ NEW DRUG ADDON: CPT

## 2025-07-02 PROCEDURE — 96417 CHEMO IV INFUS EACH ADDL SEQ: CPT

## 2025-07-02 RX ORDER — ONDANSETRON 2 MG/ML
8 INJECTION INTRAMUSCULAR; INTRAVENOUS
OUTPATIENT
Start: 2025-07-09

## 2025-07-02 RX ORDER — MEPERIDINE HYDROCHLORIDE 25 MG/ML
12.5 INJECTION INTRAMUSCULAR; INTRAVENOUS; SUBCUTANEOUS PRN
OUTPATIENT
Start: 2025-07-16

## 2025-07-02 RX ORDER — FAMOTIDINE 10 MG/ML
20 INJECTION, SOLUTION INTRAVENOUS ONCE
OUTPATIENT
Start: 2025-07-16 | End: 2025-07-16

## 2025-07-02 RX ORDER — SODIUM CHLORIDE 9 MG/ML
5-250 INJECTION, SOLUTION INTRAVENOUS PRN
OUTPATIENT
Start: 2025-07-16

## 2025-07-02 RX ORDER — DEXAMETHASONE SODIUM PHOSPHATE 10 MG/ML
10 INJECTION, SOLUTION INTRA-ARTICULAR; INTRALESIONAL; INTRAMUSCULAR; INTRAVENOUS; SOFT TISSUE ONCE
OUTPATIENT
Start: 2025-07-16 | End: 2025-07-16

## 2025-07-02 RX ORDER — SODIUM CHLORIDE 9 MG/ML
5-250 INJECTION, SOLUTION INTRAVENOUS PRN
OUTPATIENT
Start: 2025-07-09

## 2025-07-02 RX ORDER — SODIUM CHLORIDE 0.9 % (FLUSH) 0.9 %
5-40 SYRINGE (ML) INJECTION PRN
OUTPATIENT
Start: 2025-07-09

## 2025-07-02 RX ORDER — DEXAMETHASONE SODIUM PHOSPHATE 10 MG/ML
10 INJECTION, SOLUTION INTRA-ARTICULAR; INTRALESIONAL; INTRAMUSCULAR; INTRAVENOUS; SOFT TISSUE ONCE
Status: COMPLETED | OUTPATIENT
Start: 2025-07-02 | End: 2025-07-02

## 2025-07-02 RX ORDER — ACETAMINOPHEN 325 MG/1
650 TABLET ORAL
OUTPATIENT
Start: 2025-07-09

## 2025-07-02 RX ORDER — ALBUTEROL SULFATE 90 UG/1
4 INHALANT RESPIRATORY (INHALATION) PRN
OUTPATIENT
Start: 2025-07-16

## 2025-07-02 RX ORDER — ALBUTEROL SULFATE 90 UG/1
4 INHALANT RESPIRATORY (INHALATION) PRN
OUTPATIENT
Start: 2025-07-09

## 2025-07-02 RX ORDER — HEPARIN 100 UNIT/ML
500 SYRINGE INTRAVENOUS PRN
OUTPATIENT
Start: 2025-07-16

## 2025-07-02 RX ORDER — FILGRASTIM-AAFI 480 UG/.8ML
INJECTION, SOLUTION SUBCUTANEOUS
Qty: 9 EACH | Refills: 3 | Status: ACTIVE | OUTPATIENT
Start: 2025-07-02

## 2025-07-02 RX ORDER — SODIUM CHLORIDE 9 MG/ML
5-250 INJECTION, SOLUTION INTRAVENOUS PRN
Status: DISCONTINUED | OUTPATIENT
Start: 2025-07-02 | End: 2025-07-03 | Stop reason: HOSPADM

## 2025-07-02 RX ORDER — ONDANSETRON 2 MG/ML
8 INJECTION INTRAMUSCULAR; INTRAVENOUS
OUTPATIENT
Start: 2025-07-16

## 2025-07-02 RX ORDER — DIPHENHYDRAMINE HYDROCHLORIDE 50 MG/ML
25 INJECTION, SOLUTION INTRAMUSCULAR; INTRAVENOUS ONCE
OUTPATIENT
Start: 2025-07-09 | End: 2025-07-09

## 2025-07-02 RX ORDER — FAMOTIDINE 10 MG/ML
20 INJECTION, SOLUTION INTRAVENOUS ONCE
Status: COMPLETED | OUTPATIENT
Start: 2025-07-02 | End: 2025-07-02

## 2025-07-02 RX ORDER — DIPHENHYDRAMINE HYDROCHLORIDE 50 MG/ML
50 INJECTION, SOLUTION INTRAMUSCULAR; INTRAVENOUS
OUTPATIENT
Start: 2025-07-16

## 2025-07-02 RX ORDER — SODIUM CHLORIDE 9 MG/ML
INJECTION, SOLUTION INTRAVENOUS CONTINUOUS
OUTPATIENT
Start: 2025-07-09

## 2025-07-02 RX ORDER — FAMOTIDINE 10 MG/ML
20 INJECTION, SOLUTION INTRAVENOUS
OUTPATIENT
Start: 2025-07-16

## 2025-07-02 RX ORDER — HYDROCORTISONE SODIUM SUCCINATE 100 MG/2ML
100 INJECTION INTRAMUSCULAR; INTRAVENOUS
OUTPATIENT
Start: 2025-07-16

## 2025-07-02 RX ORDER — EPINEPHRINE 1 MG/ML
0.3 INJECTION, SOLUTION INTRAMUSCULAR; SUBCUTANEOUS PRN
OUTPATIENT
Start: 2025-07-16

## 2025-07-02 RX ORDER — DIPHENHYDRAMINE HYDROCHLORIDE 50 MG/ML
25 INJECTION, SOLUTION INTRAMUSCULAR; INTRAVENOUS ONCE
OUTPATIENT
Start: 2025-07-16 | End: 2025-07-16

## 2025-07-02 RX ORDER — DEXAMETHASONE SODIUM PHOSPHATE 10 MG/ML
10 INJECTION, SOLUTION INTRA-ARTICULAR; INTRALESIONAL; INTRAMUSCULAR; INTRAVENOUS; SOFT TISSUE ONCE
OUTPATIENT
Start: 2025-07-09 | End: 2025-07-09

## 2025-07-02 RX ORDER — HEPARIN 100 UNIT/ML
500 SYRINGE INTRAVENOUS PRN
OUTPATIENT
Start: 2025-07-09

## 2025-07-02 RX ORDER — SODIUM CHLORIDE 0.9 % (FLUSH) 0.9 %
5-40 SYRINGE (ML) INJECTION PRN
OUTPATIENT
Start: 2025-07-16

## 2025-07-02 RX ORDER — DIPHENHYDRAMINE HYDROCHLORIDE 50 MG/ML
25 INJECTION, SOLUTION INTRAMUSCULAR; INTRAVENOUS ONCE
Status: COMPLETED | OUTPATIENT
Start: 2025-07-02 | End: 2025-07-02

## 2025-07-02 RX ORDER — DIPHENHYDRAMINE HYDROCHLORIDE 50 MG/ML
50 INJECTION, SOLUTION INTRAMUSCULAR; INTRAVENOUS
OUTPATIENT
Start: 2025-07-09

## 2025-07-02 RX ORDER — FAMOTIDINE 10 MG/ML
20 INJECTION, SOLUTION INTRAVENOUS ONCE
OUTPATIENT
Start: 2025-07-09 | End: 2025-07-09

## 2025-07-02 RX ORDER — HYDROCORTISONE SODIUM SUCCINATE 100 MG/2ML
100 INJECTION INTRAMUSCULAR; INTRAVENOUS
OUTPATIENT
Start: 2025-07-09

## 2025-07-02 RX ORDER — FAMOTIDINE 10 MG/ML
20 INJECTION, SOLUTION INTRAVENOUS
OUTPATIENT
Start: 2025-07-09

## 2025-07-02 RX ORDER — SODIUM CHLORIDE 9 MG/ML
INJECTION, SOLUTION INTRAVENOUS CONTINUOUS
OUTPATIENT
Start: 2025-07-16

## 2025-07-02 RX ORDER — MEPERIDINE HYDROCHLORIDE 25 MG/ML
12.5 INJECTION INTRAMUSCULAR; INTRAVENOUS; SUBCUTANEOUS PRN
OUTPATIENT
Start: 2025-07-09

## 2025-07-02 RX ORDER — EPINEPHRINE 1 MG/ML
0.3 INJECTION, SOLUTION INTRAMUSCULAR; SUBCUTANEOUS PRN
OUTPATIENT
Start: 2025-07-09

## 2025-07-02 RX ORDER — SODIUM CHLORIDE 0.9 % (FLUSH) 0.9 %
5-40 SYRINGE (ML) INJECTION PRN
Status: DISCONTINUED | OUTPATIENT
Start: 2025-07-02 | End: 2025-07-03 | Stop reason: HOSPADM

## 2025-07-02 RX ORDER — ACETAMINOPHEN 325 MG/1
650 TABLET ORAL
OUTPATIENT
Start: 2025-07-16

## 2025-07-02 RX ADMIN — DEXAMETHASONE SODIUM PHOSPHATE 10 MG: 10 INJECTION INTRAMUSCULAR; INTRAVENOUS at 14:56

## 2025-07-02 RX ADMIN — SODIUM CHLORIDE, PRESERVATIVE FREE 20 ML: 5 INJECTION INTRAVENOUS at 16:41

## 2025-07-02 RX ADMIN — SODIUM CHLORIDE 25 ML/HR: 0.9 INJECTION, SOLUTION INTRAVENOUS at 13:33

## 2025-07-02 RX ADMIN — PACLITAXEL 186 MG: 6 INJECTION, SOLUTION, CONCENTRATE INTRAVENOUS at 15:36

## 2025-07-02 RX ADMIN — FAMOTIDINE 20 MG: 10 INJECTION INTRAVENOUS at 14:52

## 2025-07-02 RX ADMIN — Medication 1400 MG: at 13:37

## 2025-07-02 RX ADMIN — DIPHENHYDRAMINE HYDROCHLORIDE 25 MG: 50 INJECTION INTRAMUSCULAR; INTRAVENOUS at 14:55

## 2025-07-02 ASSESSMENT — PAIN SCALES - GENERAL: PAINLEVEL_OUTOF10: 0

## 2025-07-02 NOTE — PROGRESS NOTES
Chief Complaint   Patient presents with    Chemotherapy     /84   Pulse 89   Temp 97.6 °F (36.4 °C)   Ht 1.575 m (5' 2\")   Wt 88 kg (194 lb)   SpO2 100%   BMI 35.48 kg/m²   1. Have you been to the ER, urgent care clinic since your last visit?  Hospitalized since your last visit?No    2. Have you seen or consulted any other health care providers outside of the [facility] since your last visit?  Include any pap smears or colon screening. No    
no dysuria today, will await urine culture.     I appreciate the opportunity to participate in Ms. Alea Lindsay's care.    Signed By: BRETT Mcknight NP      No follow-ups on file.

## 2025-07-02 NOTE — PROGRESS NOTES
Patient in today for labs and follow-up visit with Dr. Murillo. Today is C2D1 on study SOC688-26.  Patient reports the following adverse events: gr 1 fatigue, gr 2 hair loss. Vital signs are stable.  QOLs completed per protocol.  Patient to receive paclitaxel + [id number]. Next appt is 2025.    Pre-dose EK, 1307, 1308  BNT start: 1337  BNT stop: 1437  Post-dose EK, 1444, 1445

## 2025-07-03 ENCOUNTER — APPOINTMENT (OUTPATIENT)
Facility: HOSPITAL | Age: 35
End: 2025-07-03
Payer: COMMERCIAL

## 2025-07-03 DIAGNOSIS — C79.51 CARCINOMA OF LEFT BREAST METASTATIC TO BONE (HCC): Primary | ICD-10-CM

## 2025-07-03 DIAGNOSIS — D70.1 CHEMOTHERAPY INDUCED NEUTROPENIA: ICD-10-CM

## 2025-07-03 DIAGNOSIS — T45.1X5A CHEMOTHERAPY INDUCED NEUTROPENIA: ICD-10-CM

## 2025-07-03 DIAGNOSIS — C50.912 CARCINOMA OF LEFT BREAST METASTATIC TO BONE (HCC): Primary | ICD-10-CM

## 2025-07-04 ENCOUNTER — HOSPITAL ENCOUNTER (OUTPATIENT)
Facility: HOSPITAL | Age: 35
Setting detail: INFUSION SERIES
Discharge: HOME | End: 2025-07-04
Payer: COMMERCIAL

## 2025-07-04 VITALS
RESPIRATION RATE: 18 BRPM | DIASTOLIC BLOOD PRESSURE: 103 MMHG | HEART RATE: 90 BPM | TEMPERATURE: 98 F | SYSTOLIC BLOOD PRESSURE: 132 MMHG | OXYGEN SATURATION: 99 %

## 2025-07-04 DIAGNOSIS — C50.912 CARCINOMA OF LEFT BREAST METASTATIC TO BONE (HCC): Primary | ICD-10-CM

## 2025-07-04 DIAGNOSIS — C79.51 CARCINOMA OF LEFT BREAST METASTATIC TO BONE (HCC): Primary | ICD-10-CM

## 2025-07-04 DIAGNOSIS — E83.51 HYPOCALCEMIA: ICD-10-CM

## 2025-07-04 PROCEDURE — 96372 THER/PROPH/DIAG INJ SC/IM: CPT

## 2025-07-04 PROCEDURE — 6360000002 HC RX W HCPCS: Performed by: INTERNAL MEDICINE

## 2025-07-04 ASSESSMENT — PAIN SCALES - GENERAL: PAINLEVEL_OUTOF10: 0

## 2025-07-04 NOTE — PROGRESS NOTES
[facility] Progress Note    Date: July 4, 2025        0800: Pt arrived ambulatory to [facility] for Neulasta Injection in stable condition.  Assessment completed.  Criteria for treatment was met.    Patient Vitals for the past 12 hrs:   Temp Pulse Resp BP SpO2   07/04/25 0745 98 °F (36.7 °C) 90 18 (!) 132/103 99 %         Medications Administered         filgrastim-sndz (ZARXIO) injection 480 mcg Admin Date  07/04/2025 Action  Given Dose  480 mcg Route  SubCUTAneous Documented By  Martha Waggoner, RN         Given to left arm      Ms. Lindsay tolerated the injection, and had no complaints.      Ms. Lindsay was discharged [facility] in stable condition. Patient is aware of next scheduled [facility] appointment 7/8/25 at 0730.         Future Appointments   Date Time Provider Department Center   7/8/2025  7:30 AM RADHA FASTTRACK 1 [facility]   7/9/2025  1:00 PM SS CHEMO CHAIR 2 MIDLO INF [facility]   7/9/2025  1:30 PM Grace Vasquez APRN - NP ONCSF [facility]   7/15/2025  8:00 AM RADHA FASTTRACK 3 [facility]   7/16/2025  1:00 PM SS CHEMO CHAIR 21 [facility] INF [facility]   7/16/2025  1:30 PM Grace Vasquez APRN - NP ONCSF [facility]   7/22/2025 10:30 AM [facility] NM INJ RM 1 [facility]   7/22/2025  1:30 PM [facility] NM RM 1 [facility]   7/22/2025  3:30 PM [facility] CT ER 3 [facility]         MARTHA WAGGONER RN, RN  July 4, 2025

## 2025-07-08 ENCOUNTER — HOSPITAL ENCOUNTER (OUTPATIENT)
Facility: HOSPITAL | Age: 35
Setting detail: INFUSION SERIES
Discharge: HOME | End: 2025-07-08
Payer: COMMERCIAL

## 2025-07-08 DIAGNOSIS — C79.51 CARCINOMA OF LEFT BREAST METASTATIC TO BONE (HCC): ICD-10-CM

## 2025-07-08 DIAGNOSIS — C50.912 CARCINOMA OF LEFT BREAST METASTATIC TO BONE (HCC): ICD-10-CM

## 2025-07-08 DIAGNOSIS — E83.51 HYPOCALCEMIA: ICD-10-CM

## 2025-07-08 LAB
ALBUMIN SERPL-MCNC: 3.2 G/DL (ref 3.5–5)
ALBUMIN/GLOB SERPL: 1.1 (ref 1.1–2.2)
ALP SERPL-CCNC: 107 U/L (ref 45–117)
ALT SERPL-CCNC: 80 U/L (ref 12–78)
AMYLASE SERPL-CCNC: 30 U/L (ref 25–115)
ANION GAP SERPL CALC-SCNC: 6 MMOL/L (ref 2–12)
AST SERPL-CCNC: 41 U/L (ref 15–37)
BASOPHILS # BLD: 0.04 K/UL (ref 0–0.1)
BASOPHILS NFR BLD: 1 % (ref 0–1)
BILIRUB DIRECT SERPL-MCNC: 0.1 MG/DL (ref 0–0.2)
BILIRUB SERPL-MCNC: 0.4 MG/DL (ref 0.2–1)
BUN SERPL-MCNC: 16 MG/DL (ref 6–20)
BUN/CREAT SERPL: 23 (ref 12–20)
CALCIUM SERPL-MCNC: 8.4 MG/DL (ref 8.5–10.1)
CHLORIDE SERPL-SCNC: 110 MMOL/L (ref 97–108)
CHOLEST SERPL-MCNC: 191 MG/DL
CO2 SERPL-SCNC: 25 MMOL/L (ref 21–32)
CREAT SERPL-MCNC: 0.7 MG/DL (ref 0.55–1.02)
CRP SERPL-MCNC: 0.77 MG/DL (ref 0–0.3)
DIFFERENTIAL METHOD BLD: ABNORMAL
EOSINOPHIL # BLD: 0.04 K/UL (ref 0–0.4)
EOSINOPHIL NFR BLD: 1 % (ref 0–7)
ERYTHROCYTE [DISTWIDTH] IN BLOOD BY AUTOMATED COUNT: 16.3 % (ref 11.5–14.5)
GGT SERPL-CCNC: 63 U/L (ref 5–55)
GLOBULIN SER CALC-MCNC: 2.8 G/DL (ref 2–4)
GLUCOSE SERPL-MCNC: 107 MG/DL (ref 65–100)
HCT VFR BLD AUTO: 30.8 % (ref 35–47)
HDLC SERPL-MCNC: 40 MG/DL
HDLC SERPL: 4.8 (ref 0–5)
HGB BLD-MCNC: 9.5 G/DL (ref 11.5–16)
IMM GRANULOCYTES # BLD AUTO: 0 K/UL
IMM GRANULOCYTES NFR BLD AUTO: 0 %
LDH SERPL L TO P-CCNC: 212 U/L (ref 81–246)
LDLC SERPL CALC-MCNC: 96 MG/DL (ref 0–100)
LIPASE SERPL-CCNC: 26 U/L (ref 13–75)
LYMPHOCYTES # BLD: 1.59 K/UL (ref 0.8–3.5)
LYMPHOCYTES NFR BLD: 37 % (ref 12–49)
MAGNESIUM SERPL-MCNC: 2.2 MG/DL (ref 1.6–2.4)
MCH RBC QN AUTO: 30.1 PG (ref 26–34)
MCHC RBC AUTO-ENTMCNC: 30.8 G/DL (ref 30–36.5)
MCV RBC AUTO: 97.5 FL (ref 80–99)
METAMYELOCYTES NFR BLD MANUAL: 3 %
MONOCYTES # BLD: 0.3 K/UL (ref 0–1)
MONOCYTES NFR BLD: 7 % (ref 5–13)
MYELOCYTES NFR BLD MANUAL: 2 %
NEUTS SEG # BLD: 2.11 K/UL (ref 1.8–8)
NEUTS SEG NFR BLD: 49 % (ref 32–75)
NRBC # BLD: 0.03 K/UL (ref 0–0.01)
NRBC BLD-RTO: 0.7 PER 100 WBC
PLATELET # BLD AUTO: 188 K/UL (ref 150–400)
PMV BLD AUTO: 9.8 FL (ref 8.9–12.9)
POTASSIUM SERPL-SCNC: 4.2 MMOL/L (ref 3.5–5.1)
PROT SERPL-MCNC: 6 G/DL (ref 6.4–8.2)
RBC # BLD AUTO: 3.16 M/UL (ref 3.8–5.2)
RBC MORPH BLD: ABNORMAL
SODIUM SERPL-SCNC: 141 MMOL/L (ref 136–145)
TRIGL SERPL-MCNC: 275 MG/DL
VLDLC SERPL CALC-MCNC: 55 MG/DL
WBC # BLD AUTO: 4.3 K/UL (ref 3.6–11)

## 2025-07-08 PROCEDURE — 82977 ASSAY OF GGT: CPT

## 2025-07-08 PROCEDURE — 80053 COMPREHEN METABOLIC PANEL: CPT

## 2025-07-08 PROCEDURE — 36591 DRAW BLOOD OFF VENOUS DEVICE: CPT

## 2025-07-08 PROCEDURE — 83615 LACTATE (LD) (LDH) ENZYME: CPT

## 2025-07-08 PROCEDURE — 86140 C-REACTIVE PROTEIN: CPT

## 2025-07-08 PROCEDURE — 82248 BILIRUBIN DIRECT: CPT

## 2025-07-08 PROCEDURE — 85025 COMPLETE CBC W/AUTO DIFF WBC: CPT

## 2025-07-08 PROCEDURE — 82150 ASSAY OF AMYLASE: CPT

## 2025-07-08 PROCEDURE — 83735 ASSAY OF MAGNESIUM: CPT

## 2025-07-08 PROCEDURE — 80061 LIPID PANEL: CPT

## 2025-07-08 PROCEDURE — 83690 ASSAY OF LIPASE: CPT

## 2025-07-08 RX ORDER — FILGRASTIM-AAFI 480 UG/.8ML
INJECTION, SOLUTION SUBCUTANEOUS
Qty: 9 EACH | Refills: 3 | Status: ACTIVE | OUTPATIENT
Start: 2025-07-08

## 2025-07-08 NOTE — PROGRESS NOTES
[facility] Short Note                   Pt admit to [facility] for pre-treatment labs ambulatory in stable condition.   Port accessed with positive blood return. Labs drawn and sent for processing. Port flushed/capped for treatment tomorrow.    Ms. Lindsay was discharged [facility] in stable condition.     Future Appointments   Date Time Provider Department Center   7/9/2025  1:00 PM SS CHEMO CHAIR 2 [facility]   7/9/2025  1:30 PM Grace Vasquez APRN - NP ONCSF BS [facility]   7/15/2025  8:00 AM RADHA FASTTRACK 3 [facility]   7/16/2025  1:00 PM SS CHEMO CHAIR 21 [facility]   7/16/2025  1:30 PM Grace Vasquez APRN - NP ONCSF BS [facility]   7/22/2025 10:30 AM [facility] NM INJ RM 1 [facility]   7/22/2025  1:30 PM [facility] NM RM 1 [facility]   7/22/2025  3:30 PM [facility] CT ER 3 [facility]     TIA VALLEJO RN  July 8, 2025  7:59 AM

## 2025-07-09 ENCOUNTER — HOSPITAL ENCOUNTER (OUTPATIENT)
Facility: HOSPITAL | Age: 35
Setting detail: INFUSION SERIES
Discharge: HOME | End: 2025-07-09
Payer: COMMERCIAL

## 2025-07-09 ENCOUNTER — RESEARCH ENCOUNTER (OUTPATIENT)
Age: 35
End: 2025-07-09

## 2025-07-09 VITALS
TEMPERATURE: 97.3 F | HEIGHT: 62 IN | BODY MASS INDEX: 38.09 KG/M2 | HEART RATE: 99 BPM | OXYGEN SATURATION: 99 % | SYSTOLIC BLOOD PRESSURE: 125 MMHG | WEIGHT: 207 LBS | DIASTOLIC BLOOD PRESSURE: 65 MMHG | RESPIRATION RATE: 18 BRPM

## 2025-07-09 DIAGNOSIS — E83.51 HYPOCALCEMIA: ICD-10-CM

## 2025-07-09 DIAGNOSIS — C79.51 CARCINOMA OF LEFT BREAST METASTATIC TO BONE (HCC): Primary | ICD-10-CM

## 2025-07-09 DIAGNOSIS — C50.912 CARCINOMA OF LEFT BREAST METASTATIC TO BONE (HCC): Primary | ICD-10-CM

## 2025-07-09 PROCEDURE — 96375 TX/PRO/DX INJ NEW DRUG ADDON: CPT

## 2025-07-09 PROCEDURE — 96413 CHEMO IV INFUSION 1 HR: CPT

## 2025-07-09 PROCEDURE — 6360000002 HC RX W HCPCS: Performed by: INTERNAL MEDICINE

## 2025-07-09 PROCEDURE — 2580000003 HC RX 258: Performed by: INTERNAL MEDICINE

## 2025-07-09 RX ORDER — DEXAMETHASONE SODIUM PHOSPHATE 10 MG/ML
10 INJECTION, SOLUTION INTRA-ARTICULAR; INTRALESIONAL; INTRAMUSCULAR; INTRAVENOUS; SOFT TISSUE ONCE
Status: COMPLETED | OUTPATIENT
Start: 2025-07-09 | End: 2025-07-09

## 2025-07-09 RX ORDER — FAMOTIDINE 10 MG/ML
20 INJECTION, SOLUTION INTRAVENOUS ONCE
Status: COMPLETED | OUTPATIENT
Start: 2025-07-09 | End: 2025-07-09

## 2025-07-09 RX ORDER — DIPHENHYDRAMINE HYDROCHLORIDE 50 MG/ML
25 INJECTION, SOLUTION INTRAMUSCULAR; INTRAVENOUS ONCE
Status: COMPLETED | OUTPATIENT
Start: 2025-07-09 | End: 2025-07-09

## 2025-07-09 RX ORDER — SODIUM CHLORIDE 9 MG/ML
5-250 INJECTION, SOLUTION INTRAVENOUS PRN
Status: DISCONTINUED | OUTPATIENT
Start: 2025-07-09 | End: 2025-07-10 | Stop reason: HOSPADM

## 2025-07-09 RX ADMIN — DIPHENHYDRAMINE HYDROCHLORIDE 25 MG: 50 INJECTION INTRAMUSCULAR; INTRAVENOUS at 14:00

## 2025-07-09 RX ADMIN — PACLITAXEL 186 MG: 6 INJECTION, SOLUTION INTRAVENOUS at 14:49

## 2025-07-09 RX ADMIN — DEXAMETHASONE SODIUM PHOSPHATE 10 MG: 10 INJECTION INTRAMUSCULAR; INTRAVENOUS at 13:38

## 2025-07-09 RX ADMIN — FAMOTIDINE 20 MG: 10 INJECTION INTRAVENOUS at 13:58

## 2025-07-09 ASSESSMENT — PAIN SCALES - GENERAL: PAINLEVEL_OUTOF10: 0

## 2025-07-09 NOTE — PROGRESS NOTES
[facility] Progress Note    Date: 2025    Name: Alea Lindsay    MRN: [id number]         : 1990    Ms. Lindsay Arrived ambulatory and in no distress for C2D8 of RES VA STZ388 (Taxol only today) Regimen.  Assessment was completed, no acute issues at this time, no new complaints voiced. Chest wall port remains accessed from 2025, + blood return noted.     Ms. Lindsay's vitals were reviewed.  Vitals:    25 1600   BP: 125/65   Pulse:    Resp:    Temp:    SpO2:        Lab results were obtained and reviewed for 2025.    Medications:  Medications Administered         dexAMETHasone (DECADRON) IntraVENous 10 mg Admin Date  2025 Action  Given Dose  10 mg Rate   Route  IntraVENous Documented By  Charis Orona RN        diphenhydrAMINE (BENADRYL) injection 25 mg Admin Date  2025 Action  Given Dose  25 mg Rate   Route  IntraVENous Documented By  Charis Orona RN        famotidine (PEPCID) injection 20 mg Admin Date  2025 Action  Given Dose  20 mg Rate   Route  IntraVENous Documented By  Charis Orona, RN        PACLitaxel (TAXOL) 186 mg in sodium chloride 0.9 % 250 mL chemo infusion Admin Date  2025 Action  New Bag Dose  186 mg Rate  306 mL/hr Route  IntraVENous Documented By  Charis Orona, RN            Two nurses verified prior to administering: Drug name, drug dose, infusion volume or drug volume when prepared in a syringe, rate of administration, route of administration,  expiration dates and/or times, appearance and physical integrity of the drugs, rate set on infusion pump, when used sequencing of drug administration.     Potential side effects and signs/symptoms of reaction for treatment regimen were discussed. Additionally, when patient should call/notify MD was reviewed.    Ms. Lindsay tolerated treatment well and was discharged [facility] in stable condition.   Port de-accessed, flushed per protocol. Patient is

## 2025-07-10 ENCOUNTER — APPOINTMENT (OUTPATIENT)
Facility: HOSPITAL | Age: 35
End: 2025-07-10
Payer: COMMERCIAL

## 2025-07-15 ENCOUNTER — HOSPITAL ENCOUNTER (OUTPATIENT)
Facility: HOSPITAL | Age: 35
Setting detail: INFUSION SERIES
Discharge: HOME | End: 2025-07-15
Payer: COMMERCIAL

## 2025-07-15 DIAGNOSIS — E83.51 HYPOCALCEMIA: ICD-10-CM

## 2025-07-15 DIAGNOSIS — C50.912 CARCINOMA OF LEFT BREAST METASTATIC TO BONE (HCC): ICD-10-CM

## 2025-07-15 DIAGNOSIS — C79.51 CARCINOMA OF LEFT BREAST METASTATIC TO BONE (HCC): ICD-10-CM

## 2025-07-15 LAB
ALBUMIN SERPL-MCNC: 3.5 G/DL (ref 3.5–5)
ALBUMIN/GLOB SERPL: 1.3 (ref 1.1–2.2)
ALP SERPL-CCNC: 145 U/L (ref 45–117)
ALT SERPL-CCNC: 71 U/L (ref 12–78)
AMYLASE SERPL-CCNC: 35 U/L (ref 25–115)
ANION GAP SERPL CALC-SCNC: 7 MMOL/L (ref 2–12)
APPEARANCE UR: CLEAR
AST SERPL-CCNC: 25 U/L (ref 15–37)
BACTERIA URNS QL MICRO: ABNORMAL /HPF
BASOPHILS # BLD: 0 K/UL (ref 0–0.1)
BASOPHILS NFR BLD: 0 % (ref 0–1)
BILIRUB DIRECT SERPL-MCNC: <0.1 MG/DL (ref 0–0.2)
BILIRUB SERPL-MCNC: 0.4 MG/DL (ref 0.2–1)
BILIRUB UR QL: NEGATIVE
BUN SERPL-MCNC: 16 MG/DL (ref 6–20)
BUN/CREAT SERPL: 20 (ref 12–20)
CALCIUM SERPL-MCNC: 8.5 MG/DL (ref 8.5–10.1)
CHLORIDE SERPL-SCNC: 109 MMOL/L (ref 97–108)
CHOLEST SERPL-MCNC: 178 MG/DL
CO2 SERPL-SCNC: 26 MMOL/L (ref 21–32)
COLOR UR: ABNORMAL
CREAT SERPL-MCNC: 0.8 MG/DL (ref 0.55–1.02)
CRP SERPL-MCNC: 1.15 MG/DL (ref 0–0.3)
DIFFERENTIAL METHOD BLD: ABNORMAL
EOSINOPHIL # BLD: 0.06 K/UL (ref 0–0.4)
EOSINOPHIL NFR BLD: 1 % (ref 0–7)
EPITH CASTS URNS QL MICRO: ABNORMAL /LPF
ERYTHROCYTE [DISTWIDTH] IN BLOOD BY AUTOMATED COUNT: 16.7 % (ref 11.5–14.5)
GGT SERPL-CCNC: 64 U/L (ref 5–55)
GLOBULIN SER CALC-MCNC: 2.7 G/DL (ref 2–4)
GLUCOSE SERPL-MCNC: 91 MG/DL (ref 65–100)
GLUCOSE UR STRIP.AUTO-MCNC: NEGATIVE MG/DL
HCT VFR BLD AUTO: 32.2 % (ref 35–47)
HDLC SERPL-MCNC: 46 MG/DL
HDLC SERPL: 3.9 (ref 0–5)
HGB BLD-MCNC: 10 G/DL (ref 11.5–16)
HGB UR QL STRIP: NEGATIVE
HYALINE CASTS URNS QL MICRO: ABNORMAL /LPF (ref 0–5)
IMM GRANULOCYTES # BLD AUTO: 0 K/UL
IMM GRANULOCYTES NFR BLD AUTO: 0 %
KETONES UR QL STRIP.AUTO: NEGATIVE MG/DL
LDH SERPL L TO P-CCNC: 232 U/L (ref 81–246)
LDLC SERPL CALC-MCNC: 75.6 MG/DL (ref 0–100)
LEUKOCYTE ESTERASE UR QL STRIP.AUTO: ABNORMAL
LIPASE SERPL-CCNC: 29 U/L (ref 13–75)
LYMPHOCYTES # BLD: 1.48 K/UL (ref 0.8–3.5)
LYMPHOCYTES NFR BLD: 25 % (ref 12–49)
MAGNESIUM SERPL-MCNC: 1.9 MG/DL (ref 1.6–2.4)
MCH RBC QN AUTO: 30.7 PG (ref 26–34)
MCHC RBC AUTO-ENTMCNC: 31.1 G/DL (ref 30–36.5)
MCV RBC AUTO: 98.8 FL (ref 80–99)
METAMYELOCYTES NFR BLD MANUAL: 1 %
MONOCYTES # BLD: 0.35 K/UL (ref 0–1)
MONOCYTES NFR BLD: 6 % (ref 5–13)
NEUTS BAND NFR BLD MANUAL: 1 % (ref 0–6)
NEUTS SEG # BLD: 3.95 K/UL (ref 1.8–8)
NEUTS SEG NFR BLD: 66 % (ref 32–75)
NITRITE UR QL STRIP.AUTO: NEGATIVE
NRBC # BLD: 0.05 K/UL (ref 0–0.01)
NRBC BLD-RTO: 0.8 PER 100 WBC
PH UR STRIP: 6 (ref 5–8)
PLATELET # BLD AUTO: 184 K/UL (ref 150–400)
PMV BLD AUTO: 10.6 FL (ref 8.9–12.9)
POTASSIUM SERPL-SCNC: 4 MMOL/L (ref 3.5–5.1)
PROT SERPL-MCNC: 6.2 G/DL (ref 6.4–8.2)
PROT UR STRIP-MCNC: NEGATIVE MG/DL
RBC # BLD AUTO: 3.26 M/UL (ref 3.8–5.2)
RBC #/AREA URNS HPF: ABNORMAL /HPF (ref 0–5)
RBC MORPH BLD: ABNORMAL
RBC MORPH BLD: ABNORMAL
SODIUM SERPL-SCNC: 142 MMOL/L (ref 136–145)
SP GR UR REFRACTOMETRY: 1.02 (ref 1–1.03)
TRIGL SERPL-MCNC: 282 MG/DL
URINE CULTURE IF INDICATED: ABNORMAL
UROBILINOGEN UR QL STRIP.AUTO: 0.2 EU/DL (ref 0.2–1)
VLDLC SERPL CALC-MCNC: 56.4 MG/DL
WBC # BLD AUTO: 5.9 K/UL (ref 3.6–11)
WBC URNS QL MICRO: ABNORMAL /HPF (ref 0–4)

## 2025-07-15 PROCEDURE — 80061 LIPID PANEL: CPT

## 2025-07-15 PROCEDURE — 82150 ASSAY OF AMYLASE: CPT

## 2025-07-15 PROCEDURE — 80053 COMPREHEN METABOLIC PANEL: CPT

## 2025-07-15 PROCEDURE — 83615 LACTATE (LD) (LDH) ENZYME: CPT

## 2025-07-15 PROCEDURE — 81001 URINALYSIS AUTO W/SCOPE: CPT

## 2025-07-15 PROCEDURE — 86140 C-REACTIVE PROTEIN: CPT

## 2025-07-15 PROCEDURE — 83735 ASSAY OF MAGNESIUM: CPT

## 2025-07-15 PROCEDURE — 82248 BILIRUBIN DIRECT: CPT

## 2025-07-15 PROCEDURE — 85025 COMPLETE CBC W/AUTO DIFF WBC: CPT

## 2025-07-15 PROCEDURE — 82977 ASSAY OF GGT: CPT

## 2025-07-15 PROCEDURE — 83690 ASSAY OF LIPASE: CPT

## 2025-07-15 NOTE — PROGRESS NOTES
[facility] Short Note                   Pt admit to [facility] for pre-treatment labs ambulatory in stable condition.   Port accessed with positive blood return. Labs drawn and sent for processing. Port flushed/capped for treatment tomorrow.    Ms. Lindsay was discharged [facility] in stable condition.     Future Appointments   Date Time Provider Department Center   7/16/2025  1:00 PM SS CHEMO CHAIR 21 [facility] INF [facility]   7/16/2025  1:30 PM Grace Vasquez, APRN - NP ONCSF BS AMB   7/22/2025 10:30 AM [facility] NM INJ RM 1 [facility]   7/22/2025  1:30 PM [facility] NM RM 1 [facility]   7/22/2025  3:30 PM [facility] CT ER 3 [facility]     ALAINA SALAZAR RN  July 15, 2025  8:57 AM

## 2025-07-16 ENCOUNTER — APPOINTMENT (OUTPATIENT)
Facility: HOSPITAL | Age: 35
End: 2025-07-16
Payer: COMMERCIAL

## 2025-07-16 ENCOUNTER — HOSPITAL ENCOUNTER (OUTPATIENT)
Facility: HOSPITAL | Age: 35
Setting detail: INFUSION SERIES
Discharge: HOME | End: 2025-07-16
Payer: COMMERCIAL

## 2025-07-16 ENCOUNTER — OFFICE VISIT (OUTPATIENT)
Age: 35
End: 2025-07-16

## 2025-07-16 ENCOUNTER — RESEARCH ENCOUNTER (OUTPATIENT)
Age: 35
End: 2025-07-16

## 2025-07-16 VITALS
TEMPERATURE: 97.8 F | DIASTOLIC BLOOD PRESSURE: 72 MMHG | RESPIRATION RATE: 18 BRPM | HEART RATE: 99 BPM | SYSTOLIC BLOOD PRESSURE: 105 MMHG | HEIGHT: 62 IN | OXYGEN SATURATION: 97 % | WEIGHT: 207 LBS | BODY MASS INDEX: 38.09 KG/M2

## 2025-07-16 VITALS
HEART RATE: 99 BPM | HEIGHT: 62 IN | TEMPERATURE: 97.8 F | OXYGEN SATURATION: 99 % | DIASTOLIC BLOOD PRESSURE: 70 MMHG | RESPIRATION RATE: 18 BRPM | BODY MASS INDEX: 38.16 KG/M2 | WEIGHT: 207.4 LBS | SYSTOLIC BLOOD PRESSURE: 111 MMHG

## 2025-07-16 DIAGNOSIS — Z51.12 ENCOUNTER FOR ANTINEOPLASTIC IMMUNOTHERAPY: ICD-10-CM

## 2025-07-16 DIAGNOSIS — C50.912 CARCINOMA OF LEFT BREAST METASTATIC TO BONE (HCC): Primary | ICD-10-CM

## 2025-07-16 DIAGNOSIS — C79.51 CARCINOMA OF LEFT BREAST METASTATIC TO BONE (HCC): Primary | ICD-10-CM

## 2025-07-16 DIAGNOSIS — C50.919 MALIGNANT NEOPLASM OF BREAST METASTATIC TO BONE (HCC): Primary | ICD-10-CM

## 2025-07-16 DIAGNOSIS — C79.51 MALIGNANT NEOPLASM OF BREAST METASTATIC TO BONE (HCC): Primary | ICD-10-CM

## 2025-07-16 DIAGNOSIS — E83.51 HYPOCALCEMIA: ICD-10-CM

## 2025-07-16 PROCEDURE — 96417 CHEMO IV INFUS EACH ADDL SEQ: CPT

## 2025-07-16 PROCEDURE — 6360000002 HC RX W HCPCS: Performed by: INTERNAL MEDICINE

## 2025-07-16 PROCEDURE — 96413 CHEMO IV INFUSION 1 HR: CPT

## 2025-07-16 PROCEDURE — 2580000003 HC RX 258: Performed by: INTERNAL MEDICINE

## 2025-07-16 PROCEDURE — 2500000003 HC RX 250 WO HCPCS: Performed by: INTERNAL MEDICINE

## 2025-07-16 PROCEDURE — 96375 TX/PRO/DX INJ NEW DRUG ADDON: CPT

## 2025-07-16 RX ORDER — DIPHENHYDRAMINE HYDROCHLORIDE 50 MG/ML
25 INJECTION, SOLUTION INTRAMUSCULAR; INTRAVENOUS ONCE
Status: COMPLETED | OUTPATIENT
Start: 2025-07-16 | End: 2025-07-16

## 2025-07-16 RX ORDER — DEXAMETHASONE SODIUM PHOSPHATE 10 MG/ML
10 INJECTION, SOLUTION INTRA-ARTICULAR; INTRALESIONAL; INTRAMUSCULAR; INTRAVENOUS; SOFT TISSUE ONCE
Status: COMPLETED | OUTPATIENT
Start: 2025-07-16 | End: 2025-07-16

## 2025-07-16 RX ORDER — SODIUM CHLORIDE 0.9 % (FLUSH) 0.9 %
5-40 SYRINGE (ML) INJECTION PRN
Status: DISCONTINUED | OUTPATIENT
Start: 2025-07-16 | End: 2025-07-17 | Stop reason: HOSPADM

## 2025-07-16 RX ORDER — FAMOTIDINE 10 MG/ML
20 INJECTION, SOLUTION INTRAVENOUS ONCE
Status: COMPLETED | OUTPATIENT
Start: 2025-07-16 | End: 2025-07-16

## 2025-07-16 RX ADMIN — PACLITAXEL 186 MG: 6 INJECTION, SOLUTION INTRAVENOUS at 16:45

## 2025-07-16 RX ADMIN — FAMOTIDINE 20 MG: 10 INJECTION INTRAVENOUS at 16:09

## 2025-07-16 RX ADMIN — Medication 1400 MG: at 14:57

## 2025-07-16 RX ADMIN — DEXAMETHASONE SODIUM PHOSPHATE 10 MG: 10 INJECTION INTRAMUSCULAR; INTRAVENOUS at 16:11

## 2025-07-16 RX ADMIN — DIPHENHYDRAMINE HYDROCHLORIDE 25 MG: 50 INJECTION INTRAMUSCULAR; INTRAVENOUS at 16:13

## 2025-07-16 ASSESSMENT — PATIENT HEALTH QUESTIONNAIRE - PHQ9
2. FEELING DOWN, DEPRESSED OR HOPELESS: NOT AT ALL
SUM OF ALL RESPONSES TO PHQ QUESTIONS 1-9: 0
1. LITTLE INTEREST OR PLEASURE IN DOING THINGS: NOT AT ALL

## 2025-07-16 ASSESSMENT — PAIN SCALES - GENERAL: PAINLEVEL_OUTOF10: 0

## 2025-07-16 NOTE — PROGRESS NOTES
Patient in today for labs and follow-up visit with Dr. Murillo. Today is C2D15 on study XVN393-03.  Patient reports the following adverse events: Reports gr 2 hair loss, gr 1 hot flashes, gr 1 dyspnea . Vital signs are stable. Pre dose and post dose labs drawn. Triplicate ECG at [facility] completed (see below). Patient to receive QVM135 and paclitaxel. Bone scans and CT CAP 7/22/25. Next appointment 7/30/25    IJQ499 admin: [id number]  [facility] ECGs: 1601, 1602, 1603

## 2025-07-16 NOTE — PROGRESS NOTES
Alea Lindsay is a 35 y.o. female is here today for a follow up.    1. Have you been to the ER, urgent care clinic since your last visit?  Hospitalized since your last visit?No    2. Have you seen or consulted any other health care providers outside of the [facility] since your last visit?  Include any pap smears or colon screening. No       7/16/2025  12:56 PM   Vitals    SYSTOLIC 105    DIASTOLIC 72    BP Site    Patient Position    BP Cuff Size    Pulse 99    Temp 97.8 °F (36.6 °C)    Respirations 18    SpO2 97 %    Weight - Scale 207 lb 6.4 oz    Height 5' 2\"    Body Mass Index 37.93 kg/m2 (H)    Pain Score    Pain Loc    Pain Level 0       Legend:  (H) High  
PD-L1/VEGF.  TRAEs  have been neutrophil count decreases, white blood cell count decreases, anemia, proteinuria, and alopecia. Immune-related AEs were observed. The most common immune-related AEs were hyperthyroidism, hypothyroidism, and rash.     Would do #1 with paclitaxel, pt is agreeable, has participated in shared decision making, has signed informed consent.  C2d15 today, tolerating very well. Will proceed as planned.     CT C/A/P and bone scan scheduled 7/22/25.     I discussed the potential risks of paclitaxel. Major toxicities include nausea and vomiting, stomatitis, fatigue, and a small risk of heart damage. Anemia frequently results and occasionally requires transfusions. Neutropenic fever is uncommon, but can be a life-threatening problem.  We provided the patient with detailed information concerning toxicity including frequent toxicities that only last a few days, such as nausea, vomiting, mouth sores, arthralgia, myalgia, and potentially allergic reactions to paclitaxel, as well as toxicities which can be longer lasting including total alopecia, fatigue, anemia and neuropathy. We provided the patient with detailed information concerning the toxicities of their regimen in addition to our verbal discussion.     We will plan to see the patient in follow up at least once per cycle, or sooner if symptoms warrant. Labs with each cycle for intensive monitoring for toxicity     The duration of this treatment plan will be until progression, intolerable side effects, or patient choice.        Rx:  zofran, emla cream, compazine     2. Bone Metastases: Will continue xgeva for bone mets, last given 5/1/25, changed to  q 12 weeks from q 6 weeks. Next due 7/20/25, will defer to when she returns in 2 weeks.     3. Emotional well being:  She has excellent support and is coping well with her disease; taking wellbutrin; on ativan 0.5 mg q8h prn; director of [employer]    She is tearful today with upcoming

## 2025-07-16 NOTE — PROGRESS NOTES
[facility] Chemo Progress Note    Date: July 16, 2025        1300: Ms. Lindsay Arrived to [facility] for  C2D15 CSO071 + Taxol ambulatory in stable condition.  Assessment was completed. Labs obtained on 7/15/25, reviewed today. Port remains accessed, flushed with positive blood return.    Went to provider appointment with Medical Oncology.    1400: Returned from provider appointment. Criteria for treatment was met.     Okay to treat placed by Research RN.    Ms. Lindsay's vitals were reviewed.  Patient Vitals for the past 12 hrs:   Temp Pulse Resp BP SpO2   07/16/25 1256 97.8 °F (36.6 °C) 99 18 105/72 97 %       Pre-medications  were administered as ordered and chemotherapy was initiated.  Medications Administered         (XLG958-10 [id number]) investigational 1,400 mg in sodium chloride 0.9 % 250 mL IVPB Admin Date  07/16/2025 Action  New Bag Dose  1,400 mg Rate  275 mL/hr Route  IntraVENous Documented By  Radha Levine RN        dexAMETHasone (DECADRON) IntraVENous 10 mg Admin Date  07/16/2025 Action  Given Dose  10 mg Rate   Route  IntraVENous Documented By  Radha Levine RN        diphenhydrAMINE (BENADRYL) injection 25 mg Admin Date  07/16/2025 Action  Given Dose  25 mg Rate   Route  IntraVENous Documented By  Radha Levine RN        famotidine (PEPCID) injection 20 mg Admin Date  07/16/2025 Action  Given Dose  20 mg Rate   Route  IntraVENous Documented By  Radha Levine RN             Two nurses verified prior to administering: Drug name, Drug dose, Infusion volume or drug volume when prepared in a syringe, Rate of administration, Route of administration, Expiration dates and/or times, Appearance and physical integrity of the drugs, Rate set on infusion pump, when used, and Sequencing of drug administration.    Potential side effects and signs/symptoms of reaction related to the treatment regimen were discussed.  Guidance was provided on when the patient should call/notify their MD regarding any

## 2025-07-16 NOTE — PROGRESS NOTES
[facility] Short Note                       Date: 2025    Name: Alea Lindsay    MRN: [id number]         : 1990      SBAR report received from Kristen Levine RN.    Medications given:   Medications Administered         (WEC940-44 FAC480) investigational 1,400 mg in sodium chloride 0.9 % 250 mL IVPB Admin Date  2025 Action  New Bag Dose  1,400 mg Rate  275 mL/hr Route  IntraVENous Documented By  Radha Levine RN        *[facility]* PACLitaxel (TAXOL) 186 mg in sodium chloride 0.9 % 250 mL chemo infusion Admin Date  2025 Action  New Bag Dose  186 mg Rate  306 mL/hr Route  IntraVENous Documented By  Rosalba Bacon RN        dexAMETHasone (DECADRON) IntraVENous 10 mg Admin Date  2025 Action  Given Dose  10 mg Rate   Route  IntraVENous Documented By  Radha Levine RN        diphenhydrAMINE (BENADRYL) injection 25 mg Admin Date  2025 Action  Given Dose  25 mg Rate   Route  IntraVENous Documented By  Radha Levine RN        famotidine (PEPCID) injection 20 mg Admin Date  2025 Action  Given Dose  20 mg Rate   Route  IntraVENous Documented By  Radha Levine RN          Port flushed and de-accessed per protocol.     Ms. Lindsay tolerated the infusion and had no complaints.    Ms. Lindsay was [facility] in stable condition.     Future Appointments   Date Time Provider Department Center   2025 10:30 AM [facility] NM INJ RM 1 [facility]   2025  1:30 PM [facility] NM RM 1 [facility]   2025  3:30 PM SM CT ER 3 [facility]CT [facility]   2025  7:30 AM SS CHEMO CHAIR 18 MIDLO INF [facility]   2025  9:15 AM Juanito Murillo MD ONCSF BS AMB   2025  7:30 AM SS CHEMO CHAIR 23 [facility]LO INF [facility]   2025  7:30 AM SS CHEMO CHAIR 12 [facility]LO INF [facility]   2025  7:30 AM SS CHEMO CHAIR 14 [facility] INF [facility]   9/3/2025  7:30 AM SS CHEMO CHAIR 27 [facility]LO INF [facility]   9/10/2025  7:30 AM SS CHEMO CHAIR 17 [facility] INF [facility]   2025  7:30 AM SS CHEMO CHAIR 12 [facility] INF

## 2025-07-17 ENCOUNTER — APPOINTMENT (OUTPATIENT)
Facility: HOSPITAL | Age: 35
End: 2025-07-17
Payer: COMMERCIAL

## 2025-07-22 ENCOUNTER — HOSPITAL ENCOUNTER (OUTPATIENT)
Facility: HOSPITAL | Age: 35
Discharge: HOME | End: 2025-07-25
Attending: INTERNAL MEDICINE
Payer: COMMERCIAL

## 2025-07-22 DIAGNOSIS — C50.919 MALIGNANT NEOPLASM OF BREAST METASTATIC TO BONE (HCC): ICD-10-CM

## 2025-07-22 DIAGNOSIS — C79.51 MALIGNANT NEOPLASM OF BREAST METASTATIC TO BONE (HCC): ICD-10-CM

## 2025-07-22 DIAGNOSIS — C79.51 CARCINOMA OF BREAST METASTATIC TO BONE, UNSPECIFIED LATERALITY (HCC): ICD-10-CM

## 2025-07-22 DIAGNOSIS — C50.919 CARCINOMA OF BREAST METASTATIC TO BONE, UNSPECIFIED LATERALITY (HCC): ICD-10-CM

## 2025-07-22 PROCEDURE — 6360000004 HC RX CONTRAST MEDICATION: Performed by: RADIOLOGY

## 2025-07-22 PROCEDURE — 71260 CT THORAX DX C+: CPT

## 2025-07-22 PROCEDURE — A9503 TC99M MEDRONATE: HCPCS | Performed by: INTERNAL MEDICINE

## 2025-07-22 PROCEDURE — 3430000000 HC RX DIAGNOSTIC RADIOPHARMACEUTICAL: Performed by: INTERNAL MEDICINE

## 2025-07-22 PROCEDURE — 78306 BONE IMAGING WHOLE BODY: CPT | Performed by: INTERNAL MEDICINE

## 2025-07-22 RX ORDER — IOPAMIDOL 755 MG/ML
100 INJECTION, SOLUTION INTRAVASCULAR
Status: COMPLETED | OUTPATIENT
Start: 2025-07-22 | End: 2025-07-22

## 2025-07-22 RX ORDER — TC 99M MEDRONATE 20 MG/10ML
21.4 INJECTION, POWDER, LYOPHILIZED, FOR SOLUTION INTRAVENOUS
Status: COMPLETED | OUTPATIENT
Start: 2025-07-22 | End: 2025-07-22

## 2025-07-22 RX ADMIN — TC 99M MEDRONATE 21.4 MILLICURIE: 20 INJECTION, POWDER, LYOPHILIZED, FOR SOLUTION INTRAVENOUS at 10:25

## 2025-07-22 RX ADMIN — IOPAMIDOL 100 ML: 755 INJECTION, SOLUTION INTRAVENOUS at 11:29

## 2025-07-24 RX ORDER — HYDROCORTISONE SODIUM SUCCINATE 100 MG/2ML
100 INJECTION INTRAMUSCULAR; INTRAVENOUS
Status: CANCELLED | OUTPATIENT
Start: 2025-07-30

## 2025-07-24 RX ORDER — SODIUM CHLORIDE 9 MG/ML
INJECTION, SOLUTION INTRAVENOUS CONTINUOUS
Status: CANCELLED | OUTPATIENT
Start: 2025-07-30

## 2025-07-24 RX ORDER — ALBUTEROL SULFATE 90 UG/1
4 INHALANT RESPIRATORY (INHALATION) PRN
Status: CANCELLED | OUTPATIENT
Start: 2025-07-30

## 2025-07-24 RX ORDER — HEPARIN 100 UNIT/ML
500 SYRINGE INTRAVENOUS PRN
Status: CANCELLED | OUTPATIENT
Start: 2025-07-30

## 2025-07-24 RX ORDER — ACETAMINOPHEN 325 MG/1
650 TABLET ORAL
Status: CANCELLED | OUTPATIENT
Start: 2025-07-30

## 2025-07-24 RX ORDER — SODIUM CHLORIDE 9 MG/ML
5-250 INJECTION, SOLUTION INTRAVENOUS PRN
Status: CANCELLED | OUTPATIENT
Start: 2025-07-30

## 2025-07-24 RX ORDER — ONDANSETRON 2 MG/ML
8 INJECTION INTRAMUSCULAR; INTRAVENOUS
Status: CANCELLED | OUTPATIENT
Start: 2025-07-30

## 2025-07-24 RX ORDER — MEPERIDINE HYDROCHLORIDE 25 MG/ML
12.5 INJECTION INTRAMUSCULAR; INTRAVENOUS; SUBCUTANEOUS PRN
Status: CANCELLED | OUTPATIENT
Start: 2025-07-30

## 2025-07-24 RX ORDER — DIPHENHYDRAMINE HYDROCHLORIDE 50 MG/ML
50 INJECTION, SOLUTION INTRAMUSCULAR; INTRAVENOUS
Status: CANCELLED | OUTPATIENT
Start: 2025-07-30

## 2025-07-24 RX ORDER — EPINEPHRINE 1 MG/ML
0.3 INJECTION, SOLUTION INTRAMUSCULAR; SUBCUTANEOUS PRN
Status: CANCELLED | OUTPATIENT
Start: 2025-07-30

## 2025-07-29 ENCOUNTER — HOSPITAL ENCOUNTER (OUTPATIENT)
Facility: HOSPITAL | Age: 35
Setting detail: INFUSION SERIES
Discharge: HOME | End: 2025-07-29
Payer: COMMERCIAL

## 2025-07-29 DIAGNOSIS — C79.51 CARCINOMA OF LEFT BREAST METASTATIC TO BONE (HCC): ICD-10-CM

## 2025-07-29 DIAGNOSIS — E83.51 HYPOCALCEMIA: ICD-10-CM

## 2025-07-29 DIAGNOSIS — C50.912 CARCINOMA OF LEFT BREAST METASTATIC TO BONE (HCC): ICD-10-CM

## 2025-07-29 LAB
ALBUMIN SERPL-MCNC: 3.4 G/DL (ref 3.5–5.2)
ALBUMIN/GLOB SERPL: 1.1 (ref 1.1–2.2)
ALP SERPL-CCNC: 89 U/L (ref 35–104)
ALT SERPL-CCNC: 47 U/L (ref 10–50)
AMYLASE SERPL-CCNC: 29 U/L (ref 28–115)
ANION GAP SERPL CALC-SCNC: 9 MMOL/L (ref 2–14)
APPEARANCE UR: CLEAR
AST SERPL-CCNC: 28 U/L (ref 10–35)
BACTERIA URNS QL MICRO: NEGATIVE /HPF
BASOPHILS # BLD: 0.04 K/UL (ref 0–0.1)
BASOPHILS NFR BLD: 0.9 % (ref 0–1)
BILIRUB DIRECT SERPL-MCNC: 0.2 MG/DL (ref 0.1–0.3)
BILIRUB SERPL-MCNC: 0.6 MG/DL (ref 0–1.2)
BILIRUB UR QL: NEGATIVE
BUN SERPL-MCNC: 4 MG/DL (ref 6–20)
BUN/CREAT SERPL: 6 (ref 12–20)
CALCIUM SERPL-MCNC: 9.2 MG/DL (ref 8.6–10)
CHLORIDE SERPL-SCNC: 105 MMOL/L (ref 98–107)
CHOLEST SERPL-MCNC: 211 MG/DL (ref 0–200)
CO2 SERPL-SCNC: 22 MMOL/L (ref 20–29)
COLOR UR: NORMAL
CREAT SERPL-MCNC: 0.7 MG/DL (ref 0.6–1)
CRP SERPL-MCNC: 2.3 MG/DL (ref 0–0.5)
DIFFERENTIAL METHOD BLD: ABNORMAL
EOSINOPHIL # BLD: 0.04 K/UL (ref 0–0.4)
EOSINOPHIL NFR BLD: 0.9 % (ref 0–7)
EPITH CASTS URNS QL MICRO: NORMAL /LPF
ERYTHROCYTE [DISTWIDTH] IN BLOOD BY AUTOMATED COUNT: 17.3 % (ref 11.5–14.5)
GGT SERPL-CCNC: 43 U/L (ref 5–36)
GLOBULIN SER CALC-MCNC: 3.1 G/DL (ref 2–4)
GLUCOSE SERPL-MCNC: 112 MG/DL (ref 65–100)
GLUCOSE UR STRIP.AUTO-MCNC: NEGATIVE MG/DL
HCT VFR BLD AUTO: 28.3 % (ref 35–47)
HDLC SERPL-MCNC: 33 MG/DL (ref 40–60)
HDLC SERPL: 6.5
HGB BLD-MCNC: 9 G/DL (ref 11.5–16)
HGB UR QL STRIP: NEGATIVE
IMM GRANULOCYTES # BLD AUTO: 0.04 K/UL (ref 0–0.04)
IMM GRANULOCYTES NFR BLD AUTO: 0.9 % (ref 0–0.5)
KETONES UR QL STRIP.AUTO: NEGATIVE MG/DL
LDH SERPL L TO P-CCNC: 244 U/L (ref 135–246)
LDLC SERPL CALC-MCNC: 118 MG/DL
LEUKOCYTE ESTERASE UR QL STRIP.AUTO: NEGATIVE
LIPASE SERPL-CCNC: 21 U/L (ref 13–60)
LYMPHOCYTES # BLD: 1.1 K/UL (ref 0.8–3.5)
LYMPHOCYTES NFR BLD: 25.5 % (ref 12–49)
MAGNESIUM SERPL-MCNC: 2 MG/DL (ref 1.6–2.6)
MCH RBC QN AUTO: 30 PG (ref 26–34)
MCHC RBC AUTO-ENTMCNC: 31.8 G/DL (ref 30–36.5)
MCV RBC AUTO: 94.3 FL (ref 80–99)
MONOCYTES # BLD: 0.5 K/UL (ref 0–1)
MONOCYTES NFR BLD: 11.6 % (ref 5–13)
NEUTS SEG # BLD: 2.6 K/UL (ref 1.8–8)
NEUTS SEG NFR BLD: 60.2 % (ref 32–75)
NITRITE UR QL STRIP.AUTO: NEGATIVE
NRBC # BLD: 0 K/UL (ref 0–0.01)
NRBC BLD-RTO: 0 PER 100 WBC
PH UR STRIP: 6.5 (ref 5–8)
PLATELET # BLD AUTO: 198 K/UL (ref 150–400)
PMV BLD AUTO: 9.8 FL (ref 8.9–12.9)
POTASSIUM SERPL-SCNC: 4.1 MMOL/L (ref 3.5–5.1)
PROT SERPL-MCNC: 6.5 G/DL (ref 6.4–8.3)
PROT UR STRIP-MCNC: NEGATIVE MG/DL
RBC # BLD AUTO: 3 M/UL (ref 3.8–5.2)
RBC #/AREA URNS HPF: NORMAL /HPF (ref 0–5)
SODIUM SERPL-SCNC: 137 MMOL/L (ref 136–145)
SP GR UR REFRACTOMETRY: <1.005 (ref 1–1.03)
T4 FREE SERPL-MCNC: 1 NG/DL (ref 0.9–1.6)
TRIGL SERPL-MCNC: 300 MG/DL (ref 0–150)
TSH, 3RD GENERATION: 1.63 UIU/ML (ref 0.27–4.2)
URINE CULTURE IF INDICATED: NORMAL
UROBILINOGEN UR QL STRIP.AUTO: 0.2 EU/DL (ref 0.2–1)
VLDLC SERPL CALC-MCNC: 60 MG/DL
WBC # BLD AUTO: 4.3 K/UL (ref 3.6–11)
WBC URNS QL MICRO: NORMAL /HPF (ref 0–4)

## 2025-07-29 PROCEDURE — 84703 CHORIONIC GONADOTROPIN ASSAY: CPT

## 2025-07-29 PROCEDURE — 82248 BILIRUBIN DIRECT: CPT

## 2025-07-29 PROCEDURE — 83690 ASSAY OF LIPASE: CPT

## 2025-07-29 PROCEDURE — 80061 LIPID PANEL: CPT

## 2025-07-29 PROCEDURE — 82150 ASSAY OF AMYLASE: CPT

## 2025-07-29 PROCEDURE — 81001 URINALYSIS AUTO W/SCOPE: CPT

## 2025-07-29 PROCEDURE — 84443 ASSAY THYROID STIM HORMONE: CPT

## 2025-07-29 PROCEDURE — 83735 ASSAY OF MAGNESIUM: CPT

## 2025-07-29 PROCEDURE — 86140 C-REACTIVE PROTEIN: CPT

## 2025-07-29 PROCEDURE — 85025 COMPLETE CBC W/AUTO DIFF WBC: CPT

## 2025-07-29 PROCEDURE — 83615 LACTATE (LD) (LDH) ENZYME: CPT

## 2025-07-29 PROCEDURE — 84439 ASSAY OF FREE THYROXINE: CPT

## 2025-07-29 PROCEDURE — 80053 COMPREHEN METABOLIC PANEL: CPT

## 2025-07-29 PROCEDURE — 36591 DRAW BLOOD OFF VENOUS DEVICE: CPT

## 2025-07-29 PROCEDURE — 82977 ASSAY OF GGT: CPT

## 2025-07-29 NOTE — PROGRESS NOTES
[facility] - Chemotherapy Progress Note    1400 Pt admit to [facility] for pre-treatment labs ambulatory in stable condition. Assessment completed. No new concerns voiced. Port accessed with positive blood return. Labs drawn per order and sent.  Line flushed and capped for tomorrows treatment. D/c home ambulatory in no distress. Pt aware of next [facility] appointment scheduled for 07/30/2025.    Please see labs in Results tab

## 2025-07-30 ENCOUNTER — HOSPITAL ENCOUNTER (OUTPATIENT)
Facility: HOSPITAL | Age: 35
Setting detail: INFUSION SERIES
Discharge: HOME | End: 2025-07-30
Payer: COMMERCIAL

## 2025-07-30 ENCOUNTER — RESEARCH ENCOUNTER (OUTPATIENT)
Age: 35
End: 2025-07-30

## 2025-07-30 ENCOUNTER — OFFICE VISIT (OUTPATIENT)
Age: 35
End: 2025-07-30

## 2025-07-30 VITALS
OXYGEN SATURATION: 96 % | BODY MASS INDEX: 38.13 KG/M2 | HEART RATE: 89 BPM | SYSTOLIC BLOOD PRESSURE: 107 MMHG | DIASTOLIC BLOOD PRESSURE: 61 MMHG | WEIGHT: 207.2 LBS | HEIGHT: 62 IN | RESPIRATION RATE: 18 BRPM | TEMPERATURE: 97.4 F

## 2025-07-30 VITALS
WEIGHT: 207 LBS | HEIGHT: 62 IN | OXYGEN SATURATION: 97 % | TEMPERATURE: 97.8 F | SYSTOLIC BLOOD PRESSURE: 116 MMHG | HEART RATE: 90 BPM | BODY MASS INDEX: 38.09 KG/M2 | DIASTOLIC BLOOD PRESSURE: 89 MMHG | RESPIRATION RATE: 18 BRPM

## 2025-07-30 DIAGNOSIS — C79.51 CARCINOMA OF LEFT BREAST METASTATIC TO BONE (HCC): ICD-10-CM

## 2025-07-30 DIAGNOSIS — C79.51 MALIGNANT NEOPLASM OF BREAST METASTATIC TO BONE (HCC): Primary | ICD-10-CM

## 2025-07-30 DIAGNOSIS — C50.919 MALIGNANT NEOPLASM OF BREAST METASTATIC TO BONE (HCC): Primary | ICD-10-CM

## 2025-07-30 DIAGNOSIS — C50.912 CARCINOMA OF LEFT BREAST METASTATIC TO BONE (HCC): ICD-10-CM

## 2025-07-30 DIAGNOSIS — E83.51 HYPOCALCEMIA: Primary | ICD-10-CM

## 2025-07-30 LAB — HCG SERPL QL: NEGATIVE

## 2025-07-30 PROCEDURE — 6360000002 HC RX W HCPCS: Performed by: INTERNAL MEDICINE

## 2025-07-30 PROCEDURE — 96375 TX/PRO/DX INJ NEW DRUG ADDON: CPT

## 2025-07-30 PROCEDURE — 6360000002 HC RX W HCPCS: Performed by: NURSE PRACTITIONER

## 2025-07-30 PROCEDURE — 96417 CHEMO IV INFUS EACH ADDL SEQ: CPT

## 2025-07-30 PROCEDURE — 96372 THER/PROPH/DIAG INJ SC/IM: CPT

## 2025-07-30 PROCEDURE — 2500000003 HC RX 250 WO HCPCS: Performed by: INTERNAL MEDICINE

## 2025-07-30 PROCEDURE — 96413 CHEMO IV INFUSION 1 HR: CPT

## 2025-07-30 PROCEDURE — 2580000003 HC RX 258: Performed by: INTERNAL MEDICINE

## 2025-07-30 PROCEDURE — 99215 OFFICE O/P EST HI 40 MIN: CPT | Performed by: INTERNAL MEDICINE

## 2025-07-30 RX ORDER — ONDANSETRON 2 MG/ML
8 INJECTION INTRAMUSCULAR; INTRAVENOUS
Status: CANCELLED | OUTPATIENT
Start: 2025-08-06

## 2025-07-30 RX ORDER — ALBUTEROL SULFATE 90 UG/1
4 INHALANT RESPIRATORY (INHALATION) PRN
Status: CANCELLED | OUTPATIENT
Start: 2025-08-06

## 2025-07-30 RX ORDER — HYDROCORTISONE SODIUM SUCCINATE 100 MG/2ML
100 INJECTION INTRAMUSCULAR; INTRAVENOUS
OUTPATIENT
Start: 2025-08-10

## 2025-07-30 RX ORDER — MEPERIDINE HYDROCHLORIDE 25 MG/ML
12.5 INJECTION INTRAMUSCULAR; INTRAVENOUS; SUBCUTANEOUS PRN
Status: CANCELLED | OUTPATIENT
Start: 2025-08-06

## 2025-07-30 RX ORDER — ACETAMINOPHEN 325 MG/1
650 TABLET ORAL
Status: CANCELLED | OUTPATIENT
Start: 2025-08-06

## 2025-07-30 RX ORDER — SODIUM CHLORIDE 9 MG/ML
5-250 INJECTION, SOLUTION INTRAVENOUS PRN
Status: CANCELLED | OUTPATIENT
Start: 2025-08-06

## 2025-07-30 RX ORDER — DIPHENHYDRAMINE HYDROCHLORIDE 50 MG/ML
25 INJECTION, SOLUTION INTRAMUSCULAR; INTRAVENOUS ONCE
Status: COMPLETED | OUTPATIENT
Start: 2025-07-30 | End: 2025-07-30

## 2025-07-30 RX ORDER — HYDROCORTISONE SODIUM SUCCINATE 100 MG/2ML
100 INJECTION INTRAMUSCULAR; INTRAVENOUS
Status: CANCELLED | OUTPATIENT
Start: 2025-08-06

## 2025-07-30 RX ORDER — ALBUTEROL SULFATE 90 UG/1
4 INHALANT RESPIRATORY (INHALATION) PRN
OUTPATIENT
Start: 2025-08-10

## 2025-07-30 RX ORDER — DIPHENHYDRAMINE HYDROCHLORIDE 50 MG/ML
50 INJECTION, SOLUTION INTRAMUSCULAR; INTRAVENOUS
OUTPATIENT
Start: 2025-08-10

## 2025-07-30 RX ORDER — ACETAMINOPHEN 325 MG/1
650 TABLET ORAL
OUTPATIENT
Start: 2025-08-10

## 2025-07-30 RX ORDER — EPINEPHRINE 1 MG/ML
0.3 INJECTION, SOLUTION INTRAMUSCULAR; SUBCUTANEOUS PRN
OUTPATIENT
Start: 2025-08-10

## 2025-07-30 RX ORDER — SODIUM CHLORIDE 9 MG/ML
5-250 INJECTION, SOLUTION INTRAVENOUS PRN
Status: DISCONTINUED | OUTPATIENT
Start: 2025-07-30 | End: 2025-07-31 | Stop reason: HOSPADM

## 2025-07-30 RX ORDER — ONDANSETRON 2 MG/ML
8 INJECTION INTRAMUSCULAR; INTRAVENOUS
OUTPATIENT
Start: 2025-08-10

## 2025-07-30 RX ORDER — DIPHENHYDRAMINE HYDROCHLORIDE 50 MG/ML
50 INJECTION, SOLUTION INTRAMUSCULAR; INTRAVENOUS
Status: CANCELLED | OUTPATIENT
Start: 2025-08-06

## 2025-07-30 RX ORDER — EPINEPHRINE 1 MG/ML
0.3 INJECTION, SOLUTION INTRAMUSCULAR; SUBCUTANEOUS PRN
Status: CANCELLED | OUTPATIENT
Start: 2025-08-06

## 2025-07-30 RX ORDER — SODIUM CHLORIDE 9 MG/ML
INJECTION, SOLUTION INTRAVENOUS CONTINUOUS
Status: CANCELLED | OUTPATIENT
Start: 2025-08-06

## 2025-07-30 RX ORDER — SODIUM CHLORIDE 0.9 % (FLUSH) 0.9 %
5-40 SYRINGE (ML) INJECTION PRN
Status: DISCONTINUED | OUTPATIENT
Start: 2025-07-30 | End: 2025-07-31 | Stop reason: HOSPADM

## 2025-07-30 RX ORDER — HEPARIN 100 UNIT/ML
500 SYRINGE INTRAVENOUS PRN
Status: CANCELLED | OUTPATIENT
Start: 2025-08-06

## 2025-07-30 RX ORDER — DEXAMETHASONE SODIUM PHOSPHATE 10 MG/ML
10 INJECTION, SOLUTION INTRA-ARTICULAR; INTRALESIONAL; INTRAMUSCULAR; INTRAVENOUS; SOFT TISSUE ONCE
Status: COMPLETED | OUTPATIENT
Start: 2025-07-30 | End: 2025-07-30

## 2025-07-30 RX ORDER — SODIUM CHLORIDE 9 MG/ML
INJECTION, SOLUTION INTRAVENOUS CONTINUOUS
OUTPATIENT
Start: 2025-08-10

## 2025-07-30 RX ADMIN — FAMOTIDINE 20 MG: 10 INJECTION INTRAVENOUS at 12:03

## 2025-07-30 RX ADMIN — PACLITAXEL 186 MG: 6 INJECTION, SOLUTION INTRAVENOUS at 12:49

## 2025-07-30 RX ADMIN — SODIUM CHLORIDE 25 ML/HR: 0.9 INJECTION, SOLUTION INTRAVENOUS at 09:57

## 2025-07-30 RX ADMIN — SODIUM CHLORIDE, PRESERVATIVE FREE 20 ML: 5 INJECTION INTRAVENOUS at 13:55

## 2025-07-30 RX ADMIN — DIPHENHYDRAMINE HYDROCHLORIDE 25 MG: 50 INJECTION INTRAMUSCULAR; INTRAVENOUS at 12:08

## 2025-07-30 RX ADMIN — Medication 1400 MG: at 10:40

## 2025-07-30 RX ADMIN — DEXAMETHASONE SODIUM PHOSPHATE 10 MG: 10 INJECTION INTRAMUSCULAR; INTRAVENOUS at 12:13

## 2025-07-30 RX ADMIN — DENOSUMAB 120 MG: 120 INJECTION SUBCUTANEOUS at 12:20

## 2025-07-30 ASSESSMENT — PAIN SCALES - GENERAL: PAINLEVEL_OUTOF10: 0

## 2025-07-30 ASSESSMENT — PATIENT HEALTH QUESTIONNAIRE - PHQ9
1. LITTLE INTEREST OR PLEASURE IN DOING THINGS: NOT AT ALL
SUM OF ALL RESPONSES TO PHQ QUESTIONS 1-9: 0
SUM OF ALL RESPONSES TO PHQ QUESTIONS 1-9: 0
2. FEELING DOWN, DEPRESSED OR HOPELESS: NOT AT ALL
SUM OF ALL RESPONSES TO PHQ QUESTIONS 1-9: 0
SUM OF ALL RESPONSES TO PHQ QUESTIONS 1-9: 0

## 2025-07-30 NOTE — PROGRESS NOTES
Alea Lindsay is a 35 y.o. female is here today for a follow up.    1. Have you been to the ER, urgent care clinic since your last visit?  Hospitalized since your last visit?No    2. Have you seen or consulted any other health care providers outside of the [facility] since your last visit?  Include any pap smears or colon screening. No       7/30/2025  7:30 AM   Vitals    SYSTOLIC 116    DIASTOLIC 89    BP Site    Patient Position    BP Cuff Size    Pulse 90    Temp 97.8 °F (36.6 °C)    Respirations 18    SpO2 97 %    Weight - Scale 207 lb 3.2 oz    Height 5' 2\"    Body Mass Index 37.9 kg/m2 (H)    Pain Score    Pain Loc    Pain Level 0       Legend:  (H) High

## 2025-07-30 NOTE — PROGRESS NOTES
Cancer [place] at [facility]  [address] [address], [address] [facility] [address]  W: [phone]  F: [phone]      Reason for Visit:   Alea Lindsay is a 35 y.o. female who is seen for treatment of breast cancer.    Treatment History:     Mammo 9/6/24 with 2.8 cm spiculated mass with two other suspicious masses on LEFT  09/09/24 -  (A) LEFT Breast 4:00 biopsy: PATH - IDC, 1.7 cm, grade 3, ER negative, NJ negative, HER2 negative, Ki 67 >95%, with tumor necrosis.  Lymphovascular invasion identified  (B) LEFT Breast 3:00 biopsy: PATH - IDC, 7 mm, grade 3, ER negative, NJ negative, HER2 negative, Ki 67 >95%,  with tumor necrosis  (C) LEFT Breast 1:00 biopsy: PATH - IDC, 1 cm, grade 3, ER negative, NJ negative, HER2 negative, Ki 67 >95%, with tumor necrosis, Lymphovascular invasion identified  (D) Lymph Node LEFT Axilla - Metastatic carcinoma  Bone Marrow, Posterior Iliac Crest and Decalcified Core 10/2/24: PATH - Metastatic mammary carcinoma. Myelofibrosis and osteosclerosis with trilineage hypoplasia, ER negative, NJ negative, HER 2 negative  PDL-1+ 10%  [facility] Testing showed BRCA+  [facility] Genetic Testing negative  Port-A-Cath placed on 10/8/24  Brain MRI 10/15/24: Normal MRI of the brain. There is no evidence of intracranial metastatic disease. Osseous metastases are present. No intracranial mass, hemorrhage or evidence of acute infarction  Palliative Carbo+Gemcitabine+Pembro 10/24/24 - 5/8/25 (PD)  D8C1 skipped due to neutropenia  DR Carbo AUC to 1.5 and Gemcitabine 20% to 800 mg/m2 with D1C2  Added Neulasta to Day 8 starting with D8C3    RCK027 + paclitaxel 6/5/25- current    History of Present Illness:   Initially presented to [facility] with LEFT breast pain leading to the pathology above.    Interval history:  Patient presents today for follow up and treatment. Reports gr 1 fatigue, gr 1 hot flashes, gr 1 insomnia, gr 1 sob, gr 1 rash    Past Medical History:   Diagnosis Date    Breast

## 2025-07-30 NOTE — PROGRESS NOTES
Patient in today for labs and follow-up visit with Dr. Murillo. Today is C3D1 on study BNT-327-02.  Patient reports the following adverse events: gr 1 fatigue, gr 1 hot flashes, gr 1 insomnia, gr 1 sob, gr 1 rash . Vital signs are stable.  Informed patient of changes to the ICF, patient verbalized understanding of changes and note to filed placed in binder. Patient to receive APV664 and paclitaxel. Next appt is 8/6/25.    Predose ECGs: 0906, 0907, 0908  BNT administration: [id number]  EOI ECGs: 1143, 1144, 1146

## 2025-07-30 NOTE — PROGRESS NOTES
[facility] VISIT NOTE  Date: 2025    Name: Alea Lindsay    MRN: [id number]         : 1990    Ms. Lindsay Arrived ambulatory and in no distress for C3D1 of Taxol+JIO820+Xgeva Regimen.  Assessment was completed, no acute issues at this time, pt c/o rash+itchiness on scalp.  Pt came in with chest wall port already accessed on 25 and dressing CDI, port flushed with +blood return. Labs reviewed from 25. Pt denied having recent invasive dental procedure. Pt proceeded to MD appointment with Medical Oncology.    When pt returned from MD appointment, ok to treat per Joshua research RN.       Pre-treatment, post treatment within 15 minutes and end tx labs after 2 hours of  are drawn and sent for research RN. Post EKG done by research RN.    Ms. Lindsay's vitals were reviewed.  Patient Vitals for the past 12 hrs:   Temp Pulse Resp BP SpO2   25 1345 97.4 °F (36.3 °C) 89 18 107/61 96 %   25 0730 97.8 °F (36.6 °C) 90 18 116/89 97 %          Medications received:  Medications Administered         (QNB977-41 [id number]) investigational 1,400 mg in sodium chloride 0.9 % 250 mL IVPB Admin Date  2025 Action  New Bag Dose  1,400 mg Rate  275 mL/hr Route  IntraVENous Documented By  Lucila Spear RN        *[facility]* PACLitaxel (TAXOL) 186 mg in sodium chloride 0.9 % 250 mL chemo infusion Admin Date  2025 Action  New Bag Dose  186 mg Rate  306 mL/hr Route  IntraVENous Documented By  Lucila Spear RN        0.9 % sodium chloride infusion Admin Date  2025 Action  New Bag Dose  25 mL/hr Rate  25 mL/hr Route  IntraVENous Documented By  Lucila Spear RN        denosumab (XGEVA) SC injection 120 mg Admin Date  2025 Action  Given Dose  120 mg Rate   Route  SubCUTAneous Documented By  Lucila Spear RN        dexAMETHasone (DECADRON) IntraVENous 10 mg Admin Date  2025 Action  Given Dose  10 mg Rate   Route  IntraVENous Documented By  Lucila Spear RN        diphenhydrAMINE

## 2025-08-05 ENCOUNTER — PATIENT MESSAGE (OUTPATIENT)
Age: 35
End: 2025-08-05

## 2025-08-05 ENCOUNTER — HOSPITAL ENCOUNTER (OUTPATIENT)
Facility: HOSPITAL | Age: 35
Setting detail: INFUSION SERIES
End: 2025-08-05

## 2025-08-05 DIAGNOSIS — C50.919 MALIGNANT NEOPLASM OF BREAST METASTATIC TO BONE (HCC): Primary | ICD-10-CM

## 2025-08-05 DIAGNOSIS — C79.51 MALIGNANT NEOPLASM OF BREAST METASTATIC TO BONE (HCC): Primary | ICD-10-CM

## 2025-08-06 ENCOUNTER — HOSPITAL ENCOUNTER (OUTPATIENT)
Facility: HOSPITAL | Age: 35
Setting detail: INFUSION SERIES
Discharge: HOME | End: 2025-08-06
Payer: COMMERCIAL

## 2025-08-06 ENCOUNTER — RESEARCH ENCOUNTER (OUTPATIENT)
Age: 35
End: 2025-08-06

## 2025-08-06 VITALS
HEART RATE: 94 BPM | RESPIRATION RATE: 18 BRPM | BODY MASS INDEX: 36.5 KG/M2 | WEIGHT: 206 LBS | TEMPERATURE: 97.7 F | DIASTOLIC BLOOD PRESSURE: 81 MMHG | OXYGEN SATURATION: 97 % | HEIGHT: 63 IN | SYSTOLIC BLOOD PRESSURE: 121 MMHG

## 2025-08-06 DIAGNOSIS — C50.912 CARCINOMA OF LEFT BREAST METASTATIC TO BONE (HCC): ICD-10-CM

## 2025-08-06 DIAGNOSIS — C79.51 CARCINOMA OF LEFT BREAST METASTATIC TO BONE (HCC): ICD-10-CM

## 2025-08-06 DIAGNOSIS — E83.51 HYPOCALCEMIA: Primary | ICD-10-CM

## 2025-08-06 LAB
ALBUMIN SERPL-MCNC: 3.4 G/DL (ref 3.5–5.2)
ALBUMIN SERPL-MCNC: 3.4 G/DL (ref 3.5–5.2)
ALBUMIN/GLOB SERPL: 1.1 (ref 1.1–2.2)
ALBUMIN/GLOB SERPL: 1.1 (ref 1.1–2.2)
ALP SERPL-CCNC: 124 U/L (ref 35–104)
ALP SERPL-CCNC: 89 U/L (ref 35–104)
ALT SERPL-CCNC: 47 U/L (ref 10–35)
ALT SERPL-CCNC: 48 U/L (ref 10–35)
AMYLASE SERPL-CCNC: 33 U/L (ref 28–115)
ANION GAP SERPL CALC-SCNC: 12 MMOL/L (ref 2–14)
ANION GAP SERPL CALC-SCNC: 9 MMOL/L (ref 2–14)
AST SERPL-CCNC: 28 U/L (ref 10–35)
AST SERPL-CCNC: 30 U/L (ref 10–35)
BASOPHILS # BLD: 0 K/UL (ref 0–0.1)
BASOPHILS NFR BLD: 0 % (ref 0–1)
BILIRUB DIRECT SERPL-MCNC: 0.2 MG/DL (ref 0.1–0.3)
BILIRUB SERPL-MCNC: 0.5 MG/DL (ref 0–1.2)
BILIRUB SERPL-MCNC: 0.6 MG/DL (ref 0–1.2)
BUN SERPL-MCNC: 11 MG/DL (ref 6–20)
BUN SERPL-MCNC: 4 MG/DL (ref 6–20)
BUN/CREAT SERPL: 13 (ref 12–20)
BUN/CREAT SERPL: 6 (ref 12–20)
CALCIUM SERPL-MCNC: 9.1 MG/DL (ref 8.6–10)
CALCIUM SERPL-MCNC: 9.2 MG/DL (ref 8.6–10)
CHLORIDE SERPL-SCNC: 103 MMOL/L (ref 98–107)
CHLORIDE SERPL-SCNC: 105 MMOL/L (ref 98–107)
CHOLEST SERPL-MCNC: 213 MG/DL (ref 0–200)
CO2 SERPL-SCNC: 22 MMOL/L (ref 20–29)
CO2 SERPL-SCNC: 23 MMOL/L (ref 20–29)
CREAT SERPL-MCNC: 0.7 MG/DL (ref 0.6–1)
CREAT SERPL-MCNC: 0.81 MG/DL (ref 0.6–1)
CRP SERPL-MCNC: 4.9 MG/DL (ref 0–0.5)
DIFFERENTIAL METHOD BLD: ABNORMAL
EOSINOPHIL # BLD: 0.08 K/UL (ref 0–0.4)
EOSINOPHIL NFR BLD: 1 % (ref 0–7)
ERYTHROCYTE [DISTWIDTH] IN BLOOD BY AUTOMATED COUNT: 17 % (ref 11.5–14.5)
GGT SERPL-CCNC: 55 U/L (ref 5–36)
GLOBULIN SER CALC-MCNC: 3.1 G/DL (ref 2–4)
GLOBULIN SER CALC-MCNC: 3.2 G/DL (ref 2–4)
GLUCOSE SERPL-MCNC: 112 MG/DL (ref 65–100)
GLUCOSE SERPL-MCNC: 154 MG/DL (ref 65–100)
HCT VFR BLD AUTO: 31.6 % (ref 35–47)
HDLC SERPL-MCNC: 29 MG/DL (ref 40–60)
HDLC SERPL: 7.4
HGB BLD-MCNC: 10.3 G/DL (ref 11.5–16)
IMM GRANULOCYTES # BLD AUTO: 0 K/UL
IMM GRANULOCYTES NFR BLD AUTO: 0 %
LDH SERPL L TO P-CCNC: 297 U/L (ref 135–246)
LDLC SERPL CALC-MCNC: 114 MG/DL
LIPASE SERPL-CCNC: 27 U/L (ref 13–60)
LYMPHOCYTES # BLD: 2.52 K/UL (ref 0.8–3.5)
LYMPHOCYTES NFR BLD: 30 % (ref 12–49)
MAGNESIUM SERPL-MCNC: 2.1 MG/DL (ref 1.6–2.6)
MCH RBC QN AUTO: 30.7 PG (ref 26–34)
MCHC RBC AUTO-ENTMCNC: 32.6 G/DL (ref 30–36.5)
MCV RBC AUTO: 94.3 FL (ref 80–99)
METAMYELOCYTES NFR BLD MANUAL: 1 %
MONOCYTES # BLD: 0.34 K/UL (ref 0–1)
MONOCYTES NFR BLD: 4 % (ref 5–13)
MYELOCYTES NFR BLD MANUAL: 6 %
NEUTS BAND NFR BLD MANUAL: 3 % (ref 0–6)
NEUTS SEG # BLD: 4.87 K/UL (ref 1.8–8)
NEUTS SEG NFR BLD: 55 % (ref 32–75)
NRBC # BLD: 0.12 K/UL (ref 0–0.01)
NRBC BLD-RTO: 1.4 PER 100 WBC
PLATELET # BLD AUTO: 237 K/UL (ref 150–400)
PMV BLD AUTO: 9.4 FL (ref 8.9–12.9)
POTASSIUM SERPL-SCNC: 3.8 MMOL/L (ref 3.5–5.1)
POTASSIUM SERPL-SCNC: 4.1 MMOL/L (ref 3.5–5.1)
PROT SERPL-MCNC: 6.5 G/DL (ref 6.4–8.3)
PROT SERPL-MCNC: 6.7 G/DL (ref 6.4–8.3)
RBC # BLD AUTO: 3.35 M/UL (ref 3.8–5.2)
RBC MORPH BLD: ABNORMAL
SODIUM SERPL-SCNC: 137 MMOL/L (ref 136–145)
SODIUM SERPL-SCNC: 138 MMOL/L (ref 136–145)
TRIGL SERPL-MCNC: 351 MG/DL (ref 0–150)
VLDLC SERPL CALC-MCNC: 70 MG/DL
WBC # BLD AUTO: 8.4 K/UL (ref 3.6–11)

## 2025-08-06 PROCEDURE — 83690 ASSAY OF LIPASE: CPT

## 2025-08-06 PROCEDURE — 2500000003 HC RX 250 WO HCPCS: Performed by: INTERNAL MEDICINE

## 2025-08-06 PROCEDURE — 80053 COMPREHEN METABOLIC PANEL: CPT

## 2025-08-06 PROCEDURE — 2580000003 HC RX 258: Performed by: INTERNAL MEDICINE

## 2025-08-06 PROCEDURE — 96413 CHEMO IV INFUSION 1 HR: CPT

## 2025-08-06 PROCEDURE — 80061 LIPID PANEL: CPT

## 2025-08-06 PROCEDURE — 86140 C-REACTIVE PROTEIN: CPT

## 2025-08-06 PROCEDURE — 85025 COMPLETE CBC W/AUTO DIFF WBC: CPT

## 2025-08-06 PROCEDURE — 83615 LACTATE (LD) (LDH) ENZYME: CPT

## 2025-08-06 PROCEDURE — 82248 BILIRUBIN DIRECT: CPT

## 2025-08-06 PROCEDURE — 82977 ASSAY OF GGT: CPT

## 2025-08-06 PROCEDURE — 83735 ASSAY OF MAGNESIUM: CPT

## 2025-08-06 PROCEDURE — 82150 ASSAY OF AMYLASE: CPT

## 2025-08-06 PROCEDURE — 96375 TX/PRO/DX INJ NEW DRUG ADDON: CPT

## 2025-08-06 PROCEDURE — 6360000002 HC RX W HCPCS: Performed by: INTERNAL MEDICINE

## 2025-08-06 RX ORDER — SODIUM CHLORIDE 0.9 % (FLUSH) 0.9 %
5-40 SYRINGE (ML) INJECTION PRN
Status: DISCONTINUED | OUTPATIENT
Start: 2025-08-06 | End: 2025-08-07 | Stop reason: HOSPADM

## 2025-08-06 RX ORDER — DEXAMETHASONE SODIUM PHOSPHATE 10 MG/ML
10 INJECTION, SOLUTION INTRA-ARTICULAR; INTRALESIONAL; INTRAMUSCULAR; INTRAVENOUS; SOFT TISSUE ONCE
Status: COMPLETED | OUTPATIENT
Start: 2025-08-06 | End: 2025-08-06

## 2025-08-06 RX ORDER — SODIUM CHLORIDE 9 MG/ML
5-250 INJECTION, SOLUTION INTRAVENOUS PRN
Status: DISCONTINUED | OUTPATIENT
Start: 2025-08-06 | End: 2025-08-07 | Stop reason: HOSPADM

## 2025-08-06 RX ORDER — DIPHENHYDRAMINE HYDROCHLORIDE 50 MG/ML
25 INJECTION, SOLUTION INTRAMUSCULAR; INTRAVENOUS ONCE
Status: COMPLETED | OUTPATIENT
Start: 2025-08-06 | End: 2025-08-06

## 2025-08-06 RX ADMIN — DIPHENHYDRAMINE HYDROCHLORIDE 25 MG: 50 INJECTION INTRAMUSCULAR; INTRAVENOUS at 09:41

## 2025-08-06 RX ADMIN — DEXAMETHASONE SODIUM PHOSPHATE 10 MG: 10 INJECTION INTRAMUSCULAR; INTRAVENOUS at 09:43

## 2025-08-06 RX ADMIN — FAMOTIDINE 20 MG: 10 INJECTION INTRAVENOUS at 09:35

## 2025-08-06 RX ADMIN — PACLITAXEL 186 MG: 6 INJECTION, SOLUTION INTRAVENOUS at 10:36

## 2025-08-06 RX ADMIN — SODIUM CHLORIDE 25 ML/HR: 0.9 INJECTION, SOLUTION INTRAVENOUS at 09:35

## 2025-08-06 RX ADMIN — SODIUM CHLORIDE, PRESERVATIVE FREE 20 ML: 5 INJECTION INTRAVENOUS at 11:45

## 2025-08-06 ASSESSMENT — PAIN SCALES - GENERAL: PAINLEVEL_OUTOF10: 0

## 2025-08-07 RX ORDER — HEPARIN 100 UNIT/ML
500 SYRINGE INTRAVENOUS PRN
Status: CANCELLED | OUTPATIENT
Start: 2025-08-13

## 2025-08-07 RX ORDER — SODIUM CHLORIDE 9 MG/ML
INJECTION, SOLUTION INTRAVENOUS CONTINUOUS
Status: CANCELLED | OUTPATIENT
Start: 2025-08-13

## 2025-08-07 RX ORDER — ONDANSETRON 2 MG/ML
8 INJECTION INTRAMUSCULAR; INTRAVENOUS
Status: CANCELLED | OUTPATIENT
Start: 2025-08-13

## 2025-08-07 RX ORDER — DIPHENHYDRAMINE HYDROCHLORIDE 50 MG/ML
50 INJECTION, SOLUTION INTRAMUSCULAR; INTRAVENOUS
Status: CANCELLED | OUTPATIENT
Start: 2025-08-13

## 2025-08-07 RX ORDER — MEPERIDINE HYDROCHLORIDE 25 MG/ML
12.5 INJECTION INTRAMUSCULAR; INTRAVENOUS; SUBCUTANEOUS PRN
Status: CANCELLED | OUTPATIENT
Start: 2025-08-13

## 2025-08-07 RX ORDER — ACETAMINOPHEN 325 MG/1
650 TABLET ORAL
Status: CANCELLED | OUTPATIENT
Start: 2025-08-13

## 2025-08-07 RX ORDER — ALBUTEROL SULFATE 90 UG/1
4 INHALANT RESPIRATORY (INHALATION) PRN
Status: CANCELLED | OUTPATIENT
Start: 2025-08-13

## 2025-08-07 RX ORDER — SODIUM CHLORIDE 9 MG/ML
5-250 INJECTION, SOLUTION INTRAVENOUS PRN
Status: CANCELLED | OUTPATIENT
Start: 2025-08-13

## 2025-08-07 RX ORDER — EPINEPHRINE 1 MG/ML
0.3 INJECTION, SOLUTION INTRAMUSCULAR; SUBCUTANEOUS PRN
Status: CANCELLED | OUTPATIENT
Start: 2025-08-13

## 2025-08-07 RX ORDER — HYDROCORTISONE SODIUM SUCCINATE 100 MG/2ML
100 INJECTION INTRAMUSCULAR; INTRAVENOUS
Status: CANCELLED | OUTPATIENT
Start: 2025-08-13

## 2025-08-11 ENCOUNTER — PATIENT MESSAGE (OUTPATIENT)
Age: 35
End: 2025-08-11

## 2025-08-11 DIAGNOSIS — R21 RASH: Primary | ICD-10-CM

## 2025-08-12 ENCOUNTER — HOSPITAL ENCOUNTER (OUTPATIENT)
Facility: HOSPITAL | Age: 35
Setting detail: INFUSION SERIES
End: 2025-08-12

## 2025-08-13 ENCOUNTER — HOSPITAL ENCOUNTER (OUTPATIENT)
Facility: HOSPITAL | Age: 35
Setting detail: INFUSION SERIES
Discharge: HOME | End: 2025-08-13
Payer: COMMERCIAL

## 2025-08-13 ENCOUNTER — OFFICE VISIT (OUTPATIENT)
Age: 35
End: 2025-08-13

## 2025-08-13 ENCOUNTER — RESEARCH ENCOUNTER (OUTPATIENT)
Age: 35
End: 2025-08-13

## 2025-08-13 VITALS
TEMPERATURE: 97.8 F | RESPIRATION RATE: 16 BRPM | HEIGHT: 63 IN | BODY MASS INDEX: 36.2 KG/M2 | OXYGEN SATURATION: 94 % | SYSTOLIC BLOOD PRESSURE: 132 MMHG | WEIGHT: 204.3 LBS | DIASTOLIC BLOOD PRESSURE: 86 MMHG | HEART RATE: 95 BPM

## 2025-08-13 VITALS
HEIGHT: 63 IN | TEMPERATURE: 97.8 F | RESPIRATION RATE: 16 BRPM | BODY MASS INDEX: 36.14 KG/M2 | HEART RATE: 89 BPM | OXYGEN SATURATION: 95 % | SYSTOLIC BLOOD PRESSURE: 122 MMHG | WEIGHT: 204 LBS | DIASTOLIC BLOOD PRESSURE: 91 MMHG

## 2025-08-13 DIAGNOSIS — C79.51 MALIGNANT NEOPLASM OF BREAST METASTATIC TO BONE (HCC): Primary | ICD-10-CM

## 2025-08-13 DIAGNOSIS — C50.919 MALIGNANT NEOPLASM OF BREAST METASTATIC TO BONE (HCC): Primary | ICD-10-CM

## 2025-08-13 DIAGNOSIS — E83.51 HYPOCALCEMIA: Primary | ICD-10-CM

## 2025-08-13 DIAGNOSIS — C79.51 CARCINOMA OF LEFT BREAST METASTATIC TO BONE (HCC): ICD-10-CM

## 2025-08-13 DIAGNOSIS — C50.912 CARCINOMA OF LEFT BREAST METASTATIC TO BONE (HCC): ICD-10-CM

## 2025-08-13 LAB
ALBUMIN SERPL-MCNC: 3.4 G/DL (ref 3.5–5.2)
ALBUMIN/GLOB SERPL: 1.1 (ref 1.1–2.2)
ALP SERPL-CCNC: 119 U/L (ref 35–104)
ALT SERPL-CCNC: 59 U/L (ref 10–35)
AMORPH CRY URNS QL MICRO: ABNORMAL
AMYLASE SERPL-CCNC: 31 U/L (ref 28–115)
ANION GAP SERPL CALC-SCNC: 11 MMOL/L (ref 2–14)
APPEARANCE UR: ABNORMAL
AST SERPL-CCNC: 37 U/L (ref 10–35)
BACTERIA URNS QL MICRO: ABNORMAL /HPF
BASOPHILS # BLD: 0 K/UL (ref 0–0.1)
BASOPHILS NFR BLD: 0 % (ref 0–1)
BILIRUB DIRECT SERPL-MCNC: 0.2 MG/DL (ref 0.1–0.3)
BILIRUB SERPL-MCNC: 0.5 MG/DL (ref 0–1.2)
BILIRUB UR QL: NEGATIVE
BUN SERPL-MCNC: 9 MG/DL (ref 6–20)
BUN/CREAT SERPL: 12 (ref 12–20)
CALCIUM SERPL-MCNC: 9 MG/DL (ref 8.6–10)
CHLORIDE SERPL-SCNC: 103 MMOL/L (ref 98–107)
CHOLEST SERPL-MCNC: 219 MG/DL (ref 0–200)
CO2 SERPL-SCNC: 23 MMOL/L (ref 20–29)
COLOR UR: ABNORMAL
CREAT SERPL-MCNC: 0.77 MG/DL (ref 0.6–1)
CRP SERPL-MCNC: 2.1 MG/DL (ref 0–0.5)
DIFFERENTIAL METHOD BLD: ABNORMAL
EOSINOPHIL # BLD: 0.13 K/UL (ref 0–0.4)
EOSINOPHIL NFR BLD: 3 % (ref 0–7)
EPITH CASTS URNS QL MICRO: ABNORMAL /LPF
ERYTHROCYTE [DISTWIDTH] IN BLOOD BY AUTOMATED COUNT: 17.2 % (ref 11.5–14.5)
GGT SERPL-CCNC: 48 U/L (ref 5–36)
GLOBULIN SER CALC-MCNC: 3 G/DL (ref 2–4)
GLUCOSE SERPL-MCNC: 107 MG/DL (ref 65–100)
GLUCOSE UR STRIP.AUTO-MCNC: NEGATIVE MG/DL
HCT VFR BLD AUTO: 28.6 % (ref 35–47)
HDLC SERPL-MCNC: 31 MG/DL (ref 40–60)
HDLC SERPL: 7 (ref 0–5)
HGB BLD-MCNC: 9.4 G/DL (ref 11.5–16)
HGB UR QL STRIP: NEGATIVE
IMM GRANULOCYTES # BLD AUTO: 0 K/UL (ref 0–0.04)
IMM GRANULOCYTES NFR BLD AUTO: 0 % (ref 0–0.5)
KETONES UR QL STRIP.AUTO: NEGATIVE MG/DL
LDH SERPL L TO P-CCNC: 310 U/L (ref 135–246)
LDLC SERPL CALC-MCNC: 127 MG/DL (ref 0–100)
LEUKOCYTE ESTERASE UR QL STRIP.AUTO: ABNORMAL
LIPASE SERPL-CCNC: 25 U/L (ref 13–60)
LYMPHOCYTES # BLD: 1.16 K/UL (ref 0.8–3.5)
LYMPHOCYTES NFR BLD: 27 % (ref 12–49)
MAGNESIUM SERPL-MCNC: 2.1 MG/DL (ref 1.6–2.6)
MCH RBC QN AUTO: 30.4 PG (ref 26–34)
MCHC RBC AUTO-ENTMCNC: 32.9 G/DL (ref 30–36.5)
MCV RBC AUTO: 92.6 FL (ref 80–99)
METAMYELOCYTES NFR BLD MANUAL: 2 %
MONOCYTES # BLD: 0.56 K/UL (ref 0–1)
MONOCYTES NFR BLD: 13 % (ref 5–13)
MYELOCYTES NFR BLD MANUAL: 2 %
NEUTS BAND NFR BLD MANUAL: 3 %
NEUTS SEG # BLD: 2.28 K/UL (ref 1.8–8)
NEUTS SEG NFR BLD: 50 % (ref 32–75)
NITRITE UR QL STRIP.AUTO: NEGATIVE
NRBC # BLD: 0.07 K/UL (ref 0–0.01)
NRBC BLD-RTO: 1.6 PER 100 WBC
PH UR STRIP: 6.5 (ref 5–8)
PLATELET # BLD AUTO: 210 K/UL (ref 150–400)
POTASSIUM SERPL-SCNC: 5.1 MMOL/L (ref 3.5–5.1)
PROT SERPL-MCNC: 6.5 G/DL (ref 6.4–8.3)
PROT UR STRIP-MCNC: NEGATIVE MG/DL
RBC # BLD AUTO: 3.09 M/UL (ref 3.8–5.2)
RBC #/AREA URNS HPF: ABNORMAL /HPF (ref 0–5)
RBC MORPH BLD: ABNORMAL
SODIUM SERPL-SCNC: 137 MMOL/L (ref 136–145)
SP GR UR REFRACTOMETRY: 1.01 (ref 1–1.03)
TRIGL SERPL-MCNC: 304 MG/DL (ref 0–150)
URINE CULTURE IF INDICATED: ABNORMAL
UROBILINOGEN UR QL STRIP.AUTO: 0.2 EU/DL (ref 0.2–1)
VLDLC SERPL CALC-MCNC: 61 MG/DL
WBC # BLD AUTO: 4.3 K/UL (ref 3.6–11)
WBC URNS QL MICRO: ABNORMAL /HPF (ref 0–4)

## 2025-08-13 PROCEDURE — 36415 COLL VENOUS BLD VENIPUNCTURE: CPT

## 2025-08-13 PROCEDURE — 36593 DECLOT VASCULAR DEVICE: CPT

## 2025-08-13 PROCEDURE — 82150 ASSAY OF AMYLASE: CPT

## 2025-08-13 PROCEDURE — 82248 BILIRUBIN DIRECT: CPT

## 2025-08-13 PROCEDURE — 83690 ASSAY OF LIPASE: CPT

## 2025-08-13 PROCEDURE — 96413 CHEMO IV INFUSION 1 HR: CPT

## 2025-08-13 PROCEDURE — 87086 URINE CULTURE/COLONY COUNT: CPT

## 2025-08-13 PROCEDURE — 2580000003 HC RX 258: Performed by: INTERNAL MEDICINE

## 2025-08-13 PROCEDURE — 85025 COMPLETE CBC W/AUTO DIFF WBC: CPT

## 2025-08-13 PROCEDURE — 86140 C-REACTIVE PROTEIN: CPT

## 2025-08-13 PROCEDURE — 80053 COMPREHEN METABOLIC PANEL: CPT

## 2025-08-13 PROCEDURE — 82977 ASSAY OF GGT: CPT

## 2025-08-13 PROCEDURE — 2500000003 HC RX 250 WO HCPCS: Performed by: INTERNAL MEDICINE

## 2025-08-13 PROCEDURE — 83615 LACTATE (LD) (LDH) ENZYME: CPT

## 2025-08-13 PROCEDURE — 96375 TX/PRO/DX INJ NEW DRUG ADDON: CPT

## 2025-08-13 PROCEDURE — 96417 CHEMO IV INFUS EACH ADDL SEQ: CPT

## 2025-08-13 PROCEDURE — 83735 ASSAY OF MAGNESIUM: CPT

## 2025-08-13 PROCEDURE — 81001 URINALYSIS AUTO W/SCOPE: CPT

## 2025-08-13 PROCEDURE — 80061 LIPID PANEL: CPT

## 2025-08-13 PROCEDURE — 6360000002 HC RX W HCPCS: Performed by: INTERNAL MEDICINE

## 2025-08-13 RX ORDER — DEXAMETHASONE SODIUM PHOSPHATE 10 MG/ML
10 INJECTION, SOLUTION INTRA-ARTICULAR; INTRALESIONAL; INTRAMUSCULAR; INTRAVENOUS; SOFT TISSUE ONCE
Status: COMPLETED | OUTPATIENT
Start: 2025-08-13 | End: 2025-08-13

## 2025-08-13 RX ORDER — SODIUM CHLORIDE 9 MG/ML
5-250 INJECTION, SOLUTION INTRAVENOUS PRN
Status: DISCONTINUED | OUTPATIENT
Start: 2025-08-13 | End: 2025-08-14 | Stop reason: HOSPADM

## 2025-08-13 RX ORDER — SODIUM CHLORIDE 0.9 % (FLUSH) 0.9 %
5-40 SYRINGE (ML) INJECTION PRN
Status: DISCONTINUED | OUTPATIENT
Start: 2025-08-13 | End: 2025-08-14 | Stop reason: HOSPADM

## 2025-08-13 RX ORDER — DIPHENHYDRAMINE HYDROCHLORIDE 50 MG/ML
25 INJECTION, SOLUTION INTRAMUSCULAR; INTRAVENOUS ONCE
Status: COMPLETED | OUTPATIENT
Start: 2025-08-13 | End: 2025-08-13

## 2025-08-13 RX ADMIN — SODIUM CHLORIDE 25 ML/HR: 0.9 INJECTION, SOLUTION INTRAVENOUS at 11:16

## 2025-08-13 RX ADMIN — PACLITAXEL 186 MG: 6 INJECTION, SOLUTION, CONCENTRATE INTRAVENOUS at 13:45

## 2025-08-13 RX ADMIN — WATER 2 MG: 1 INJECTION INTRAMUSCULAR; INTRAVENOUS; SUBCUTANEOUS at 08:22

## 2025-08-13 RX ADMIN — DEXAMETHASONE SODIUM PHOSPHATE 10 MG: 10 INJECTION INTRAMUSCULAR; INTRAVENOUS at 13:02

## 2025-08-13 RX ADMIN — FAMOTIDINE 20 MG: 10 INJECTION INTRAVENOUS at 12:55

## 2025-08-13 RX ADMIN — Medication 20 ML: at 14:50

## 2025-08-13 RX ADMIN — Medication 1400 MG: at 11:19

## 2025-08-13 RX ADMIN — DIPHENHYDRAMINE HYDROCHLORIDE 25 MG: 50 INJECTION INTRAMUSCULAR; INTRAVENOUS at 12:59

## 2025-08-13 RX ADMIN — Medication 30 ML: at 08:00

## 2025-08-13 ASSESSMENT — PAIN SCALES - GENERAL: PAINLEVEL_OUTOF10: 0

## 2025-08-14 LAB
BACTERIA SPEC CULT: NORMAL
CC UR VC: NORMAL
SERVICE CMNT-IMP: NORMAL

## 2025-08-17 ENCOUNTER — CLINICAL DOCUMENTATION (OUTPATIENT)
Age: 35
End: 2025-08-17

## 2025-08-18 ENCOUNTER — TELEPHONE (OUTPATIENT)
Age: 35
End: 2025-08-18

## 2025-08-18 RX ORDER — IOPAMIDOL 755 MG/ML
100 INJECTION, SOLUTION INTRAVASCULAR
Status: COMPLETED | OUTPATIENT
Start: 2025-08-18 | End: 2025-08-19

## 2025-08-18 RX ORDER — SODIUM CHLORIDE 0.9 % (FLUSH) 0.9 %
5-40 SYRINGE (ML) INJECTION PRN
Status: CANCELLED | OUTPATIENT
Start: 2025-08-27

## 2025-08-18 RX ORDER — EPINEPHRINE 1 MG/ML
0.3 INJECTION, SOLUTION INTRAMUSCULAR; SUBCUTANEOUS PRN
Status: CANCELLED | OUTPATIENT
Start: 2025-08-27

## 2025-08-18 RX ORDER — HEPARIN 100 UNIT/ML
500 SYRINGE INTRAVENOUS PRN
Status: CANCELLED | OUTPATIENT
Start: 2025-08-27

## 2025-08-18 RX ORDER — HYDROCORTISONE SODIUM SUCCINATE 100 MG/2ML
100 INJECTION INTRAMUSCULAR; INTRAVENOUS
Status: CANCELLED | OUTPATIENT
Start: 2025-08-27

## 2025-08-18 RX ORDER — ONDANSETRON 2 MG/ML
8 INJECTION INTRAMUSCULAR; INTRAVENOUS
Status: CANCELLED | OUTPATIENT
Start: 2025-08-27

## 2025-08-18 RX ORDER — MEPERIDINE HYDROCHLORIDE 25 MG/ML
12.5 INJECTION INTRAMUSCULAR; INTRAVENOUS; SUBCUTANEOUS PRN
Status: CANCELLED | OUTPATIENT
Start: 2025-08-27

## 2025-08-18 RX ORDER — ALBUTEROL SULFATE 90 UG/1
4 INHALANT RESPIRATORY (INHALATION) PRN
Status: CANCELLED | OUTPATIENT
Start: 2025-08-27

## 2025-08-18 RX ORDER — ACETAMINOPHEN 325 MG/1
650 TABLET ORAL
Status: CANCELLED | OUTPATIENT
Start: 2025-08-27

## 2025-08-18 RX ORDER — SODIUM CHLORIDE 9 MG/ML
INJECTION, SOLUTION INTRAVENOUS CONTINUOUS
Status: CANCELLED | OUTPATIENT
Start: 2025-08-27

## 2025-08-18 RX ORDER — SODIUM CHLORIDE 9 MG/ML
5-250 INJECTION, SOLUTION INTRAVENOUS PRN
Status: CANCELLED | OUTPATIENT
Start: 2025-08-27

## 2025-08-18 RX ORDER — DIPHENHYDRAMINE HYDROCHLORIDE 50 MG/ML
50 INJECTION, SOLUTION INTRAMUSCULAR; INTRAVENOUS
Status: CANCELLED | OUTPATIENT
Start: 2025-08-27

## 2025-08-19 ENCOUNTER — HOSPITAL ENCOUNTER (OUTPATIENT)
Facility: HOSPITAL | Age: 35
Discharge: HOME | End: 2025-08-22
Attending: INTERNAL MEDICINE
Payer: COMMERCIAL

## 2025-08-19 DIAGNOSIS — C50.919 MALIGNANT NEOPLASM OF BREAST METASTATIC TO BONE (HCC): ICD-10-CM

## 2025-08-19 DIAGNOSIS — C79.51 MALIGNANT NEOPLASM OF BREAST METASTATIC TO BONE (HCC): ICD-10-CM

## 2025-08-19 PROCEDURE — 71260 CT THORAX DX C+: CPT

## 2025-08-19 PROCEDURE — 6360000004 HC RX CONTRAST MEDICATION: Performed by: INTERNAL MEDICINE

## 2025-08-19 RX ADMIN — IOPAMIDOL 100 ML: 755 INJECTION, SOLUTION INTRAVENOUS at 14:21

## 2025-08-20 ENCOUNTER — HOSPITAL ENCOUNTER (OUTPATIENT)
Facility: HOSPITAL | Age: 35
Discharge: HOME | End: 2025-08-23
Attending: INTERNAL MEDICINE
Payer: COMMERCIAL

## 2025-08-20 DIAGNOSIS — C79.51 MALIGNANT NEOPLASM OF BREAST METASTATIC TO BONE (HCC): ICD-10-CM

## 2025-08-20 DIAGNOSIS — C50.919 MALIGNANT NEOPLASM OF BREAST METASTATIC TO BONE (HCC): ICD-10-CM

## 2025-08-20 PROCEDURE — 3430000000 HC RX DIAGNOSTIC RADIOPHARMACEUTICAL: Performed by: INTERNAL MEDICINE

## 2025-08-20 PROCEDURE — A9503 TC99M MEDRONATE: HCPCS | Performed by: INTERNAL MEDICINE

## 2025-08-20 PROCEDURE — 78306 BONE IMAGING WHOLE BODY: CPT | Performed by: INTERNAL MEDICINE

## 2025-08-20 RX ORDER — TC 99M MEDRONATE 20 MG/10ML
22 INJECTION, POWDER, LYOPHILIZED, FOR SOLUTION INTRAVENOUS
Status: COMPLETED | OUTPATIENT
Start: 2025-08-20 | End: 2025-08-20

## 2025-08-20 RX ORDER — TRIAMCINOLONE ACETONIDE 1 MG/G
OINTMENT TOPICAL 2 TIMES DAILY
Qty: 80 G | Refills: 2 | Status: SHIPPED | OUTPATIENT
Start: 2025-08-20 | End: 2025-09-19

## 2025-08-20 RX ADMIN — TC 99M MEDRONATE 22 MILLICURIE: 20 INJECTION, POWDER, LYOPHILIZED, FOR SOLUTION INTRAVENOUS at 10:20

## 2025-08-25 RX ORDER — ACETAMINOPHEN 325 MG/1
650 TABLET ORAL
OUTPATIENT
Start: 2025-09-03

## 2025-08-25 RX ORDER — SODIUM CHLORIDE 9 MG/ML
5-250 INJECTION, SOLUTION INTRAVENOUS PRN
OUTPATIENT
Start: 2025-09-03

## 2025-08-25 RX ORDER — MEPERIDINE HYDROCHLORIDE 25 MG/ML
12.5 INJECTION INTRAMUSCULAR; INTRAVENOUS; SUBCUTANEOUS PRN
OUTPATIENT
Start: 2025-09-03

## 2025-08-25 RX ORDER — SODIUM CHLORIDE 0.9 % (FLUSH) 0.9 %
5-40 SYRINGE (ML) INJECTION PRN
OUTPATIENT
Start: 2025-09-03

## 2025-08-25 RX ORDER — ALBUTEROL SULFATE 90 UG/1
4 INHALANT RESPIRATORY (INHALATION) PRN
OUTPATIENT
Start: 2025-09-03

## 2025-08-25 RX ORDER — SODIUM CHLORIDE 9 MG/ML
INJECTION, SOLUTION INTRAVENOUS CONTINUOUS
OUTPATIENT
Start: 2025-09-03

## 2025-08-25 RX ORDER — DIPHENHYDRAMINE HYDROCHLORIDE 50 MG/ML
50 INJECTION, SOLUTION INTRAMUSCULAR; INTRAVENOUS
OUTPATIENT
Start: 2025-09-03

## 2025-08-25 RX ORDER — ONDANSETRON 2 MG/ML
8 INJECTION INTRAMUSCULAR; INTRAVENOUS
OUTPATIENT
Start: 2025-09-03

## 2025-08-25 RX ORDER — HEPARIN 100 UNIT/ML
500 SYRINGE INTRAVENOUS PRN
OUTPATIENT
Start: 2025-09-03

## 2025-08-25 RX ORDER — HYDROCORTISONE SODIUM SUCCINATE 100 MG/2ML
100 INJECTION INTRAMUSCULAR; INTRAVENOUS
OUTPATIENT
Start: 2025-09-03

## 2025-08-25 RX ORDER — EPINEPHRINE 1 MG/ML
0.3 INJECTION, SOLUTION INTRAMUSCULAR; SUBCUTANEOUS PRN
OUTPATIENT
Start: 2025-09-03

## 2025-08-26 ENCOUNTER — HOSPITAL ENCOUNTER (OUTPATIENT)
Facility: HOSPITAL | Age: 35
Setting detail: INFUSION SERIES
Discharge: HOME | End: 2025-08-26
Payer: COMMERCIAL

## 2025-08-26 DIAGNOSIS — C79.51 CARCINOMA OF LEFT BREAST METASTATIC TO BONE (HCC): ICD-10-CM

## 2025-08-26 DIAGNOSIS — C50.912 CARCINOMA OF LEFT BREAST METASTATIC TO BONE (HCC): ICD-10-CM

## 2025-08-26 DIAGNOSIS — E83.51 HYPOCALCEMIA: ICD-10-CM

## 2025-08-26 LAB
ALBUMIN SERPL-MCNC: 3.2 G/DL (ref 3.5–5.2)
ALBUMIN/GLOB SERPL: 1 (ref 1.1–2.2)
ALP SERPL-CCNC: 81 U/L (ref 35–104)
ALT SERPL-CCNC: 41 U/L (ref 10–35)
AMYLASE SERPL-CCNC: 25 U/L (ref 28–115)
ANION GAP SERPL CALC-SCNC: 10 MMOL/L (ref 2–14)
APPEARANCE UR: ABNORMAL
AST SERPL-CCNC: 27 U/L (ref 10–35)
BACTERIA URNS QL MICRO: ABNORMAL /HPF
BASOPHILS # BLD: 0.03 K/UL (ref 0–0.1)
BASOPHILS NFR BLD: 0.6 % (ref 0–1)
BILIRUB DIRECT SERPL-MCNC: 0.2 MG/DL (ref 0.1–0.3)
BILIRUB SERPL-MCNC: 0.6 MG/DL (ref 0–1.2)
BILIRUB UR QL: NEGATIVE
BUN SERPL-MCNC: 4 MG/DL (ref 6–20)
CALCIUM SERPL-MCNC: 8.5 MG/DL (ref 8.6–10)
CHLORIDE SERPL-SCNC: 104 MMOL/L (ref 98–107)
CHOLEST SERPL-MCNC: 203 MG/DL (ref 0–200)
CO2 SERPL-SCNC: 24 MMOL/L (ref 20–29)
COLOR UR: ABNORMAL
CREAT SERPL-MCNC: 0.78 MG/DL (ref 0.6–1)
CRP SERPL-MCNC: 2.2 MG/DL (ref 0–0.5)
DIFFERENTIAL METHOD BLD: ABNORMAL
EOSINOPHIL # BLD: 0.07 K/UL (ref 0–0.4)
EOSINOPHIL NFR BLD: 1.5 % (ref 0–7)
EPITH CASTS URNS QL MICRO: ABNORMAL /LPF
ERYTHROCYTE [DISTWIDTH] IN BLOOD BY AUTOMATED COUNT: 19.1 % (ref 11.5–14.5)
GGT SERPL-CCNC: 36 U/L (ref 5–36)
GLOBULIN SER CALC-MCNC: 3.2 G/DL (ref 2–4)
GLUCOSE SERPL-MCNC: 118 MG/DL (ref 65–100)
GLUCOSE UR STRIP.AUTO-MCNC: NEGATIVE MG/DL
HCG SERPL-ACNC: 1 MIU/ML
HCT VFR BLD AUTO: 26.4 % (ref 35–47)
HDLC SERPL-MCNC: 26 MG/DL (ref 40–60)
HDLC SERPL: 7.8 (ref 0–5)
HGB BLD-MCNC: 8.3 G/DL (ref 11.5–16)
HGB UR QL STRIP: NEGATIVE
IMM GRANULOCYTES # BLD AUTO: 0.04 K/UL (ref 0–0.04)
IMM GRANULOCYTES NFR BLD AUTO: 0.9 % (ref 0–0.5)
KETONES UR QL STRIP.AUTO: ABNORMAL MG/DL
LDH SERPL L TO P-CCNC: 274 U/L (ref 135–246)
LDLC SERPL CALC-MCNC: 121 MG/DL (ref 0–100)
LEUKOCYTE ESTERASE UR QL STRIP.AUTO: ABNORMAL
LIPASE SERPL-CCNC: 17 U/L (ref 13–60)
LYMPHOCYTES # BLD: 1.13 K/UL (ref 0.8–3.5)
LYMPHOCYTES NFR BLD: 24 % (ref 12–49)
MAGNESIUM SERPL-MCNC: 1.8 MG/DL (ref 1.6–2.6)
MCH RBC QN AUTO: 29.5 PG (ref 26–34)
MCHC RBC AUTO-ENTMCNC: 31.4 G/DL (ref 30–36.5)
MCV RBC AUTO: 94 FL (ref 80–99)
MONOCYTES # BLD: 0.54 K/UL (ref 0–1)
MONOCYTES NFR BLD: 11.5 % (ref 5–13)
NEUTS SEG # BLD: 2.89 K/UL (ref 1.8–8)
NEUTS SEG NFR BLD: 61.5 % (ref 32–75)
NITRITE UR QL STRIP.AUTO: NEGATIVE
NRBC # BLD: 0.04 K/UL (ref 0–0.01)
NRBC BLD-RTO: 0.9 PER 100 WBC
PH UR STRIP: 6 (ref 5–8)
PLATELET # BLD AUTO: 219 K/UL (ref 150–400)
PMV BLD AUTO: 10.2 FL (ref 8.9–12.9)
POTASSIUM SERPL-SCNC: 4.1 MMOL/L (ref 3.5–5.1)
PROT SERPL-MCNC: 6.4 G/DL (ref 6.4–8.3)
PROT UR STRIP-MCNC: 30 MG/DL
RBC # BLD AUTO: 2.81 M/UL (ref 3.8–5.2)
RBC #/AREA URNS HPF: ABNORMAL /HPF (ref 0–5)
SODIUM SERPL-SCNC: 138 MMOL/L (ref 136–145)
SP GR UR REFRACTOMETRY: 1.02 (ref 1–1.03)
TRIGL SERPL-MCNC: 283 MG/DL (ref 0–150)
URINE CULTURE IF INDICATED: ABNORMAL
UROBILINOGEN UR QL STRIP.AUTO: 1 EU/DL (ref 0.2–1)
VLDLC SERPL CALC-MCNC: 57 MG/DL
WBC # BLD AUTO: 4.7 K/UL (ref 3.6–11)
WBC URNS QL MICRO: ABNORMAL /HPF (ref 0–4)

## 2025-08-26 PROCEDURE — 84702 CHORIONIC GONADOTROPIN TEST: CPT

## 2025-08-26 PROCEDURE — 83735 ASSAY OF MAGNESIUM: CPT

## 2025-08-26 PROCEDURE — 82977 ASSAY OF GGT: CPT

## 2025-08-26 PROCEDURE — 83615 LACTATE (LD) (LDH) ENZYME: CPT

## 2025-08-26 PROCEDURE — 82150 ASSAY OF AMYLASE: CPT

## 2025-08-26 PROCEDURE — 83690 ASSAY OF LIPASE: CPT

## 2025-08-26 PROCEDURE — 86140 C-REACTIVE PROTEIN: CPT

## 2025-08-26 PROCEDURE — 82248 BILIRUBIN DIRECT: CPT

## 2025-08-26 PROCEDURE — 80061 LIPID PANEL: CPT

## 2025-08-26 PROCEDURE — 80053 COMPREHEN METABOLIC PANEL: CPT

## 2025-08-26 PROCEDURE — 81001 URINALYSIS AUTO W/SCOPE: CPT

## 2025-08-26 PROCEDURE — 85025 COMPLETE CBC W/AUTO DIFF WBC: CPT

## 2025-08-27 ENCOUNTER — PATIENT MESSAGE (OUTPATIENT)
Age: 35
End: 2025-08-27

## 2025-08-27 ENCOUNTER — HOSPITAL ENCOUNTER (OUTPATIENT)
Facility: HOSPITAL | Age: 35
Setting detail: INFUSION SERIES
Discharge: HOME | End: 2025-08-27
Payer: COMMERCIAL

## 2025-08-27 ENCOUNTER — RESEARCH ENCOUNTER (OUTPATIENT)
Age: 35
End: 2025-08-27

## 2025-08-27 ENCOUNTER — OFFICE VISIT (OUTPATIENT)
Age: 35
End: 2025-08-27

## 2025-08-27 VITALS
SYSTOLIC BLOOD PRESSURE: 150 MMHG | TEMPERATURE: 97.6 F | HEART RATE: 86 BPM | DIASTOLIC BLOOD PRESSURE: 80 MMHG | HEIGHT: 63 IN | BODY MASS INDEX: 35.4 KG/M2 | OXYGEN SATURATION: 98 % | WEIGHT: 199.8 LBS | RESPIRATION RATE: 16 BRPM

## 2025-08-27 VITALS
HEART RATE: 90 BPM | OXYGEN SATURATION: 98 % | RESPIRATION RATE: 16 BRPM | DIASTOLIC BLOOD PRESSURE: 83 MMHG | BODY MASS INDEX: 36.62 KG/M2 | SYSTOLIC BLOOD PRESSURE: 105 MMHG | TEMPERATURE: 97.6 F | HEIGHT: 62 IN | WEIGHT: 199 LBS

## 2025-08-27 DIAGNOSIS — C77.3 BREAST CANCER METASTASIZED TO AXILLARY LYMPH NODE, LEFT (HCC): ICD-10-CM

## 2025-08-27 DIAGNOSIS — Z09 CHEMOTHERAPY FOLLOW-UP EXAMINATION: ICD-10-CM

## 2025-08-27 DIAGNOSIS — C79.51 MALIGNANT NEOPLASM OF BREAST METASTATIC TO BONE (HCC): Primary | ICD-10-CM

## 2025-08-27 DIAGNOSIS — C79.51 CARCINOMA OF LEFT BREAST METASTATIC TO BONE (HCC): ICD-10-CM

## 2025-08-27 DIAGNOSIS — C50.912 BREAST CANCER METASTASIZED TO AXILLARY LYMPH NODE, LEFT (HCC): ICD-10-CM

## 2025-08-27 DIAGNOSIS — C50.919 CARCINOMA OF BREAST METASTATIC TO BONE, UNSPECIFIED LATERALITY (HCC): ICD-10-CM

## 2025-08-27 DIAGNOSIS — E83.51 HYPOCALCEMIA: Primary | ICD-10-CM

## 2025-08-27 DIAGNOSIS — C79.51 CARCINOMA OF BREAST METASTATIC TO BONE, UNSPECIFIED LATERALITY (HCC): ICD-10-CM

## 2025-08-27 DIAGNOSIS — C50.919 MALIGNANT NEOPLASM OF BREAST METASTATIC TO BONE (HCC): Primary | ICD-10-CM

## 2025-08-27 DIAGNOSIS — C50.912 CARCINOMA OF LEFT BREAST METASTATIC TO BONE (HCC): ICD-10-CM

## 2025-08-27 PROCEDURE — 6360000002 HC RX W HCPCS: Performed by: INTERNAL MEDICINE

## 2025-08-27 PROCEDURE — 96413 CHEMO IV INFUSION 1 HR: CPT

## 2025-08-27 PROCEDURE — 96375 TX/PRO/DX INJ NEW DRUG ADDON: CPT

## 2025-08-27 PROCEDURE — 2500000003 HC RX 250 WO HCPCS: Performed by: INTERNAL MEDICINE

## 2025-08-27 PROCEDURE — 96411 CHEMO IV PUSH ADDL DRUG: CPT

## 2025-08-27 PROCEDURE — 2580000003 HC RX 258: Performed by: INTERNAL MEDICINE

## 2025-08-27 RX ORDER — DIPHENHYDRAMINE HYDROCHLORIDE 50 MG/ML
25 INJECTION, SOLUTION INTRAMUSCULAR; INTRAVENOUS ONCE
Status: COMPLETED | OUTPATIENT
Start: 2025-08-27 | End: 2025-08-27

## 2025-08-27 RX ORDER — NYSTATIN 100000 [USP'U]/G
POWDER TOPICAL
Qty: 30 G | Refills: 2 | Status: SHIPPED | OUTPATIENT
Start: 2025-08-27

## 2025-08-27 RX ORDER — DEXAMETHASONE SODIUM PHOSPHATE 10 MG/ML
10 INJECTION, SOLUTION INTRA-ARTICULAR; INTRALESIONAL; INTRAMUSCULAR; INTRAVENOUS; SOFT TISSUE ONCE
Status: COMPLETED | OUTPATIENT
Start: 2025-08-27 | End: 2025-08-27

## 2025-08-27 RX ORDER — SODIUM CHLORIDE 9 MG/ML
5-250 INJECTION, SOLUTION INTRAVENOUS PRN
Status: DISCONTINUED | OUTPATIENT
Start: 2025-08-27 | End: 2025-08-28 | Stop reason: HOSPADM

## 2025-08-27 RX ADMIN — WATER 2 MG: 1 INJECTION INTRAMUSCULAR; INTRAVENOUS; SUBCUTANEOUS at 13:36

## 2025-08-27 RX ADMIN — DEXAMETHASONE SODIUM PHOSPHATE 10 MG: 10 INJECTION INTRAMUSCULAR; INTRAVENOUS at 12:29

## 2025-08-27 RX ADMIN — Medication 1400 MG: at 10:40

## 2025-08-27 RX ADMIN — FAMOTIDINE 20 MG: 10 INJECTION, SOLUTION INTRAVENOUS at 12:22

## 2025-08-27 RX ADMIN — PACLITAXEL 186 MG: 6 INJECTION, SOLUTION INTRAVENOUS at 14:54

## 2025-08-27 RX ADMIN — DIPHENHYDRAMINE HYDROCHLORIDE 25 MG: 50 INJECTION INTRAMUSCULAR; INTRAVENOUS at 12:25

## 2025-08-27 ASSESSMENT — PATIENT HEALTH QUESTIONNAIRE - PHQ9
SUM OF ALL RESPONSES TO PHQ QUESTIONS 1-9: 0
2. FEELING DOWN, DEPRESSED OR HOPELESS: NOT AT ALL
1. LITTLE INTEREST OR PLEASURE IN DOING THINGS: NOT AT ALL
SUM OF ALL RESPONSES TO PHQ QUESTIONS 1-9: 0

## 2025-08-27 ASSESSMENT — PAIN SCALES - GENERAL: PAINLEVEL_OUTOF10: 0

## 2025-08-29 ENCOUNTER — HOSPITAL ENCOUNTER (OUTPATIENT)
Facility: HOSPITAL | Age: 35
Setting detail: OUTPATIENT SURGERY
Discharge: HOME | End: 2025-08-29
Attending: RADIOLOGY | Admitting: RADIOLOGY
Payer: COMMERCIAL

## 2025-08-29 VITALS — WEIGHT: 199 LBS | BODY MASS INDEX: 33.97 KG/M2 | HEIGHT: 64 IN

## 2025-08-29 DIAGNOSIS — C50.919 MALIGNANT NEOPLASM OF BREAST METASTATIC TO BONE (HCC): ICD-10-CM

## 2025-08-29 DIAGNOSIS — C79.51 MALIGNANT NEOPLASM OF BREAST METASTATIC TO BONE (HCC): ICD-10-CM

## 2025-08-29 PROCEDURE — 6360000002 HC RX W HCPCS: Performed by: RADIOLOGY

## 2025-08-29 RX ORDER — HEPARIN 100 UNIT/ML
SYRINGE INTRAVENOUS PRN
Status: COMPLETED | OUTPATIENT
Start: 2025-08-29 | End: 2025-08-29

## 2025-08-29 RX ADMIN — HEPARIN 500 UNITS: 100 SYRINGE at 09:24

## 2025-08-29 ASSESSMENT — PAIN - FUNCTIONAL ASSESSMENT: PAIN_FUNCTIONAL_ASSESSMENT: 0-10

## 2025-09-02 ENCOUNTER — HOSPITAL ENCOUNTER (OUTPATIENT)
Facility: HOSPITAL | Age: 35
Setting detail: INFUSION SERIES
Discharge: HOME | End: 2025-09-02
Payer: COMMERCIAL

## 2025-09-02 DIAGNOSIS — C50.912 CARCINOMA OF LEFT BREAST METASTATIC TO BONE (HCC): ICD-10-CM

## 2025-09-02 DIAGNOSIS — E83.51 HYPOCALCEMIA: ICD-10-CM

## 2025-09-02 DIAGNOSIS — C79.51 CARCINOMA OF LEFT BREAST METASTATIC TO BONE (HCC): ICD-10-CM

## 2025-09-02 LAB
ALBUMIN SERPL-MCNC: 3.5 G/DL (ref 3.5–5.2)
ALBUMIN/GLOB SERPL: 1.3 (ref 1.1–2.2)
ALP SERPL-CCNC: 122 U/L (ref 35–104)
ALT SERPL-CCNC: 60 U/L (ref 10–35)
AMYLASE SERPL-CCNC: 34 U/L (ref 28–115)
ANION GAP SERPL CALC-SCNC: 10 MMOL/L (ref 2–14)
AST SERPL-CCNC: 38 U/L (ref 10–35)
BASOPHILS # BLD: 0 K/UL (ref 0–0.1)
BASOPHILS NFR BLD: 0 % (ref 0–1)
BILIRUB DIRECT SERPL-MCNC: 0.2 MG/DL (ref 0.1–0.3)
BILIRUB SERPL-MCNC: 0.3 MG/DL (ref 0–1.2)
BUN SERPL-MCNC: 13 MG/DL (ref 6–20)
BUN/CREAT SERPL: 18 (ref 12–20)
CALCIUM SERPL-MCNC: 8.8 MG/DL (ref 8.6–10)
CHLORIDE SERPL-SCNC: 102 MMOL/L (ref 98–107)
CHOLEST SERPL-MCNC: 182 MG/DL (ref 0–200)
CO2 SERPL-SCNC: 23 MMOL/L (ref 20–29)
CREAT SERPL-MCNC: 0.71 MG/DL (ref 0.6–1)
CRP SERPL-MCNC: 0.8 MG/DL (ref 0–0.5)
DIFFERENTIAL METHOD BLD: ABNORMAL
EOSINOPHIL # BLD: 0 K/UL (ref 0–0.4)
EOSINOPHIL NFR BLD: 0 % (ref 0–7)
ERYTHROCYTE [DISTWIDTH] IN BLOOD BY AUTOMATED COUNT: 18.6 % (ref 11.5–14.5)
GGT SERPL-CCNC: 41 U/L (ref 5–36)
GLOBULIN SER CALC-MCNC: 2.6 G/DL (ref 2–4)
GLUCOSE SERPL-MCNC: 107 MG/DL (ref 65–100)
HCT VFR BLD AUTO: 26.5 % (ref 35–47)
HDLC SERPL-MCNC: 31 MG/DL (ref 40–60)
HDLC SERPL: 5.9 (ref 0–5)
HGB BLD-MCNC: 8.5 G/DL (ref 11.5–16)
IMM GRANULOCYTES # BLD AUTO: 0 K/UL
IMM GRANULOCYTES NFR BLD AUTO: 0 %
LDH SERPL L TO P-CCNC: 365 U/L (ref 135–246)
LDLC SERPL CALC-MCNC: 92 MG/DL (ref 0–100)
LIPASE SERPL-CCNC: 27 U/L (ref 13–60)
LYMPHOCYTES # BLD: 2.48 K/UL (ref 0.8–3.5)
LYMPHOCYTES NFR BLD: 18 % (ref 12–49)
MAGNESIUM SERPL-MCNC: 2 MG/DL (ref 1.6–2.6)
MCH RBC QN AUTO: 30.1 PG (ref 26–34)
MCHC RBC AUTO-ENTMCNC: 32.1 G/DL (ref 30–36.5)
MCV RBC AUTO: 94 FL (ref 80–99)
METAMYELOCYTES NFR BLD MANUAL: 2 %
MONOCYTES # BLD: 0.97 K/UL (ref 0–1)
MONOCYTES NFR BLD: 7 % (ref 5–13)
MYELOCYTES NFR BLD MANUAL: 3 %
NEUTS BAND NFR BLD MANUAL: 2 % (ref 0–6)
NEUTS SEG # BLD: 9.66 K/UL (ref 1.8–8)
NEUTS SEG NFR BLD: 68 % (ref 32–75)
NRBC # BLD: 0.23 K/UL (ref 0–0.01)
NRBC BLD-RTO: 1.7 PER 100 WBC
PLATELET # BLD AUTO: 246 K/UL (ref 150–400)
PMV BLD AUTO: 10.2 FL (ref 8.9–12.9)
POTASSIUM SERPL-SCNC: 4.3 MMOL/L (ref 3.5–5.1)
PROT SERPL-MCNC: 6.2 G/DL (ref 6.4–8.3)
RBC # BLD AUTO: 2.82 M/UL (ref 3.8–5.2)
RBC MORPH BLD: ABNORMAL
SODIUM SERPL-SCNC: 135 MMOL/L (ref 136–145)
TRIGL SERPL-MCNC: 297 MG/DL (ref 0–150)
VLDLC SERPL CALC-MCNC: 59 MG/DL
WBC # BLD AUTO: 13.8 K/UL (ref 3.6–11)

## 2025-09-02 PROCEDURE — 82248 BILIRUBIN DIRECT: CPT

## 2025-09-02 PROCEDURE — 83690 ASSAY OF LIPASE: CPT

## 2025-09-02 PROCEDURE — 80061 LIPID PANEL: CPT

## 2025-09-02 PROCEDURE — 82150 ASSAY OF AMYLASE: CPT

## 2025-09-02 PROCEDURE — 36591 DRAW BLOOD OFF VENOUS DEVICE: CPT

## 2025-09-02 PROCEDURE — 82977 ASSAY OF GGT: CPT

## 2025-09-02 PROCEDURE — 86140 C-REACTIVE PROTEIN: CPT

## 2025-09-02 PROCEDURE — 83735 ASSAY OF MAGNESIUM: CPT

## 2025-09-02 PROCEDURE — 83615 LACTATE (LD) (LDH) ENZYME: CPT

## 2025-09-02 PROCEDURE — 85025 COMPLETE CBC W/AUTO DIFF WBC: CPT

## 2025-09-02 PROCEDURE — 80053 COMPREHEN METABOLIC PANEL: CPT

## 2025-09-03 ENCOUNTER — HOSPITAL ENCOUNTER (OUTPATIENT)
Facility: HOSPITAL | Age: 35
Setting detail: INFUSION SERIES
Discharge: HOME | End: 2025-09-03
Payer: COMMERCIAL

## 2025-09-03 ENCOUNTER — RESEARCH ENCOUNTER (OUTPATIENT)
Age: 35
End: 2025-09-03

## 2025-09-03 VITALS
WEIGHT: 200.4 LBS | DIASTOLIC BLOOD PRESSURE: 83 MMHG | SYSTOLIC BLOOD PRESSURE: 117 MMHG | HEIGHT: 64 IN | HEART RATE: 98 BPM | TEMPERATURE: 97.3 F | BODY MASS INDEX: 34.21 KG/M2 | OXYGEN SATURATION: 97 % | RESPIRATION RATE: 16 BRPM

## 2025-09-03 DIAGNOSIS — C79.51 CARCINOMA OF LEFT BREAST METASTATIC TO BONE (HCC): ICD-10-CM

## 2025-09-03 DIAGNOSIS — C50.912 CARCINOMA OF LEFT BREAST METASTATIC TO BONE (HCC): ICD-10-CM

## 2025-09-03 DIAGNOSIS — E83.51 HYPOCALCEMIA: Primary | ICD-10-CM

## 2025-09-03 PROCEDURE — 2500000003 HC RX 250 WO HCPCS: Performed by: INTERNAL MEDICINE

## 2025-09-03 PROCEDURE — 6360000002 HC RX W HCPCS: Performed by: INTERNAL MEDICINE

## 2025-09-03 PROCEDURE — 96375 TX/PRO/DX INJ NEW DRUG ADDON: CPT

## 2025-09-03 PROCEDURE — 96413 CHEMO IV INFUSION 1 HR: CPT

## 2025-09-03 PROCEDURE — 2580000003 HC RX 258: Performed by: INTERNAL MEDICINE

## 2025-09-03 RX ORDER — SODIUM CHLORIDE 9 MG/ML
5-250 INJECTION, SOLUTION INTRAVENOUS PRN
Status: DISCONTINUED | OUTPATIENT
Start: 2025-09-03 | End: 2025-09-04 | Stop reason: HOSPADM

## 2025-09-03 RX ORDER — DEXAMETHASONE SODIUM PHOSPHATE 10 MG/ML
10 INJECTION, SOLUTION INTRA-ARTICULAR; INTRALESIONAL; INTRAMUSCULAR; INTRAVENOUS; SOFT TISSUE ONCE
Status: COMPLETED | OUTPATIENT
Start: 2025-09-03 | End: 2025-09-03

## 2025-09-03 RX ORDER — DIPHENHYDRAMINE HYDROCHLORIDE 50 MG/ML
25 INJECTION, SOLUTION INTRAMUSCULAR; INTRAVENOUS ONCE
Status: COMPLETED | OUTPATIENT
Start: 2025-09-03 | End: 2025-09-03

## 2025-09-03 RX ADMIN — DEXAMETHASONE SODIUM PHOSPHATE 10 MG: 10 INJECTION INTRAMUSCULAR; INTRAVENOUS at 09:00

## 2025-09-03 RX ADMIN — DIPHENHYDRAMINE HYDROCHLORIDE 25 MG: 50 INJECTION INTRAMUSCULAR; INTRAVENOUS at 08:56

## 2025-09-03 RX ADMIN — FAMOTIDINE 20 MG: 10 INJECTION, SOLUTION INTRAVENOUS at 08:50

## 2025-09-03 RX ADMIN — PACLITAXEL 186 MG: 6 INJECTION, SOLUTION INTRAVENOUS at 09:48

## 2025-09-03 RX ADMIN — SODIUM CHLORIDE 250 ML/HR: 0.9 INJECTION, SOLUTION INTRAVENOUS at 08:53

## 2025-09-03 ASSESSMENT — PAIN SCALES - GENERAL: PAINLEVEL_OUTOF10: 0

## 2025-10-01 ENCOUNTER — APPOINTMENT (OUTPATIENT)
Facility: HOSPITAL | Age: 35
End: 2025-10-01
Payer: COMMERCIAL

## 2025-10-15 ENCOUNTER — APPOINTMENT (OUTPATIENT)
Facility: HOSPITAL | Age: 35
End: 2025-10-15
Payer: COMMERCIAL

## (undated) DEVICE — SUTURE PROL SZ 3-0 L36IN NONABSORBABLE BLU L26MM SH 1/2 CIR 8522H

## (undated) DEVICE — BLADE ES ELASTOMERIC COAT INSUL DURABLE BEND UPTO 90DEG

## (undated) DEVICE — PENCIL ES CRD L10FT HND SWCHING ROCK SWCH W/ EDGE COAT BLDE

## (undated) DEVICE — BASIN ST MAJOR-NO CAUTERY: Brand: MEDLINE INDUSTRIES, INC.

## (undated) DEVICE — DRAPE,CHEST,FENES,15X10,STERIL: Brand: MEDLINE

## (undated) DEVICE — LIQUIBAND RAPID ADHESIVE 36/CS 0.8ML: Brand: MEDLINE

## (undated) DEVICE — SYRINGE MED 10ML LUERLOCK TIP W/O SFTY DISP

## (undated) DEVICE — TOWEL,OR,DSP,ST,BLUE,STD,4/PK,20PK/CS: Brand: MEDLINE

## (undated) DEVICE — HYPODERMIC SAFETY NEEDLE: Brand: MONOJECT

## (undated) DEVICE — SUTURE MONOCRYL + SZ 4-0 L27IN ABSRB UD L19MM PS-2 3/8 CIR MCP426H

## (undated) DEVICE — PENCIL SMK EVAC ALL IN 1 DSGN ENH VISIBILITY IMPROVED AIR

## (undated) DEVICE — INTENT OT USE PROVIDES A STERILE INTERFACE BETWEEN THE OPERATING ROOM SURGICAL LAMPS (NON-STERILE) AND THE SURGEON OR STAFF WORKING IN THE STERILE FIELD.: Brand: ASPEN® ALC PLUS LIGHT HANDLE COVER

## (undated) DEVICE — GLOVE SURG SZ 6 L12IN FNGR THK79MIL GRN LTX FREE

## (undated) DEVICE — ELECTRODE PT RET AD L9FT HI MOIST COND ADH HYDRGEL CORDED

## (undated) DEVICE — C-ARM: Brand: UNBRANDED

## (undated) DEVICE — SOLUTION IV 500ML 0.9% SOD CHL PH 5 INJ USP VIAFLX PLAS

## (undated) DEVICE — X-RAY DETECTABLE SPONGES,16 PLY: Brand: VISTEC

## (undated) DEVICE — DECANTER BAG 9": Brand: MEDLINE INDUSTRIES, INC.

## (undated) DEVICE — APPLICATOR MEDICATED 26 CC SOLUTION HI LT ORNG CHLORAPREP

## (undated) DEVICE — SUTURE VICRYL + SZ 3-0 L27IN ABSRB UD L26MM SH 1/2 CIR VCP416H